# Patient Record
Sex: MALE | Race: WHITE | NOT HISPANIC OR LATINO | Employment: UNEMPLOYED | URBAN - METROPOLITAN AREA
[De-identification: names, ages, dates, MRNs, and addresses within clinical notes are randomized per-mention and may not be internally consistent; named-entity substitution may affect disease eponyms.]

---

## 2018-01-02 ENCOUNTER — OFFICE VISIT CONVERTED (OUTPATIENT)
Dept: FAMILY MEDICINE CLINIC | Facility: CLINIC | Age: 58
End: 2018-01-02
Attending: NURSE PRACTITIONER

## 2018-03-12 ENCOUNTER — CONVERSION ENCOUNTER (OUTPATIENT)
Dept: CARDIOLOGY | Facility: CLINIC | Age: 58
End: 2018-03-12

## 2018-03-12 ENCOUNTER — OFFICE VISIT CONVERTED (OUTPATIENT)
Dept: CARDIOLOGY | Facility: CLINIC | Age: 58
End: 2018-03-12
Attending: NURSE PRACTITIONER

## 2018-03-12 ENCOUNTER — OFFICE VISIT CONVERTED (OUTPATIENT)
Dept: FAMILY MEDICINE CLINIC | Facility: CLINIC | Age: 58
End: 2018-03-12
Attending: NURSE PRACTITIONER

## 2018-03-26 ENCOUNTER — PATIENT OUTREACH - CONVERTED (OUTPATIENT)
Dept: FAMILY MEDICINE CLINIC | Facility: CLINIC | Age: 58
End: 2018-03-26
Attending: NURSE PRACTITIONER

## 2018-06-05 ENCOUNTER — OFFICE VISIT CONVERTED (OUTPATIENT)
Dept: FAMILY MEDICINE CLINIC | Facility: CLINIC | Age: 58
End: 2018-06-05
Attending: NURSE PRACTITIONER

## 2018-10-23 ENCOUNTER — OFFICE VISIT CONVERTED (OUTPATIENT)
Dept: FAMILY MEDICINE CLINIC | Facility: CLINIC | Age: 58
End: 2018-10-23
Attending: NURSE PRACTITIONER

## 2019-02-18 ENCOUNTER — HOSPITAL ENCOUNTER (OUTPATIENT)
Dept: URGENT CARE | Facility: CLINIC | Age: 59
Discharge: HOME | End: 2019-02-18
Attending: NURSE PRACTITIONER

## 2019-03-18 ENCOUNTER — OFFICE VISIT CONVERTED (OUTPATIENT)
Dept: FAMILY MEDICINE CLINIC | Facility: CLINIC | Age: 59
End: 2019-03-18
Attending: NURSE PRACTITIONER

## 2019-03-21 ENCOUNTER — HOSPITAL ENCOUNTER (OUTPATIENT)
Dept: FAMILY MEDICINE CLINIC | Facility: CLINIC | Age: 59
Discharge: HOME | End: 2019-03-21
Attending: NURSE PRACTITIONER

## 2019-03-21 LAB
ALBUMIN SERPL-MCNC: 4.3 G/DL (ref 3.5–5)
ALBUMIN/GLOB SERPL: 1.5 {RATIO} (ref 1.4–2.6)
ALP SERPL-CCNC: 50 U/L (ref 56–119)
ALT SERPL-CCNC: 25 U/L (ref 10–40)
ANION GAP SERPL CALC-SCNC: 15 MMOL/L (ref 8–19)
AST SERPL-CCNC: 24 U/L (ref 15–50)
BASOPHILS # BLD AUTO: 0.07 10*3/UL (ref 0–0.2)
BASOPHILS NFR BLD AUTO: 0.8 % (ref 0–3)
BILIRUB SERPL-MCNC: 0.29 MG/DL (ref 0.2–1.3)
BUN SERPL-MCNC: 15 MG/DL (ref 5–25)
BUN/CREAT SERPL: 12 {RATIO} (ref 6–20)
CALCIUM SERPL-MCNC: 9.2 MG/DL (ref 8.7–10.4)
CHLORIDE SERPL-SCNC: 102 MMOL/L (ref 99–111)
CHOLEST SERPL-MCNC: 134 MG/DL (ref 107–200)
CHOLEST/HDLC SERPL: 4.6 {RATIO} (ref 3–6)
CONV ABS IMM GRAN: 0.02 10*3/UL (ref 0–0.2)
CONV CO2: 22 MMOL/L (ref 22–32)
CONV CREATININE URINE, RANDOM: 15.1 MG/DL (ref 10–300)
CONV IMMATURE GRAN: 0.2 % (ref 0–1.8)
CONV MICROALBUM.,U,RANDOM: <12 MG/L (ref 0–20)
CONV TOTAL PROTEIN: 7.2 G/DL (ref 6.3–8.2)
CREAT UR-MCNC: 1.27 MG/DL (ref 0.7–1.2)
DEPRECATED RDW RBC AUTO: 45 FL (ref 35.1–43.9)
EOSINOPHIL # BLD AUTO: 0.38 10*3/UL (ref 0–0.7)
EOSINOPHIL # BLD AUTO: 4.4 % (ref 0–7)
ERYTHROCYTE [DISTWIDTH] IN BLOOD BY AUTOMATED COUNT: 13.3 % (ref 11.6–14.4)
EST. AVERAGE GLUCOSE BLD GHB EST-MCNC: 177 MG/DL
GFR SERPLBLD BASED ON 1.73 SQ M-ARVRAT: >60 ML/MIN/{1.73_M2}
GLOBULIN UR ELPH-MCNC: 2.9 G/DL (ref 2–3.5)
GLUCOSE SERPL-MCNC: 130 MG/DL (ref 70–99)
HBA1C MFR BLD: 15.1 G/DL (ref 14–18)
HBA1C MFR BLD: 7.8 % (ref 3.5–5.7)
HCT VFR BLD AUTO: 46.8 % (ref 42–52)
HDLC SERPL-MCNC: 29 MG/DL (ref 40–60)
LDLC SERPL CALC-MCNC: 77 MG/DL (ref 70–100)
LYMPHOCYTES # BLD AUTO: 3.02 10*3/UL (ref 1–5)
MCH RBC QN AUTO: 29.3 PG (ref 27–31)
MCHC RBC AUTO-ENTMCNC: 32.3 G/DL (ref 33–37)
MCV RBC AUTO: 90.7 FL (ref 80–96)
MICROALBUMIN/CREAT UR: 79.5 MG/G{CRE} (ref 0–25)
MONOCYTES # BLD AUTO: 0.64 10*3/UL (ref 0.2–1.2)
MONOCYTES NFR BLD AUTO: 7.5 % (ref 3–10)
NEUTROPHILS # BLD AUTO: 4.42 10*3/UL (ref 2–8)
NEUTROPHILS NFR BLD AUTO: 51.8 % (ref 30–85)
NRBC CBCN: 0 % (ref 0–0.7)
OSMOLALITY SERPL CALC.SUM OF ELEC: 283 MOSM/KG (ref 273–304)
PLATELET # BLD AUTO: 233 10*3/UL (ref 130–400)
PMV BLD AUTO: 11.1 FL (ref 9.4–12.4)
POTASSIUM SERPL-SCNC: 4.3 MMOL/L (ref 3.5–5.3)
RBC # BLD AUTO: 5.16 10*6/UL (ref 4.7–6.1)
SODIUM SERPL-SCNC: 135 MMOL/L (ref 135–147)
T4 FREE SERPL-MCNC: 1.2 NG/DL (ref 0.9–1.8)
TRIGL SERPL-MCNC: 142 MG/DL (ref 40–150)
TSH SERPL-ACNC: 6.11 M[IU]/L (ref 0.27–4.2)
VARIANT LYMPHS NFR BLD MANUAL: 35.3 % (ref 20–45)
VLDLC SERPL-MCNC: 28 MG/DL (ref 5–37)
WBC # BLD AUTO: 8.55 10*3/UL (ref 4.8–10.8)

## 2019-05-30 ENCOUNTER — CONVERSION ENCOUNTER (OUTPATIENT)
Dept: CARDIOLOGY | Facility: CLINIC | Age: 59
End: 2019-05-30

## 2019-05-30 ENCOUNTER — OFFICE VISIT CONVERTED (OUTPATIENT)
Dept: CARDIOLOGY | Facility: CLINIC | Age: 59
End: 2019-05-30
Attending: INTERNAL MEDICINE

## 2019-06-11 ENCOUNTER — CONVERSION ENCOUNTER (OUTPATIENT)
Dept: FAMILY MEDICINE CLINIC | Facility: CLINIC | Age: 59
End: 2019-06-11

## 2019-06-11 ENCOUNTER — OFFICE VISIT CONVERTED (OUTPATIENT)
Dept: FAMILY MEDICINE CLINIC | Facility: CLINIC | Age: 59
End: 2019-06-11
Attending: NURSE PRACTITIONER

## 2019-06-11 ENCOUNTER — HOSPITAL ENCOUNTER (OUTPATIENT)
Dept: FAMILY MEDICINE CLINIC | Facility: CLINIC | Age: 59
Discharge: HOME | End: 2019-06-11
Attending: NURSE PRACTITIONER

## 2019-06-11 LAB
ALBUMIN SERPL-MCNC: 4.3 G/DL (ref 3.5–5)
ALBUMIN/GLOB SERPL: 1.2 {RATIO} (ref 1.4–2.6)
ALP SERPL-CCNC: 70 U/L (ref 56–119)
ALT SERPL-CCNC: 22 U/L (ref 10–40)
ANION GAP SERPL CALC-SCNC: 20 MMOL/L (ref 8–19)
AST SERPL-CCNC: 20 U/L (ref 15–50)
BASOPHILS # BLD AUTO: 0.08 10*3/UL (ref 0–0.2)
BASOPHILS NFR BLD AUTO: 0.7 % (ref 0–3)
BILIRUB SERPL-MCNC: 0.34 MG/DL (ref 0.2–1.3)
BUN SERPL-MCNC: 18 MG/DL (ref 5–25)
BUN/CREAT SERPL: 16 {RATIO} (ref 6–20)
CALCIUM SERPL-MCNC: 9 MG/DL (ref 8.7–10.4)
CHLORIDE SERPL-SCNC: 98 MMOL/L (ref 99–111)
CHOLEST SERPL-MCNC: 127 MG/DL (ref 107–200)
CHOLEST/HDLC SERPL: 5.5 {RATIO} (ref 3–6)
CONV ABS IMM GRAN: 0.03 10*3/UL (ref 0–0.2)
CONV CO2: 23 MMOL/L (ref 22–32)
CONV CREATININE URINE, RANDOM: 80.6 MG/DL (ref 10–300)
CONV IMMATURE GRAN: 0.3 % (ref 0–1.8)
CONV MICROALBUM.,U,RANDOM: <12 MG/L (ref 0–20)
CONV TOTAL PROTEIN: 7.8 G/DL (ref 6.3–8.2)
CREAT UR-MCNC: 1.11 MG/DL (ref 0.7–1.2)
DEPRECATED RDW RBC AUTO: 40.1 FL (ref 35.1–43.9)
EOSINOPHIL # BLD AUTO: 0.25 10*3/UL (ref 0–0.7)
EOSINOPHIL # BLD AUTO: 2.3 % (ref 0–7)
ERYTHROCYTE [DISTWIDTH] IN BLOOD BY AUTOMATED COUNT: 12.8 % (ref 11.6–14.4)
EST. AVERAGE GLUCOSE BLD GHB EST-MCNC: 200 MG/DL
GFR SERPLBLD BASED ON 1.73 SQ M-ARVRAT: >60 ML/MIN/{1.73_M2}
GLOBULIN UR ELPH-MCNC: 3.5 G/DL (ref 2–3.5)
GLUCOSE SERPL-MCNC: 217 MG/DL (ref 70–99)
HBA1C MFR BLD: 17.3 G/DL (ref 14–18)
HBA1C MFR BLD: 8.6 % (ref 3.5–5.7)
HCT VFR BLD AUTO: 50 % (ref 42–52)
HDLC SERPL-MCNC: 23 MG/DL (ref 40–60)
LDLC SERPL CALC-MCNC: 85 MG/DL (ref 70–100)
LYMPHOCYTES # BLD AUTO: 2.51 10*3/UL (ref 1–5)
MCH RBC QN AUTO: 30.1 PG (ref 27–31)
MCHC RBC AUTO-ENTMCNC: 34.6 G/DL (ref 33–37)
MCV RBC AUTO: 87.1 FL (ref 80–96)
MICROALBUMIN/CREAT UR: 14.9 MG/G{CRE} (ref 0–25)
MONOCYTES # BLD AUTO: 0.8 10*3/UL (ref 0.2–1.2)
MONOCYTES NFR BLD AUTO: 7.3 % (ref 3–10)
NEUTROPHILS # BLD AUTO: 7.36 10*3/UL (ref 2–8)
NEUTROPHILS NFR BLD AUTO: 66.6 % (ref 30–85)
NRBC CBCN: 0 % (ref 0–0.7)
OSMOLALITY SERPL CALC.SUM OF ELEC: 292 MOSM/KG (ref 273–304)
PLATELET # BLD AUTO: 225 10*3/UL (ref 130–400)
PMV BLD AUTO: 11.2 FL (ref 9.4–12.4)
POTASSIUM SERPL-SCNC: 4.4 MMOL/L (ref 3.5–5.3)
RBC # BLD AUTO: 5.74 10*6/UL (ref 4.7–6.1)
SODIUM SERPL-SCNC: 137 MMOL/L (ref 135–147)
T4 FREE SERPL-MCNC: 1.2 NG/DL (ref 0.9–1.8)
TRIGL SERPL-MCNC: 458 MG/DL (ref 40–150)
TSH SERPL-ACNC: 2.41 M[IU]/L (ref 0.27–4.2)
VARIANT LYMPHS NFR BLD MANUAL: 22.8 % (ref 20–45)
WBC # BLD AUTO: 11.03 10*3/UL (ref 4.8–10.8)

## 2019-07-29 ENCOUNTER — HOSPITAL ENCOUNTER (OUTPATIENT)
Dept: FAMILY MEDICINE CLINIC | Facility: CLINIC | Age: 59
Discharge: HOME | End: 2019-07-29
Attending: NURSE PRACTITIONER

## 2019-07-29 LAB
BASOPHILS # BLD AUTO: 0.06 10*3/UL (ref 0–0.2)
BASOPHILS NFR BLD AUTO: 0.6 % (ref 0–3)
CONV ABS IMM GRAN: 0.05 10*3/UL (ref 0–0.2)
CONV IMMATURE GRAN: 0.5 % (ref 0–1.8)
DEPRECATED RDW RBC AUTO: 40.5 FL (ref 35.1–43.9)
EOSINOPHIL # BLD AUTO: 0.15 10*3/UL (ref 0–0.7)
EOSINOPHIL # BLD AUTO: 1.5 % (ref 0–7)
ERYTHROCYTE [DISTWIDTH] IN BLOOD BY AUTOMATED COUNT: 13.1 % (ref 11.6–14.4)
HBA1C MFR BLD: 15.9 G/DL (ref 14–18)
HCT VFR BLD AUTO: 47.5 % (ref 42–52)
LYMPHOCYTES # BLD AUTO: 3.08 10*3/UL (ref 1–5)
MCH RBC QN AUTO: 29.1 PG (ref 27–31)
MCHC RBC AUTO-ENTMCNC: 33.5 G/DL (ref 33–37)
MCV RBC AUTO: 86.8 FL (ref 80–96)
MONOCYTES # BLD AUTO: 0.83 10*3/UL (ref 0.2–1.2)
MONOCYTES NFR BLD AUTO: 8.1 % (ref 3–10)
NEUTROPHILS # BLD AUTO: 6.02 10*3/UL (ref 2–8)
NEUTROPHILS NFR BLD AUTO: 59.1 % (ref 30–85)
NRBC CBCN: 0 % (ref 0–0.7)
PLATELET # BLD AUTO: 210 10*3/UL (ref 130–400)
PMV BLD AUTO: 11.7 FL (ref 9.4–12.4)
RBC # BLD AUTO: 5.47 10*6/UL (ref 4.7–6.1)
VARIANT LYMPHS NFR BLD MANUAL: 30.2 % (ref 20–45)
WBC # BLD AUTO: 10.19 10*3/UL (ref 4.8–10.8)

## 2019-09-04 ENCOUNTER — OFFICE VISIT CONVERTED (OUTPATIENT)
Dept: CARDIOLOGY | Facility: CLINIC | Age: 59
End: 2019-09-04
Attending: INTERNAL MEDICINE

## 2019-09-04 ENCOUNTER — HOSPITAL ENCOUNTER (OUTPATIENT)
Dept: FAMILY MEDICINE CLINIC | Facility: CLINIC | Age: 59
Discharge: HOME | End: 2019-09-04
Attending: NURSE PRACTITIONER

## 2019-09-04 ENCOUNTER — OFFICE VISIT CONVERTED (OUTPATIENT)
Dept: FAMILY MEDICINE CLINIC | Facility: CLINIC | Age: 59
End: 2019-09-04
Attending: NURSE PRACTITIONER

## 2019-09-04 ENCOUNTER — CONVERSION ENCOUNTER (OUTPATIENT)
Dept: CARDIOLOGY | Facility: CLINIC | Age: 59
End: 2019-09-04

## 2019-09-04 LAB
ALBUMIN SERPL-MCNC: 4.2 G/DL (ref 3.5–5)
ALBUMIN/GLOB SERPL: 1.4 {RATIO} (ref 1.4–2.6)
ALP SERPL-CCNC: 49 U/L (ref 56–155)
ALT SERPL-CCNC: 23 U/L (ref 10–40)
ANION GAP SERPL CALC-SCNC: 18 MMOL/L (ref 8–19)
AST SERPL-CCNC: 20 U/L (ref 15–50)
BASOPHILS # BLD AUTO: 0.04 10*3/UL (ref 0–0.2)
BASOPHILS NFR BLD AUTO: 0.4 % (ref 0–3)
BILIRUB SERPL-MCNC: 0.3 MG/DL (ref 0.2–1.3)
BUN SERPL-MCNC: 8 MG/DL (ref 5–25)
BUN/CREAT SERPL: 8 {RATIO} (ref 6–20)
CALCIUM SERPL-MCNC: 9.1 MG/DL (ref 8.7–10.4)
CHLORIDE SERPL-SCNC: 102 MMOL/L (ref 99–111)
CHOLEST SERPL-MCNC: 119 MG/DL (ref 107–200)
CHOLEST/HDLC SERPL: 4.8 {RATIO} (ref 3–6)
CONV ABS IMM GRAN: 0.03 10*3/UL (ref 0–0.2)
CONV CO2: 22 MMOL/L (ref 22–32)
CONV IMMATURE GRAN: 0.3 % (ref 0–1.8)
CONV TOTAL PROTEIN: 7.1 G/DL (ref 6.3–8.2)
CREAT UR-MCNC: 0.98 MG/DL (ref 0.7–1.2)
DEPRECATED RDW RBC AUTO: 45.7 FL (ref 35.1–43.9)
EOSINOPHIL # BLD AUTO: 0.35 10*3/UL (ref 0–0.7)
EOSINOPHIL # BLD AUTO: 3.9 % (ref 0–7)
ERYTHROCYTE [DISTWIDTH] IN BLOOD BY AUTOMATED COUNT: 13.6 % (ref 11.6–14.4)
EST. AVERAGE GLUCOSE BLD GHB EST-MCNC: 206 MG/DL
GFR SERPLBLD BASED ON 1.73 SQ M-ARVRAT: >60 ML/MIN/{1.73_M2}
GLOBULIN UR ELPH-MCNC: 2.9 G/DL (ref 2–3.5)
GLUCOSE SERPL-MCNC: 137 MG/DL (ref 70–99)
HBA1C MFR BLD: 8.8 % (ref 3.5–5.7)
HCT VFR BLD AUTO: 47.2 % (ref 42–52)
HDLC SERPL-MCNC: 25 MG/DL (ref 40–60)
HGB BLD-MCNC: 15.1 G/DL (ref 14–18)
LDLC SERPL CALC-MCNC: 64 MG/DL (ref 70–100)
LYMPHOCYTES # BLD AUTO: 3.1 10*3/UL (ref 1–5)
LYMPHOCYTES NFR BLD AUTO: 34.8 % (ref 20–45)
MCH RBC QN AUTO: 29 PG (ref 27–31)
MCHC RBC AUTO-ENTMCNC: 32 G/DL (ref 33–37)
MCV RBC AUTO: 90.8 FL (ref 80–96)
MONOCYTES # BLD AUTO: 0.84 10*3/UL (ref 0.2–1.2)
MONOCYTES NFR BLD AUTO: 9.4 % (ref 3–10)
NEUTROPHILS # BLD AUTO: 4.56 10*3/UL (ref 2–8)
NEUTROPHILS NFR BLD AUTO: 51.2 % (ref 30–85)
NRBC CBCN: 0 % (ref 0–0.7)
OSMOLALITY SERPL CALC.SUM OF ELEC: 286 MOSM/KG (ref 273–304)
PLATELET # BLD AUTO: 225 10*3/UL (ref 130–400)
PMV BLD AUTO: 11 FL (ref 9.4–12.4)
POTASSIUM SERPL-SCNC: 4.3 MMOL/L (ref 3.5–5.3)
RBC # BLD AUTO: 5.2 10*6/UL (ref 4.7–6.1)
SODIUM SERPL-SCNC: 138 MMOL/L (ref 135–147)
T4 FREE SERPL-MCNC: 1 NG/DL (ref 0.9–1.8)
TRIGL SERPL-MCNC: 149 MG/DL (ref 40–150)
TSH SERPL-ACNC: 3.7 M[IU]/L (ref 0.27–4.2)
VLDLC SERPL-MCNC: 30 MG/DL (ref 5–37)
WBC # BLD AUTO: 8.92 10*3/UL (ref 4.8–10.8)

## 2020-07-23 ENCOUNTER — OFFICE VISIT CONVERTED (OUTPATIENT)
Dept: FAMILY MEDICINE CLINIC | Facility: CLINIC | Age: 60
End: 2020-07-23
Attending: NURSE PRACTITIONER

## 2020-07-23 ENCOUNTER — HOSPITAL ENCOUNTER (OUTPATIENT)
Dept: FAMILY MEDICINE CLINIC | Facility: CLINIC | Age: 60
Discharge: HOME | End: 2020-07-23
Attending: NURSE PRACTITIONER

## 2020-07-23 ENCOUNTER — CONVERSION ENCOUNTER (OUTPATIENT)
Dept: FAMILY MEDICINE CLINIC | Facility: CLINIC | Age: 60
End: 2020-07-23

## 2020-07-23 LAB
ALBUMIN SERPL-MCNC: 4.3 G/DL (ref 3.5–5)
ALBUMIN/GLOB SERPL: 1.5 {RATIO} (ref 1.4–2.6)
ALP SERPL-CCNC: 58 U/L (ref 56–155)
ALT SERPL-CCNC: 26 U/L (ref 10–40)
ANION GAP SERPL CALC-SCNC: 20 MMOL/L (ref 8–19)
AST SERPL-CCNC: 15 U/L (ref 15–50)
BASOPHILS # BLD AUTO: 0.07 10*3/UL (ref 0–0.2)
BASOPHILS NFR BLD AUTO: 0.7 % (ref 0–3)
BILIRUB SERPL-MCNC: 0.27 MG/DL (ref 0.2–1.3)
BUN SERPL-MCNC: 13 MG/DL (ref 5–25)
BUN/CREAT SERPL: 12 {RATIO} (ref 6–20)
CALCIUM SERPL-MCNC: 9.3 MG/DL (ref 8.7–10.4)
CHLORIDE SERPL-SCNC: 98 MMOL/L (ref 99–111)
CHOLEST SERPL-MCNC: 153 MG/DL (ref 107–200)
CHOLEST/HDLC SERPL: 5.7 {RATIO} (ref 3–6)
CONV ABS IMM GRAN: 0.04 10*3/UL (ref 0–0.2)
CONV CO2: 22 MMOL/L (ref 22–32)
CONV CREATININE URINE, RANDOM: 42.5 MG/DL (ref 10–300)
CONV IMMATURE GRAN: 0.4 % (ref 0–1.8)
CONV MICROALBUM.,U,RANDOM: <12 MG/L (ref 0–20)
CONV TOTAL PROTEIN: 7.2 G/DL (ref 6.3–8.2)
CREAT UR-MCNC: 1.1 MG/DL (ref 0.7–1.2)
DEPRECATED RDW RBC AUTO: 38.9 FL (ref 35.1–43.9)
EOSINOPHIL # BLD AUTO: 0.28 10*3/UL (ref 0–0.7)
EOSINOPHIL # BLD AUTO: 2.9 % (ref 0–7)
ERYTHROCYTE [DISTWIDTH] IN BLOOD BY AUTOMATED COUNT: 12.1 % (ref 11.6–14.4)
EST. AVERAGE GLUCOSE BLD GHB EST-MCNC: 243 MG/DL
GFR SERPLBLD BASED ON 1.73 SQ M-ARVRAT: >60 ML/MIN/{1.73_M2}
GLOBULIN UR ELPH-MCNC: 2.9 G/DL (ref 2–3.5)
GLUCOSE SERPL-MCNC: 338 MG/DL (ref 70–99)
HBA1C MFR BLD: 10.1 % (ref 3.5–5.7)
HCT VFR BLD AUTO: 48.7 % (ref 42–52)
HDLC SERPL-MCNC: 27 MG/DL (ref 40–60)
HGB BLD-MCNC: 16.3 G/DL (ref 14–18)
LDLC SERPL CALC-MCNC: 50 MG/DL (ref 70–100)
LYMPHOCYTES # BLD AUTO: 2.65 10*3/UL (ref 1–5)
LYMPHOCYTES NFR BLD AUTO: 27.4 % (ref 20–45)
MCH RBC QN AUTO: 29.5 PG (ref 27–31)
MCHC RBC AUTO-ENTMCNC: 33.5 G/DL (ref 33–37)
MCV RBC AUTO: 88.2 FL (ref 80–96)
MICROALBUMIN/CREAT UR: 28.2 MG/G{CRE} (ref 0–25)
MONOCYTES # BLD AUTO: 0.64 10*3/UL (ref 0.2–1.2)
MONOCYTES NFR BLD AUTO: 6.6 % (ref 3–10)
NEUTROPHILS # BLD AUTO: 6 10*3/UL (ref 2–8)
NEUTROPHILS NFR BLD AUTO: 62 % (ref 30–85)
NRBC CBCN: 0 % (ref 0–0.7)
OSMOLALITY SERPL CALC.SUM OF ELEC: 295 MOSM/KG (ref 273–304)
PLATELET # BLD AUTO: 222 10*3/UL (ref 130–400)
PMV BLD AUTO: 11.7 FL (ref 9.4–12.4)
POTASSIUM SERPL-SCNC: 4 MMOL/L (ref 3.5–5.3)
RBC # BLD AUTO: 5.52 10*6/UL (ref 4.7–6.1)
SODIUM SERPL-SCNC: 136 MMOL/L (ref 135–147)
TRIGL SERPL-MCNC: 381 MG/DL (ref 40–150)
TSH SERPL-ACNC: 4.53 M[IU]/L (ref 0.27–4.2)
VLDLC SERPL-MCNC: 76 MG/DL (ref 5–37)
WBC # BLD AUTO: 9.68 10*3/UL (ref 4.8–10.8)

## 2020-07-25 LAB
PSA FREE MFR SERPL: 28.6 %
PSA FREE SERPL-MCNC: 0.6 NG/ML
PSA SERPL-MCNC: 2.1 NG/ML (ref 0–4)

## 2020-10-20 ENCOUNTER — OFFICE VISIT CONVERTED (OUTPATIENT)
Dept: FAMILY MEDICINE CLINIC | Facility: CLINIC | Age: 60
End: 2020-10-20
Attending: NURSE PRACTITIONER

## 2020-10-20 ENCOUNTER — HOSPITAL ENCOUNTER (OUTPATIENT)
Dept: FAMILY MEDICINE CLINIC | Facility: CLINIC | Age: 60
Discharge: HOME | End: 2020-10-20
Attending: NURSE PRACTITIONER

## 2020-10-20 LAB
BASOPHILS # BLD AUTO: 0.07 10*3/UL (ref 0–0.2)
BASOPHILS NFR BLD AUTO: 0.7 % (ref 0–3)
CONV ABS IMM GRAN: 0.02 10*3/UL (ref 0–0.2)
CONV IMMATURE GRAN: 0.2 % (ref 0–1.8)
DEPRECATED RDW RBC AUTO: 42.1 FL (ref 35.1–43.9)
EOSINOPHIL # BLD AUTO: 0.26 10*3/UL (ref 0–0.7)
EOSINOPHIL # BLD AUTO: 2.7 % (ref 0–7)
ERYTHROCYTE [DISTWIDTH] IN BLOOD BY AUTOMATED COUNT: 13.2 % (ref 11.6–14.4)
EST. AVERAGE GLUCOSE BLD GHB EST-MCNC: 180 MG/DL
HBA1C MFR BLD: 7.9 % (ref 3.5–5.7)
HCT VFR BLD AUTO: 53 % (ref 42–52)
HGB BLD-MCNC: 17.7 G/DL (ref 14–18)
LYMPHOCYTES # BLD AUTO: 3.34 10*3/UL (ref 1–5)
LYMPHOCYTES NFR BLD AUTO: 34.4 % (ref 20–45)
MCH RBC QN AUTO: 29.3 PG (ref 27–31)
MCHC RBC AUTO-ENTMCNC: 33.4 G/DL (ref 33–37)
MCV RBC AUTO: 87.6 FL (ref 80–96)
MONOCYTES # BLD AUTO: 0.65 10*3/UL (ref 0.2–1.2)
MONOCYTES NFR BLD AUTO: 6.7 % (ref 3–10)
NEUTROPHILS # BLD AUTO: 5.36 10*3/UL (ref 2–8)
NEUTROPHILS NFR BLD AUTO: 55.3 % (ref 30–85)
NRBC CBCN: 0 % (ref 0–0.7)
PLATELET # BLD AUTO: 239 10*3/UL (ref 130–400)
PMV BLD AUTO: 11.6 FL (ref 9.4–12.4)
RBC # BLD AUTO: 6.05 10*6/UL (ref 4.7–6.1)
WBC # BLD AUTO: 9.7 10*3/UL (ref 4.8–10.8)

## 2020-10-21 LAB
ALBUMIN SERPL-MCNC: 4.8 G/DL (ref 3.5–5)
ALBUMIN/GLOB SERPL: 1.5 {RATIO} (ref 1.4–2.6)
ALP SERPL-CCNC: 94 U/L (ref 56–155)
ALT SERPL-CCNC: 29 U/L (ref 10–40)
ANION GAP SERPL CALC-SCNC: 19 MMOL/L (ref 8–19)
AST SERPL-CCNC: 27 U/L (ref 15–50)
BILIRUB SERPL-MCNC: 0.76 MG/DL (ref 0.2–1.3)
BNP SERPL-MCNC: 163 PG/ML (ref 0–900)
BUN SERPL-MCNC: 9 MG/DL (ref 5–25)
BUN/CREAT SERPL: 8 {RATIO} (ref 6–20)
CALCIUM SERPL-MCNC: 10 MG/DL (ref 8.7–10.4)
CHLORIDE SERPL-SCNC: 102 MMOL/L (ref 99–111)
CONV CO2: 22 MMOL/L (ref 22–32)
CONV TOTAL PROTEIN: 8.1 G/DL (ref 6.3–8.2)
CREAT UR-MCNC: 1.14 MG/DL (ref 0.7–1.2)
GFR SERPLBLD BASED ON 1.73 SQ M-ARVRAT: >60 ML/MIN/{1.73_M2}
GLOBULIN UR ELPH-MCNC: 3.3 G/DL (ref 2–3.5)
GLUCOSE SERPL-MCNC: 82 MG/DL (ref 70–99)
OSMOLALITY SERPL CALC.SUM OF ELEC: 286 MOSM/KG (ref 273–304)
POTASSIUM SERPL-SCNC: 4.2 MMOL/L (ref 3.5–5.3)
SODIUM SERPL-SCNC: 139 MMOL/L (ref 135–147)
T4 FREE SERPL-MCNC: 1.4 NG/DL (ref 0.9–1.8)
TSH SERPL-ACNC: 2.49 M[IU]/L (ref 0.27–4.2)

## 2020-11-23 ENCOUNTER — CONVERSION ENCOUNTER (OUTPATIENT)
Dept: CARDIOLOGY | Facility: CLINIC | Age: 60
End: 2020-11-23

## 2020-11-23 ENCOUNTER — OFFICE VISIT CONVERTED (OUTPATIENT)
Dept: CARDIOLOGY | Facility: CLINIC | Age: 60
End: 2020-11-23
Attending: INTERNAL MEDICINE

## 2020-12-02 ENCOUNTER — CONVERSION ENCOUNTER (OUTPATIENT)
Dept: CARDIOLOGY | Facility: CLINIC | Age: 60
End: 2020-12-02
Attending: INTERNAL MEDICINE

## 2021-05-10 NOTE — PROCEDURES
"   Procedure Note      Patient Name: Alberto Rachel   Patient ID: [id number]   Sex: Male   YOB: 1960    Primary Care Provider: Parul GUERRIER    Visit Date: December 2, 2020    Provider: Darin Cruz MD   Location[facility]   Location Address: [address] [place] [address], [address]   VLADIMIR Yung  [id number]   Location Phone: [phone]          FINAL REPORT   TRANSTHORACIC ECHOCARDIOGRAM REPORT    Diagnosis: Shortness of breath   Height: 5'8\" Weight: 231 B/P: 190/86 BSA: 2.2   Tech: BNS   MEASUREMENTS:  RVID (Diastole) : RVID. (NORMAL: 0.7 to 2.4 cm max)   LVID (Systole): 4.3 cm (Diastole): 5.5 cm . (NORMAL: 3.7 - 5.4 cm)   Posterior Wall Thickness (Diastole): 0.9 cm. (NORMAL: 0.8 - 1.1 cm)   Septal Thickness (Diastole): 1.1 cm. (NORMAL: 0.7 - 1.2 cm)   LAID (Systole): 4.9 cm. (NORMAL: 1.9 - 3.8 cm)   Aortic Root Diameter (Diastole): 3.2 cm. (NORMAL: 2.0 - 3.7 cm)   COMMENTS:  The patient underwent 2-D, M-Mode, and Doppler examination, including pulse-wave, continuous-wave, and color-flow analysis; the study is technically adequate.   FINDINGS:  MITRAL VALVE: Normal. E to F slope was normal. No evidence of any prolapse.   AORTIC VALVE: Normal with three cusps. Normal central closure. No evidence of any obstruction.   TRICUSPID VALVE: Normal.   PULMONIC VALVE: Normal.   LEFT ATRIUM: Normal; no masses seen. LA volume is 30 mL/m2.   AORTIC ROOT: Normal in size and motion.   LEFT VENTRICLE: The left ventricular chamber size is normal. The left ventricular wall thickness is normal. The left ventricular systolic function is preserved with estimated EF of 55%. There is mild hypokinesis of the inferior wall and proximal interventricular septum.   RIGHT ATRIUM: Normal.   RIGHT VENTRICLE: Normal size and function.   PERICARDIUM: No effusion.   INFERIOR VENA CAVA: Diameter is 1.7 cm with greater than 50% reduction with inspiration.   DOPPLER: Pulse-wave, continuous wave, and color-flow Doppler " evaluation was performed. E/A ratio is 2.2. DT= 158 msec. IVRT is 81 msec. E/E' is 13. There is trace aortic valve regurgitation present. There is tricuspid regurgitation with normal estimated pulmonary artery systolic pressure.   Faxed: 12/08/2020      CONCLUSION:  1.  Left ventricular chamber size is normal. The left ventricular wall thickness is normal. The left ventricular        systolic function is preserved with estimated EF of 55%. There is mild hypokinesis of the inferior wall and        proximal interventricular septum.   2.  Grade 2-3 left ventricular diastolic dysfunction.   3.  Trace aortic regurgitation of no hemodynamic consequence.   4.  Tricuspid regurgitation with normal estimated pulmonary artery systolic pressure.      Darin Cruz MD, [facility]  PM/pap    This note was transcribed by Luanne Mccall.  pap/pm  The above service was transcribed by Luanne Mccall, and I attest to the accuracy of the note.  PM                 Electronically Signed by: Keri Mccall-, Other -Author on December 8, 2020 10:12:43 AM  Electronically Co-signed by: Darin Cruz MD -Reviewer on December 19, 2020 12:23:20 AM

## 2021-05-13 NOTE — PROGRESS NOTES
"   Progress Note      Patient Name: Alberto Rachel   Patient ID: [id number]   Sex: Male   YOB: 1960    Primary Care Provider: Parul GUERRIER   Referring Provider: Naheed GUERRIER    Visit Date: July 23, 2020    Provider: CHEY Ta   Location: [facility]   Location Address: [address] [place] [address] VLADIMIR Dawson  [id number]   Location Phone: [phone]          Chief Complaint     Follow up and med refills       History Of Present Illness  Alberto Rachel is a 60 year old /White male who presents for evaluation and treatment of:      Here for refills.  He is not fasting but doesnt have the gas money to come back. Not even in a couple of weeks. It's been almost a year since we checked his labs.   HTN/LIPIDs/CAD - doing ok with current meds  Depression/anxiety- he says it's doing better with the abilify. Denies SI/HI, needs refills   DM:  He is doing fine with meds, he says he checks his sugar and it's under 100. He is drinking 3 mountain dews a day and his diet is \"good\"  he chews a twist of tobacco a week       Past Medical History  Disease Name Date Onset Notes   Anxiety --  --    CAD (coronary artery disease) 10/23/2018 --    Carotid Artery Stenosis/Occlusion With Cerebrovascular Infarction 06/01/15 Dr St   Depression --  --    Depression --  --    Diabetes mellitus, type 2 10/23/2018 --    Duodenal Ulcer --  --    Essential hypertension 10/23/2018 --    Headache --  --    Heartburn --  --    Hyperlipidemia --  --    Hypertension --  --    Irregular Heart Beat --  --    Low back pain 03/18/2019 --    Mild episode of recurrent major depressive disorder 10/23/2018 --    Rash 03/18/2019 --    Shortness of Breath --  --    Type 2 diabetes mellitus with hyperglycemia, without long-term current use of insulin 06/11/2019 --    Uncontrolled diabetes mellitus 03/18/2019 --          Past Surgical History  Procedure Name Date Notes   Cardiac Catherization 06/2015 " Dr St   Circumcision --  age 14 yrs old   Heart Bypass 06/2015 5 bypasses         Medication List  Name Date Started Instructions   Abilify 20 mg oral tablet 07/23/2020 take 1 tablet (20 mg) by oral route once daily for 30 days   aspirin 81 mg oral tablet,delayed release (/EC) 07/23/2020 take 1 tablet (81 mg) by oral route once daily for 30 days   atorvastatin 80 mg oral tablet 07/23/2020 take 1 tablet (80 mg) by oral route once daily at bedtime for 30 days   BP machine 03/13/2018 dx htn   bumetanide 1 mg oral tablet  take 1 tablet (1 mg) by oral route every other day   Celexa 20 mg oral tablet 07/23/2020 take 1 tablet (20 mg) by oral route once daily for 30 days   fenofibrate 160 mg oral tablet 07/23/2020 take 1 tablet (160 mg) by oral route once daily for 30 days    mg oral tablet 03/18/2019 take 1 tablet (600 mg) by oral route every 6 hours as needed with food   Klor-Con M20 20 mEq oral tablet,ER particles/crystals  take 1 tablet by oral route every other day   metformin 1,000 mg oral tablet 07/23/2020 take 1 tablet (1,000 mg) by oral route 2 times per day with morning and evening meals for 30 days   metoprolol tartrate 50 mg oral tablet 07/23/2020 take 1 tablet (50 mg) by oral route 2 times per day with meals for 30 days   nitroglycerin 0.2 mg/hr transdermal patch 24 hour 03/29/2019 apply 1 PATCH transdermally EVERY DAY   Nitrostat 0.4 mg sublingual tablet, sublingual 10/23/2018 0.4 MG SL Q5MIN PRN For ANGINA         Allergy List  Allergen Name Date Reaction Notes   Flexeril --  --  --          Family Medical History  Disease Name Relative/Age Notes   Heart Disease  --          Social History  Finding Status Start/Stop Quantity Notes   Alcohol Former 13yrs old/-- --  07/28/2016 - 1 case per day states several years of drinking    Assessed for Abuse/Neglect --  --/-- --  Denies abuse   Developmental disorder of scholastic skill --  --/-- --  --    Other Substance Use Never --/-- --  --    Retired --   "--/-- --  --    Smokeless tobacco Current every day 4yrs/-- twist week 06/11/2019 - 03/12/2018 - 03/06/17 chew tobbaco 1 twist week used 54 yrs   Tobacco Current every day --/-- 2-3 twist pweek --    Tobacco chew use --  --/-- --  03/12/2018 -    Uses seatbelts --  --/-- --  --          Immunizations  NameDate Admin Mfg Trade Name Lot Number Route Inj VIS Given VIS Publication   Lpbwwdxut34/23/2018 SK[employer] Fluarix, quadrivalent, preservative free [id number] IM RD 10/23/2018 08/07/2015   Comments:    Arvvsofcv20/10/2017 SKB Fluarix, quadrivalent, preservative free [id number] NE NE 10/24/2017 07/02/2012   Comments:    Tblukapwj81/21/2015 SKB Fluarix, quadrivalent, preservative free [id number] NE NE 09/22/2015 07/02/2012   Comments:    Zpjviyoau75/15/2014 SKB Fluarix, quadrivalent, preservative free 2A2KX NE NE 12/02/2014 07/02/2012   Comments:          Review of Systems  · Constitutional  o Admits  o : weight gain  o Denies  o : fever, fatigue  · Cardiovascular  o Denies  o : lower extremity edema,chest pressure, palpitations  · Respiratory  o Denies  o : shortness of breath, wheezing, cough dyspnea on exertion  · Gastrointestinal  o Denies  o : nausea, vomiting, diarrhea, constipation, abdominal pain  · Genitourinary  o Denies  o : urgency, nocturia  · Psychiatric  o Denies  o : suicidal ideation, homicidal ideation      Vitals  Date Time BP Position Site L\R Cuff Size HR RR TEMP (F) WT  HT  BMI kg/m2 BSA m2 O2 Sat HC       09/04/2019 11:19 /78 Sitting    60 - R   226lbs 0oz 5'  8\" 34.36 2.22     09/04/2019 11:21 /70 Sitting    58 - R           07/23/2020 10:11 /76 Sitting    68 - R 20 97.7 232lbs 7oz 5'  8\" 35.34 2.25 96 %          Physical Examination  · Constitutional  o Appearance  o : well-nourished, well developed, alert, in no acute distress  · Respiratory  o Respiratory Effort  o : breathing unlabored  o Auscultation of Lungs  o : normal breath sounds throughout  · Cardiovascular  o Heart  o : "   § Auscultation of Heart  § : regular rate and rhythm, no murmurs, gallops or rubs  § Palpation of Heart  § : normal apical impulse, no cardiac thrill present  o Peripheral Vascular System  o :   § Carotid Arteries  § : normal pulses bilaterally, no bruits present  § Pedal Pulses  § : pulses 2 bilaterally  § Extremities  § : trace to 1 + lower leg edema noted dave; less than 2 second refill noted  · Neurologic  o Mental Status Examination  o :   § Orientation  § : grossly oriented to person, place and time  o Cranial Nerves  o : cranial nerves intact and symmetric throughout  · Psychiatric  o Judgement and Insight  o : judgement and insight intact, judgement for everyday activities and social situations within normal limits, insight intact  o Mood and Affect  o : mood normal, affect appropriate, denies any SI/HI  o Presence of Abnormal Thoughts  o : no hallucinations, no delusions present, no psychotic thoughts              Assessment  · Type 2 diabetes mellitus with hyperglycemia, without long-term current use of insulin       Type 2 diabetes mellitus with hyperglycemia     250.00/E11.65  · Essential hypertension     401.9/I10  · Hyperlipidemia     272.4/E78.5  · Nicotine dependence     305.1/F17.200  · Mild episode of recurrent major depressive disorder     296.31/F33.0  · Anxiety     300.02/F41.1  · Screening for depression     V79.0/Z13.89  · CAD (coronary artery disease)     414.00/I25.10  · Uncontrolled diabetes mellitus     250.02/E11.65      Plan  · Orders  o Annual depression screening, 15 minutes (, 89652) - V79.0/Z13.89 - 07/23/2020  o ACO-18: Positive screen for clinical depression using a standardized tool and a follow-up plan documented () - V79.0/Z13.89 - 07/23/2020  o CBC with Auto Diff [facility] (68674) - - 07/23/2020  o TSH [facility] (86634) - - 07/23/2020  o ACO-39: Current medications updated and reviewed () - - 07/23/2020  o ACO-18: Negative screen for clinical depression using a standardized  tool () - - 07/23/2020  o ACO-14: Influenza immunization administered or previously received () - - 07/23/2020  o ACO-13: Fall Risk Screening with no falls in past year or only one fall without injury in the past year (1101F) - - 07/23/2020  o PSA - total (57768) - - 07/23/2020  o Diabetes 2 Panel (Urine Microalbumin, CMP, Lipid, A1c, ) [facility] ([id number], 17068, 89089, 59769) - - 07/23/2020  · Medications  o Abilify 20 mg oral tablet   SIG: take 1 tablet (20 mg) by oral route once daily for 30 days   DISP: (30) tablets with 2 refills  Refilled on 07/23/2020     o aspirin 81 mg oral tablet,delayed release (DR/EC)   SIG: take 1 tablet (81 mg) by oral route once daily for 30 days   DISP: (30) tablet with 11 refills  Refilled on 07/23/2020     o atorvastatin 80 mg oral tablet   SIG: take 1 tablet (80 mg) by oral route once daily at bedtime for 30 days   DISP: (30) tablets with 2 refills  Refilled on 07/23/2020     o Celexa 20 mg oral tablet   SIG: take 1 tablet (20 mg) by oral route once daily for 30 days   DISP: (30) tablets with 2 refills  Refilled on 07/23/2020     o metformin 1,000 mg oral tablet   SIG: take 1 tablet (1,000 mg) by oral route 2 times per day with morning and evening meals for 30 days   DISP: (60) tablets with 2 refills  Refilled on 07/23/2020     o metoprolol tartrate 50 mg oral tablet   SIG: take 1 tablet (50 mg) by oral route 2 times per day with meals for 30 days   DISP: (60) tablets with 2 refills  Refilled on 07/23/2020     o Medications have been Reconciled  o Transition of Care or Provider Policy  · Instructions  o Advised that cheeses and other sources of dairy fats, animal fats, fast food, and the extras (candy, pasteries, pies, doughnuts and cookies) all contain LDL raising nutrients. Advised to increase fruits, vegetables, whole grains, and to monitor portion sizes.   o *Form of nicotine being used: dipping  o Patient was strongly encouraged to discontinue use of any nicotine containing  product or minimize the use of the product.  o Depression Screen completed and scanned into the EMR under the designated folder within the patient's documents.  o PHQ-9 results between 5-9 indicate Minimal Depression  o The provider screening met the required time of 15 minutes.  o Take all medications as prescribed/directed.  o Rest. Increase Fluids.  o Patient was educated/instructed on their diagnosis, treatment and medications prior to discharge from the clinic today.  o Call the office with any concerns or questions.  o F.U in 3 month for labs and appt. Call with any concerns ro questions. We will do labs today. He drove himself today. He said he is fine to drive to [place].   o Chronic conditions reviewed and taken into consideration for today's treatment plan.  · Disposition  o Call or Return if symptoms worsen or persist.            Electronically Signed by: CHEY Ta -Author on July 24, 2020 03:51:43 PM

## 2021-05-13 NOTE — PROGRESS NOTES
Progress Note      Patient Name: Alberto Rachel   Patient ID: [id number]   Sex: Male   YOB: 1960    Primary Care Provider: Parul GUERRIER    Visit Date: October 20, 2020    Provider: CHEY Walter   Location: [facility]   Location Address: [address]  [place]  [id number]   Location Phone: [phone]          Chief Complaint     3 month follow up  States he has been having SOA when lies down       History Of Present Illness  Alberto Rachel is a 60 year old /White male who presents for evaluation and treatment of:      Here for refills.  He is not fasting but is ok with labs.  He has been soa at times when he wipes himself or when he is laying down.  He is not having any swelling of the legs per pt.  He thinks he may have asthma. He is not coughing up anything.  No runny nose/vomiting noted.   HTN/LIPIDs/CAD - doing ok with current meds  Depression/anxiety- he says it's doing better with the abilify. Denies SI/HI, needs refills   DM:  He is not checking sugars.  He is drinking water.  He has lost 13 lbs.  he chews a twist of tobacco a week       Past Medical History  Disease Name Date Onset Notes   Anxiety --  --    CAD (coronary artery disease) 10/23/2018 --    Carotid Artery Stenosis/Occlusion With Cerebrovascular Infarction 06/01/15 Dr St   Depression --  --    Depression --  --    Diabetes mellitus, type 2 10/23/2018 --    Duodenal Ulcer --  --    Essential hypertension 10/23/2018 --    Headache --  --    Heartburn --  --    Hyperlipidemia --  --    Hypertension --  --    Irregular Heart Beat --  --    Low back pain 03/18/2019 --    Mild episode of recurrent major depressive disorder 10/23/2018 --    Rash 03/18/2019 --    Shortness of Breath --  --    Type 2 diabetes mellitus with hyperglycemia, without long-term current use of insulin 06/11/2019 --    Uncontrolled diabetes mellitus 03/18/2019 --          Past Surgical  History  Procedure Name Date Notes   Cardiac Catherization 06/2015 Dr St   Circumcision --  age 14 yrs old   Heart Bypass 06/2015 5 bypasses         Medication List  Name Date Started Instructions   Abilify 20 mg oral tablet 10/20/2020 take 1 tablet (20 mg) by oral route once daily for 30 days   aspirin 81 mg oral tablet,delayed release (/EC) 08/25/2020 take 1 tablet (81 mg) by oral route once daily for 30 days   atorvastatin 80 mg oral tablet 10/20/2020 take 1 tablet (80 mg) by oral route once daily at bedtime for 30 days   BP machine 03/13/2018 dx htn   bumetanide 1 mg oral tablet  take 1 tablet (1 mg) by oral route every other day   Celexa 20 mg oral tablet 10/20/2020 take 1 tablet (20 mg) by oral route once daily for 30 days   fenofibrate 160 mg oral tablet 10/20/2020 take 1 tablet (160 mg) by oral route once daily for 30 days   glipizide 5 mg oral tablet 10/20/2020 take 1 tablet (5 mg) by oral route 2 times per day before meals    mg oral tablet 03/18/2019 take 1 tablet (600 mg) by oral route every 6 hours as needed with food   Klor-Con M20 20 mEq oral tablet,ER particles/crystals  take 1 tablet by oral route every other day   levothyroxine 50 mcg oral tablet 10/20/2020 take 1 tablet (50 mcg) by oral route once daily on an empty stomach 30 minutes before breakfast   metformin 1,000 mg oral tablet 10/20/2020 take 1 tablet (1,000 mg) by oral route 2 times per day with morning and evening meals for 30 days   metoprolol tartrate 50 mg oral tablet 10/20/2020 take 1 tablet (50 mg) by oral route 2 times per day with meals for 30 days   nitroglycerin 0.2 mg/hr transdermal patch 24 hour 03/29/2019 apply 1 PATCH transdermally EVERY DAY   Nitrostat 0.4 mg sublingual tablet, sublingual 10/23/2018 0.4 MG SL Q5MIN PRN For ANGINA         Allergy List  Allergen Name Date Reaction Notes   Flexeril --  --  --        Allergies Reconciled  Family Medical History  Disease Name Relative/Age Notes   Heart Disease  --   "        Social History  Finding Status Start/Stop Quantity Notes   Alcohol Former 13yrs old/-- --  07/28/2016 - 1 case per day states several years of drinking    Assessed for Abuse/Neglect --  --/-- --  Denies abuse   Developmental disorder of scholastic skill --  --/-- --  --    Other Substance Use Never --/-- --  --    Retired --  --/-- --  --    Smokeless tobacco Current every day 4yrs/-- twist week 06/11/2019 - 03/12/2018 - 03/06/17 chew tobbaco 1 twist week used 54 yrs   Tobacco Current every day --/-- 1 twist pweek --    Tobacco chew use --  --/-- --  03/12/2018 -    Uses seatbelts --  --/-- --  --          Immunizations  NameDate Admin Mfg Trade Name Lot Number Route Inj VIS Given VIS Publication   Swuvoleld28/20/2020 [facility] Fluzone Quadrivalent [id number] IM LD 10/20/2020 08/15/2019   Comments: pt. tolerated well         Review of Systems  · Constitutional  o Admits  o : weight loss  o Denies  o : fever, fatigue, wt gain  · Cardiovascular  o Denies  o : lower extremity edema, claudication, chest pressure, palpitations  · Respiratory  o Denies  o : shortness of breath, wheezing, cough, hemoptysis, dyspnea on exertion  · Gastrointestinal  o Denies  o : nausea, vomiting, diarrhea, constipation, abdominal pain  · Genitourinary  o Denies  o : urgency, nocturia  · Psychiatric  o Denies  o : suicidal ideation, homicidal ideation      Vitals  Date Time BP Position Site L\R Cuff Size HR RR TEMP (F) WT  HT  BMI kg/m2 BSA m2 O2 Sat FR L/min FiO2        10/20/2020 01:34 /82 Sitting    65 - R 14 97.7 219lbs 0oz 5'  8\" 33.3 2.18 97 %            Physical Examination  · Constitutional  o Appearance  o : well-nourished, well developed, alert, in no acute distress  · Respiratory  o Respiratory Effort  o : breathing unlabored  o Auscultation of Lungs  o : normal breath sounds throughout  · Cardiovascular  o Heart  o :   § Auscultation of Heart  § : regular rate and rhythm, no murmurs, gallops or rubs  § Palpation of " Heart  § : normal apical impulse, no cardiac thrill present  o Peripheral Vascular System  o :   § Carotid Arteries  § : normal pulses bilaterally, no bruits present  § Pedal Pulses  § : pulses 2 bilaterally  § Extremities  § : trace to 1 + lower leg edema noted dave; less than 2 second refill noted  · Neurologic  o Mental Status Examination  o :   § Orientation  § : grossly oriented to person, place and time  o Cranial Nerves  o : cranial nerves intact and symmetric throughout  · Psychiatric  o Judgement and Insight  o : judgement and insight intact, judgement for everyday activities and social situations within normal limits, insight intact  o Mood and Affect  o : mood normal, affect appropriate, denies any SI/HI  o Presence of Abnormal Thoughts  o : no hallucinations, no delusions present, no psychotic thoughts          Assessment  · Type 2 diabetes mellitus with hyperglycemia, without long-term current use of insulin       Type 2 diabetes mellitus with hyperglycemia     250.00/E11.65  · Essential hypertension     401.9/I10  · Hyperlipidemia     272.4/E78.5  · Nicotine dependence     305.1/F17.200  · Mild episode of recurrent major depressive disorder     296.31/F33.0  · Anxiety     300.02/F41.1  · Need for influenza vaccination     V04.81/Z23  · CAD (coronary artery disease)     414.00/I25.10  · Uncontrolled diabetes mellitus     250.02/E11.65  · Dyspnea     786.09/R06.00      Plan  · Orders  o Immunization Admin Fee (Single) ([facility]) (67589) - V04.81/Z23 - 10/20/2020  o Fluzone Quadrivalent Vaccine, age 3+ (38524) - V04.81/Z23 - 10/20/2020   Vaccine - Influenza; Dose: 0.5; Site: Left Deltoid; Route: Intramuscular; Date: 10/20/2020 14:40:00; Exp: 06/30/2021; Lot: [id number]; Mfg: sanofi pasteur; TradeName: Fluzone Quadrivalent; Administered By: N/A; Comment: pt. tolerated well  o CBC with Auto Diff [facility] (61846) - - 10/20/2020  o CMP [facility] (80661) - - 10/20/2020  o Hgb A1c [facility] (45602) - - 10/20/2020  o Thyroid Profile  (THYII) - - 10/20/2020  o ACO-39: Current medications updated and reviewed () - - 10/20/2020  o ACO-14: Influenza immunization administered or previously received () - - 10/20/2020  o ACO-13: Fall Risk Screening with no falls in past year or only one fall without injury in the past year (1101F) - - 10/20/2020  o BNP level (61566) - - 10/20/2020  o Echocardiography for determining interventricular septal thickness (17000) - - 10/20/2020  o Doppler echocardiography, pulsed wave and/or continuous wave wit (94493) - - 10/20/2020  o Doppler echocardiography color flow velocity mapping (List separ (11366) - - 10/20/2020  o CXR PA/Lat [facility] (16246) - - 10/20/2020  · Medications  o Abilify 20 mg oral tablet   SIG: take 1 tablet (20 mg) by oral route once daily for 30 days   DISP: (30) Tablet with 2 refills  Refilled on 10/20/2020     o atorvastatin 80 mg oral tablet   SIG: take 1 tablet (80 mg) by oral route once daily at bedtime for 30 days   DISP: (30) Tablet with 2 refills  Refilled on 10/20/2020     o Celexa 20 mg oral tablet   SIG: take 1 tablet (20 mg) by oral route once daily for 30 days   DISP: (30) Tablet with 2 refills  Refilled on 10/20/2020     o fenofibrate 160 mg oral tablet   SIG: take 1 tablet (160 mg) by oral route once daily for 30 days   DISP: (30) Tablet with 2 refills  Refilled on 10/20/2020     o glipizide 5 mg oral tablet   SIG: take 1 tablet (5 mg) by oral route 2 times per day before meals   DISP: (60) Tablet with 2 refills  Refilled on 10/20/2020     o levothyroxine 50 mcg oral tablet   SIG: take 1 tablet (50 mcg) by oral route once daily on an empty stomach 30 minutes before breakfast   DISP: (30) Tablet with 2 refills  Refilled on 10/20/2020     o metformin 1,000 mg oral tablet   SIG: take 1 tablet (1,000 mg) by oral route 2 times per day with morning and evening meals for 30 days   DISP: (60) Tablet with 2 refills  Refilled on 10/20/2020     o metoprolol tartrate 50 mg  oral tablet   SIG: take 1 tablet (50 mg) by oral route 2 times per day with meals for 30 days   DISP: (60) Tablet with 2 refills  Refilled on 10/20/2020     o Medications have been Reconciled  o Transition of Care or Provider Policy  · Instructions  o Advised that cheeses and other sources of dairy fats, animal fats, fast food, and the extras (candy, pasteries, pies, doughnuts and cookies) all contain LDL raising nutrients. Advised to increase fruits, vegetables, whole grains, and to monitor portion sizes.   o *Form of nicotine being used: dipping  o Patient was strongly encouraged to discontinue use of any nicotine containing product or minimize the use of the product.  o Take all medications as prescribed/directed.  o Rest. Increase Fluids.  o Patient was educated/instructed on their diagnosis, treatment and medications prior to discharge from the clinic today.  o Call the office with any concerns or questions.  o F.U in 3 month for labs and appt. Call with any concerns ro questions. We will do labs today. He drove himself today. He said he is fine to drive to [facility]. We will do the u/s of the heart. We will do CXR.   o Chronic conditions reviewed and taken into consideration for today's treatment plan.  o Electronically Identified Patient Education Materials Provided Electronically            Electronically Signed by: CHEY Walter -Author on October 20, 2020 03:38:08 PM

## 2021-05-13 NOTE — PROGRESS NOTES
Progress Note      Patient Name: Alberto Rachel   Patient ID: [id number]   Sex: Male   YOB: 1960    Primary Care Provider: Parul GUERRIER    Visit Date: November 23, 2020    Provider: Darin Cruz MD   Location: [facility]   Location Address: [address] [place] [address], [facility]   VLADIMIR Yung  [id number]   Location Phone: [phone]          Chief Complaint     Shortness of breath.       History Of Present Illness  Alberto Rachel is a 60 year old /White male who has not been in the office in over 13 months now. He states he had an episode a month ago where he was short of breath off and on for 2 days. He admits he was without some of his medications. Now that he is back on his meds, he feels good and has no complaints at all. No chest pain. No palpitations, swelling, dizziness, syncope, PND, or orthopnea. He did not do the echocardiogram as directed by PCP.   PAST MEDICAL HISTORY: Coronary artery disease with previous bypass, LIMA to the LAD, SVG to ramus, and SVG to RCA in 2015; Hyperlipidemia; Hypertension.   PSYCHOSOCIAL HISTORY: Chews tobacco currently.   CURRENT MEDICATIONS: Patient cannot read or write and did not bring his bottles. Progress note from his PCP last office visit October 20th was obtained to try to verify some of the cardiac meds. He said he gets some of his med[facility] at [facility]. According to them, he does have aspirin 81 mg every other day and atorvastatin 80 mg that was picked up August 25th. He has never picked up bumetanide 1 mg. He does take fenofibrate 160 mg. He does not take potassium. He does take metoprolol 50 mg b.i.d. He says he has NitroPatch 0.2 mg/hour from a different pharmacy. According to a list from them, he is also supposed to be taking Abilify 20 mg, Celexa 20 mg, glipizide 5 mg, levothyroxine 50 mcg, metformin 1,000 mg b.i.d., nitro p.r.n. He also does have losartan 25 mg once a day that we prescribed last year.      ALLERGIES:  Flexeril.  "      Review of Systems  · Cardiovascular  o Denies  o : palpitations (fast, fluttering, or skipping beats), swelling (feet, ankles, hands), shortness of breath while walking or lying flat, chest pain or angina pectoris   · Respiratory  o Denies  o : chronic or frequent cough      Vitals  Date Time BP Position Site L\R Cuff Size HR RR TEMP (F) WT  HT  BMI kg/m2 BSA m2 O2 Sat FR L/min FiO2 HC       11/23/2020 01:07 /86 Sitting    66 - R   231lbs 0oz 5'  8\" 35.12 2.24       11/23/2020 01:07 /88 Sitting    64 - R                   Physical Examination  · Constitutional  o Appearance  o : Awake, alert, in no acute distress.  · Eyes  o Conjunctivae  o : Conjunctivae normal.  · Ears, Nose, Mouth and Throat  o Oral Cavity  o :   § Oral Mucosa  § : Normal.  · Neck  o Jugular Veins  o : No JVD. Good carotid upstroke. No bruits noted.  · Respiratory  o Respiratory  o : Good respiratory effort. Clear to percussion and auscultation.  · Cardiovascular  o Heart  o : PMI not well felt. S1, S2 normal. No S3. No S4.  o Peripheral Vascular System  o :   § Extremities  § : Good femoral and pedal pulses. No pedal edema.  · Gastrointestinal  o Abdominal Examination  o : Soft. No tenderness or masses felt. No hepatosplenomegaly. Abdominal aorta is not palpable.  · EKG  o EKG  o : Performed in the office today.  o Indications  o : Coronary artery disease.  o Results  o : Sinus rhythm, rate 68, nonspecific intraventricular conduction delay, abnormal T waves.  o Comparison  o : No change.   · Labs  o Labs  o : In October, BUN 9, creatinine 1.14, hemoglobin A1c 7.9%. In July, total cholesterol 153, triglycerides 381, LDL 50, HDL 27.          Assessment     1.  Shortness of breath, improving.  2.  Coronary artery disease with previous bypass without angina.  3.  Hyperlipidemia, mixed, with LDL at goal.  4.  Hypertension, needs tighter control.       Plan     1.  Increase losartan to 50 mg once a day.    2.  Instructed to do a " blood pressure log, and we will adjust hypertensive medications further if needed.    3.  Encouraged patient to bring someone with him at every office visit.  He said he has a son who can help him        with his medicines, so written instructions were given to the patient to increase his losartan to 50 mg so he        can have his son help him with it.    4.  Do an echocardiogram in 2-3 weeks.  5.  Rest of treatment is based on results of the echo.          CHEY Villanueva/Darin Cruz MD, [facility]  JF:PM:vm               Electronically Signed by: Britt Espinoza-, Other -Author on November 30, 2020 10:25:26 AM  Electronically Co-signed by: CHEY Lindo -Reviewer on December 1, 2020 12:12:48 PM  Electronically Co-signed by: Darin Cruz MD -Reviewer on December 6, 2020 07:08:22 PM

## 2021-05-14 VITALS
DIASTOLIC BLOOD PRESSURE: 86 MMHG | SYSTOLIC BLOOD PRESSURE: 190 MMHG | BODY MASS INDEX: 35.01 KG/M2 | HEIGHT: 68 IN | HEART RATE: 66 BPM | WEIGHT: 231 LBS

## 2021-05-14 VITALS
WEIGHT: 219 LBS | HEIGHT: 68 IN | OXYGEN SATURATION: 97 % | HEART RATE: 65 BPM | SYSTOLIC BLOOD PRESSURE: 120 MMHG | RESPIRATION RATE: 14 BRPM | BODY MASS INDEX: 33.19 KG/M2 | TEMPERATURE: 97.7 F | DIASTOLIC BLOOD PRESSURE: 82 MMHG

## 2021-05-15 VITALS
DIASTOLIC BLOOD PRESSURE: 83 MMHG | HEART RATE: 69 BPM | BODY MASS INDEX: 33.57 KG/M2 | TEMPERATURE: 98.6 F | WEIGHT: 221.5 LBS | OXYGEN SATURATION: 97 % | HEIGHT: 68 IN | RESPIRATION RATE: 14 BRPM | SYSTOLIC BLOOD PRESSURE: 166 MMHG

## 2021-05-15 VITALS
OXYGEN SATURATION: 96 % | RESPIRATION RATE: 20 BRPM | HEART RATE: 68 BPM | BODY MASS INDEX: 35.23 KG/M2 | TEMPERATURE: 97.7 F | HEIGHT: 68 IN | DIASTOLIC BLOOD PRESSURE: 76 MMHG | WEIGHT: 232.44 LBS | SYSTOLIC BLOOD PRESSURE: 136 MMHG

## 2021-05-15 VITALS
OXYGEN SATURATION: 97 % | WEIGHT: 222 LBS | BODY MASS INDEX: 33.65 KG/M2 | HEIGHT: 68 IN | RESPIRATION RATE: 12 BRPM | HEART RATE: 64 BPM | TEMPERATURE: 97.7 F | DIASTOLIC BLOOD PRESSURE: 70 MMHG | SYSTOLIC BLOOD PRESSURE: 143 MMHG

## 2021-05-15 VITALS
DIASTOLIC BLOOD PRESSURE: 68 MMHG | WEIGHT: 226.37 LBS | HEIGHT: 68 IN | BODY MASS INDEX: 34.31 KG/M2 | TEMPERATURE: 97.9 F | RESPIRATION RATE: 16 BRPM | SYSTOLIC BLOOD PRESSURE: 147 MMHG | OXYGEN SATURATION: 97 % | HEART RATE: 61 BPM

## 2021-05-15 VITALS
SYSTOLIC BLOOD PRESSURE: 148 MMHG | WEIGHT: 226 LBS | DIASTOLIC BLOOD PRESSURE: 78 MMHG | BODY MASS INDEX: 34.25 KG/M2 | SYSTOLIC BLOOD PRESSURE: 124 MMHG | HEIGHT: 68 IN | HEART RATE: 60 BPM | DIASTOLIC BLOOD PRESSURE: 80 MMHG

## 2021-05-15 VITALS
DIASTOLIC BLOOD PRESSURE: 67 MMHG | HEIGHT: 68 IN | SYSTOLIC BLOOD PRESSURE: 140 MMHG | BODY MASS INDEX: 34.25 KG/M2 | WEIGHT: 226 LBS | HEART RATE: 57 BPM

## 2021-05-16 VITALS
BODY MASS INDEX: 31.28 KG/M2 | TEMPERATURE: 97.8 F | RESPIRATION RATE: 14 BRPM | WEIGHT: 206.37 LBS | DIASTOLIC BLOOD PRESSURE: 65 MMHG | HEART RATE: 66 BPM | SYSTOLIC BLOOD PRESSURE: 139 MMHG | OXYGEN SATURATION: 98 % | HEIGHT: 68 IN

## 2021-05-16 VITALS
SYSTOLIC BLOOD PRESSURE: 138 MMHG | HEIGHT: 68 IN | OXYGEN SATURATION: 96 % | HEART RATE: 55 BPM | BODY MASS INDEX: 32.32 KG/M2 | DIASTOLIC BLOOD PRESSURE: 78 MMHG | TEMPERATURE: 98.6 F | RESPIRATION RATE: 12 BRPM | WEIGHT: 213.25 LBS

## 2021-05-16 VITALS
DIASTOLIC BLOOD PRESSURE: 88 MMHG | HEIGHT: 68 IN | HEART RATE: 56 BPM | WEIGHT: 215 LBS | BODY MASS INDEX: 32.58 KG/M2 | SYSTOLIC BLOOD PRESSURE: 136 MMHG

## 2021-05-16 VITALS
RESPIRATION RATE: 20 BRPM | OXYGEN SATURATION: 97 % | TEMPERATURE: 97.7 F | WEIGHT: 217.19 LBS | HEART RATE: 68 BPM | DIASTOLIC BLOOD PRESSURE: 73 MMHG | HEIGHT: 68 IN | SYSTOLIC BLOOD PRESSURE: 160 MMHG | BODY MASS INDEX: 32.92 KG/M2

## 2021-05-16 VITALS
SYSTOLIC BLOOD PRESSURE: 142 MMHG | OXYGEN SATURATION: 98 % | TEMPERATURE: 98.3 F | BODY MASS INDEX: 32.48 KG/M2 | RESPIRATION RATE: 12 BRPM | DIASTOLIC BLOOD PRESSURE: 79 MMHG | WEIGHT: 214.31 LBS | HEIGHT: 68 IN | HEART RATE: 62 BPM

## 2021-08-04 ENCOUNTER — TELEPHONE (OUTPATIENT)
Dept: CARDIOLOGY | Facility: CLINIC | Age: 61
End: 2021-08-04

## 2021-08-05 NOTE — TELEPHONE ENCOUNTER
He has an appointment in August 30.  Just hold on the fenofibrate till appointment we will discuss it then.  Make sure he has a lipid lab ordered before that appointment.

## 2021-08-13 ENCOUNTER — OFFICE VISIT (OUTPATIENT)
Dept: FAMILY MEDICINE CLINIC | Facility: CLINIC | Age: 61
End: 2021-08-13

## 2021-08-13 ENCOUNTER — HOSPITAL ENCOUNTER (OUTPATIENT)
Dept: GENERAL RADIOLOGY | Facility: HOSPITAL | Age: 61
Discharge: HOME | End: 2021-08-13
Admitting: FAMILY MEDICINE

## 2021-08-13 VITALS
BODY MASS INDEX: 33.45 KG/M2 | OXYGEN SATURATION: 98 % | HEIGHT: 68 IN | SYSTOLIC BLOOD PRESSURE: 170 MMHG | TEMPERATURE: 97.6 F | WEIGHT: 220.7 LBS | DIASTOLIC BLOOD PRESSURE: 90 MMHG | HEART RATE: 70 BPM

## 2021-08-13 DIAGNOSIS — F33.42 RECURRENT MAJOR DEPRESSIVE DISORDER, IN FULL REMISSION (HCC): Chronic | ICD-10-CM

## 2021-08-13 DIAGNOSIS — M17.10 ARTHRITIS OF KNEE: Primary | ICD-10-CM

## 2021-08-13 DIAGNOSIS — E11.65 TYPE 2 DIABETES MELLITUS WITH HYPERGLYCEMIA, WITHOUT LONG-TERM CURRENT USE OF INSULIN (HCC): Chronic | ICD-10-CM

## 2021-08-13 DIAGNOSIS — I10 ESSENTIAL HYPERTENSION: Chronic | ICD-10-CM

## 2021-08-13 DIAGNOSIS — E78.2 MIXED HYPERLIPIDEMIA: Chronic | ICD-10-CM

## 2021-08-13 DIAGNOSIS — S89.91XA INJURY OF RIGHT LOWER EXTREMITY, INITIAL ENCOUNTER: ICD-10-CM

## 2021-08-13 DIAGNOSIS — I25.10 ATHEROSCLEROSIS OF CORONARY ARTERY OF NATIVE HEART WITHOUT ANGINA PECTORIS, UNSPECIFIED VESSEL OR LESION TYPE: ICD-10-CM

## 2021-08-13 PROBLEM — F32.A DEPRESSION: Status: ACTIVE | Noted: 2021-08-13

## 2021-08-13 PROBLEM — F41.9 ANXIETY: Status: ACTIVE | Noted: 2021-08-13

## 2021-08-13 PROBLEM — E03.9 HYPOTHYROIDISM, UNSPECIFIED: Chronic | Status: ACTIVE | Noted: 2021-08-13

## 2021-08-13 PROBLEM — E78.5 HYPERLIPIDEMIA: Status: ACTIVE | Noted: 2021-08-13

## 2021-08-13 PROBLEM — E11.9 TYPE 2 DIABETES MELLITUS (HCC): Status: ACTIVE | Noted: 2018-10-23

## 2021-08-13 PROCEDURE — 73590 X-RAY EXAM OF LOWER LEG: CPT

## 2021-08-13 PROCEDURE — 99214 OFFICE O/P EST MOD 30 MIN: CPT | Performed by: FAMILY MEDICINE

## 2021-08-13 RX ORDER — ATORVASTATIN CALCIUM 80 MG/1
80 TABLET, FILM COATED ORAL DAILY
COMMUNITY
Start: 2021-07-31 | End: 2021-10-26

## 2021-08-13 RX ORDER — FENOFIBRATE 160 MG/1
160 TABLET ORAL DAILY
COMMUNITY
End: 2021-08-13

## 2021-08-13 RX ORDER — ARIPIPRAZOLE 20 MG/1
20 TABLET ORAL DAILY
COMMUNITY
End: 2022-02-14 | Stop reason: SDUPTHER

## 2021-08-13 RX ORDER — FENOFIBRATE 160 MG/1
160 TABLET ORAL DAILY
COMMUNITY
End: 2021-09-02 | Stop reason: CLARIF

## 2021-08-13 RX ORDER — POTASSIUM CHLORIDE 20 MEQ/1
TABLET, EXTENDED RELEASE ORAL
COMMUNITY
End: 2021-09-10 | Stop reason: SDUPTHER

## 2021-08-13 RX ORDER — ASPIRIN 81 MG/1
TABLET ORAL
COMMUNITY
Start: 2021-08-01 | End: 2021-09-10 | Stop reason: SDUPTHER

## 2021-08-13 RX ORDER — LEVOTHYROXINE SODIUM 0.05 MG/1
50 TABLET ORAL DAILY
COMMUNITY
End: 2022-02-14 | Stop reason: SDUPTHER

## 2021-08-13 RX ORDER — CITALOPRAM 20 MG/1
20 TABLET ORAL DAILY
COMMUNITY
End: 2022-02-14 | Stop reason: SDUPTHER

## 2021-08-13 RX ORDER — NITROGLYCERIN 0.4 MG/1
0.4 TABLET SUBLINGUAL
COMMUNITY
End: 2021-08-27 | Stop reason: SDUPTHER

## 2021-08-13 RX ORDER — BUMETANIDE 1 MG/1
TABLET ORAL
COMMUNITY
End: 2022-02-14 | Stop reason: SDUPTHER

## 2021-08-13 RX ORDER — IBUPROFEN 600 MG/1
600 TABLET ORAL EVERY 6 HOURS PRN
COMMUNITY
End: 2021-08-13

## 2021-08-13 RX ORDER — NITROGLYCERIN 40 MG/1
1 PATCH TRANSDERMAL DAILY
COMMUNITY
End: 2021-08-27

## 2021-08-13 RX ORDER — METOPROLOL TARTRATE 50 MG/1
50 TABLET, FILM COATED ORAL 2 TIMES DAILY
COMMUNITY
Start: 2021-08-01 | End: 2021-09-10

## 2021-08-13 RX ORDER — GLIPIZIDE 5 MG/1
5 TABLET ORAL
COMMUNITY
End: 2022-01-11 | Stop reason: SDUPTHER

## 2021-08-13 NOTE — PROGRESS NOTES
"Chief Complaint  Establish Care (new patient ) and patient wants xray done on right foot/leg    Subjective          Alberto Rachel presents to [facility]  History of Present Illness    Patient presents to establish care has a main complaint of   Knee pain, Leg pain significant past medical history of anxiety CAD depression HTN HLD    Patient is taking Lipitor Bumex Celexa fenofibrate glipizide ibuprofen Synthroid Metformin metoprolol potassium and Abilify for above chronic conditions and    Objective   Vital Signs:   /90 (BP Location: Left arm, Patient Position: Sitting, Cuff Size: Adult)   Pulse 70   Temp 97.6 °F (36.4 °C) (Temporal)   Ht 172.7 cm (68\")   Wt 100 kg (220 lb 11.2 oz)   SpO2 98%   BMI 33.56 kg/m²     Physical Exam  Vitals reviewed.   Constitutional:       Appearance: Normal appearance. He is well-developed.   HENT:      Head: Normocephalic and atraumatic.      Right Ear: External ear normal.      Left Ear: External ear normal.      Mouth/Throat:      Pharynx: No oropharyngeal exudate.   Eyes:      Conjunctiva/sclera: Conjunctivae normal.      Pupils: Pupils are equal, round, and reactive to light.   Cardiovascular:      Rate and Rhythm: Normal rate and regular rhythm.      Heart sounds: No murmur heard.   No friction rub. No gallop.    Pulmonary:      Effort: Pulmonary effort is normal.      Breath sounds: Normal breath sounds. No wheezing or rhonchi.   Abdominal:      General: Bowel sounds are normal. There is no distension.      Palpations: Abdomen is soft.      Tenderness: There is no abdominal tenderness.   Skin:     General: Skin is warm and dry.   Neurological:      Mental Status: He is alert and oriented to person, place, and time.      Cranial Nerves: No cranial nerve deficit.   Psychiatric:         Mood and Affect: Mood and affect normal.         Behavior: Behavior normal.         Thought Content: Thought content normal.         Judgment: " Judgment normal.        Result Review :   The following data was reviewed by: Magnus Euceda DO on 08/13/2021:  Common labs    Common Labsle 10/20/20 10/20/20 10/20/20    1857 1857 1857   Glucose   82   BUN   9   Creatinine   1.14   Sodium   139   Potassium   4.2   Chloride   102   Calcium   10.0   Albumin   4.8   Total Bilirubin   0.76   Alkaline Phosphatase   94   AST (SGOT)   27   ALT (SGPT)   29   WBC 9.70     Hemoglobin 17.7     Hematocrit 53.0 (A)     Platelets 239     Hemoglobin A1C  7.9 (A)    (A) Abnormal value       Comments are available for some flowsheets but are not being displayed.               TSH    TSH 10/20/20   TSH 2.490           Most Recent A1C    HGBA1C Most Recent 10/20/20   Hemoglobin A1C 7.9 (A)   (A) Abnormal value       Comments are available for some flowsheets but are not being displayed.             Review labs last A1c 10/2020 7.9 TSH same timeframe was within normal range CMP without remarkable kidney disease or dysfunction with a normal hemoglobin when last checked again on 10/2020            Assessment and Plan    Diagnoses and all orders for this visit:    1. Arthritis of knee (Primary)  -     XR Tibia Fibula 2 View Right (In Office)  -     diclofenac (VOLTAREN) 50 MG EC tablet; Take 1 tablet by mouth 2 (Two) Times a Day As Needed (pain with food).  Dispense: 30 tablet; Refill: 1    2. Injury of right lower extremity, initial encounter  -     XR Tibia Fibula 2 View Right (In Office)  -     diclofenac (VOLTAREN) 50 MG EC tablet; Take 1 tablet by mouth 2 (Two) Times a Day As Needed (pain with food).  Dispense: 30 tablet; Refill: 1    3. Essential hypertension    4. Mixed hyperlipidemia  -     Comprehensive Metabolic Panel; Future  -     Lipid Panel; Future  -     Hemoglobin A1c; Future  -     Microalbumin / Creatinine Urine Ratio - Urine, Clean Catch; Future    5. Type 2 diabetes mellitus with hyperglycemia, without long-term current use of insulin (CMS/[facility])  -     Comprehensive  Metabolic Panel; Future  -     Lipid Panel; Future  -     Hemoglobin A1c; Future  -     Microalbumin / Creatinine Urine Ratio - Urine, Clean Catch; Future    6. Recurrent major depressive disorder, in full remission (CMS/HCC)    7. Atherosclerosis of coronary artery of native heart without angina pectoris, unspecified vessel or lesion type      Counseled patient on importance of monitoring leg pain advise getting an ultrasound evaluation of his calf to evaluate for blood clot which would be concerning given his signs and symptoms versus just a muscle strain, patient declines to go to the hospital for any such procedure we will order an x-ray today to see if anything is strained or swollen to correlate with his symptoms and advised strongly if any worsening redness or pain would need to go to the hospital and patient understood and agreed.  Prescribe diclofenac as needed with food to take additionally for just muscle pain or strain he did admit an injury of having some twisting of his leg that may have caused this calf strain versus other as above, I will have him monitor his blood pressure today at home and follow-up in 2 to 4 weeks additionally given that it was 170/90 has a blood pressure monitor at home      Follow Up   Return in about 2 weeks (around 8/27/2021), or if symptoms worsen or fail to improve, for Labs before.  Patient was given instructions and counseling regarding his condition or for health maintenance advice. Please see specific information pulled into the AVS if appropriate.

## 2021-08-13 NOTE — PROGRESS NOTES
"Chief Complaint  Establish Care (new patient ) and patient wants xray done on right foot/leg    Subjective     {Problem List  Visit Diagnosis   Encounters  Notes  Medications  Labs  Result Review Imaging  Media :23}     Alberto Rachel presents to [facility]  History of Present Illness    Objective   Vital Signs:   Pulse 70   Temp 97.6 °F (36.4 °C) (Temporal)   Ht 172.7 cm (68\")   Wt 100 kg (220 lb 11.2 oz)   SpO2 98%   BMI 33.56 kg/m²     Physical Exam   Result Review :{Labs  Result Review  Imaging  Med Tab  Media  Procedures :23}   {The following data was reviewed by (Optional):02421}  {Ambulatory Labs (Optional):06260}  {Data reviewed (Optional):35417:::1}        adasd  Assessment and Plan {CC Problem List  Visit Diagnosis   ROS  Review (Popup)  [facility] Maintenance  Quality  BestPractice  Medications  SmartSets  SnapShot Encounters  Media :23}   Diagnoses and all orders for this visit:    1. Arthritis of knee (Primary)    2. Essential hypertension    3. Mixed hyperlipidemia  -     Comprehensive Metabolic Panel; Future  -     Lipid Panel; Future  -     Hemoglobin A1c; Future  -     Microalbumin / Creatinine Urine Ratio - Urine, Clean Catch; Future    4. Type 2 diabetes mellitus with hyperglycemia, without long-term current use of insulin (CMS/HCC)  -     Comprehensive Metabolic Panel; Future  -     Lipid Panel; Future  -     Hemoglobin A1c; Future  -     Microalbumin / Creatinine Urine Ratio - Urine, Clean Catch; Future    5. Recurrent major depressive disorder, in full remission (CMS/HCC)    6. Atherosclerosis of coronary artery of native heart without angina pectoris, unspecified vessel or lesion type      {Time Spent (Optional):54385}  Follow Up {Instructions Charge Capture  Follow-up Communications :23}  Return in about 4 weeks (around 9/10/2021), or if symptoms worsen or fail to improve, for Labs before.  Patient was given instructions and " counseling regarding his condition or for health maintenance advice. Please see specific information pulled into the AVS if appropriate.

## 2021-08-20 ENCOUNTER — TELEPHONE (OUTPATIENT)
Dept: CARDIOLOGY | Facility: CLINIC | Age: 61
End: 2021-08-20

## 2021-08-20 NOTE — TELEPHONE ENCOUNTER
It looks like the patient has not been here since 2019.  I cannot refill or do anything without an office visit.

## 2021-08-24 DIAGNOSIS — E78.2 MIXED HYPERLIPIDEMIA: Primary | ICD-10-CM

## 2021-08-24 NOTE — TELEPHONE ENCOUNTER
Letter says it will cover fenofibrate the generic for TriCor.  Start fenofibrate 145 mg once a day.  Check lipids in 3 months.

## 2021-08-26 PROBLEM — I25.810 ATHEROSCLEROSIS OF CORONARY ARTERY BYPASS GRAFT OF NATIVE HEART WITHOUT ANGINA PECTORIS: Status: ACTIVE | Noted: 2018-10-23

## 2021-08-27 ENCOUNTER — OFFICE VISIT (OUTPATIENT)
Dept: FAMILY MEDICINE CLINIC | Facility: CLINIC | Age: 61
End: 2021-08-27

## 2021-08-27 VITALS
DIASTOLIC BLOOD PRESSURE: 90 MMHG | OXYGEN SATURATION: 97 % | SYSTOLIC BLOOD PRESSURE: 170 MMHG | BODY MASS INDEX: 33.8 KG/M2 | WEIGHT: 223 LBS | HEIGHT: 68 IN | RESPIRATION RATE: 20 BRPM | HEART RATE: 74 BPM | TEMPERATURE: 97.7 F

## 2021-08-27 DIAGNOSIS — I10 ESSENTIAL HYPERTENSION: Primary | ICD-10-CM

## 2021-08-27 DIAGNOSIS — E03.9 HYPOTHYROIDISM, UNSPECIFIED TYPE: Chronic | ICD-10-CM

## 2021-08-27 DIAGNOSIS — E11.65 TYPE 2 DIABETES MELLITUS WITH HYPERGLYCEMIA, WITHOUT LONG-TERM CURRENT USE OF INSULIN (HCC): ICD-10-CM

## 2021-08-27 DIAGNOSIS — E78.2 MIXED HYPERLIPIDEMIA: ICD-10-CM

## 2021-08-27 PROCEDURE — 99214 OFFICE O/P EST MOD 30 MIN: CPT | Performed by: FAMILY MEDICINE

## 2021-08-27 RX ORDER — LISINOPRIL AND HYDROCHLOROTHIAZIDE 20; 12.5 MG/1; MG/1
1 TABLET ORAL DAILY
Qty: 30 TABLET | Refills: 5 | Status: SHIPPED | OUTPATIENT
Start: 2021-08-27 | End: 2021-08-27 | Stop reason: SDUPTHER

## 2021-08-27 RX ORDER — LISINOPRIL AND HYDROCHLOROTHIAZIDE 20; 12.5 MG/1; MG/1
1 TABLET ORAL DAILY
Qty: 30 TABLET | Refills: 5 | Status: SHIPPED | OUTPATIENT
Start: 2021-08-27 | End: 2021-09-10

## 2021-08-27 RX ORDER — NITROGLYCERIN 0.4 MG/1
0.4 TABLET SUBLINGUAL
Qty: 15 TABLET | Refills: 5 | Status: SHIPPED | OUTPATIENT
Start: 2021-08-27 | End: 2021-08-27 | Stop reason: SDUPTHER

## 2021-08-27 RX ORDER — NITROGLYCERIN 0.4 MG/1
0.4 TABLET SUBLINGUAL
Qty: 15 TABLET | Refills: 5 | Status: SHIPPED | OUTPATIENT
Start: 2021-08-27 | End: 2022-02-14 | Stop reason: SDUPTHER

## 2021-08-27 RX ORDER — NITROGLYCERIN 0.4 MG/1
0.4 TABLET SUBLINGUAL
Qty: 15 TABLET | Refills: 5 | Status: SHIPPED | OUTPATIENT
Start: 2021-08-27 | End: 2021-08-27

## 2021-08-27 RX ORDER — LISINOPRIL AND HYDROCHLOROTHIAZIDE 20; 12.5 MG/1; MG/1
1 TABLET ORAL DAILY
Qty: 30 TABLET | Refills: 5 | Status: SHIPPED | OUTPATIENT
Start: 2021-08-27 | End: 2021-08-27

## 2021-08-27 NOTE — PROGRESS NOTES
"Chief Complaint  Follow-up (hypertension )    Subjective          Alberto Rachel presents to [facility]  History of Present Illness    Presents to follow-up on blood pressure he is taking lisinopril HCTZ is elevated today 170/90 no chest pain palpitations headache fever shortness of breath or other he is not monitoring his blood pressure at home he has no monitor so we will provide him 1 and have him follow-up in a couple weeks.    Additionally has history of hyperlipidemia takes fenofibrate and has history of diabetes on Metformin glipizide additionally on Lipitor.  Last labs were reviewed and A1c on 10/2020 was 7.9 recommend repeating labs before follow-up    Objective   Vital Signs:   /90 Comment: manual bp  Pulse 74   Temp 97.7 °F (36.5 °C) (Temporal)   Resp 20   Ht 172.7 cm (68\")   Wt 101 kg (223 lb)   SpO2 97%   BMI 33.91 kg/m²     Physical Exam  Vitals reviewed.   Constitutional:       Appearance: Normal appearance. He is well-developed.   HENT:      Head: Normocephalic and atraumatic.      Right Ear: External ear normal.      Left Ear: External ear normal.      Mouth/Throat:      Pharynx: No oropharyngeal exudate.   Eyes:      Conjunctiva/sclera: Conjunctivae normal.      Pupils: Pupils are equal, round, and reactive to light.   Cardiovascular:      Rate and Rhythm: Normal rate and regular rhythm.      Heart sounds: No murmur heard.   No friction rub. No gallop.    Pulmonary:      Effort: Pulmonary effort is normal.      Breath sounds: Normal breath sounds. No wheezing or rhonchi.   Abdominal:      General: Bowel sounds are normal. There is no distension.      Palpations: Abdomen is soft.      Tenderness: There is no abdominal tenderness.   Skin:     General: Skin is warm and dry.   Neurological:      Mental Status: He is alert and oriented to person, place, and time.      Cranial Nerves: No cranial nerve deficit.   Psychiatric:         Mood and Affect: Mood " and affect normal.         Behavior: Behavior normal.         Thought Content: Thought content normal.         Judgment: Judgment normal.        Result Review :     Reviewed last labs from 10/2020 A1c was 7.9 and a normal BMP free T4 TSH normal needs lipid panel and repeat PSA            Assessment and Plan    Diagnoses and all orders for this visit:    1. Essential hypertension (Primary)  -     Discontinue: nitroglycerin (NITROSTAT) 0.4 MG SL tablet; Place 1 tablet under the tongue Every 5 (Five) Minutes As Needed for Chest Pain. Take no more than 3 doses in 15 minutes.  Dispense: 15 tablet; Refill: 5  -     Discontinue: lisinopril-hydrochlorothiazide (Zestoretic) 20-12.5 MG per tablet; Take 1 tablet by mouth Daily.  Dispense: 30 tablet; Refill: 5  -     Discontinue: lisinopril-hydrochlorothiazide (Zestoretic) 20-12.5 MG per tablet; Take 1 tablet by mouth Daily.  Dispense: 30 tablet; Refill: 5  -     Discontinue: nitroglycerin (NITROSTAT) 0.4 MG SL tablet; Place 1 tablet under the tongue Every 5 (Five) Minutes As Needed for Chest Pain. Take no more than 3 doses in 15 minutes.  Dispense: 15 tablet; Refill: 5  -     lisinopril-hydrochlorothiazide (Zestoretic) 20-12.5 MG per tablet; Take 1 tablet by mouth Daily.  Dispense: 30 tablet; Refill: 5  -     nitroglycerin (NITROSTAT) 0.4 MG SL tablet; Place 1 tablet under the tongue Every 5 (Five) Minutes As Needed for Chest Pain. Take no more than 3 doses in 15 minutes.  Dispense: 15 tablet; Refill: 5    2. Mixed hyperlipidemia    3. Hypothyroidism, unspecified type    4. Type 2 diabetes mellitus with hyperglycemia, without long-term current use of insulin (CMS/[facility])      Monitor blood pressure home follow-up in 2 weeks sooner if concerns, continue lisinopril HCTZ goal less than 140/90 continue Metformin glipizide for blood sugar monitor at home additionally and recheck labs in for follow-up.  Goal A1c will be less than 7.5 and LDL less than 100 when checked labs already  ordered to be done    Follow Up   Return in about 2 weeks (around 9/10/2021), or if symptoms worsen or fail to improve, for BP & Log.  Patient was given instructions and counseling regarding his condition or for health maintenance advice. Please see specific information pulled into the AVS if appropriate.

## 2021-09-02 DIAGNOSIS — E78.5 HYPERLIPIDEMIA, UNSPECIFIED HYPERLIPIDEMIA TYPE: Primary | ICD-10-CM

## 2021-09-02 RX ORDER — FENOFIBRATE 145 MG/1
145 TABLET, COATED ORAL DAILY
Qty: 90 TABLET | Refills: 3 | Status: SHIPPED | OUTPATIENT
Start: 2021-09-02 | End: 2022-02-14

## 2021-09-10 ENCOUNTER — TELEPHONE (OUTPATIENT)
Dept: FAMILY MEDICINE CLINIC | Facility: CLINIC | Age: 61
End: 2021-09-10

## 2021-09-10 ENCOUNTER — OFFICE VISIT (OUTPATIENT)
Dept: FAMILY MEDICINE CLINIC | Facility: CLINIC | Age: 61
End: 2021-09-10

## 2021-09-10 VITALS
BODY MASS INDEX: 32.92 KG/M2 | TEMPERATURE: 97.6 F | HEART RATE: 102 BPM | OXYGEN SATURATION: 96 % | WEIGHT: 217.2 LBS | DIASTOLIC BLOOD PRESSURE: 76 MMHG | HEIGHT: 68 IN | SYSTOLIC BLOOD PRESSURE: 181 MMHG

## 2021-09-10 DIAGNOSIS — R25.2 CRAMPS, EXTREMITY: Primary | ICD-10-CM

## 2021-09-10 DIAGNOSIS — R00.0 TACHYCARDIA: ICD-10-CM

## 2021-09-10 DIAGNOSIS — I10 ESSENTIAL HYPERTENSION: Chronic | ICD-10-CM

## 2021-09-10 DIAGNOSIS — E66.9 OBESITY (BMI 30-39.9): Chronic | ICD-10-CM

## 2021-09-10 DIAGNOSIS — Z55.0 ILLITERACY: Chronic | ICD-10-CM

## 2021-09-10 DIAGNOSIS — E03.9 HYPOTHYROIDISM, UNSPECIFIED TYPE: Chronic | ICD-10-CM

## 2021-09-10 PROCEDURE — 99214 OFFICE O/P EST MOD 30 MIN: CPT | Performed by: FAMILY MEDICINE

## 2021-09-10 RX ORDER — ASPIRIN 81 MG/1
81 TABLET ORAL DAILY
Qty: 30 TABLET | Refills: 5 | Status: SHIPPED | OUTPATIENT
Start: 2021-09-10 | End: 2022-02-14 | Stop reason: SDUPTHER

## 2021-09-10 RX ORDER — CARVEDILOL 25 MG/1
25 TABLET ORAL 2 TIMES DAILY WITH MEALS
Qty: 60 TABLET | Refills: 5 | Status: SHIPPED | OUTPATIENT
Start: 2021-09-10 | End: 2021-09-27

## 2021-09-10 RX ORDER — CARVEDILOL 12.5 MG/1
25 TABLET ORAL 2 TIMES DAILY WITH MEALS
Qty: 60 TABLET | Refills: 5 | Status: SHIPPED | OUTPATIENT
Start: 2021-09-10 | End: 2021-09-10

## 2021-09-10 RX ORDER — POTASSIUM CHLORIDE 20 MEQ/1
20 TABLET, EXTENDED RELEASE ORAL DAILY
Qty: 30 TABLET | Refills: 5 | Status: SHIPPED | OUTPATIENT
Start: 2021-09-10 | End: 2022-01-31

## 2021-09-10 RX ORDER — LISINOPRIL AND HYDROCHLOROTHIAZIDE 20; 12.5 MG/1; MG/1
1 TABLET ORAL 2 TIMES DAILY
Qty: 60 TABLET | Refills: 5 | Status: SHIPPED | OUTPATIENT
Start: 2021-09-10 | End: 2022-01-11

## 2021-09-10 RX ORDER — ASPIRIN 81 MG/1
81 TABLET ORAL DAILY
Qty: 30 TABLET | Refills: 4 | Status: SHIPPED | OUTPATIENT
Start: 2021-09-10 | End: 2021-09-10

## 2021-09-10 RX ORDER — LISINOPRIL AND HYDROCHLOROTHIAZIDE 20; 12.5 MG/1; MG/1
1 TABLET ORAL 2 TIMES DAILY
Qty: 60 TABLET | Refills: 5 | Status: SHIPPED | OUTPATIENT
Start: 2021-09-10 | End: 2021-09-10

## 2021-09-10 SDOH — EDUCATIONAL SECURITY - EDUCATION ATTAINMENT: ILITERACY AND LOW LEVEL LITERACY: Z55.0

## 2021-09-10 NOTE — TELEPHONE ENCOUNTER
Pharmacy Name: [id number] - [place], [place] - [address] [facility] [address]  [phone] [facility] [phone]      Pharmacy representative name: STU    Pharmacy representative phone number: [phone]    What medication are you calling in regards to: carvedilol (Coreg) 12.5 MG tablet    What question does the pharmacy have: THE PHARMACY STATED THE DIRECTIONS ARE TO TAKE THE MEDICATION TWICE A DAY WITH MEALS. CAN THIS BE CHANGED TO A 30-DAY SUPPLY  TABLETS. OR IF THE DIRECTIONS ARE CORRECT SHE NEEDS A CALL BACK TO VERIFY    Who is the provider that prescribed the medication: ZACK MINOR

## 2021-09-10 NOTE — PROGRESS NOTES
"Chief Complaint  high heart rate, cramps in legs, and Hypertension (running in 200's)    Subjective          Alberto Rachel presents to [facility]  History of Present Illness    Patient presents complaining of elevated blood pressure despite taking his lisinopril HCTZ twice a day he wants to stop taking metoprolol is not helping bring down his heart rate which is also elevated.  He is recently going through a break-up worsening his condition.  He wants to take aspirin and another medicine for blood pressure besides metoprolol.    Complicating his above conditions is his inability to read he does have a friend with him who is willing to help with managing his medicines having some cramps additionally potassium has helped in the past    Objective   Vital Signs:   BP (!) 181/76 (BP Location: Left arm, Patient Position: Sitting, Cuff Size: Adult)   Pulse 102   Temp 97.6 °F (36.4 °C) (Temporal)   Ht 172.7 cm (68\")   Wt 98.5 kg (217 lb 3.2 oz)   SpO2 96%   BMI 33.03 kg/m²     Physical Exam  Vitals reviewed.   Constitutional:       Appearance: Normal appearance. He is well-developed.   HENT:      Head: Normocephalic and atraumatic.      Right Ear: External ear normal.      Left Ear: External ear normal.      Mouth/Throat:      Pharynx: No oropharyngeal exudate.   Eyes:      Conjunctiva/sclera: Conjunctivae normal.      Pupils: Pupils are equal, round, and reactive to light.   Cardiovascular:      Rate and Rhythm: Normal rate and regular rhythm.      Heart sounds: No murmur heard.   No friction rub. No gallop.    Pulmonary:      Effort: Pulmonary effort is normal.      Breath sounds: Normal breath sounds. No wheezing or rhonchi.   Abdominal:      General: Bowel sounds are normal. There is no distension.      Palpations: Abdomen is soft.      Tenderness: There is no abdominal tenderness.   Skin:     General: Skin is warm and dry.   Neurological:      Mental Status: He is alert and " oriented to person, place, and time.      Cranial Nerves: No cranial nerve deficit.   Psychiatric:         Mood and Affect: Mood and affect normal.         Behavior: Behavior normal.         Thought Content: Thought content normal.         Judgment: Judgment normal.        Result Review :   The following data was reviewed by: Magnus Euceda DO on 09/10/2021:  Common labs    Common Labsle 10/20/20 10/20/20 10/20/20    1857 1857 1857   Glucose   82   BUN   9   Creatinine   1.14   Sodium   139   Potassium   4.2   Chloride   102   Calcium   10.0   Albumin   4.8   Total Bilirubin   0.76   Alkaline Phosphatase   94   AST (SGOT)   27   ALT (SGPT)   29   WBC 9.70     Hemoglobin 17.7     Hematocrit 53.0 (A)     Platelets 239     Hemoglobin A1C  7.9 (A)    (A) Abnormal value       Comments are available for some flowsheets but are not being displayed.                           Assessment and Plan    Diagnoses and all orders for this visit:    1. Cramps, extremity (Primary)    2. Essential hypertension  -     Discontinue: lisinopril-hydrochlorothiazide (Zestoretic) 20-12.5 MG per tablet; Take 1 tablet by mouth 2 (two) times a day.  Dispense: 60 tablet; Refill: 5  -     lisinopril-hydrochlorothiazide (Zestoretic) 20-12.5 MG per tablet; Take 1 tablet by mouth 2 (two) times a day.  Dispense: 60 tablet; Refill: 5  -     carvedilol (Coreg) 12.5 MG tablet; Take 2 tablets by mouth 2 (Two) Times a Day With Meals.  Dispense: 60 tablet; Refill: 5  -     potassium chloride (KLOR-CON) 20 MEQ CR tablet; Take 1 tablet by mouth Daily.  Dispense: 30 tablet; Refill: 5  -     aspirin 81 MG EC tablet; Take 1 tablet by mouth Daily.  Dispense: 30 tablet; Refill: 5    3. Tachycardia    4. Obesity (BMI 30-39.9)    5. Hypothyroidism, unspecified type    6. Illiteracy    Other orders  -     Discontinue: aspirin 81 MG EC tablet; Take 1 tablet by mouth Daily.  Dispense: 30 tablet; Refill: 4    Monitor blood pressure at home follow-up in 1 week potassium  for cramps renewed, Coreg added for blood pressure control less than 140/90 is the goal continue to monitor and manage weight salt intake increase exercise      Follow Up   Return in about 1 week (around 9/17/2021), or if symptoms worsen or fail to improve.  Patient was given instructions and counseling regarding his condition or for health maintenance advice. Please see specific information pulled into the AVS if appropriate.

## 2021-09-17 ENCOUNTER — OFFICE VISIT (OUTPATIENT)
Dept: FAMILY MEDICINE CLINIC | Facility: CLINIC | Age: 61
End: 2021-09-17

## 2021-09-17 VITALS
HEART RATE: 69 BPM | BODY MASS INDEX: 33.8 KG/M2 | TEMPERATURE: 97.6 F | DIASTOLIC BLOOD PRESSURE: 98 MMHG | WEIGHT: 223 LBS | SYSTOLIC BLOOD PRESSURE: 161 MMHG | HEIGHT: 68 IN | OXYGEN SATURATION: 97 %

## 2021-09-17 DIAGNOSIS — I10 ESSENTIAL HYPERTENSION: Primary | ICD-10-CM

## 2021-09-17 DIAGNOSIS — E66.9 OBESITY (BMI 30-39.9): ICD-10-CM

## 2021-09-17 DIAGNOSIS — J45.20 MILD INTERMITTENT ASTHMA, UNSPECIFIED WHETHER COMPLICATED: ICD-10-CM

## 2021-09-17 DIAGNOSIS — M17.10 ARTHRITIS OF KNEE: ICD-10-CM

## 2021-09-17 PROCEDURE — 99214 OFFICE O/P EST MOD 30 MIN: CPT | Performed by: FAMILY MEDICINE

## 2021-09-17 RX ORDER — ALBUTEROL SULFATE 90 UG/1
2 AEROSOL, METERED RESPIRATORY (INHALATION) EVERY 4 HOURS PRN
Qty: 18 G | Refills: 11 | Status: SHIPPED | OUTPATIENT
Start: 2021-09-17 | End: 2022-02-14 | Stop reason: SDUPTHER

## 2021-09-17 RX ORDER — DICLOFENAC SODIUM 75 MG/1
75 TABLET, DELAYED RELEASE ORAL 2 TIMES DAILY PRN
Qty: 60 TABLET | Refills: 2 | Status: SHIPPED | OUTPATIENT
Start: 2021-09-17 | End: 2021-11-12

## 2021-09-17 NOTE — PROGRESS NOTES
"Chief Complaint  Follow-up (hypertension)    Subjective          Alberto Rachel presents to [facility]  History of Present Illness    Patient presents for blood pressure elevations still not controlled he takes his medicine currently 2 times a day 2 tablets complaining of back pain and asthma history once inhaler and increased pain medicine or something a take as needed for his back no injury vision change loss feels better overall    Objective   Vital Signs:   /98   Pulse 69   Temp 97.6 °F (36.4 °C) (Temporal)   Ht 172.7 cm (68\")   Wt 101 kg (223 lb)   SpO2 97%   BMI 33.91 kg/m²     Physical Exam  Vitals reviewed.   Constitutional:       Appearance: Normal appearance. He is well-developed.   HENT:      Head: Normocephalic and atraumatic.      Right Ear: External ear normal.      Left Ear: External ear normal.      Mouth/Throat:      Pharynx: No oropharyngeal exudate.   Eyes:      Conjunctiva/sclera: Conjunctivae normal.      Pupils: Pupils are equal, round, and reactive to light.   Cardiovascular:      Rate and Rhythm: Normal rate and regular rhythm.      Heart sounds: No murmur heard.   No friction rub. No gallop.    Pulmonary:      Effort: Pulmonary effort is normal.      Breath sounds: Normal breath sounds. No wheezing or rhonchi.   Abdominal:      General: Bowel sounds are normal. There is no distension.      Palpations: Abdomen is soft.      Tenderness: There is no abdominal tenderness.   Skin:     General: Skin is warm and dry.   Neurological:      Mental Status: He is alert and oriented to person, place, and time.      Cranial Nerves: No cranial nerve deficit.   Psychiatric:         Mood and Affect: Mood and affect normal.         Behavior: Behavior normal.         Thought Content: Thought content normal.         Judgment: Judgment normal.        Result Review :   The following data was reviewed by: Magnus Euceda DO on 09/17/2021:  Common labs    Common Labsle " 10/20/20 10/20/20 10/20/20    1857 1857 1857   Glucose   82   BUN   9   Creatinine   1.14   Sodium   139   Potassium   4.2   Chloride   102   Calcium   10.0   Albumin   4.8   Total Bilirubin   0.76   Alkaline Phosphatase   94   AST (SGOT)   27   ALT (SGPT)   29   WBC 9.70     Hemoglobin 17.7     Hematocrit 53.0 (A)     Platelets 239     Hemoglobin A1C  7.9 (A)    (A) Abnormal value       Comments are available for some flowsheets but are not being displayed.                     Assessment and Plan    Diagnoses and all orders for this visit:    1. Essential hypertension (Primary)    2. Obesity (BMI 30-39.9)    3. Arthritis of knee  -     diclofenac (VOLTAREN) 75 MG EC tablet; Take 1 tablet by mouth 2 (Two) Times a Day As Needed (pain with food).  Dispense: 60 tablet; Refill: 2    4. Mild intermittent asthma, unspecified whether complicated  -     albuterol sulfate  (90 Base) MCG/ACT inhaler; Inhale 2 puffs Every 4 (Four) Hours As Needed for Wheezing.  Dispense: 18 g; Refill: 11    Patient is to continue monitor blood pressure at home we will work to get his blood pressure down to 140/90 he has a monitor at home to check    Asthma history ordered an albuterol inhaler  He will increase his diclofenac to 75 mg twice daily with food discontinue the 50 mg    Message to pharmacy to help with his illiteracy and keeping his medication straight we will add another medicine for blood pressure when were able to help better define for him which medicines to take likely to add hydralazine twice daily up to 3 times a day as needed        Follow Up   Return in about 1 week (around 9/24/2021).  Patient was given instructions and counseling regarding his condition or for health maintenance advice. Please see specific information pulled into the AVS if appropriate.

## 2021-09-27 ENCOUNTER — OFFICE VISIT (OUTPATIENT)
Dept: FAMILY MEDICINE CLINIC | Facility: CLINIC | Age: 61
End: 2021-09-27

## 2021-09-27 VITALS
WEIGHT: 223.8 LBS | SYSTOLIC BLOOD PRESSURE: 150 MMHG | BODY MASS INDEX: 33.92 KG/M2 | TEMPERATURE: 97.7 F | OXYGEN SATURATION: 97 % | HEIGHT: 68 IN | DIASTOLIC BLOOD PRESSURE: 90 MMHG | HEART RATE: 69 BPM

## 2021-09-27 DIAGNOSIS — F51.01 PRIMARY INSOMNIA: ICD-10-CM

## 2021-09-27 DIAGNOSIS — E78.2 MIXED HYPERLIPIDEMIA: Primary | Chronic | ICD-10-CM

## 2021-09-27 DIAGNOSIS — I10 ESSENTIAL HYPERTENSION: Chronic | ICD-10-CM

## 2021-09-27 DIAGNOSIS — E11.65 TYPE 2 DIABETES MELLITUS WITH HYPERGLYCEMIA, WITHOUT LONG-TERM CURRENT USE OF INSULIN (HCC): Chronic | ICD-10-CM

## 2021-09-27 DIAGNOSIS — E03.9 HYPOTHYROIDISM, UNSPECIFIED TYPE: Chronic | ICD-10-CM

## 2021-09-27 DIAGNOSIS — E66.9 OBESITY (BMI 30-39.9): Chronic | ICD-10-CM

## 2021-09-27 DIAGNOSIS — M51.36 DEGENERATIVE DISC DISEASE, LUMBAR: ICD-10-CM

## 2021-09-27 PROBLEM — E78.5 HYPERLIPIDEMIA: Chronic | Status: ACTIVE | Noted: 2021-08-13

## 2021-09-27 PROBLEM — E11.9 TYPE 2 DIABETES MELLITUS: Chronic | Status: ACTIVE | Noted: 2018-10-23

## 2021-09-27 PROCEDURE — 99214 OFFICE O/P EST MOD 30 MIN: CPT | Performed by: FAMILY MEDICINE

## 2021-09-27 RX ORDER — CARVEDILOL 25 MG/1
25 TABLET ORAL 2 TIMES DAILY WITH MEALS
Qty: 60 TABLET | Refills: 5 | Status: SHIPPED | OUTPATIENT
Start: 2021-09-27 | End: 2022-01-31

## 2021-09-27 RX ORDER — TRAZODONE HYDROCHLORIDE 100 MG/1
100 TABLET ORAL NIGHTLY
Qty: 7 TABLET | Refills: 1 | Status: SHIPPED | OUTPATIENT
Start: 2021-09-27 | End: 2021-11-22 | Stop reason: SDUPTHER

## 2021-09-27 RX ORDER — TRIAMTERENE CAPSULES 50 MG/1
50 CAPSULE ORAL DAILY
Qty: 120 CAPSULE | Refills: 2 | Status: SHIPPED | OUTPATIENT
Start: 2021-09-27 | End: 2021-10-07 | Stop reason: SDUPTHER

## 2021-09-27 NOTE — ASSESSMENT & PLAN NOTE
*controlled  Continue synthroid, no symptoms weight gain or lost, nodules or other  Last TSH T4 10/2020 within normal limits   recommend repeat with next routine labs

## 2021-09-27 NOTE — ASSESSMENT & PLAN NOTE
*borderline control, improved  150/90 today, better at home  Continue meds coreg bid, bumex, lisinopril/hctz  Dietary sodium restriction.  Weight loss.  Ambulatory blood pressure monitoring.  Blood pressure will be reassessed in another month with labs before

## 2021-09-27 NOTE — ASSESSMENT & PLAN NOTE
*managed  Last a1c >7, recheck per specialist or with next labs  Continue Metformin #monitor sugars at home   recommend eye exam and foot exam annually patient's for pneumonia hep B  Please follow-up recheck in 3 to 6 months

## 2021-09-27 NOTE — PROGRESS NOTES
"Chief Complaint  Follow-up (hypertension)    Subjective          Alberto Rachel presents to [facility]  History of Present Illness    Presents to follow up on hypertension, BP doing better but still above 150/90, better at home, wants to adjust medicine to get to goal <140/90.     Unable to read so hard to get to him read instructions on the bottle to even know which med he is taking so we I picked them by color. We increased coreg and will add triamterene which is green, one is white and 1 is blue.     He is checking blood pressure at home advised to monitor and follow up. He is having trouble sleeping most night, cant stay asleep, melatonin has not helped before, will trial trazodone.     Diclofenac help his back pain, knows to take with food as needed, no radiating symptoms or worsening.    Has PMH significant for hypothyroidism, diabetes, hyperlipidemia additionally on metformin, glipizide, synthroid. Last a1c was 7.9 on 10/2020, close to goal <7-7.5, no neuropathy, vision complaints or foot sores ulcers or other. Recommended yearly foot and eye exam, recheck labs before or at follow up appt.      Objective   Vital Signs:   /90 Comment: manual  Pulse 69   Temp 97.7 °F (36.5 °C) (Temporal)   Ht 172.7 cm (68\")   Wt 102 kg (223 lb 12.8 oz)   SpO2 97%   BMI 34.03 kg/m²     Physical Exam  Vitals reviewed.   Constitutional:       Appearance: Normal appearance. He is well-developed.   HENT:      Head: Normocephalic and atraumatic.      Right Ear: External ear normal.      Left Ear: External ear normal.      Mouth/Throat:      Pharynx: No oropharyngeal exudate.   Eyes:      Conjunctiva/sclera: Conjunctivae normal.      Pupils: Pupils are equal, round, and reactive to light.   Cardiovascular:      Rate and Rhythm: Normal rate and regular rhythm.      Heart sounds: No murmur heard.   No friction rub. No gallop.    Pulmonary:      Effort: Pulmonary effort is normal.      " Breath sounds: Normal breath sounds. No wheezing or rhonchi.   Abdominal:      General: Bowel sounds are normal. There is no distension.      Palpations: Abdomen is soft.      Tenderness: There is no abdominal tenderness.   Skin:     General: Skin is warm and dry.   Neurological:      Mental Status: He is alert and oriented to person, place, and time.      Cranial Nerves: No cranial nerve deficit.   Psychiatric:         Mood and Affect: Mood and affect normal.         Behavior: Behavior normal.         Thought Content: Thought content normal.         Judgment: Judgment normal.        Result Review :   The following data was reviewed by: Magnus Euceda DO on 09/27/2021:  Common labs    Common Labsle 10/20/20 10/20/20 10/20/20    1857 1857 1857   Glucose   82   BUN   9   Creatinine   1.14   Sodium   139   Potassium   4.2   Chloride   102   Calcium   10.0   Albumin   4.8   Total Bilirubin   0.76   Alkaline Phosphatase   94   AST (SGOT)   27   ALT (SGPT)   29   WBC 9.70     Hemoglobin 17.7     Hematocrit 53.0 (A)     Platelets 239     Hemoglobin A1C  7.9 (A)    (A) Abnormal value       Comments are available for some flowsheets but are not being displayed.               TSH    TSH 10/20/20   TSH 2.490           Most Recent A1C    HGBA1C Most Recent 10/20/20   Hemoglobin A1C 7.9 (A)   (A) Abnormal value       Comments are available for some flowsheets but are not being displayed.                  Patient's last A1c was 7.9 10/2020 had a TSH of 2.49 back on 10/2020 kidney function good hematocrit within normal limits 10/2021       Assessment and Plan    Diagnoses and all orders for this visit:    1. Mixed hyperlipidemia (Primary)  Assessment & Plan:  *controlled  Continue lipitor fenofibrate, recheck lipids labs given dual meds  Goal LDL <        2. Hypothyroidism, unspecified type  Assessment & Plan:  *controlled  Continue synthroid, no symptoms weight gain or lost, nodules or other  Last TSH T4 10/2020 within  normal limits   recommend repeat with next routine labs       Orders:  -     TSH+Free T4; Future    3. Essential hypertension  Assessment & Plan:  *borderline control, improved  150/90 today, better at home  Continue meds coreg bid, bumex, lisinopril/hctz  Dietary sodium restriction.  Weight loss.  Ambulatory blood pressure monitoring.  Blood pressure will be reassessed in another month with labs before         Orders:  -     carvedilol (Coreg) 25 MG tablet; Take 1 tablet by mouth 2 (Two) Times a Day With Meals.  Dispense: 60 tablet; Refill: 5  -     triamterene (DYRENIUM) 50 MG capsule; Take 1 capsule by mouth Daily. Indications: High Blood Pressure Disorder  Dispense: 120 capsule; Refill: 2    4. Obesity (BMI 30-39.9)  Assessment & Plan:  Patient's (Body mass index is 34.03 kg/m².) indicates that they are obese (BMI >30) with health conditions that include hypertension, coronary heart disease, diabetes mellitus and dyslipidemias . Weight is improving with lifestyle modifications. BMI is is above average; BMI management plan is completed. We discussed portion control and increasing exercise.       5. Type 2 diabetes mellitus with hyperglycemia, without long-term current use of insulin (CMS/[facility])  Assessment & Plan:  *managed  Last a1c >7, recheck per specialist or with next labs  Continue Metformin #monitor sugars at home   recommend eye exam and foot exam annually patient's for pneumonia hep B  Please follow-up recheck in 3 to 6 months      6. Primary insomnia  Assessment & Plan:  *new, managing  Advised sleep hygiene  Trial trazodone 50mg up to 2 tabs qhs   Will follow up in 1-2 weeks to see if improved      Orders:  -     traZODone (DESYREL) 100 MG tablet; Take 1 tablet by mouth Every Night for 14 days. Indications: Trouble Sleeping  Dispense: 7 tablet; Refill: 1    7. Degenerative disc disease, lumbar  Assessment & Plan:  *acute on chronic  Advised stretches, strengthening  Can take diclofenac prn with food as  needed  Consider PT and avoid exacerbating strenuous activities  Regular walking exercise also advised, weight loss        Follow Up   Return in about 1 week (around 10/4/2021), or if symptoms worsen or fail to improve, for Medicare Wellness, Labs before, BP & Log.  Patient was given instructions and counseling regarding his condition or for health maintenance advice. Please see specific information pulled into the AVS if appropriate.

## 2021-09-30 PROBLEM — M51.369 DEGENERATIVE DISC DISEASE, LUMBAR: Status: ACTIVE | Noted: 2021-09-30

## 2021-09-30 PROBLEM — M51.36 DEGENERATIVE DISC DISEASE, LUMBAR: Status: ACTIVE | Noted: 2021-09-30

## 2021-09-30 PROBLEM — F51.01 PRIMARY INSOMNIA: Status: ACTIVE | Noted: 2021-09-30

## 2021-09-30 NOTE — ASSESSMENT & PLAN NOTE
*acute on chronic  Advised stretches, strengthening  Can take diclofenac prn with food as needed  Consider PT and avoid exacerbating strenuous activities  Regular walking exercise also advised, weight loss

## 2021-09-30 NOTE — ASSESSMENT & PLAN NOTE
*new, managing  Advised sleep hygiene  Trial trazodone 50mg up to 2 tabs qhs   Will follow up in 1-2 weeks to see if improved

## 2021-09-30 NOTE — ASSESSMENT & PLAN NOTE
Patient's (Body mass index is 34.03 kg/m².) indicates that they are obese (BMI >30) with health conditions that include hypertension, coronary heart disease, diabetes mellitus and dyslipidemias . Weight is improving with lifestyle modifications. BMI is is above average; BMI management plan is completed. We discussed portion control and increasing exercise.

## 2021-10-04 DIAGNOSIS — M17.10 ARTHRITIS OF KNEE: ICD-10-CM

## 2021-10-04 DIAGNOSIS — S89.91XA INJURY OF RIGHT LOWER EXTREMITY, INITIAL ENCOUNTER: ICD-10-CM

## 2021-10-04 NOTE — TELEPHONE ENCOUNTER
Patient has 50mg and 75 mg in the chart, I would take the higher dose with food as needed doesn't need both

## 2021-10-06 ENCOUNTER — TELEPHONE (OUTPATIENT)
Dept: FAMILY MEDICINE CLINIC | Facility: CLINIC | Age: 61
End: 2021-10-06

## 2021-10-06 NOTE — TELEPHONE ENCOUNTER
Pt in office and pharmacy did not receive his BP med, he thinks it is his triamterene to treat his high BP. Wants to know if you will re-send it in to [facility].

## 2021-10-07 DIAGNOSIS — I10 ESSENTIAL HYPERTENSION: Chronic | ICD-10-CM

## 2021-10-07 RX ORDER — TRIAMTERENE CAPSULES 50 MG/1
50 CAPSULE ORAL DAILY
Qty: 30 CAPSULE | Refills: 2 | Status: SHIPPED | OUTPATIENT
Start: 2021-10-07 | End: 2021-11-22 | Stop reason: SDUPTHER

## 2021-10-15 ENCOUNTER — OFFICE VISIT (OUTPATIENT)
Dept: FAMILY MEDICINE CLINIC | Facility: CLINIC | Age: 61
End: 2021-10-15

## 2021-10-15 VITALS
WEIGHT: 212.2 LBS | DIASTOLIC BLOOD PRESSURE: 70 MMHG | BODY MASS INDEX: 32.16 KG/M2 | HEART RATE: 68 BPM | SYSTOLIC BLOOD PRESSURE: 138 MMHG | HEIGHT: 68 IN | TEMPERATURE: 97.4 F | OXYGEN SATURATION: 99 %

## 2021-10-15 DIAGNOSIS — I25.810 ATHEROSCLEROSIS OF CORONARY ARTERY BYPASS GRAFT OF NATIVE HEART WITHOUT ANGINA PECTORIS: ICD-10-CM

## 2021-10-15 DIAGNOSIS — F51.01 PRIMARY INSOMNIA: ICD-10-CM

## 2021-10-15 DIAGNOSIS — E78.2 MIXED HYPERLIPIDEMIA: Chronic | ICD-10-CM

## 2021-10-15 DIAGNOSIS — E03.9 HYPOTHYROIDISM, UNSPECIFIED TYPE: Chronic | ICD-10-CM

## 2021-10-15 DIAGNOSIS — I10 ESSENTIAL HYPERTENSION: Primary | Chronic | ICD-10-CM

## 2021-10-15 DIAGNOSIS — Z55.0 ILLITERACY: Chronic | ICD-10-CM

## 2021-10-15 DIAGNOSIS — E11.65 TYPE 2 DIABETES MELLITUS WITH HYPERGLYCEMIA, WITHOUT LONG-TERM CURRENT USE OF INSULIN (HCC): Chronic | ICD-10-CM

## 2021-10-15 DIAGNOSIS — K21.9 GASTROESOPHAGEAL REFLUX DISEASE WITHOUT ESOPHAGITIS: Chronic | ICD-10-CM

## 2021-10-15 DIAGNOSIS — M51.36 DEGENERATIVE DISC DISEASE, LUMBAR: ICD-10-CM

## 2021-10-15 DIAGNOSIS — E66.9 OBESITY (BMI 30-39.9): Chronic | ICD-10-CM

## 2021-10-15 PROCEDURE — 99214 OFFICE O/P EST MOD 30 MIN: CPT | Performed by: FAMILY MEDICINE

## 2021-10-15 RX ORDER — FAMOTIDINE 40 MG/1
40 TABLET, FILM COATED ORAL 2 TIMES DAILY
Qty: 60 TABLET | Refills: 5 | Status: SHIPPED | OUTPATIENT
Start: 2021-10-15 | End: 2022-05-06 | Stop reason: SDUPTHER

## 2021-10-15 SDOH — EDUCATIONAL SECURITY - EDUCATION ATTAINMENT: ILITERACY AND LOW LEVEL LITERACY: Z55.0

## 2021-10-15 NOTE — PROGRESS NOTES
"Chief Complaint  acid reflux and Follow-up (blood pressure)    Subjective          Alberto Rachel presents to [facility]  Patient presents to clinic for f/u after change in b/p medications. Home readings have been consistently  <140 systolic per pt. Denies fever, chills, myalgia, SOB, chest pain, or other.    Patient states that he is having intermittent burning stomach pain. The pain is located in his mid upper abdomen and is aggrivated by chewing tobacco, eating spicy foods, and lying down flat. Tums makes it better. He admits to increase in belching and denies change in bowel habits. He has 3-4 BMs per day that is soft and brown which he describes as normal looking.        Objective   Vital Signs:   /70 (BP Location: Left arm, Patient Position: Sitting, Cuff Size: Adult)   Pulse 68   Temp 97.4 °F (36.3 °C) (Temporal)   Ht 172.7 cm (68\")   Wt 96.3 kg (212 lb 3.2 oz)   SpO2 99%   BMI 32.26 kg/m²     Physical Exam  Vitals reviewed.   Constitutional:       Appearance: Normal appearance. He is well-developed.   HENT:      Head: Normocephalic and atraumatic.      Right Ear: External ear normal.      Left Ear: External ear normal.      Mouth/Throat:      Pharynx: No oropharyngeal exudate.   Eyes:      Conjunctiva/sclera: Conjunctivae normal.      Pupils: Pupils are equal, round, and reactive to light.   Cardiovascular:      Rate and Rhythm: Normal rate and regular rhythm.      Heart sounds: No murmur heard.  No friction rub. No gallop.    Pulmonary:      Effort: Pulmonary effort is normal.      Breath sounds: Normal breath sounds. No wheezing or rhonchi.   Abdominal:      General: Abdomen is flat. Bowel sounds are normal. There is no distension.      Palpations: Abdomen is soft.      Tenderness: There is no abdominal tenderness.      Hernia: No hernia is present.   Skin:     General: Skin is warm and dry.   Neurological:      Mental Status: He is alert and oriented to " person, place, and time.      Cranial Nerves: No cranial nerve deficit.   Psychiatric:         Mood and Affect: Mood and affect normal.         Behavior: Behavior normal.         Thought Content: Thought content normal.         Judgment: Judgment normal.        Result Review :   The following data was reviewed by: Magnus Euceda DO on 10/15/2021:           Assessment and Plan    Diagnoses and all orders for this visit:    1. Essential hypertension (Primary)  Assessment & Plan:  Controlled*  Continue medicines, treatment plan  At goal <140/90  Monitor at home, labs every 6-12 months as indicated  Fu for symptoms of headache or acute BP changes          2. Type 2 diabetes mellitus with hyperglycemia, without long-term current use of insulin (HCC)    3. Obesity (BMI 30-39.9)    4. Mixed hyperlipidemia  Assessment & Plan:  *controlled  Continue diet, medicines for now as prescribed  recheck lipids annually  Goal LDL <        5. Atherosclerosis of coronary artery bypass graft of native heart without angina pectoris    6. Illiteracy  Assessment & Plan:  Discussed with patient today about not being able to read   Advised to consider additional resources to help meed any needs he has  Referral to social work for patient       7. Primary insomnia  Assessment & Plan:  *chronic  We will change to trazodone or increase medicine for relief of insomnia  Can take Benadryl intentionally      8. Degenerative disc disease, lumbar  Assessment & Plan:  *stable  Continue activity,  try to avoid on activity or symptoms  Advised stretches, strengthening  Can take diclofenac prn with food as needed  Consider PT and avoid exacerbating strenuous activities          9. Hypothyroidism, unspecified type  Assessment & Plan:  *controlled  Continue synthroid, no symptoms weight gain or lost, nodules or other  Last TSH T4  within normal limits   recommend repeat with next routine labs   We will      10. Gastroesophageal reflux disease without  esophagitis  Assessment & Plan:  *acute, significant indigestion orders  Increase flatulence and gas  Advised using Gas-X, Pepcid can be taken twice a day  Avoid spicy foods ibuprofen, ]      Other orders  -     famotidine (Pepcid) 40 MG tablet; Take 1 tablet by mouth 2 (Two) Times a Day.  Dispense: 60 tablet; Refill: 5      Follow Up   Return in about 3 months (around 1/15/2022), or if symptoms worsen or fail to improve, for Next scheduled follow up.  Patient was given instructions and counseling regarding his condition or for health maintenance advice. Please see specific information pulled into the AVS if appropriate.

## 2021-10-26 NOTE — TELEPHONE ENCOUNTER
Patient last OV 11.23. 20     Medication was documented at that visit.       Next OV  None scheduled

## 2021-10-27 PROBLEM — F51.01 PRIMARY INSOMNIA: Chronic | Status: ACTIVE | Noted: 2021-09-30

## 2021-10-27 PROBLEM — M51.36 DEGENERATIVE DISC DISEASE, LUMBAR: Chronic | Status: ACTIVE | Noted: 2021-09-30

## 2021-10-27 PROBLEM — M51.369 DEGENERATIVE DISC DISEASE, LUMBAR: Chronic | Status: ACTIVE | Noted: 2021-09-30

## 2021-10-27 PROBLEM — S89.91XA INJURY OF RIGHT LEG: Status: RESOLVED | Noted: 2021-08-13 | Resolved: 2021-10-27

## 2021-10-28 RX ORDER — ATORVASTATIN CALCIUM 80 MG/1
TABLET, FILM COATED ORAL
Qty: 30 TABLET | Refills: 0 | Status: SHIPPED | OUTPATIENT
Start: 2021-10-28 | End: 2021-11-22 | Stop reason: SDUPTHER

## 2021-10-28 RX ORDER — METOPROLOL TARTRATE 50 MG/1
TABLET, FILM COATED ORAL
Qty: 60 TABLET | Refills: 0 | Status: SHIPPED | OUTPATIENT
Start: 2021-10-28 | End: 2022-02-14 | Stop reason: DRUGHIGH

## 2021-10-28 NOTE — ASSESSMENT & PLAN NOTE
*stable  Continue activity,  try to avoid on activity or symptoms  Advised stretches, strengthening  Can take diclofenac prn with food as needed  Consider PT and avoid exacerbating strenuous activities

## 2021-10-28 NOTE — ASSESSMENT & PLAN NOTE
*acute, significant indigestion orders  Increase flatulence and gas  Advised using Gas-X, Pepcid can be taken twice a day  Avoid spicy foods ibuprofen, ]

## 2021-10-28 NOTE — ASSESSMENT & PLAN NOTE
Controlled*  Continue medicines, treatment plan  At goal <140/90  Monitor at home, labs every 6-12 months as indicated  Fu for symptoms of headache or acute BP changes

## 2021-10-28 NOTE — ASSESSMENT & PLAN NOTE
*controlled  Continue diet, medicines for now as prescribed  recheck lipids annually  Goal LDL <

## 2021-10-28 NOTE — ASSESSMENT & PLAN NOTE
*chronic  We will change to trazodone or increase medicine for relief of insomnia  Can take Benadryl intentionally

## 2021-10-28 NOTE — ASSESSMENT & PLAN NOTE
Discussed with patient today about not being able to read   Advised to consider additional resources to help meed any needs he has  Referral to social work for patient

## 2021-10-28 NOTE — ASSESSMENT & PLAN NOTE
*controlled  Continue synthroid, no symptoms weight gain or lost, nodules or other  Last TSH T4  within normal limits   recommend repeat with next routine labs   We will

## 2021-11-12 ENCOUNTER — OFFICE VISIT (OUTPATIENT)
Dept: FAMILY MEDICINE CLINIC | Facility: CLINIC | Age: 61
End: 2021-11-12

## 2021-11-12 VITALS
HEART RATE: 64 BPM | TEMPERATURE: 97.5 F | BODY MASS INDEX: 32.92 KG/M2 | DIASTOLIC BLOOD PRESSURE: 70 MMHG | HEIGHT: 68 IN | WEIGHT: 217.2 LBS | OXYGEN SATURATION: 98 % | SYSTOLIC BLOOD PRESSURE: 153 MMHG

## 2021-11-12 DIAGNOSIS — M51.36 DEGENERATIVE DISC DISEASE, LUMBAR: Chronic | ICD-10-CM

## 2021-11-12 DIAGNOSIS — I10 ESSENTIAL HYPERTENSION: Primary | Chronic | ICD-10-CM

## 2021-11-12 PROCEDURE — 99214 OFFICE O/P EST MOD 30 MIN: CPT | Performed by: FAMILY MEDICINE

## 2021-11-12 RX ORDER — CELECOXIB 100 MG/1
100 CAPSULE ORAL 2 TIMES DAILY PRN
Qty: 60 CAPSULE | Refills: 2 | Status: SHIPPED | OUTPATIENT
Start: 2021-11-12 | End: 2022-01-11 | Stop reason: SDUPTHER

## 2021-11-12 NOTE — PROGRESS NOTES
"Chief Complaint  Follow-up (blood pressure)    Subjective          Alberto Rachel presents to [facility]  History of Present Illness     Presents to follow-up on blood pressure doing well takes medicine as prescribed at goal less than 140/90 at home little bit elevated today 153/70 no chest pain palpitations headache fever shortness breath or other 1 some repeat log to monitor at home    Additionally has some chronic low back pain he takes diclofenac for wants to know we can increase but at max 75 mg twice a day with food could consider changing to Celebrex if needed  Objective   Vital Signs:   /70   Pulse 64   Temp 97.5 °F (36.4 °C) (Temporal)   Ht 172.7 cm (68\")   Wt 98.5 kg (217 lb 3.2 oz)   SpO2 98%   BMI 33.03 kg/m²     Physical Exam  Vitals reviewed.   Constitutional:       Appearance: Normal appearance. He is well-developed.   HENT:      Head: Normocephalic and atraumatic.      Right Ear: External ear normal.      Left Ear: External ear normal.      Mouth/Throat:      Pharynx: No oropharyngeal exudate.   Eyes:      Conjunctiva/sclera: Conjunctivae normal.      Pupils: Pupils are equal, round, and reactive to light.   Cardiovascular:      Rate and Rhythm: Normal rate.   Pulmonary:      Effort: Pulmonary effort is normal.   Abdominal:      General: Abdomen is flat. There is no distension.      Palpations: Abdomen is soft.   Skin:     General: Skin is warm and dry.   Neurological:      General: No focal deficit present.      Mental Status: He is alert and oriented to person, place, and time.   Psychiatric:         Mood and Affect: Mood and affect normal.         Behavior: Behavior normal.         Thought Content: Thought content normal.         Judgment: Judgment normal.        Result Review :   The following data was reviewed by: Magnus Euceda DO on 11/12/2021:                Assessment and Plan    Diagnoses and all orders for this visit:    1. Essential " hypertension (Primary)    2. Degenerative disc disease, lumbar      Continue medicine for blood pressure at goal less than 140/90 at home continue to monitor follow-up if worse signs or symptoms and continue with diclofenac for pain of his low back    Follow-up on other chronic conditions HLD T2DM hypothyroidism 3 months labs before        Follow Up   Return in about 3 months (around 2/12/2022), or if symptoms worsen or fail to improve, for Labs before.  Patient was given instructions and counseling regarding his condition or for health maintenance advice. Please see specific information pulled into the AVS if appropriate.

## 2021-11-22 DIAGNOSIS — I10 ESSENTIAL HYPERTENSION: Chronic | ICD-10-CM

## 2021-11-22 DIAGNOSIS — F51.01 PRIMARY INSOMNIA: ICD-10-CM

## 2021-11-22 DIAGNOSIS — E78.2 MIXED HYPERLIPIDEMIA: Primary | ICD-10-CM

## 2021-11-22 RX ORDER — TRAZODONE HYDROCHLORIDE 100 MG/1
100 TABLET ORAL NIGHTLY
Qty: 30 TABLET | Refills: 11 | Status: SHIPPED | OUTPATIENT
Start: 2021-11-22 | End: 2022-02-14 | Stop reason: SDUPTHER

## 2021-11-22 RX ORDER — METOPROLOL TARTRATE 50 MG/1
50 TABLET, FILM COATED ORAL 2 TIMES DAILY
Qty: 60 TABLET | Refills: 0 | Status: CANCELLED | OUTPATIENT
Start: 2021-11-22

## 2021-11-22 RX ORDER — ATORVASTATIN CALCIUM 80 MG/1
80 TABLET, FILM COATED ORAL DAILY
Qty: 30 TABLET | Refills: 11 | Status: SHIPPED | OUTPATIENT
Start: 2021-11-22 | End: 2022-01-18 | Stop reason: ALTCHOICE

## 2021-11-22 RX ORDER — TRIAMTERENE CAPSULES 50 MG/1
50 CAPSULE ORAL DAILY
Qty: 30 CAPSULE | Refills: 11 | Status: SHIPPED | OUTPATIENT
Start: 2021-11-22 | End: 2021-12-15

## 2021-11-22 NOTE — TELEPHONE ENCOUNTER
PT in office and is needing refill on his trazodone 100mg, metoprolol 50mg, atorvastatin 80mg, and triamterene 50mg, would like sent to [facility].

## 2021-11-29 DIAGNOSIS — I10 ESSENTIAL HYPERTENSION: Chronic | ICD-10-CM

## 2021-12-13 RX ORDER — METOPROLOL TARTRATE 50 MG/1
TABLET, FILM COATED ORAL
Qty: 60 TABLET | Refills: 0 | OUTPATIENT
Start: 2021-12-13

## 2021-12-14 ENCOUNTER — TELEPHONE (OUTPATIENT)
Dept: FAMILY MEDICINE CLINIC | Facility: CLINIC | Age: 61
End: 2021-12-14

## 2021-12-14 NOTE — TELEPHONE ENCOUNTER
Pt in office and wants to know if you will refill his Metoprolol Tartrate 50mg, and would like sent to [facility] in [place]. Previously filled by Dr Emery.

## 2021-12-15 ENCOUNTER — TELEPHONE (OUTPATIENT)
Dept: FAMILY MEDICINE CLINIC | Facility: CLINIC | Age: 61
End: 2021-12-15

## 2021-12-15 RX ORDER — SPIRONOLACTONE 50 MG/1
50 TABLET, FILM COATED ORAL DAILY
Qty: 90 TABLET | Refills: 3
Start: 2021-12-15 | End: 2022-02-14 | Stop reason: SDUPTHER

## 2021-12-15 NOTE — TELEPHONE ENCOUNTER
Patient came into the office he was confused about what medications he was needed refilled he was identifying them by color. I called and spoke to Mary Lou at the pharmacy and she stated that she belives that he needs the metoprolol tartrate. She stated that the other medication that she thinks he is trying to he is theTriamterene but it was denied for insurance. Please advise.

## 2021-12-17 ENCOUNTER — TELEPHONE (OUTPATIENT)
Dept: FAMILY MEDICINE CLINIC | Facility: CLINIC | Age: 61
End: 2021-12-17

## 2022-01-11 ENCOUNTER — OFFICE VISIT (OUTPATIENT)
Dept: FAMILY MEDICINE CLINIC | Facility: CLINIC | Age: 62
End: 2022-01-11

## 2022-01-11 VITALS
HEIGHT: 68 IN | HEART RATE: 58 BPM | SYSTOLIC BLOOD PRESSURE: 163 MMHG | BODY MASS INDEX: 32.92 KG/M2 | WEIGHT: 217.2 LBS | TEMPERATURE: 97.4 F | DIASTOLIC BLOOD PRESSURE: 69 MMHG | OXYGEN SATURATION: 98 %

## 2022-01-11 DIAGNOSIS — I10 ESSENTIAL HYPERTENSION: Chronic | ICD-10-CM

## 2022-01-11 PROCEDURE — 99213 OFFICE O/P EST LOW 20 MIN: CPT | Performed by: FAMILY MEDICINE

## 2022-01-11 RX ORDER — GLIPIZIDE 5 MG/1
5 TABLET ORAL
Qty: 60 TABLET | Refills: 5 | Status: SHIPPED | OUTPATIENT
Start: 2022-01-11 | End: 2022-02-14 | Stop reason: SDUPTHER

## 2022-01-11 RX ORDER — LISINOPRIL AND HYDROCHLOROTHIAZIDE 25; 20 MG/1; MG/1
1 TABLET ORAL 2 TIMES DAILY
Qty: 60 TABLET | Refills: 11 | Status: SHIPPED | OUTPATIENT
Start: 2022-01-11 | End: 2022-02-14 | Stop reason: DRUGHIGH

## 2022-01-11 RX ORDER — CELECOXIB 100 MG/1
100 CAPSULE ORAL 2 TIMES DAILY PRN
Qty: 60 CAPSULE | Refills: 5 | Status: SHIPPED | OUTPATIENT
Start: 2022-01-11 | End: 2022-02-14 | Stop reason: SDUPTHER

## 2022-01-11 NOTE — PROGRESS NOTES
"Chief Complaint  go over medications and Hypertension    Subjective          Alberto Rachel presents to [facility]  History of Present Illness    Patient presents to follow-up on chronic conditions    He has high blood pressure difficulty with controlling due to his issues with literacy, had some confusion with what medicines he has been taking so we want to make sure he came in and we reviewed with him his medicines today his insurance will longer cover triamterene and will need to change to a different medicine no issues with vision loss change headache palpitations or other    Objective   Vital Signs:   /69   Pulse 58   Temp 97.4 °F (36.3 °C) (Temporal)   Ht 172.7 cm (68\")   Wt 98.5 kg (217 lb 3.2 oz)   SpO2 98%   BMI 33.03 kg/m²     Physical Exam  Vitals reviewed.   Constitutional:       Appearance: Normal appearance. He is well-developed.   HENT:      Head: Normocephalic and atraumatic.      Right Ear: External ear normal.      Left Ear: External ear normal.      Mouth/Throat:      Pharynx: No oropharyngeal exudate.   Eyes:      Conjunctiva/sclera: Conjunctivae normal.      Pupils: Pupils are equal, round, and reactive to light.   Cardiovascular:      Rate and Rhythm: Normal rate.   Pulmonary:      Effort: Pulmonary effort is normal.   Abdominal:      General: Abdomen is flat. There is no distension.      Palpations: Abdomen is soft.   Skin:     General: Skin is warm and dry.   Neurological:      General: No focal deficit present.      Mental Status: He is alert and oriented to person, place, and time.   Psychiatric:         Mood and Affect: Mood and affect normal.         Behavior: Behavior normal.         Thought Content: Thought content normal.         Judgment: Judgment normal.        Result Review :   The following data was reviewed by: Magnus Euceda DO on 01/11/2022:           Advise patient to get labs today or before the end of the week if able     monitor " blood pressure at home goal less than 140/90       Assessment and Plan    Diagnoses and all orders for this visit:    1. Essential hypertension    Other orders  -     celecoxib (CeleBREX) 100 MG capsule; Take 1 capsule by mouth 2 (Two) Times a Day As Needed for Mild Pain .  Dispense: 60 capsule; Refill: 5  -     lisinopril-hydrochlorothiazide (PRINZIDE,ZESTORETIC) 20-25 MG per tablet; Take 1 tablet by mouth 2 (Two) Times a Day.  Dispense: 60 tablet; Refill: 11  -     glipizide (GLUCOTROL) 5 MG tablet; Take 1 tablet by mouth 2 (Two) Times a Day Before Meals.  Dispense: 60 tablet; Refill: 5        Follow Up   Return in about 4 weeks (around 2/8/2022), or if symptoms worsen or fail to improve, for Labs before.  Patient was given instructions and counseling regarding his condition or for health maintenance advice. Please see specific information pulled into the AVS if appropriate.

## 2022-01-12 ENCOUNTER — LAB (OUTPATIENT)
Dept: LAB | Facility: HOSPITAL | Age: 62
End: 2022-01-12

## 2022-01-12 DIAGNOSIS — E78.2 MIXED HYPERLIPIDEMIA: ICD-10-CM

## 2022-01-12 DIAGNOSIS — E03.9 HYPOTHYROIDISM, UNSPECIFIED TYPE: Chronic | ICD-10-CM

## 2022-01-12 DIAGNOSIS — E11.65 TYPE 2 DIABETES MELLITUS WITH HYPERGLYCEMIA, WITHOUT LONG-TERM CURRENT USE OF INSULIN: Chronic | ICD-10-CM

## 2022-01-12 LAB
ALBUMIN SERPL-MCNC: 4.8 G/DL (ref 3.5–5.2)
ALBUMIN UR-MCNC: <1.2 MG/DL
ALBUMIN/GLOB SERPL: 1.8 G/DL
ALP SERPL-CCNC: 48 U/L (ref 39–117)
ALT SERPL W P-5'-P-CCNC: 25 U/L (ref 1–41)
ANION GAP SERPL CALCULATED.3IONS-SCNC: 9.1 MMOL/L (ref 5–15)
AST SERPL-CCNC: 22 U/L (ref 1–40)
BILIRUB SERPL-MCNC: 0.4 MG/DL (ref 0–1.2)
BUN SERPL-MCNC: 17 MG/DL (ref 8–23)
BUN/CREAT SERPL: 12.9 (ref 7–25)
CALCIUM SPEC-SCNC: 9.5 MG/DL (ref 8.6–10.5)
CHLORIDE SERPL-SCNC: 104 MMOL/L (ref 98–107)
CHOLEST SERPL-MCNC: 173 MG/DL (ref 0–200)
CO2 SERPL-SCNC: 21.9 MMOL/L (ref 22–29)
CREAT SERPL-MCNC: 1.32 MG/DL (ref 0.76–1.27)
CREAT UR-MCNC: 29.8 MG/DL
GFR SERPL CREATININE-BSD FRML MDRD: 55 ML/MIN/1.73
GLOBULIN UR ELPH-MCNC: 2.7 GM/DL
GLUCOSE SERPL-MCNC: 207 MG/DL (ref 65–99)
HBA1C MFR BLD: 11.92 % (ref 4.8–5.6)
HDLC SERPL-MCNC: 36 MG/DL (ref 40–60)
LDLC SERPL CALC-MCNC: 107 MG/DL (ref 0–100)
LDLC/HDLC SERPL: 2.86 {RATIO}
MICROALBUMIN/CREAT UR: NORMAL MG/G{CREAT}
POTASSIUM SERPL-SCNC: 4.5 MMOL/L (ref 3.5–5.2)
PROT SERPL-MCNC: 7.5 G/DL (ref 6–8.5)
SODIUM SERPL-SCNC: 135 MMOL/L (ref 136–145)
T4 FREE SERPL-MCNC: 1.05 NG/DL (ref 0.93–1.7)
TRIGL SERPL-MCNC: 171 MG/DL (ref 0–150)
TSH SERPL DL<=0.05 MIU/L-ACNC: 3.8 UIU/ML (ref 0.27–4.2)
VLDLC SERPL-MCNC: 30 MG/DL (ref 5–40)

## 2022-01-12 PROCEDURE — 83036 HEMOGLOBIN GLYCOSYLATED A1C: CPT

## 2022-01-12 PROCEDURE — 84443 ASSAY THYROID STIM HORMONE: CPT

## 2022-01-12 PROCEDURE — 82570 ASSAY OF URINE CREATININE: CPT

## 2022-01-12 PROCEDURE — 84439 ASSAY OF FREE THYROXINE: CPT

## 2022-01-12 PROCEDURE — 82043 UR ALBUMIN QUANTITATIVE: CPT

## 2022-01-12 PROCEDURE — 80061 LIPID PANEL: CPT

## 2022-01-12 PROCEDURE — 36415 COLL VENOUS BLD VENIPUNCTURE: CPT

## 2022-01-12 PROCEDURE — 80053 COMPREHEN METABOLIC PANEL: CPT

## 2022-01-17 ENCOUNTER — TELEPHONE (OUTPATIENT)
Dept: CARDIOLOGY | Facility: CLINIC | Age: 62
End: 2022-01-17

## 2022-01-17 ENCOUNTER — TELEPHONE (OUTPATIENT)
Dept: FAMILY MEDICINE CLINIC | Facility: CLINIC | Age: 62
End: 2022-01-17

## 2022-01-17 DIAGNOSIS — E11.65 TYPE 2 DIABETES MELLITUS WITH HYPERGLYCEMIA, WITHOUT LONG-TERM CURRENT USE OF INSULIN: Primary | ICD-10-CM

## 2022-01-17 NOTE — TELEPHONE ENCOUNTER
----- Message from Magnus Euceda, DO sent at 1/17/2022 11:12 AM EST -----  I want to thank you for coming in and getting these labs draw, you potassium I was worried about was fine which is good but your sugar is running very high.     Would you be ok if I sent you to the sugar specialist to help us with your insulin and medicines? Just let me know we'll schedule you with Dr. Costa in [facility] [place] in [place].

## 2022-01-17 NOTE — TELEPHONE ENCOUNTER
----- Message from CHEY Hernández sent at 1/13/2022  7:42 AM EST -----  Cholesterols are worse.  Is he taking his cholesterol meds daily?

## 2022-01-17 NOTE — PROGRESS NOTES
I want to thank you for coming in and getting these labs draw, you potassium I was worried about was fine which is good but your sugar is running very high.     Would you be ok if I sent you to the sugar specialist to help us with your insulin and medicines? Just let me know we'll schedule you with Dr. Costa in [facility] down [place] the [place] in [place].

## 2022-01-18 DIAGNOSIS — E78.2 MIXED HYPERLIPIDEMIA: Primary | ICD-10-CM

## 2022-01-18 RX ORDER — ROSUVASTATIN CALCIUM 40 MG/1
40 TABLET, COATED ORAL DAILY
Qty: 90 TABLET | Refills: 3 | Status: SHIPPED | OUTPATIENT
Start: 2022-01-18 | End: 2022-02-14 | Stop reason: ALTCHOICE

## 2022-01-19 NOTE — TELEPHONE ENCOUNTER
Patient aware of referral being placed.     PROVIDER PLEASE BE ADVISED:    Patient also states he wanted to see if provider would increase his Glipizide, he currently takes 5mg twice a day and wonders if it is increased would that help his sugar levels from being high? Same question concerning cholesterol medication as well.

## 2022-01-24 RX ORDER — FENOFIBRATE 160 MG/1
TABLET ORAL
Qty: 90 TABLET | OUTPATIENT
Start: 2022-01-24

## 2022-01-31 DIAGNOSIS — I10 ESSENTIAL HYPERTENSION: Chronic | ICD-10-CM

## 2022-01-31 RX ORDER — CARVEDILOL 25 MG/1
TABLET ORAL
Qty: 180 TABLET | Refills: 2 | Status: SHIPPED | OUTPATIENT
Start: 2022-01-31 | End: 2022-02-14 | Stop reason: SDUPTHER

## 2022-01-31 RX ORDER — POTASSIUM CHLORIDE 20 MEQ/1
20 TABLET, EXTENDED RELEASE ORAL DAILY
Qty: 90 TABLET | Refills: 0 | Status: SHIPPED | OUTPATIENT
Start: 2022-01-31 | End: 2022-02-14 | Stop reason: SDUPTHER

## 2022-02-14 ENCOUNTER — OFFICE VISIT (OUTPATIENT)
Dept: FAMILY MEDICINE CLINIC | Facility: CLINIC | Age: 62
End: 2022-02-14

## 2022-02-14 VITALS
TEMPERATURE: 97.5 F | DIASTOLIC BLOOD PRESSURE: 62 MMHG | BODY MASS INDEX: 33.4 KG/M2 | HEIGHT: 68 IN | SYSTOLIC BLOOD PRESSURE: 146 MMHG | OXYGEN SATURATION: 97 % | WEIGHT: 220.4 LBS | HEART RATE: 75 BPM

## 2022-02-14 DIAGNOSIS — Z20.822 CLOSE EXPOSURE TO COVID-19 VIRUS: Primary | ICD-10-CM

## 2022-02-14 DIAGNOSIS — E11.65 TYPE 2 DIABETES MELLITUS WITH HYPERGLYCEMIA, WITHOUT LONG-TERM CURRENT USE OF INSULIN: Chronic | ICD-10-CM

## 2022-02-14 DIAGNOSIS — B37.2 CANDIDAL DERMATITIS: ICD-10-CM

## 2022-02-14 DIAGNOSIS — J45.20 MILD INTERMITTENT ASTHMA, UNSPECIFIED WHETHER COMPLICATED: ICD-10-CM

## 2022-02-14 DIAGNOSIS — E78.2 MIXED HYPERLIPIDEMIA: Chronic | ICD-10-CM

## 2022-02-14 DIAGNOSIS — F51.01 PRIMARY INSOMNIA: Chronic | ICD-10-CM

## 2022-02-14 DIAGNOSIS — I10 ESSENTIAL HYPERTENSION: Chronic | ICD-10-CM

## 2022-02-14 DIAGNOSIS — E03.9 HYPOTHYROIDISM, UNSPECIFIED TYPE: Chronic | ICD-10-CM

## 2022-02-14 PROCEDURE — 99214 OFFICE O/P EST MOD 30 MIN: CPT | Performed by: FAMILY MEDICINE

## 2022-02-14 PROCEDURE — 87426 SARSCOV CORONAVIRUS AG IA: CPT | Performed by: FAMILY MEDICINE

## 2022-02-14 RX ORDER — TRAZODONE HYDROCHLORIDE 100 MG/1
100 TABLET ORAL NIGHTLY
Qty: 30 TABLET | Refills: 11 | Status: SHIPPED | OUTPATIENT
Start: 2022-02-14 | End: 2023-02-20

## 2022-02-14 RX ORDER — NITROGLYCERIN 0.4 MG/1
0.4 TABLET SUBLINGUAL
Qty: 15 TABLET | Refills: 5 | Status: SHIPPED | OUTPATIENT
Start: 2022-02-14

## 2022-02-14 RX ORDER — CITALOPRAM 20 MG/1
20 TABLET ORAL DAILY
Qty: 90 TABLET | Refills: 3 | Status: SHIPPED | OUTPATIENT
Start: 2022-02-14 | End: 2023-02-02

## 2022-02-14 RX ORDER — ATORVASTATIN CALCIUM 80 MG/1
80 TABLET, FILM COATED ORAL DAILY
Qty: 90 TABLET | Refills: 3 | Status: SHIPPED | OUTPATIENT
Start: 2022-02-14 | End: 2022-11-03

## 2022-02-14 RX ORDER — CARVEDILOL 25 MG/1
25 TABLET ORAL 2 TIMES DAILY WITH MEALS
Qty: 180 TABLET | Refills: 2 | Status: SHIPPED | OUTPATIENT
Start: 2022-02-14 | End: 2022-05-06 | Stop reason: SDUPTHER

## 2022-02-14 RX ORDER — ARIPIPRAZOLE 20 MG/1
20 TABLET ORAL DAILY
Qty: 90 TABLET | Refills: 3 | Status: SHIPPED | OUTPATIENT
Start: 2022-02-14 | End: 2023-02-20

## 2022-02-14 RX ORDER — CELECOXIB 100 MG/1
100 CAPSULE ORAL 2 TIMES DAILY PRN
Qty: 60 CAPSULE | Refills: 5 | Status: SHIPPED | OUTPATIENT
Start: 2022-02-14 | End: 2022-11-16

## 2022-02-14 RX ORDER — NYSTATIN AND TRIAMCINOLONE ACETONIDE 100000; 1 [USP'U]/G; MG/G
1 OINTMENT TOPICAL 2 TIMES DAILY
Qty: 30 G | Refills: 0 | Status: SHIPPED | OUTPATIENT
Start: 2022-02-14 | End: 2022-02-24

## 2022-02-14 RX ORDER — LEVOTHYROXINE SODIUM 0.05 MG/1
50 TABLET ORAL DAILY
Qty: 90 TABLET | Refills: 3 | Status: SHIPPED | OUTPATIENT
Start: 2022-02-14 | End: 2023-01-23

## 2022-02-14 RX ORDER — BUMETANIDE 1 MG/1
1 TABLET ORAL DAILY
Qty: 90 TABLET | Refills: 3 | Status: SHIPPED | OUTPATIENT
Start: 2022-02-14 | End: 2023-01-23

## 2022-02-14 RX ORDER — ASPIRIN 81 MG/1
81 TABLET ORAL DAILY
Qty: 30 TABLET | Refills: 5 | Status: SHIPPED | OUTPATIENT
Start: 2022-02-14 | End: 2022-11-16

## 2022-02-14 RX ORDER — ATORVASTATIN CALCIUM 80 MG/1
80 TABLET, FILM COATED ORAL DAILY
COMMUNITY
Start: 2022-01-25 | End: 2022-02-14 | Stop reason: SDUPTHER

## 2022-02-14 RX ORDER — GLIPIZIDE 5 MG/1
5 TABLET ORAL
Qty: 60 TABLET | Refills: 5 | Status: SHIPPED | OUTPATIENT
Start: 2022-02-14 | End: 2022-05-06

## 2022-02-14 RX ORDER — LATANOPROST 50 UG/ML
SOLUTION/ DROPS OPHTHALMIC
COMMUNITY
Start: 2022-01-14

## 2022-02-14 RX ORDER — POTASSIUM CHLORIDE 20 MEQ/1
20 TABLET, EXTENDED RELEASE ORAL DAILY
Qty: 90 TABLET | Refills: 0 | Status: SHIPPED | OUTPATIENT
Start: 2022-02-14 | End: 2022-07-29 | Stop reason: SDUPTHER

## 2022-02-14 RX ORDER — ALBUTEROL SULFATE 90 UG/1
2 AEROSOL, METERED RESPIRATORY (INHALATION) EVERY 4 HOURS PRN
Qty: 18 G | Refills: 11 | Status: SHIPPED | OUTPATIENT
Start: 2022-02-14

## 2022-02-14 RX ORDER — SPIRONOLACTONE 50 MG/1
50 TABLET, FILM COATED ORAL DAILY
Qty: 90 TABLET | Refills: 3
Start: 2022-02-14 | End: 2022-05-06 | Stop reason: SDUPTHER

## 2022-02-14 NOTE — PROGRESS NOTES
"Chief Complaint  Follow-up (hypertension,diabetes), diabetes (sugar ran in 500's a month ago ), dry cough (exposed to covid 2 weeks ago, negative covid test at home), and red spots on chest and stomach (noticed years ago)    Subjective          Alberto aRchel presents to [facility]  History of Present Illness    Patient presents to follow-up on blood pressure and blood sugars with his diabetes running in the 500s a month ago he states also having a dry cough was exposed to Covid and wants to be tested has had exposure over 2 weeks ago.    He monitors his blood pressure at home and has been close to goal, better on repeat today and difficult to manage given his complaints of not being able to read which we have try to color coordinate his medicines as to best help him know which he is taking and when.     He takes glipizide for blood sugar and metformin and have discussed there being other medicines that might better help him with his blood sugar and not cause to go low, consider adding farxiga or smilar to take daily and refer to specialist.     Objective   Vital Signs:   /62   Pulse 75   Temp 97.5 °F (36.4 °C) (Temporal)   Ht 172.7 cm (68\")   Wt 100 kg (220 lb 6.4 oz)   SpO2 97%   BMI 33.51 kg/m²     Physical Exam  Vitals reviewed.   Constitutional:       Appearance: Normal appearance. He is well-developed.   HENT:      Head: Normocephalic and atraumatic.      Right Ear: External ear normal.      Left Ear: External ear normal.      Mouth/Throat:      Pharynx: No oropharyngeal exudate.   Eyes:      Conjunctiva/sclera: Conjunctivae normal.      Pupils: Pupils are equal, round, and reactive to light.   Cardiovascular:      Rate and Rhythm: Normal rate.   Pulmonary:      Effort: Pulmonary effort is normal.   Abdominal:      General: Abdomen is flat. There is no distension.      Palpations: Abdomen is soft.   Skin:     General: Skin is warm and dry.   Neurological:      " General: No focal deficit present.      Mental Status: He is alert and oriented to person, place, and time.   Psychiatric:         Mood and Affect: Mood and affect normal.         Behavior: Behavior normal.         Thought Content: Thought content normal.         Judgment: Judgment normal.        Result Review :   The following data was reviewed by: Magnus Euceda DO on 02/14/2022:  Common labs    Common Labsle 1/12/22 1/12/22 1/12/22 1/12/22    1131 1131 1131 1133   Glucose  207 (A)     BUN  17     Creatinine  1.32 (A)     eGFR Non African Am  55 (A)     Sodium  135 (A)     Potassium  4.5     Chloride  104     Calcium  9.5     Albumin  4.80     Total Bilirubin  0.4     Alkaline Phosphatase  48     AST (SGOT)  22     ALT (SGPT)  25     Total Cholesterol 173      Triglycerides 171 (A)      HDL Cholesterol 36 (A)      LDL Cholesterol  107 (A)      Hemoglobin A1C   11.92 (A)    Microalbumin, Urine    <1.2   (A) Abnormal value                      Assessment and Plan    Diagnoses and all orders for this visit:    1. Close exposure to COVID-19 virus (Primary)  -     POCT SARS-CoV-2 Antigen MIKE    2. Essential hypertension  -     aspirin 81 MG EC tablet; Take 1 tablet by mouth Daily.  Dispense: 30 tablet; Refill: 5  -     nitroglycerin (NITROSTAT) 0.4 MG SL tablet; Place 1 tablet under the tongue Every 5 (Five) Minutes As Needed for Chest Pain. Take no more than 3 doses in 15 minutes.  Dispense: 15 tablet; Refill: 5  -     potassium chloride (K-DUR,KLOR-CON) 20 MEQ CR tablet; Take 1 tablet by mouth Daily.  Dispense: 90 tablet; Refill: 0  -     carvedilol (COREG) 25 MG tablet; Take 1 tablet by mouth 2 (Two) Times a Day With Meals.  Dispense: 180 tablet; Refill: 2    3. Mild intermittent asthma, unspecified whether complicated  -     albuterol sulfate  (90 Base) MCG/ACT inhaler; Inhale 2 puffs Every 4 (Four) Hours As Needed for Wheezing.  Dispense: 18 g; Refill: 11    4. Primary insomnia  -     traZODone (DESYREL) 100  MG tablet; Take 1 tablet by mouth Every Night. Indications: Trouble Sleeping  Dispense: 30 tablet; Refill: 11    5. Type 2 diabetes mellitus with hyperglycemia, without long-term current use of insulin (HCC)  -     glipizide (GLUCOTROL) 5 MG tablet; Take 1 tablet by mouth 2 (Two) Times a Day Before Meals.  Dispense: 60 tablet; Refill: 5  -     metFORMIN (GLUCOPHAGE) 1000 MG tablet; Take 1 tablet by mouth 2 (Two) Times a Day With Meals.  Dispense: 180 tablet; Refill: 3  -     Ambulatory Referral to Internal Medicine    6. Mixed hyperlipidemia    7. Hypothyroidism, unspecified type  -     levothyroxine (SYNTHROID, LEVOTHROID) 50 MCG tablet; Take 1 tablet by mouth Daily.  Dispense: 90 tablet; Refill: 3    8. Candidal dermatitis    Other orders  -     celecoxib (CeleBREX) 100 MG capsule; Take 1 capsule by mouth 2 (Two) Times a Day As Needed for Mild Pain .  Dispense: 60 capsule; Refill: 5  -     citalopram (CeleXA) 20 MG tablet; Take 1 tablet by mouth Daily.  Dispense: 90 tablet; Refill: 3  -     atorvastatin (LIPITOR) 80 MG tablet; Take 1 tablet by mouth Daily.  Dispense: 90 tablet; Refill: 3  -     ARIPiprazole (ABILIFY) 20 MG tablet; Take 1 tablet by mouth Daily.  Dispense: 90 tablet; Refill: 3  -     bumetanide (BUMEX) 1 MG tablet; Take 1 tablet by mouth Daily.  Dispense: 90 tablet; Refill: 3  -     spironolactone (Aldactone) 50 MG tablet; Take 1 tablet by mouth Daily.  Dispense: 90 tablet; Refill: 3  -     nystatin-triamcinolone (MYCOLOG) 665887-3.1 UNIT/GM-% ointment; Apply 1 application topically to the appropriate area as directed 2 (Two) Times a Day for 10 days.  Dispense: 30 g; Refill: 0  -     Dextromethorphan-guaiFENesin  MG/15ML liquid; Take 10 mL by mouth Every 6 (Six) Hours As Needed (cough).  Dispense: 354 mL; Refill: 0      Reviewed with patient medications and instructions to refer him to specialist given directions on how to get there and where the location of the new provider would be to help  us with his diabetes, continue to monitor his blood sugar and blood pressure at home and bring the log with him to next appointment, will send any recent labs to specialist additionally to review    Covid swab today for patient negative           Follow Up   Return in about 4 weeks (around 3/14/2022), or if symptoms worsen or fail to improve, for Recheck, BP & Log, Dr. Costa for glucose, keep montioring .  Patient was given instructions and counseling regarding his condition or for health maintenance advice. Please see specific information pulled into the AVS if appropriate.

## 2022-02-20 PROBLEM — B37.2 CANDIDAL DERMATITIS: Status: ACTIVE | Noted: 2022-02-20

## 2022-02-20 LAB
EXPIRATION DATE: ABNORMAL
INTERNAL CONTROL: ABNORMAL
Lab: 1111
SARS-COV-2 AG UPPER RESP QL IA.RAPID: NOT DETECTED

## 2022-02-22 ENCOUNTER — TELEPHONE (OUTPATIENT)
Dept: FAMILY MEDICINE CLINIC | Facility: CLINIC | Age: 62
End: 2022-02-22

## 2022-02-22 NOTE — TELEPHONE ENCOUNTER
Talked to patient and dr baker he stated the patient can take 2 pills twice a day of celebrex 100mg, pt is aware and understood

## 2022-03-02 ENCOUNTER — OFFICE VISIT (OUTPATIENT)
Dept: FAMILY MEDICINE CLINIC | Facility: CLINIC | Age: 62
End: 2022-03-02

## 2022-03-02 VITALS
SYSTOLIC BLOOD PRESSURE: 157 MMHG | HEART RATE: 69 BPM | WEIGHT: 222.1 LBS | DIASTOLIC BLOOD PRESSURE: 81 MMHG | BODY MASS INDEX: 33.66 KG/M2 | TEMPERATURE: 97.8 F | OXYGEN SATURATION: 95 % | HEIGHT: 68 IN

## 2022-03-02 DIAGNOSIS — J40 BRONCHITIS: Primary | ICD-10-CM

## 2022-03-02 PROCEDURE — 99213 OFFICE O/P EST LOW 20 MIN: CPT | Performed by: FAMILY MEDICINE

## 2022-03-02 RX ORDER — CEFDINIR 300 MG/1
300 CAPSULE ORAL 2 TIMES DAILY
Qty: 20 CAPSULE | Refills: 0 | Status: SHIPPED | OUTPATIENT
Start: 2022-03-02 | End: 2022-03-15 | Stop reason: SDUPTHER

## 2022-03-02 NOTE — PROGRESS NOTES
"Chief Complaint  Cough (For 3 weeks), Sore Throat, and Nasal Congestion    Subjective          Alberto Rachel presents to [facility]  History of Present Illness    Patient presents with cough congestion ongoing for 3 weeks nasal congestion sore throat    Objective   Vital Signs:   /81   Pulse 69   Temp 97.8 °F (36.6 °C)   Ht 172.7 cm (68\")   Wt 101 kg (222 lb 1.6 oz)   SpO2 95%   BMI 33.77 kg/m²     Physical Exam  Vitals reviewed.   Constitutional:       Appearance: Normal appearance. He is well-developed.   HENT:      Head: Normocephalic and atraumatic.      Right Ear: External ear normal.      Left Ear: External ear normal.      Mouth/Throat:      Pharynx: No oropharyngeal exudate.   Eyes:      Conjunctiva/sclera: Conjunctivae normal.      Pupils: Pupils are equal, round, and reactive to light.   Cardiovascular:      Rate and Rhythm: Normal rate.   Pulmonary:      Effort: Pulmonary effort is normal.   Abdominal:      General: Abdomen is flat. There is no distension.      Palpations: Abdomen is soft.   Skin:     General: Skin is warm and dry.   Neurological:      General: No focal deficit present.      Mental Status: He is alert and oriented to person, place, and time.   Psychiatric:         Mood and Affect: Mood and affect normal.         Behavior: Behavior normal.         Thought Content: Thought content normal.         Judgment: Judgment normal.        Result Review :                 Assessment and Plan    Diagnoses and all orders for this visit:    1. Bronchitis (Primary)    Other orders  -     cefdinir (OMNICEF) 300 MG capsule; Take 1 capsule by mouth 2 (Two) Times a Day.  Dispense: 20 capsule; Refill: 0      Will prescribe antibiotics and follow-up if no improvement precautions given can take over-the-counter medicine for symptom relief      Follow Up   No follow-ups on file.  Patient was given instructions and counseling regarding his condition or for health " maintenance advice. Please see specific information pulled into the AVS if appropriate.

## 2022-03-15 ENCOUNTER — OFFICE VISIT (OUTPATIENT)
Dept: FAMILY MEDICINE CLINIC | Facility: CLINIC | Age: 62
End: 2022-03-15

## 2022-03-15 VITALS
TEMPERATURE: 97.4 F | SYSTOLIC BLOOD PRESSURE: 129 MMHG | RESPIRATION RATE: 18 BRPM | DIASTOLIC BLOOD PRESSURE: 71 MMHG | WEIGHT: 218.6 LBS | OXYGEN SATURATION: 97 % | HEIGHT: 68 IN | BODY MASS INDEX: 33.13 KG/M2 | HEART RATE: 92 BPM

## 2022-03-15 DIAGNOSIS — E11.65 TYPE 2 DIABETES MELLITUS WITH HYPERGLYCEMIA, WITHOUT LONG-TERM CURRENT USE OF INSULIN: ICD-10-CM

## 2022-03-15 DIAGNOSIS — I10 ESSENTIAL HYPERTENSION: Primary | ICD-10-CM

## 2022-03-15 DIAGNOSIS — J40 BRONCHITIS: ICD-10-CM

## 2022-03-15 PROCEDURE — 99213 OFFICE O/P EST LOW 20 MIN: CPT | Performed by: FAMILY MEDICINE

## 2022-03-15 RX ORDER — BLOOD-GLUCOSE METER
KIT MISCELLANEOUS
COMMUNITY
Start: 2022-03-14

## 2022-03-15 RX ORDER — ORAL SEMAGLUTIDE 3 MG/1
TABLET ORAL
COMMUNITY
Start: 2022-03-11 | End: 2022-08-23 | Stop reason: SDUPTHER

## 2022-03-15 RX ORDER — BLOOD SUGAR DIAGNOSTIC
STRIP MISCELLANEOUS
COMMUNITY
Start: 2022-03-11

## 2022-03-15 RX ORDER — EMPAGLIFLOZIN 10 MG/1
TABLET, FILM COATED ORAL
COMMUNITY
Start: 2022-03-11 | End: 2022-10-26 | Stop reason: SDUPTHER

## 2022-03-15 RX ORDER — METFORMIN HYDROCHLORIDE 500 MG/1
TABLET, EXTENDED RELEASE ORAL
COMMUNITY
Start: 2022-03-11 | End: 2022-03-15

## 2022-03-15 RX ORDER — CEFDINIR 300 MG/1
300 CAPSULE ORAL 2 TIMES DAILY
Qty: 20 CAPSULE | Refills: 0 | Status: SHIPPED | OUTPATIENT
Start: 2022-03-15 | End: 2022-05-16

## 2022-03-15 RX ORDER — LANCETS 30 GAUGE
EACH MISCELLANEOUS
COMMUNITY
Start: 2022-03-11

## 2022-03-15 NOTE — PROGRESS NOTES
"Chief Complaint  Follow-up (1 Month Follow Up)    Subjective          Alberto Rachel presents to [facility]  History of Present Illness    Patient presents to follow-up on blood pressure and other chronic conditions his pressure today is 129/71 he did see specialist about his diabetes he is taking medicines as prescribed and overall doing well no complaints today      Objective   Vital Signs:   /71   Pulse 92   Temp 97.4 °F (36.3 °C)   Resp 18   Ht 172.7 cm (68\")   Wt 99.2 kg (218 lb 9.6 oz)   SpO2 97%   BMI 33.24 kg/m²     Physical Exam  Vitals reviewed.   Constitutional:       Appearance: Normal appearance. He is well-developed.   HENT:      Head: Normocephalic and atraumatic.      Right Ear: External ear normal.      Left Ear: External ear normal.      Nose: Nose normal.   Eyes:      Conjunctiva/sclera: Conjunctivae normal.      Pupils: Pupils are equal, round, and reactive to light.   Cardiovascular:      Rate and Rhythm: Normal rate.   Pulmonary:      Effort: Pulmonary effort is normal.      Breath sounds: Normal breath sounds.   Abdominal:      General: There is no distension.   Skin:     General: Skin is warm and dry.   Neurological:      General: No focal deficit present.      Mental Status: He is alert and oriented to person, place, and time.   Psychiatric:         Mood and Affect: Mood and affect normal.         Behavior: Behavior normal.         Thought Content: Thought content normal.         Judgment: Judgment normal.        Result Review :   The following data was reviewed by: Magnus Euceda DO on 03/15/2022:  Common labs    Common Labsle 1/12/22 1/12/22 1/12/22 1/12/22    1131 1131 1131 1133   Glucose  207 (A)     BUN  17     Creatinine  1.32 (A)     eGFR Non African Am  55 (A)     Sodium  135 (A)     Potassium  4.5     Chloride  104     Calcium  9.5     Albumin  4.80     Total Bilirubin  0.4     Alkaline Phosphatase  48     AST (SGOT)  22     ALT (SGPT) "  25     Total Cholesterol 173      Triglycerides 171 (A)      HDL Cholesterol 36 (A)      LDL Cholesterol  107 (A)      Hemoglobin A1C   11.92 (A)    Microalbumin, Urine    <1.2   (A) Abnormal value                      Assessment and Plan    Diagnoses and all orders for this visit:    1. Essential hypertension (Primary)    2. Type 2 diabetes mellitus with hyperglycemia, without long-term current use of insulin (HCC)    3. Bronchitis    Other orders  -     cefdinir (OMNICEF) 300 MG capsule; Take 1 capsule by mouth 2 (Two) Times a Day.  Dispense: 20 capsule; Refill: 0      Continue medicines as prescribed for blood pressure follow-up with specialist regarding diabetes monitor blood sugars at home goal blood pressure less than 140/90 and A1c less than 7    We will prescribe antibiotic for bronchitis symptoms if no improvement or further development later in the week of symptoms      Follow Up   Return in about 2 months (around 5/15/2022) for Labs before.  Patient was given instructions and counseling regarding his condition or for health maintenance advice. Please see specific information pulled into the AVS if appropriate.

## 2022-04-06 ENCOUNTER — TELEPHONE (OUTPATIENT)
Dept: FAMILY MEDICINE CLINIC | Facility: CLINIC | Age: 62
End: 2022-04-06

## 2022-04-06 NOTE — TELEPHONE ENCOUNTER
We received a letter from [facility] because pt thought he was taking atorvastatin and rosuvastatin, according to his med list he is just taking atorvastatin so I called pt to let him know

## 2022-05-06 DIAGNOSIS — E78.2 MIXED HYPERLIPIDEMIA: ICD-10-CM

## 2022-05-06 DIAGNOSIS — I10 ESSENTIAL HYPERTENSION: Primary | ICD-10-CM

## 2022-05-06 DIAGNOSIS — K21.9 GASTROESOPHAGEAL REFLUX DISEASE WITHOUT ESOPHAGITIS: ICD-10-CM

## 2022-05-06 RX ORDER — CARVEDILOL 25 MG/1
25 TABLET ORAL 2 TIMES DAILY WITH MEALS
Qty: 180 TABLET | Refills: 2 | Status: SHIPPED | OUTPATIENT
Start: 2022-05-06 | End: 2022-11-16

## 2022-05-06 RX ORDER — FAMOTIDINE 40 MG/1
40 TABLET, FILM COATED ORAL 2 TIMES DAILY
Qty: 60 TABLET | Refills: 5 | Status: SHIPPED | OUTPATIENT
Start: 2022-05-06 | End: 2022-11-03

## 2022-05-06 RX ORDER — SPIRONOLACTONE 50 MG/1
50 TABLET, FILM COATED ORAL DAILY
Qty: 90 TABLET | Refills: 3 | Status: SHIPPED | OUTPATIENT
Start: 2022-05-06 | End: 2022-11-10 | Stop reason: DRUGHIGH

## 2022-05-06 NOTE — PROGRESS NOTES
Ilia from [facility] called to double check medication list for patient. Spoke with Dr. Euceda via telephone to also check medications. Refilled patients meds that were due.

## 2022-05-16 ENCOUNTER — OFFICE VISIT (OUTPATIENT)
Dept: FAMILY MEDICINE CLINIC | Facility: CLINIC | Age: 62
End: 2022-05-16

## 2022-05-16 VITALS
RESPIRATION RATE: 18 BRPM | SYSTOLIC BLOOD PRESSURE: 149 MMHG | WEIGHT: 214 LBS | TEMPERATURE: 97.3 F | HEIGHT: 68 IN | OXYGEN SATURATION: 97 % | DIASTOLIC BLOOD PRESSURE: 73 MMHG | HEART RATE: 76 BPM | BODY MASS INDEX: 32.43 KG/M2

## 2022-05-16 DIAGNOSIS — I10 ESSENTIAL HYPERTENSION: Chronic | ICD-10-CM

## 2022-05-16 DIAGNOSIS — E78.2 MIXED HYPERLIPIDEMIA: Chronic | ICD-10-CM

## 2022-05-16 DIAGNOSIS — K21.9 GASTROESOPHAGEAL REFLUX DISEASE WITHOUT ESOPHAGITIS: Chronic | ICD-10-CM

## 2022-05-16 DIAGNOSIS — E11.65 TYPE 2 DIABETES MELLITUS WITH HYPERGLYCEMIA, WITHOUT LONG-TERM CURRENT USE OF INSULIN: Primary | Chronic | ICD-10-CM

## 2022-05-16 PROCEDURE — 99214 OFFICE O/P EST MOD 30 MIN: CPT | Performed by: FAMILY MEDICINE

## 2022-05-16 RX ORDER — LISINOPRIL AND HYDROCHLOROTHIAZIDE 25; 20 MG/1; MG/1
1 TABLET ORAL 2 TIMES DAILY
COMMUNITY
Start: 2022-04-04 | End: 2022-09-15 | Stop reason: SDUPTHER

## 2022-05-16 RX ORDER — BIMATOPROST 0.01 %
DROPS OPHTHALMIC (EYE)
COMMUNITY
Start: 2022-03-15

## 2022-05-16 RX ORDER — PIOGLITAZONEHYDROCHLORIDE 15 MG/1
15 TABLET ORAL
COMMUNITY
Start: 2022-03-25

## 2022-05-16 RX ORDER — ORAL SEMAGLUTIDE 7 MG/1
TABLET ORAL
COMMUNITY
Start: 2022-05-06 | End: 2022-08-23 | Stop reason: SDUPTHER

## 2022-07-29 DIAGNOSIS — I10 ESSENTIAL HYPERTENSION: Chronic | ICD-10-CM

## 2022-07-29 RX ORDER — POTASSIUM CHLORIDE 20 MEQ/1
20 TABLET, EXTENDED RELEASE ORAL DAILY
Qty: 90 TABLET | Refills: 0 | Status: SHIPPED | OUTPATIENT
Start: 2022-07-29 | End: 2022-10-21

## 2022-07-29 NOTE — TELEPHONE ENCOUNTER
Caller: [facility] [place] [address] [facility] - [phone] [facility] [phone] FX    Relationship: Pharmacy    Best call back number: [phone]    Requested Prescriptions:   Requested Prescriptions     Pending Prescriptions Disp Refills   • potassium chloride (K-DUR,KLOR-CON) 20 MEQ CR tablet 90 tablet 0     Sig: Take 1 tablet by mouth Daily.        Pharmacy where request should be sent: [address] [phone] [facility] [phone] FX     Additional details provided by patient: SENT TO THE WRONG PHARMACY    Does the patient have less than a 3 day supply:  [x] Yes  [] No    Talon Lou Rep   07/29/22 13:44 EDT

## 2022-08-23 ENCOUNTER — OFFICE VISIT (OUTPATIENT)
Dept: FAMILY MEDICINE CLINIC | Facility: CLINIC | Age: 62
End: 2022-08-23

## 2022-08-23 VITALS
DIASTOLIC BLOOD PRESSURE: 81 MMHG | HEART RATE: 70 BPM | WEIGHT: 211.3 LBS | BODY MASS INDEX: 32.02 KG/M2 | TEMPERATURE: 98.2 F | OXYGEN SATURATION: 96 % | SYSTOLIC BLOOD PRESSURE: 174 MMHG | RESPIRATION RATE: 18 BRPM | HEIGHT: 68 IN

## 2022-08-23 DIAGNOSIS — F51.01 PRIMARY INSOMNIA: Chronic | ICD-10-CM

## 2022-08-23 DIAGNOSIS — Z00.00 ENCOUNTER FOR SUBSEQUENT ANNUAL WELLNESS VISIT (AWV) IN MEDICARE PATIENT: Primary | ICD-10-CM

## 2022-08-23 DIAGNOSIS — E66.9 OBESITY (BMI 30-39.9): ICD-10-CM

## 2022-08-23 DIAGNOSIS — E11.65 TYPE 2 DIABETES MELLITUS WITH HYPERGLYCEMIA, WITHOUT LONG-TERM CURRENT USE OF INSULIN: Chronic | ICD-10-CM

## 2022-08-23 DIAGNOSIS — E03.9 HYPOTHYROIDISM, UNSPECIFIED TYPE: Chronic | ICD-10-CM

## 2022-08-23 DIAGNOSIS — I10 ESSENTIAL HYPERTENSION: Chronic | ICD-10-CM

## 2022-08-23 DIAGNOSIS — E78.2 MIXED HYPERLIPIDEMIA: Chronic | ICD-10-CM

## 2022-08-23 DIAGNOSIS — K21.9 GASTROESOPHAGEAL REFLUX DISEASE WITHOUT ESOPHAGITIS: ICD-10-CM

## 2022-08-23 PROCEDURE — 1170F FXNL STATUS ASSESSED: CPT | Performed by: FAMILY MEDICINE

## 2022-08-23 PROCEDURE — 1160F RVW MEDS BY RX/DR IN RCRD: CPT | Performed by: FAMILY MEDICINE

## 2022-08-23 PROCEDURE — G0439 PPPS, SUBSEQ VISIT: HCPCS | Performed by: FAMILY MEDICINE

## 2022-08-23 PROCEDURE — 1126F AMNT PAIN NOTED NONE PRSNT: CPT | Performed by: FAMILY MEDICINE

## 2022-08-23 RX ORDER — ORAL SEMAGLUTIDE 14 MG/1
TABLET ORAL
COMMUNITY
Start: 2022-08-12

## 2022-08-24 ENCOUNTER — LAB (OUTPATIENT)
Dept: LAB | Facility: HOSPITAL | Age: 62
End: 2022-08-24

## 2022-08-24 DIAGNOSIS — E11.65 TYPE 2 DIABETES MELLITUS WITH HYPERGLYCEMIA, WITHOUT LONG-TERM CURRENT USE OF INSULIN: Chronic | ICD-10-CM

## 2022-08-24 DIAGNOSIS — K21.9 GASTROESOPHAGEAL REFLUX DISEASE WITHOUT ESOPHAGITIS: ICD-10-CM

## 2022-08-24 DIAGNOSIS — E78.2 MIXED HYPERLIPIDEMIA: Chronic | ICD-10-CM

## 2022-08-24 DIAGNOSIS — F51.01 PRIMARY INSOMNIA: Chronic | ICD-10-CM

## 2022-08-24 DIAGNOSIS — I10 ESSENTIAL HYPERTENSION: Chronic | ICD-10-CM

## 2022-08-24 DIAGNOSIS — E66.9 OBESITY (BMI 30-39.9): ICD-10-CM

## 2022-08-24 DIAGNOSIS — E03.9 HYPOTHYROIDISM, UNSPECIFIED TYPE: Chronic | ICD-10-CM

## 2022-08-24 LAB
ALBUMIN SERPL-MCNC: 4.6 G/DL (ref 3.5–5.2)
ALBUMIN UR-MCNC: <1.2 MG/DL
ALBUMIN/GLOB SERPL: 1.6 G/DL
ALP SERPL-CCNC: 71 U/L (ref 39–117)
ALT SERPL W P-5'-P-CCNC: 23 U/L (ref 1–41)
ANION GAP SERPL CALCULATED.3IONS-SCNC: 14 MMOL/L (ref 5–15)
AST SERPL-CCNC: 16 U/L (ref 1–40)
BILIRUB SERPL-MCNC: 0.2 MG/DL (ref 0–1.2)
BUN SERPL-MCNC: 24 MG/DL (ref 8–23)
BUN/CREAT SERPL: 22.2 (ref 7–25)
CALCIUM SPEC-SCNC: 9.7 MG/DL (ref 8.6–10.5)
CHLORIDE SERPL-SCNC: 97 MMOL/L (ref 98–107)
CHOLEST SERPL-MCNC: 177 MG/DL (ref 0–200)
CO2 SERPL-SCNC: 24 MMOL/L (ref 22–29)
CREAT SERPL-MCNC: 1.08 MG/DL (ref 0.76–1.27)
CREAT UR-MCNC: 19.6 MG/DL
EGFRCR SERPLBLD CKD-EPI 2021: 76.6 ML/MIN/1.73
FOLATE SERPL-MCNC: 13.6 NG/ML (ref 4.78–24.2)
GLOBULIN UR ELPH-MCNC: 2.8 GM/DL
GLUCOSE SERPL-MCNC: 194 MG/DL (ref 65–99)
HBA1C MFR BLD: 8.7 % (ref 4.8–5.6)
HDLC SERPL-MCNC: 36 MG/DL (ref 40–60)
LDLC SERPL CALC-MCNC: 69 MG/DL (ref 0–100)
LDLC/HDLC SERPL: 1.33 {RATIO}
MICROALBUMIN/CREAT UR: NORMAL MG/G{CREAT}
POTASSIUM SERPL-SCNC: 4.2 MMOL/L (ref 3.5–5.2)
PROT SERPL-MCNC: 7.4 G/DL (ref 6–8.5)
SODIUM SERPL-SCNC: 135 MMOL/L (ref 136–145)
T4 FREE SERPL-MCNC: 1.27 NG/DL (ref 0.93–1.7)
TRIGL SERPL-MCNC: 466 MG/DL (ref 0–150)
TSH SERPL DL<=0.05 MIU/L-ACNC: 2.23 UIU/ML (ref 0.27–4.2)
VIT B12 BLD-MCNC: 397 PG/ML (ref 211–946)
VLDLC SERPL-MCNC: 72 MG/DL (ref 5–40)

## 2022-08-24 PROCEDURE — 84439 ASSAY OF FREE THYROXINE: CPT

## 2022-08-24 PROCEDURE — 82043 UR ALBUMIN QUANTITATIVE: CPT

## 2022-08-24 PROCEDURE — 84443 ASSAY THYROID STIM HORMONE: CPT

## 2022-08-24 PROCEDURE — 82607 VITAMIN B-12: CPT

## 2022-08-24 PROCEDURE — 83036 HEMOGLOBIN GLYCOSYLATED A1C: CPT

## 2022-08-24 PROCEDURE — 36415 COLL VENOUS BLD VENIPUNCTURE: CPT

## 2022-08-24 PROCEDURE — 82570 ASSAY OF URINE CREATININE: CPT

## 2022-08-24 PROCEDURE — 80061 LIPID PANEL: CPT

## 2022-08-24 PROCEDURE — 80053 COMPREHEN METABOLIC PANEL: CPT

## 2022-08-24 PROCEDURE — 82746 ASSAY OF FOLIC ACID SERUM: CPT

## 2022-08-25 ENCOUNTER — TELEPHONE (OUTPATIENT)
Dept: CARDIOLOGY | Facility: CLINIC | Age: 62
End: 2022-08-25

## 2022-08-25 NOTE — TELEPHONE ENCOUNTER
----- Message from CHEY Deng sent at 8/25/2022  8:13 AM EDT -----  Notify pt triglycerides are elevated again. LDL is in normal range. They need follow up appointment with one of the MDs

## 2022-09-05 DIAGNOSIS — E78.5 HYPERLIPIDEMIA, UNSPECIFIED HYPERLIPIDEMIA TYPE: ICD-10-CM

## 2022-09-05 RX ORDER — FENOFIBRATE 145 MG/1
145 TABLET, COATED ORAL DAILY
Qty: 90 TABLET | Refills: 3 | Status: SHIPPED | OUTPATIENT
Start: 2022-09-05

## 2022-09-07 ENCOUNTER — TELEPHONE (OUTPATIENT)
Dept: FAMILY MEDICINE CLINIC | Facility: CLINIC | Age: 62
End: 2022-09-07

## 2022-09-07 DIAGNOSIS — I10 ESSENTIAL HYPERTENSION: Primary | ICD-10-CM

## 2022-09-07 DIAGNOSIS — E78.5 HYPERLIPIDEMIA, UNSPECIFIED HYPERLIPIDEMIA TYPE: Primary | ICD-10-CM

## 2022-09-07 RX ORDER — EZETIMIBE 10 MG/1
10 TABLET ORAL DAILY
Qty: 90 TABLET | Refills: 3 | Status: SHIPPED | OUTPATIENT
Start: 2022-09-07 | End: 2022-11-16

## 2022-09-07 NOTE — TELEPHONE ENCOUNTER
Caller: [facility]    Relationship to patient: Other    Best call back number: [phone]    Patient is needing: THE [facility] NOTICED WHEN REVIEWING PATIENT'S HISTORY OF MEDICATIONS THAT  WHEN PATIENT SWITCHED TO [facility] lisinopril-hydrochlorothiazide (PRINZIDE,ZESTORETIC) 20-25 MG per tablet     WAS NOT INCLUDED AND PATIENT HAS NOT HAD THIS MEDICATION SINCE MAY PLEASE MAKE SURE IT IS INCLUDED WITH THE NEXT REFILL.  PATIENT PICKS UP HIS MEDICATIONS EVERY Friday PHARMACY DOES NOT HAVE THIS PRESCRIPTION ON FILE

## 2022-09-15 NOTE — TELEPHONE ENCOUNTER
Caller: [facility]    Best call back number: [phone]    Requested Prescriptions:   Requested Prescriptions     Pending Prescriptions Disp Refills   • lisinopril-hydrochlorothiazide (PRINZIDE,ZESTORETIC) 20-25 MG per tablet       Sig: Take 1 tablet by mouth 2 (Two) Times a Day.        Pharmacy where request should be sent: [address] [phone] [facility] [phone] FX     Additional details provided by patient: [facility] REPORTS PATIENT NEEDS MEDICATION HAS BEEN OUT MEDICATION SINCE STARTING THE PREPACKED MEDS IN MAY.  NEW PHARMACY COULD NOT REQUEST REFILL, MEDICATION DID NOT TRANSFER FROM OLD PHARMACY.    PLEASE SEND  Does the patient have less than a 3 day supply:  [x] Yes  [] No    Talon Salvador Rep   09/15/22 16:03 EDT

## 2022-09-16 RX ORDER — LISINOPRIL AND HYDROCHLOROTHIAZIDE 25; 20 MG/1; MG/1
1 TABLET ORAL 2 TIMES DAILY
Qty: 60 TABLET | Refills: 2 | Status: SHIPPED | OUTPATIENT
Start: 2022-09-16 | End: 2022-11-16 | Stop reason: DRUGHIGH

## 2022-09-16 NOTE — TELEPHONE ENCOUNTER
Spoke with Michael from Pharmacy, he stated prescription was never sent over to them when patient switched pharmacy. Went ahead and placed order based off last prescription refilled

## 2022-09-21 NOTE — TELEPHONE ENCOUNTER
Spoke with patient. He has been monitoring BP says its been running a bit high. Let him know to continue checking it through the week and if it was not decreasing to give us a call back to see if we needed to follow up or do something different with medication. Patient verbalized understanding    Patient received medications this week with BP medication added

## 2022-10-21 DIAGNOSIS — I10 ESSENTIAL HYPERTENSION: Chronic | ICD-10-CM

## 2022-10-21 RX ORDER — POTASSIUM CHLORIDE 20 MEQ/1
TABLET, EXTENDED RELEASE ORAL
Qty: 90 TABLET | Refills: 0 | Status: SHIPPED | OUTPATIENT
Start: 2022-10-21 | End: 2022-11-10

## 2022-10-26 ENCOUNTER — OFFICE VISIT (OUTPATIENT)
Dept: FAMILY MEDICINE CLINIC | Facility: CLINIC | Age: 62
End: 2022-10-26

## 2022-10-26 VITALS
WEIGHT: 207 LBS | DIASTOLIC BLOOD PRESSURE: 55 MMHG | HEIGHT: 68 IN | OXYGEN SATURATION: 96 % | TEMPERATURE: 98 F | SYSTOLIC BLOOD PRESSURE: 110 MMHG | BODY MASS INDEX: 31.37 KG/M2 | HEART RATE: 69 BPM

## 2022-10-26 DIAGNOSIS — Z23 NEED FOR INFLUENZA VACCINATION: ICD-10-CM

## 2022-10-26 DIAGNOSIS — E03.9 HYPOTHYROIDISM, UNSPECIFIED TYPE: Chronic | ICD-10-CM

## 2022-10-26 DIAGNOSIS — E78.2 MIXED HYPERLIPIDEMIA: Chronic | ICD-10-CM

## 2022-10-26 DIAGNOSIS — J40 BRONCHITIS: ICD-10-CM

## 2022-10-26 DIAGNOSIS — M25.511 BILATERAL SHOULDER PAIN, UNSPECIFIED CHRONICITY: Primary | ICD-10-CM

## 2022-10-26 DIAGNOSIS — M51.36 DEGENERATIVE DISC DISEASE, LUMBAR: Chronic | ICD-10-CM

## 2022-10-26 DIAGNOSIS — I10 ESSENTIAL HYPERTENSION: Chronic | ICD-10-CM

## 2022-10-26 DIAGNOSIS — M25.512 BILATERAL SHOULDER PAIN, UNSPECIFIED CHRONICITY: Primary | ICD-10-CM

## 2022-10-26 PROCEDURE — G0008 ADMIN INFLUENZA VIRUS VAC: HCPCS | Performed by: FAMILY MEDICINE

## 2022-10-26 PROCEDURE — 99214 OFFICE O/P EST MOD 30 MIN: CPT | Performed by: FAMILY MEDICINE

## 2022-10-26 PROCEDURE — 90686 IIV4 VACC NO PRSV 0.5 ML IM: CPT | Performed by: FAMILY MEDICINE

## 2022-10-26 RX ORDER — EMPAGLIFLOZIN 25 MG/1
25 TABLET, FILM COATED ORAL DAILY
COMMUNITY
Start: 2022-10-07

## 2022-10-26 RX ORDER — PREDNISONE 20 MG/1
20 TABLET ORAL 2 TIMES DAILY
Qty: 10 TABLET | Refills: 0 | Status: SHIPPED | OUTPATIENT
Start: 2022-10-26 | End: 2022-11-04

## 2022-11-01 PROBLEM — M25.511 BILATERAL SHOULDER PAIN: Chronic | Status: ACTIVE | Noted: 2022-11-01

## 2022-11-01 PROBLEM — M25.512 BILATERAL SHOULDER PAIN: Status: ACTIVE | Noted: 2022-11-01

## 2022-11-01 PROBLEM — M25.512 BILATERAL SHOULDER PAIN: Chronic | Status: ACTIVE | Noted: 2022-11-01

## 2022-11-01 PROBLEM — M25.511 BILATERAL SHOULDER PAIN: Status: ACTIVE | Noted: 2022-11-01

## 2022-11-02 DIAGNOSIS — K21.9 GASTROESOPHAGEAL REFLUX DISEASE WITHOUT ESOPHAGITIS: ICD-10-CM

## 2022-11-02 DIAGNOSIS — E78.2 MIXED HYPERLIPIDEMIA: ICD-10-CM

## 2022-11-03 RX ORDER — ATORVASTATIN CALCIUM 80 MG/1
TABLET, FILM COATED ORAL
Qty: 30 TABLET | Refills: 1 | Status: SHIPPED | OUTPATIENT
Start: 2022-11-03 | End: 2022-12-27

## 2022-11-03 RX ORDER — FAMOTIDINE 40 MG/1
TABLET, FILM COATED ORAL
Qty: 60 TABLET | Refills: 4 | Status: SHIPPED | OUTPATIENT
Start: 2022-11-03 | End: 2023-03-27

## 2022-11-04 ENCOUNTER — OFFICE VISIT (OUTPATIENT)
Dept: FAMILY MEDICINE CLINIC | Facility: CLINIC | Age: 62
End: 2022-11-04

## 2022-11-04 ENCOUNTER — LAB (OUTPATIENT)
Dept: LAB | Facility: HOSPITAL | Age: 62
End: 2022-11-04

## 2022-11-04 ENCOUNTER — HOSPITAL ENCOUNTER (OUTPATIENT)
Dept: GENERAL RADIOLOGY | Facility: HOSPITAL | Age: 62
Discharge: HOME | End: 2022-11-04

## 2022-11-04 VITALS
HEIGHT: 67 IN | RESPIRATION RATE: 18 BRPM | SYSTOLIC BLOOD PRESSURE: 110 MMHG | OXYGEN SATURATION: 99 % | BODY MASS INDEX: 33.27 KG/M2 | TEMPERATURE: 97.3 F | DIASTOLIC BLOOD PRESSURE: 54 MMHG | WEIGHT: 212 LBS | HEART RATE: 64 BPM

## 2022-11-04 DIAGNOSIS — R05.2 SUBACUTE COUGH: ICD-10-CM

## 2022-11-04 DIAGNOSIS — R06.02 SHORTNESS OF BREATH: ICD-10-CM

## 2022-11-04 DIAGNOSIS — M79.601 BILATERAL ARM PAIN: ICD-10-CM

## 2022-11-04 DIAGNOSIS — M79.602 BILATERAL ARM PAIN: ICD-10-CM

## 2022-11-04 DIAGNOSIS — R06.02 SHORTNESS OF BREATH: Primary | ICD-10-CM

## 2022-11-04 LAB
BASOPHILS # BLD AUTO: 0.02 10*3/MM3 (ref 0–0.2)
BASOPHILS NFR BLD AUTO: 0.1 % (ref 0–1.5)
DEPRECATED RDW RBC AUTO: 40.2 FL (ref 37–54)
EOSINOPHIL # BLD AUTO: 0.05 10*3/MM3 (ref 0–0.4)
EOSINOPHIL NFR BLD AUTO: 0.3 % (ref 0.3–6.2)
ERYTHROCYTE [DISTWIDTH] IN BLOOD BY AUTOMATED COUNT: 12.9 % (ref 12.3–15.4)
HCT VFR BLD AUTO: 46.2 % (ref 37.5–51)
HGB BLD-MCNC: 16 G/DL (ref 13–17.7)
IMM GRANULOCYTES # BLD AUTO: 0.13 10*3/MM3 (ref 0–0.05)
IMM GRANULOCYTES NFR BLD AUTO: 0.9 % (ref 0–0.5)
LYMPHOCYTES # BLD AUTO: 2.86 10*3/MM3 (ref 0.7–3.1)
LYMPHOCYTES NFR BLD AUTO: 19.1 % (ref 19.6–45.3)
MCH RBC QN AUTO: 29.7 PG (ref 26.6–33)
MCHC RBC AUTO-ENTMCNC: 34.6 G/DL (ref 31.5–35.7)
MCV RBC AUTO: 85.9 FL (ref 79–97)
MONOCYTES # BLD AUTO: 0.7 10*3/MM3 (ref 0.1–0.9)
MONOCYTES NFR BLD AUTO: 4.7 % (ref 5–12)
NEUTROPHILS NFR BLD AUTO: 11.24 10*3/MM3 (ref 1.7–7)
NEUTROPHILS NFR BLD AUTO: 74.9 % (ref 42.7–76)
NRBC BLD AUTO-RTO: 0 /100 WBC (ref 0–0.2)
PLATELET # BLD AUTO: 257 10*3/MM3 (ref 140–450)
PMV BLD AUTO: 10.4 FL (ref 6–12)
RBC # BLD AUTO: 5.38 10*6/MM3 (ref 4.14–5.8)
WBC NRBC COR # BLD: 15 10*3/MM3 (ref 3.4–10.8)

## 2022-11-04 PROCEDURE — 85025 COMPLETE CBC W/AUTO DIFF WBC: CPT

## 2022-11-04 PROCEDURE — 71046 X-RAY EXAM CHEST 2 VIEWS: CPT

## 2022-11-04 PROCEDURE — 36415 COLL VENOUS BLD VENIPUNCTURE: CPT

## 2022-11-04 PROCEDURE — 80053 COMPREHEN METABOLIC PANEL: CPT

## 2022-11-04 PROCEDURE — 99214 OFFICE O/P EST MOD 30 MIN: CPT

## 2022-11-04 NOTE — PROGRESS NOTES
Chief Complaint   Patient presents with   • Shoulder Pain     Bilateral shoulder pain x2 weeks   • Laryngitis     X3 weeks       Subjective          Alberto Rachel presents to [facility]    History of Present Illness  He is here to be seen for bilateral shoulder pain for 2 weeks. He has also been complaining of sore throat that's been ongoing for 3 weeks now. He states he overall feels weak and fatigued. He has not had any lab work taken yet. He is complaining of a cough that started last week and shortness of breath. He states he also has some chest pain that comes and goes but is on the right side not the left side. He is also complaining of MARYA arm pain.      Past History:  Medical History: has a past medical history of Anxiety, Arthritis, Atherosclerosis of coronary artery bypass graft of native heart without angina pectoris (10/23/2018), CAD (coronary artery disease) (10/23/2018), Carotid artery stenosis with cerebral infarction ([facility]) (2015), Cramps, extremity (9/10/2021), Depression, DM2 (diabetes mellitus, type 2) ([facility]) (10/23/2018), Duodenal ulcer, Essential hypertension (10/23/2018), Headache, Heart attack ([facility]) (), Heartburn, HLD (hyperlipidemia), HTN (hypertension), Injury of right leg (2021), Irregular heart beat, Low back pain (2019), Mild episode of recurrent depressive disorder ([facility]) (10/23/2018), Rash (2019), Shortness of breath, SOB (shortness of breath), Type 2 diabetes mellitus with hyperglycemia, without long-term current use of insulin ([facility]) (2019), and Uncontrolled diabetes mellitus (2019).   Surgical History: has a past surgical history that includes Cardiac catheterization (2015); Circumcision, non-; and Coronary artery bypass graft (2015).   Family History: family history includes Heart disease in an other family member.   Social History: reports that he has never smoked. He has been exposed to tobacco  "smoke. His smokeless tobacco use includes chew. He reports that he does not currently use alcohol. He reports that he does not use drugs.  Allergies: Cyclobenzaprine  (Not in a hospital admission)       Social History     Socioeconomic History   • Marital status:    Tobacco Use   • Smoking status: Never     Passive exposure: Past   • Smokeless tobacco: Current     Types: Chew   Vaping Use   • Vaping Use: Never used   Substance and Sexual Activity   • Alcohol use: Not Currently   • Drug use: Never   • Sexual activity: Defer       Health Maintenance Due   Topic Date Due   • ZOSTER VACCINE (1 of 2) Never done   • COVID-19 Vaccine (4 - Booster for Moderna series) 06/20/2022       Objective     Vital Signs:   /54 (BP Location: Left arm, Patient Position: Sitting, Cuff Size: Adult)   Pulse 64   Temp 97.3 °F (36.3 °C) (Temporal)   Resp 18   Ht 170.2 cm (67\")   Wt 96.2 kg (212 lb)   SpO2 99%   BMI 33.20 kg/m²       Physical Exam  Constitutional:       Appearance: Normal appearance.   HENT:      Nose: Nose normal.      Mouth/Throat:      Mouth: Mucous membranes are moist.      Pharynx: Oropharynx is clear.   Cardiovascular:      Rate and Rhythm: Normal rate and regular rhythm.      Pulses: Normal pulses.   Pulmonary:      Effort: Pulmonary effort is normal.      Breath sounds: Normal breath sounds.   Skin:     General: Skin is warm and dry.   Neurological:      General: No focal deficit present.      Mental Status: He is alert and oriented to person, place, and time.   Psychiatric:         Mood and Affect: Mood normal.         Behavior: Behavior normal.          Review of Systems   Constitutional: Positive for fatigue.   Respiratory: Positive for cough and shortness of breath.    Cardiovascular: Positive for chest pain (right sided).   Musculoskeletal: Positive for myalgias (MARYA arm).        Result Review :                 Assessment and Plan    Diagnoses and all orders for this visit:    1. Shortness of " breath (Primary)  -     XR Chest 2 View; Future  -     CBC w AUTO Differential; Future    2. Subacute cough  -     XR Chest 2 View; Future  -     CBC w AUTO Differential; Future    3. Bilateral arm pain  -     Comprehensive metabolic panel; Future  -     diclofenac (VOLTAREN) 50 MG EC tablet; Take 1 tablet by mouth 2 (Two) Times a Day As Needed (shoudler pain MARYA) for up to 14 days.  Dispense: 28 tablet; Refill: 0    Kidney function on 8/24 lab draw was WNL. He was give diclofenac for the arm pain and advised to wait to take any until we get new kidney function back. He is taking potassium and advised to hold that until we know what his potassium level is. CXR is negative for any acute process.    I spent 37 minutes caring for Alberto on this date of service. This time includes time spent by me in the following activities:preparing for the visit, reviewing tests, obtaining and/or reviewing a separately obtained history, performing a medically appropriate examination and/or evaluation , counseling and educating the patient/family/caregiver, ordering medications, tests, or procedures and documenting information in the medical record    Pt thought to be clinically stable at this time.    Follow Up   No follow-ups on file.  Patient was given instructions and counseling regarding his condition or for health maintenance advice. Please see specific information pulled into the AVS if appropriate.

## 2022-11-05 ENCOUNTER — DOCUMENTATION (OUTPATIENT)
Dept: FAMILY MEDICINE CLINIC | Facility: CLINIC | Age: 62
End: 2022-11-05

## 2022-11-05 LAB
ALBUMIN SERPL-MCNC: 5 G/DL (ref 3.5–5.2)
ALBUMIN/GLOB SERPL: 2.1 G/DL
ALP SERPL-CCNC: 40 U/L (ref 39–117)
ALT SERPL W P-5'-P-CCNC: 14 U/L (ref 1–41)
ANION GAP SERPL CALCULATED.3IONS-SCNC: 16 MMOL/L (ref 5–15)
AST SERPL-CCNC: 14 U/L (ref 1–40)
BILIRUB SERPL-MCNC: 0.3 MG/DL (ref 0–1.2)
BUN SERPL-MCNC: 95 MG/DL (ref 8–23)
BUN/CREAT SERPL: 22.5 (ref 7–25)
CALCIUM SPEC-SCNC: 10.2 MG/DL (ref 8.6–10.5)
CHLORIDE SERPL-SCNC: 94 MMOL/L (ref 98–107)
CO2 SERPL-SCNC: 23 MMOL/L (ref 22–29)
CREAT SERPL-MCNC: 4.23 MG/DL (ref 0.76–1.27)
EGFRCR SERPLBLD CKD-EPI 2021: 14.9 ML/MIN/1.73
GLOBULIN UR ELPH-MCNC: 2.4 GM/DL
GLUCOSE SERPL-MCNC: 144 MG/DL (ref 65–99)
POTASSIUM SERPL-SCNC: 6.7 MMOL/L (ref 3.5–5.2)
PROT SERPL-MCNC: 7.4 G/DL (ref 6–8.5)
SODIUM SERPL-SCNC: 133 MMOL/L (ref 136–145)

## 2022-11-05 NOTE — PROGRESS NOTES
"I received a panic high on K+ of 6.7 on [place].  I attempted [phone] and a message come to me \"[employer] there are restrictions on this line\". I attempted this # 4 times.  I then had the on call service attempt the call and they received the same message.  I then called Celine Gupta--[phone] and a man answered the line stating it was the wrong # and there is no such Celine at that #.  Then I attempted his second emergency contact on the chart (Juventino Peralta [phone]) and the message I got from his cell is \"this caller is not accepting calls at this time\".  There is no other contact to trial.    I was unable to give pt the critical lab level.    He is currently on K+ supplement.   I will attempt again in the AM.  If pt does call back he needs to go straight to the ER with the K+ of 6.7.    Parul Rodas, APRN   11/05/2022     "

## 2022-11-07 ENCOUNTER — LAB (OUTPATIENT)
Dept: LAB | Facility: HOSPITAL | Age: 62
End: 2022-11-07

## 2022-11-07 DIAGNOSIS — D72.829 LEUKOCYTOSIS, UNSPECIFIED TYPE: ICD-10-CM

## 2022-11-07 DIAGNOSIS — E87.5 HYPERKALEMIA: Primary | ICD-10-CM

## 2022-11-07 DIAGNOSIS — E87.5 HYPERKALEMIA: ICD-10-CM

## 2022-11-07 LAB
ALBUMIN SERPL-MCNC: 4.7 G/DL (ref 3.5–5.2)
ALBUMIN/GLOB SERPL: 1.9 G/DL
ALP SERPL-CCNC: 43 U/L (ref 39–117)
ALT SERPL W P-5'-P-CCNC: 11 U/L (ref 1–41)
ANION GAP SERPL CALCULATED.3IONS-SCNC: 11.7 MMOL/L (ref 5–15)
AST SERPL-CCNC: 10 U/L (ref 1–40)
BASOPHILS # BLD AUTO: 0.05 10*3/MM3 (ref 0–0.2)
BASOPHILS NFR BLD AUTO: 0.3 % (ref 0–1.5)
BILIRUB SERPL-MCNC: 0.5 MG/DL (ref 0–1.2)
BUN SERPL-MCNC: 81 MG/DL (ref 8–23)
BUN/CREAT SERPL: 31.8 (ref 7–25)
CALCIUM SPEC-SCNC: 9.8 MG/DL (ref 8.6–10.5)
CHLORIDE SERPL-SCNC: 99 MMOL/L (ref 98–107)
CO2 SERPL-SCNC: 18.3 MMOL/L (ref 22–29)
CREAT SERPL-MCNC: 2.55 MG/DL (ref 0.76–1.27)
DEPRECATED RDW RBC AUTO: 41 FL (ref 37–54)
EGFRCR SERPLBLD CKD-EPI 2021: 27.3 ML/MIN/1.73
EOSINOPHIL # BLD AUTO: 0.18 10*3/MM3 (ref 0–0.4)
EOSINOPHIL NFR BLD AUTO: 1.2 % (ref 0.3–6.2)
ERYTHROCYTE [DISTWIDTH] IN BLOOD BY AUTOMATED COUNT: 12.8 % (ref 12.3–15.4)
GLOBULIN UR ELPH-MCNC: 2.5 GM/DL
GLUCOSE SERPL-MCNC: 218 MG/DL (ref 65–99)
HCT VFR BLD AUTO: 49.4 % (ref 37.5–51)
HGB BLD-MCNC: 16.8 G/DL (ref 13–17.7)
IMM GRANULOCYTES # BLD AUTO: 0.07 10*3/MM3 (ref 0–0.05)
IMM GRANULOCYTES NFR BLD AUTO: 0.4 % (ref 0–0.5)
LYMPHOCYTES # BLD AUTO: 4.8 10*3/MM3 (ref 0.7–3.1)
LYMPHOCYTES NFR BLD AUTO: 30.7 % (ref 19.6–45.3)
MCH RBC QN AUTO: 29.8 PG (ref 26.6–33)
MCHC RBC AUTO-ENTMCNC: 34 G/DL (ref 31.5–35.7)
MCV RBC AUTO: 87.7 FL (ref 79–97)
MONOCYTES # BLD AUTO: 1.09 10*3/MM3 (ref 0.1–0.9)
MONOCYTES NFR BLD AUTO: 7 % (ref 5–12)
NEUTROPHILS NFR BLD AUTO: 60.4 % (ref 42.7–76)
NEUTROPHILS NFR BLD AUTO: 9.46 10*3/MM3 (ref 1.7–7)
NRBC BLD AUTO-RTO: 0 /100 WBC (ref 0–0.2)
PLATELET # BLD AUTO: 272 10*3/MM3 (ref 140–450)
PMV BLD AUTO: 10.5 FL (ref 6–12)
POTASSIUM SERPL-SCNC: 6 MMOL/L (ref 3.5–5.2)
PROT SERPL-MCNC: 7.2 G/DL (ref 6–8.5)
RBC # BLD AUTO: 5.63 10*6/MM3 (ref 4.14–5.8)
SODIUM SERPL-SCNC: 129 MMOL/L (ref 136–145)
WBC NRBC COR # BLD: 15.65 10*3/MM3 (ref 3.4–10.8)

## 2022-11-07 PROCEDURE — 85025 COMPLETE CBC W/AUTO DIFF WBC: CPT

## 2022-11-07 PROCEDURE — 36415 COLL VENOUS BLD VENIPUNCTURE: CPT

## 2022-11-07 PROCEDURE — 80053 COMPREHEN METABOLIC PANEL: CPT

## 2022-11-07 NOTE — PROGRESS NOTES
Bethany Roblero tried to contact patient multiple different ways to alert him of this critical high potassium level and to seek treatment in the ER. His telephone line seems to be unable to accept calls and the contact listed is not the correct number. He needs to go to the ER immediately if we can get ahold of him. Hes in acute kidney failure with a potassium that could cause an arhythmia.

## 2022-11-08 ENCOUNTER — HOSPITAL ENCOUNTER (EMERGENCY)
Facility: HOSPITAL | Age: 62
Discharge: HOME | End: 2022-11-08
Attending: EMERGENCY MEDICINE | Admitting: EMERGENCY MEDICINE

## 2022-11-08 ENCOUNTER — TELEPHONE (OUTPATIENT)
Dept: FAMILY MEDICINE CLINIC | Facility: CLINIC | Age: 62
End: 2022-11-08

## 2022-11-08 VITALS
BODY MASS INDEX: 30.54 KG/M2 | HEART RATE: 84 BPM | SYSTOLIC BLOOD PRESSURE: 147 MMHG | DIASTOLIC BLOOD PRESSURE: 77 MMHG | RESPIRATION RATE: 18 BRPM | WEIGHT: 201.5 LBS | HEIGHT: 68 IN | TEMPERATURE: 97.4 F | OXYGEN SATURATION: 97 %

## 2022-11-08 DIAGNOSIS — N28.9 RENAL INSUFFICIENCY: Primary | ICD-10-CM

## 2022-11-08 LAB
ALBUMIN SERPL-MCNC: 4.8 G/DL (ref 3.5–5.2)
ALBUMIN/GLOB SERPL: 1.3 G/DL
ALP SERPL-CCNC: 50 U/L (ref 39–117)
ALT SERPL W P-5'-P-CCNC: 13 U/L (ref 1–41)
ANION GAP SERPL CALCULATED.3IONS-SCNC: 11.1 MMOL/L (ref 5–15)
AST SERPL-CCNC: 12 U/L (ref 1–40)
BASOPHILS # BLD AUTO: 0.03 10*3/MM3 (ref 0–0.2)
BASOPHILS NFR BLD AUTO: 0.2 % (ref 0–1.5)
BILIRUB SERPL-MCNC: 0.4 MG/DL (ref 0–1.2)
BILIRUB UR QL STRIP: NEGATIVE
BUN SERPL-MCNC: 80 MG/DL (ref 8–23)
BUN/CREAT SERPL: 37.6 (ref 7–25)
CALCIUM SPEC-SCNC: 9.5 MG/DL (ref 8.6–10.5)
CHLORIDE SERPL-SCNC: 100 MMOL/L (ref 98–107)
CLARITY UR: CLEAR
CO2 SERPL-SCNC: 22.9 MMOL/L (ref 22–29)
COLOR UR: YELLOW
CREAT SERPL-MCNC: 2.13 MG/DL (ref 0.76–1.27)
DEPRECATED RDW RBC AUTO: 41.3 FL (ref 37–54)
EGFRCR SERPLBLD CKD-EPI 2021: 33.9 ML/MIN/1.73
EOSINOPHIL # BLD AUTO: 0.16 10*3/MM3 (ref 0–0.4)
EOSINOPHIL NFR BLD AUTO: 1.3 % (ref 0.3–6.2)
ERYTHROCYTE [DISTWIDTH] IN BLOOD BY AUTOMATED COUNT: 12.5 % (ref 12.3–15.4)
GLOBULIN UR ELPH-MCNC: 3.6 GM/DL
GLUCOSE SERPL-MCNC: 174 MG/DL (ref 65–99)
GLUCOSE UR STRIP-MCNC: ABNORMAL MG/DL
HCT VFR BLD AUTO: 49 % (ref 37.5–51)
HGB BLD-MCNC: 16.4 G/DL (ref 13–17.7)
HGB UR QL STRIP.AUTO: NEGATIVE
HOLD SPECIMEN: NORMAL
HOLD SPECIMEN: NORMAL
IMM GRANULOCYTES # BLD AUTO: 0.06 10*3/MM3 (ref 0–0.05)
IMM GRANULOCYTES NFR BLD AUTO: 0.5 % (ref 0–0.5)
KETONES UR QL STRIP: NEGATIVE
LEUKOCYTE ESTERASE UR QL STRIP.AUTO: NEGATIVE
LIPASE SERPL-CCNC: 204 U/L (ref 13–60)
LYMPHOCYTES # BLD AUTO: 3.19 10*3/MM3 (ref 0.7–3.1)
LYMPHOCYTES NFR BLD AUTO: 25.2 % (ref 19.6–45.3)
MAGNESIUM SERPL-MCNC: 3 MG/DL (ref 1.6–2.4)
MCH RBC QN AUTO: 29.8 PG (ref 26.6–33)
MCHC RBC AUTO-ENTMCNC: 33.5 G/DL (ref 31.5–35.7)
MCV RBC AUTO: 89.1 FL (ref 79–97)
MONOCYTES # BLD AUTO: 1.24 10*3/MM3 (ref 0.1–0.9)
MONOCYTES NFR BLD AUTO: 9.8 % (ref 5–12)
NEUTROPHILS NFR BLD AUTO: 63 % (ref 42.7–76)
NEUTROPHILS NFR BLD AUTO: 7.98 10*3/MM3 (ref 1.7–7)
NITRITE UR QL STRIP: NEGATIVE
NRBC BLD AUTO-RTO: 0 /100 WBC (ref 0–0.2)
PH UR STRIP.AUTO: 6 [PH] (ref 5–8)
PHOSPHATE SERPL-MCNC: 4.2 MG/DL (ref 2.5–4.5)
PLATELET # BLD AUTO: 214 10*3/MM3 (ref 140–450)
PMV BLD AUTO: 9.9 FL (ref 6–12)
POTASSIUM SERPL-SCNC: 5.5 MMOL/L (ref 3.5–5.2)
PROT SERPL-MCNC: 8.4 G/DL (ref 6–8.5)
PROT UR QL STRIP: NEGATIVE
QT INTERVAL: 363 MS
RBC # BLD AUTO: 5.5 10*6/MM3 (ref 4.14–5.8)
SODIUM SERPL-SCNC: 134 MMOL/L (ref 136–145)
SP GR UR STRIP: <=1.005 (ref 1–1.03)
UROBILINOGEN UR QL STRIP: ABNORMAL
WBC NRBC COR # BLD: 12.66 10*3/MM3 (ref 3.4–10.8)
WHOLE BLOOD HOLD COAG: NORMAL
WHOLE BLOOD HOLD SPECIMEN: NORMAL

## 2022-11-08 PROCEDURE — 81003 URINALYSIS AUTO W/O SCOPE: CPT | Performed by: PHYSICIAN ASSISTANT

## 2022-11-08 PROCEDURE — 99283 EMERGENCY DEPT VISIT LOW MDM: CPT

## 2022-11-08 PROCEDURE — 83735 ASSAY OF MAGNESIUM: CPT | Performed by: EMERGENCY MEDICINE

## 2022-11-08 PROCEDURE — 80053 COMPREHEN METABOLIC PANEL: CPT | Performed by: EMERGENCY MEDICINE

## 2022-11-08 PROCEDURE — 85025 COMPLETE CBC W/AUTO DIFF WBC: CPT | Performed by: EMERGENCY MEDICINE

## 2022-11-08 PROCEDURE — 93005 ELECTROCARDIOGRAM TRACING: CPT | Performed by: PHYSICIAN ASSISTANT

## 2022-11-08 PROCEDURE — 84100 ASSAY OF PHOSPHORUS: CPT | Performed by: EMERGENCY MEDICINE

## 2022-11-08 PROCEDURE — 83690 ASSAY OF LIPASE: CPT | Performed by: PHYSICIAN ASSISTANT

## 2022-11-08 RX ORDER — SODIUM CHLORIDE 0.9 % (FLUSH) 0.9 %
10 SYRINGE (ML) INJECTION AS NEEDED
Status: DISCONTINUED | OUTPATIENT
Start: 2022-11-08 | End: 2022-11-08 | Stop reason: HOSPADM

## 2022-11-08 RX ADMIN — SODIUM CHLORIDE 1000 ML: 9 INJECTION, SOLUTION INTRAVENOUS at 12:03

## 2022-11-08 NOTE — ED PROVIDER NOTES
Time: 11:47 AM EST  Arrived by: private car  Chief Complaint: abnormal labs  History provided by: patient  History is limited by: N/A    History of Present Illness:  Patient is a 62 y.o. year old male who presents to the emergency department with abnormal labs yesterday from PCP who advised patient to come to ED for evaluation and repeat labs.       History provided by:  Patient   used: No        Patient Care Team  Primary Care Provider: Magnus Euceda DO    Past Medical History:     Allergies   Allergen Reactions   • Cyclobenzaprine Unknown - Low Severity     Past Medical History:   Diagnosis Date   • Anxiety    • Arthritis    • Atherosclerosis of coronary artery bypass graft of native heart without angina pectoris 10/23/2018    Coronary artery disease with previous bypass, LIMA to the LAD, SVG to ramus, and SVG to RCA in 2015   • CAD (coronary artery disease) 10/23/2018   • Carotid artery stenosis with cerebral infarction ([facility]) 06/01/2015    Dr. St    • Cramps, extremity 9/10/2021   • Depression    • DM2 (diabetes mellitus, type 2) ([facility]) 10/23/2018   • Duodenal ulcer    • Essential hypertension 10/23/2018   • Headache    • Heart attack ([facility]) 2015   • Heartburn    • HLD (hyperlipidemia)    • HTN (hypertension)    • Injury of right leg 8/13/2021   • Irregular heart beat    • Low back pain 03/18/2019   • Mild episode of recurrent depressive disorder ([facility]) 10/23/2018   • Rash 03/18/2019   • Shortness of breath    • SOB (shortness of breath)    • Type 2 diabetes mellitus with hyperglycemia, without long-term current use of insulin ([facility]) 06/11/2019   • Uncontrolled diabetes mellitus 03/18/2019     Past Surgical History:   Procedure Laterality Date   • CARDIAC CATHETERIZATION  06/2015    Dr. St    • CIRCUMCISION      age 14 yrs old   • CORONARY ARTERY BYPASS GRAFT  06/2015    5 bypasses     Family History   Problem Relation Age of Onset   • Heart disease Other        Home Medications:  Prior to  Admission medications    Medication Sig Start Date End Date Taking? Authorizing Provider   albuterol sulfate  (90 Base) MCG/ACT inhaler Inhale 2 puffs Every 4 (Four) Hours As Needed for Wheezing. 2/14/22   Magnus Euceda DO   ARIPiprazole (ABILIFY) 20 MG tablet Take 1 tablet by mouth Daily. 2/14/22   Magnus Euceda DO   aspirin 81 MG EC tablet Take 1 tablet by mouth Daily. 2/14/22   Magnus Euceda DO   atorvastatin (LIPITOR) 80 MG tablet TAKE 1 TABLET AT BEDTIME 11/3/22   Magnus Euceda DO   Blood Glucose Monitoring Suppl (OneTouch Verio Reflect) w/Device kit  3/14/22   Provider, MD Kenney   brimonidine-timolol (COMBIGAN) 0.2-0.5 % ophthalmic solution  7/5/22   Provider, MD Kenney   bumetanide (BUMEX) 1 MG tablet Take 1 tablet by mouth Daily. 2/14/22   Magnus Euceda DO   carvedilol (COREG) 25 MG tablet Take 1 tablet by mouth 2 (Two) Times a Day With Meals. 5/6/22   Magnus Euceda DO   celecoxib (CeleBREX) 100 MG capsule Take 1 capsule by mouth 2 (Two) Times a Day As Needed for Mild Pain . 2/14/22   Magnus Euceda DO   citalopram (CeleXA) 20 MG tablet Take 1 tablet by mouth Daily. 2/14/22   Magnus Euceda DO   cyanocobalamin (V-R VITAMIN B-12) 500 MCG tablet Take 1 tablet by mouth Daily. 9/5/22   Magnus Euceda DO   diclofenac (VOLTAREN) 50 MG EC tablet Take 1 tablet by mouth 2 (Two) Times a Day As Needed (shoudler pain MARYA) for up to 14 days. 11/4/22 11/18/22  Latricia Dwyer APRN   ezetimibe (Zetia) 10 MG tablet Take 1 tablet by mouth Daily. 9/7/22   Magnus Euceda DO   famotidine (PEPCID) 40 MG tablet TAKE 1 TABLET BY MOUTH TWICE DAILY 11/3/22   Magnus Euceda DO   fenofibrate (TRICOR) 145 MG tablet Take 1 tablet by mouth Daily. 9/5/22   Euceda, Magnus, DO   Jardiance 25 MG tablet tablet Take 1 tablet by mouth Daily. 10/7/22   Provider, Kenney, MD   latanoprost (XALATAN) 0.005 % ophthalmic solution INSTILL 1 DROP IN BOTH EYES AT BEDTIME 1/14/22   Provider, MD Kenney   levothyroxine  (SYNTHROID, LEVOTHROID) 50 MCG tablet Take 1 tablet by mouth Daily. 2/14/22   Magnus Euceda DO   lisinopril-hydrochlorothiazide (PRINZIDE,ZESTORETIC) 20-25 MG per tablet Take 1 tablet by mouth 2 (Two) Times a Day. 9/16/22   Magnus Euceda DO   Lumigan 0.01 % ophthalmic drops INSTILL 1 DROP IN BOTH EYES AT BEDTIME 3/15/22   Kenney Conn MD   metFORMIN (GLUCOPHAGE) 1000 MG tablet Take 1 tablet by mouth 2 (Two) Times a Day With Meals. 2/14/22   Magnus Euceda DO   metFORMIN ER (GLUCOPHAGE-XR) 500 MG 24 hr tablet  7/15/22   Kenney Conn MD   nitroglycerin (NITROSTAT) 0.4 MG SL tablet Place 1 tablet under the tongue Every 5 (Five) Minutes As Needed for Chest Pain. Take no more than 3 doses in 15 minutes. 2/14/22   Magnus Euceda DO   OneTouch Delica Lancets 30G misc  3/11/22   Kenney Conn MD   OneTouch Verio test strip  3/11/22   Kenney Conn MD   pioglitazone (ACTOS) 15 MG tablet Take 15 mg by mouth every night at bedtime. 3/25/22   Kenney Conn MD   potassium chloride (K-DUR,KLOR-CON) 20 MEQ CR tablet TAKE 1 TABLET BY MOUTH ONCE DAILY 10/21/22   Magnus Euceda DO   Rybelsus 14 MG tablet  8/12/22   Kenney Conn MD   spironolactone (Aldactone) 50 MG tablet Take 1 tablet by mouth Daily. 5/6/22 5/6/23  Magnus Euceda DO   traZODone (DESYREL) 100 MG tablet Take 1 tablet by mouth Every Night. Indications: Trouble Sleeping 2/14/22 2/14/23  Magnus Euceda DO        Social History:   Social History     Tobacco Use   • Smoking status: Never     Passive exposure: Past   • Smokeless tobacco: Current     Types: Chew   Vaping Use   • Vaping Use: Never used   Substance Use Topics   • Alcohol use: Not Currently   • Drug use: Never     Recent travel: not applicable    Review of Systems:  Review of Systems   Constitutional: Negative for chills and fever.   HENT: Negative for congestion, ear pain and sore throat.    Eyes: Negative for pain.   Respiratory: Negative for cough, chest  "tightness and shortness of breath.    Cardiovascular: Negative for chest pain.   Gastrointestinal: Negative for abdominal pain, diarrhea, nausea and vomiting.   Genitourinary: Negative for flank pain and hematuria.   Musculoskeletal: Negative for joint swelling.   Skin: Negative for pallor.   Neurological: Negative for seizures and headaches.   All other systems reviewed and are negative.       Physical Exam:  /76   Pulse 74   Temp 97.4 °F (36.3 °C)   Resp 18   Ht 172.7 cm (68\")   Wt 91.4 kg (201 lb 8 oz)   SpO2 97%   BMI 30.64 kg/m²     Physical Exam  Vitals and nursing note reviewed.   Constitutional:       General: He is not in acute distress.     Appearance: Normal appearance. He is not toxic-appearing.   HENT:      Head: Normocephalic and atraumatic.      Jaw: There is normal jaw occlusion.   Eyes:      General: Lids are normal.      Extraocular Movements: Extraocular movements intact.      Conjunctiva/sclera: Conjunctivae normal.      Pupils: Pupils are equal, round, and reactive to light.   Cardiovascular:      Rate and Rhythm: Normal rate and regular rhythm.      Pulses: Normal pulses.      Heart sounds: Normal heart sounds.   Pulmonary:      Effort: Pulmonary effort is normal. No respiratory distress.      Breath sounds: Normal breath sounds. No wheezing or rhonchi.   Abdominal:      General: Abdomen is flat. There is no distension.      Palpations: Abdomen is soft.      Tenderness: There is no abdominal tenderness. There is no guarding or rebound.   Musculoskeletal:         General: Normal range of motion.      Cervical back: Normal range of motion and neck supple.      Right lower leg: No edema.      Left lower leg: No edema.   Skin:     General: Skin is warm and dry.   Neurological:      Mental Status: He is alert and oriented to person, place, and time. Mental status is at baseline.   Psychiatric:         Mood and Affect: Mood normal.                Medications in the Emergency " Department:  Medications   sodium chloride 0.9 % flush 10 mL (has no administration in time range)   sodium chloride 0.9 % bolus 1,000 mL (1,000 mL Intravenous New Bag 11/8/22 1203)        Labs  Lab Results (last 24 hours)     Procedure Component Value Units Date/Time    CBC & Differential [[id number]]  (Abnormal) Collected: 11/08/22 0926    Specimen: Blood Updated: 11/08/22 0940    Narrative:      The following orders were created for panel order CBC & Differential.  Procedure                               Abnormality         Status                     ---------                               -----------         ------                     CBC Auto Differential[[id number]]        Abnormal            Final result                 Please view results for these tests on the individual orders.    Comprehensive Metabolic Panel [[id number]]  (Abnormal) Collected: 11/08/22 0926    Specimen: Blood Updated: 11/08/22 1001     Glucose 174 mg/dL      BUN 80 mg/dL      Creatinine 2.13 mg/dL      Sodium 134 mmol/L      Potassium 5.5 mmol/L      Chloride 100 mmol/L      CO2 22.9 mmol/L      Calcium 9.5 mg/dL      Total Protein 8.4 g/dL      Albumin 4.80 g/dL      ALT (SGPT) 13 U/L      AST (SGOT) 12 U/L      Alkaline Phosphatase 50 U/L      Total Bilirubin 0.4 mg/dL      Globulin 3.6 gm/dL      A/G Ratio 1.3 g/dL      BUN/Creatinine Ratio 37.6     Anion Gap 11.1 mmol/L      eGFR 33.9 mL/min/1.73      Comment: National Kidney Foundation and American Society of Nephrology (ASN) Task Force recommended calculation based on the Chronic Kidney Disease Epidemiology Collaboration (CKD-EPI) equation refit without adjustment for race.       Narrative:      GFR Normal >60  Chronic Kidney Disease <60  Kidney Failure <15      Magnesium [[id number]]  (Abnormal) Collected: 11/08/22 0926    Specimen: Blood Updated: 11/08/22 1001     Magnesium 3.0 mg/dL     Phosphorus [[id number]]  (Normal) Collected: 11/08/22 0926    Specimen: Blood Updated: 11/08/22  1001     Phosphorus 4.2 mg/dL     CBC Auto Differential [[id number]]  (Abnormal) Collected: 11/08/22 0926    Specimen: Blood Updated: 11/08/22 0940     WBC 12.66 10*3/mm3      RBC 5.50 10*6/mm3      Hemoglobin 16.4 g/dL      Hematocrit 49.0 %      MCV 89.1 fL      MCH 29.8 pg      MCHC 33.5 g/dL      RDW 12.5 %      RDW-SD 41.3 fl      MPV 9.9 fL      Platelets 214 10*3/mm3      Neutrophil % 63.0 %      Lymphocyte % 25.2 %      Monocyte % 9.8 %      Eosinophil % 1.3 %      Basophil % 0.2 %      Immature Grans % 0.5 %      Neutrophils, Absolute 7.98 10*3/mm3      Lymphocytes, Absolute 3.19 10*3/mm3      Monocytes, Absolute 1.24 10*3/mm3      Eosinophils, Absolute 0.16 10*3/mm3      Basophils, Absolute 0.03 10*3/mm3      Immature Grans, Absolute 0.06 10*3/mm3      nRBC 0.0 /100 WBC     Lipase [[id number]]  (Abnormal) Collected: 11/08/22 0926    Specimen: Blood Updated: 11/08/22 1036     Lipase 204 U/L     Urinalysis With Culture If Indicated - Urine, Clean Catch [[id number]]  (Abnormal) Collected: 11/08/22 1051    Specimen: Urine, Clean Catch Updated: 11/08/22 1102     Color, UA Yellow     Appearance, UA Clear     pH, UA 6.0     Specific Gravity, UA <=1.005     Glucose, UA >=1000 mg/dL (3+)     Ketones, UA Negative     Bilirubin, UA Negative     Blood, UA Negative     Protein, UA Negative     Leuk Esterase, UA Negative     Nitrite, UA Negative     Urobilinogen, UA 0.2 E.U./dL    Narrative:      In absence of clinical symptoms, the presence of pyuria, bacteria, and/or nitrites on the urinalysis result does not correlate with infection.  Urine microscopic not indicated.           Imaging:  No Radiology Exams Resulted Within Past 24 Hours    Procedures:  Procedures    Progress                            Medical Decision Making:  MDM  Number of Diagnoses or Management Options  Renal insufficiency  Diagnosis management comments: In summary this is a 62-year-old male who presents to the emergency department for evaluation of  abnormal laboratory findings yesterday.  He reports he was told he was in acute renal failure along with other abnormalities.  Laboratory evaluation today shows improving renal function.  CBC independently reviewed by me and shows no critical abnormalities.  CMP independently reviewed by me and shows no critical abnormalities except for improving renal function.  Patient is otherwise well-appearing and is adamantly requesting discharge home.  He is eating and drink without issue in the emergency department and he is making plenty of urine.  Very strict return to ER and follow-up instructions have been provided to the patient.         Final diagnoses:   Renal insufficiency        Disposition:  ED Disposition     ED Disposition   Discharge    Condition   Stable    Comment   --             This medical record created using voice recognition software and a virtual scribe.    Documentation assistance provided by Tiarra Hill acting as scribe for Marco A Slade MD, MD. Information recorded by the scribe was done at my direction and has been verified and validated by me.         Tiarra Hill  11/08/22 1152       Marco A Slade MD  11/08/22 2123

## 2022-11-08 NOTE — ED NOTES
The patient's daughter got a call yesterday from patient's pcp to have him come into er to be evaluation and repeat labs for kidney function and calcium levels

## 2022-11-08 NOTE — TELEPHONE ENCOUNTER
Spoke with patient and friend of patient Zina about lab results. Per Dr. Pino, patient is still in acute kidney failure and should go to ER to be checked out and treated. Let them know the importance of being checked out due to lab values. Zina stated patient would go to hospital and had a ride coming to pick him up

## 2022-11-10 ENCOUNTER — LAB (OUTPATIENT)
Dept: LAB | Facility: HOSPITAL | Age: 62
End: 2022-11-10

## 2022-11-10 DIAGNOSIS — E87.5 HYPERKALEMIA: Primary | ICD-10-CM

## 2022-11-10 DIAGNOSIS — E87.5 HYPERKALEMIA: ICD-10-CM

## 2022-11-10 LAB
ALBUMIN SERPL-MCNC: 4.4 G/DL (ref 3.5–5.2)
ANION GAP SERPL CALCULATED.3IONS-SCNC: 15 MMOL/L (ref 5–15)
BUN SERPL-MCNC: 60 MG/DL (ref 8–23)
BUN/CREAT SERPL: 21.4 (ref 7–25)
CALCIUM SPEC-SCNC: 9.3 MG/DL (ref 8.6–10.5)
CHLORIDE SERPL-SCNC: 104 MMOL/L (ref 98–107)
CO2 SERPL-SCNC: 19 MMOL/L (ref 22–29)
CREAT SERPL-MCNC: 2.81 MG/DL (ref 0.76–1.27)
EGFRCR SERPLBLD CKD-EPI 2021: 24.3 ML/MIN/1.73
GLUCOSE SERPL-MCNC: 139 MG/DL (ref 65–99)
PHOSPHATE SERPL-MCNC: 4.8 MG/DL (ref 2.5–4.5)
POTASSIUM SERPL-SCNC: 5.3 MMOL/L (ref 3.5–5.2)
SODIUM SERPL-SCNC: 138 MMOL/L (ref 136–145)

## 2022-11-10 PROCEDURE — 80069 RENAL FUNCTION PANEL: CPT

## 2022-11-10 PROCEDURE — 36415 COLL VENOUS BLD VENIPUNCTURE: CPT

## 2022-11-14 NOTE — PROGRESS NOTES
Please follow up in office and recheck Potassium still a little high,    Kidneys worse, need to increase fluids and hold some other medicines too I think  If your feeling worse I would go back to hospital but if not come in for labs today and bring all medicines

## 2022-11-15 ENCOUNTER — LAB (OUTPATIENT)
Dept: LAB | Facility: HOSPITAL | Age: 62
End: 2022-11-15

## 2022-11-15 ENCOUNTER — OFFICE VISIT (OUTPATIENT)
Dept: FAMILY MEDICINE CLINIC | Facility: CLINIC | Age: 62
End: 2022-11-15

## 2022-11-15 ENCOUNTER — PATIENT OUTREACH (OUTPATIENT)
Dept: CASE MANAGEMENT | Facility: OTHER | Age: 62
End: 2022-11-15

## 2022-11-15 VITALS
SYSTOLIC BLOOD PRESSURE: 105 MMHG | RESPIRATION RATE: 18 BRPM | OXYGEN SATURATION: 100 % | TEMPERATURE: 98.1 F | HEIGHT: 68 IN | WEIGHT: 206.4 LBS | DIASTOLIC BLOOD PRESSURE: 38 MMHG | BODY MASS INDEX: 31.28 KG/M2 | HEART RATE: 76 BPM

## 2022-11-15 DIAGNOSIS — E78.2 MIXED HYPERLIPIDEMIA: ICD-10-CM

## 2022-11-15 DIAGNOSIS — E87.5 HYPERKALEMIA: ICD-10-CM

## 2022-11-15 DIAGNOSIS — M25.512 BILATERAL SHOULDER PAIN, UNSPECIFIED CHRONICITY: ICD-10-CM

## 2022-11-15 DIAGNOSIS — K21.9 GASTROESOPHAGEAL REFLUX DISEASE WITHOUT ESOPHAGITIS: ICD-10-CM

## 2022-11-15 DIAGNOSIS — M25.511 BILATERAL SHOULDER PAIN, UNSPECIFIED CHRONICITY: ICD-10-CM

## 2022-11-15 DIAGNOSIS — M25.512 BILATERAL SHOULDER PAIN, UNSPECIFIED CHRONICITY: Chronic | ICD-10-CM

## 2022-11-15 DIAGNOSIS — E87.5 HYPERKALEMIA: Primary | ICD-10-CM

## 2022-11-15 DIAGNOSIS — I10 ESSENTIAL HYPERTENSION: ICD-10-CM

## 2022-11-15 DIAGNOSIS — E78.2 MIXED HYPERLIPIDEMIA: Chronic | ICD-10-CM

## 2022-11-15 DIAGNOSIS — Z55.0 ILLITERACY: Chronic | ICD-10-CM

## 2022-11-15 DIAGNOSIS — M25.511 BILATERAL SHOULDER PAIN, UNSPECIFIED CHRONICITY: Chronic | ICD-10-CM

## 2022-11-15 LAB — MAGNESIUM SERPL-MCNC: 2 MG/DL (ref 1.6–2.4)

## 2022-11-15 PROCEDURE — 99213 OFFICE O/P EST LOW 20 MIN: CPT | Performed by: FAMILY MEDICINE

## 2022-11-15 PROCEDURE — 80069 RENAL FUNCTION PANEL: CPT

## 2022-11-15 PROCEDURE — 83735 ASSAY OF MAGNESIUM: CPT

## 2022-11-15 PROCEDURE — 36415 COLL VENOUS BLD VENIPUNCTURE: CPT

## 2022-11-15 SDOH — EDUCATIONAL SECURITY - EDUCATION ATTAINMENT: ILITERACY AND LOW LEVEL LITERACY: Z55.0

## 2022-11-15 NOTE — OUTREACH NOTE
AMBULATORY CASE MANAGEMENT NOTE    Name and Relationship of Patient/Support Person: Alberto Rachel Ray - Self    CCM Interim Update    Saw in office for CCM services and discussed program, read consent to patient and he verbally agreed to consent to CCM services for chronic conditions of HTN, HLD, DMT2, depression and anxiety. Reports he cannot read or write. Reviewed program with him, purpose, goals, and expectations. He is aware he can stop services at any time and discussed possible cost of program.     Adult Patient Profile  Questions/Answers    Flowsheet Row Most Recent Value   Symptoms/Conditions Managed at Home cardiovascular, diabetes, type 2   Barriers to Managing Health financial resources, literacy, medication, inability to prepare, medication side effects, social support, understanding health advice   Cardiovascular Symptoms/Conditions high blood cholesterol, hypertension   Cardiovascular Self-Management Outcome 4 (good)   Cardiovascular Comment keepng blood pressure log at home and readings are WNL, however at office today he is hypotension (122/38 and recheck at 110/55 manual)   Diabetes Management Strategies blood glucose testing, medication therapy   Frequency of Blood Glucose Testing other (see comments)  [only checking blood sugars daily]   Usual Blood Glucose lower 100's   Last A1C Result down from 11.9% on 1/12/22   Diabetes Self-Management Outcome 4 (good)   Diabetes Comment michelle in blood pressure log today, readins 2oo to 106 most in low 100's PCP givn log to review   Taking Medications Not Prescribed no   Missed Doses of Prescribed Medications During Past Week no  [has pill pack from pharmacy and nursing called for his medication list to be faxed over for the pill pack]   Taken Prescribed Medications at Different Time or Schedule During Past Week no   Taken More or Less Medication Than Prescribed no  [removing potassium from pill pack this week and not taking it]   Barriers to Taking  Medication as Prescribed don't know what it is for, trouble reading the label  [has pill planner from pharmacy but not the medication list and is unsure what all his medicines are for but is compliant taking them]   Primary Source of Support/Comfort friend   Name of Support/Comfort Primary Source his neighbor Celine assists as needed, she lives next door to him no other support system   People in Home alone   Family Caregiver if Needed none        Adult Wellness Assessment  Questions/Answers    Flowsheet Row Most Recent Value   Help with Reading Health-Related Information always   Problems Learning about Medical Condition always   Confidence in Filling Out Forms by Self never        Social Work Assessment  Questions/Answers    Flowsheet Row Most Recent Value   People in Home alone   Family Caregiver if Needed none   Speech WDL WDL                Education Documentation  Blood Pressure Monitoring, taught by Kimberly Mayers RN at 11/15/2022 10:55 AM.  Learner: Patient  Readiness: Acceptance  Method: Explanation, Teach Back  Response: Demonstrated Understanding, Verbalizes Understanding  Comment: patient brings hisblood sugar an blood pressure logs to office and his pill pack from pharmacy, discussed with him and PCP.    Blood Glucose Monitoring, taught by Kimberly Mayers RN at 11/15/2022 10:55 AM.  Learner: Patient  Readiness: Acceptance  Method: Explanation, Teach Back  Response: Demonstrated Understanding, Verbalizes Understanding  Comment: patient brings hisblood sugar an blood pressure logs to office and his pill pack from pharmacy, discussed with him and PCP.    Diabetes, Type 2, taught by Kimberly Mayers RN at 11/15/2022 10:55 AM.  Learner: Patient  Readiness: Acceptance  Method: Explanation, Teach Back  Response: Demonstrated Understanding, Verbalizes Understanding  Comment: patient brings hisblood sugar an blood pressure logs to office and his pill pack from pharmacy, discussed with him and PCP.    unresolved  or worsening symptoms, taught by Kimberly Mayers, RN at 11/15/2022 10:55 AM.  Learner: Patient  Readiness: Acceptance  Method: Explanation, Teach Back  Response: Demonstrated Understanding, Verbalizes Understanding  Comment: patient brings hisblood sugar an blood pressure logs to office and his pill pack from pharmacy, discussed with him and PCP.    medication management, taught by Kimberly Mayers RN at 11/15/2022 10:55 AM.  Learner: Patient  Readiness: Acceptance  Method: Explanation, Teach Back  Response: Demonstrated Understanding, Verbalizes Understanding  Comment: patient brings hisblood sugar an blood pressure logs to office and his pill pack from pharmacy, discussed with him and PCP.          KIMBERLY MURO  Ambulatory Case Management    11/15/2022, 11:01 EST

## 2022-11-16 ENCOUNTER — TELEPHONE (OUTPATIENT)
Dept: CASE MANAGEMENT | Facility: OTHER | Age: 62
End: 2022-11-16

## 2022-11-16 ENCOUNTER — TELEPHONE (OUTPATIENT)
Dept: FAMILY MEDICINE CLINIC | Facility: CLINIC | Age: 62
End: 2022-11-16

## 2022-11-16 ENCOUNTER — PATIENT OUTREACH (OUTPATIENT)
Dept: CASE MANAGEMENT | Facility: OTHER | Age: 62
End: 2022-11-16

## 2022-11-16 DIAGNOSIS — E87.5 HYPERKALEMIA: Primary | ICD-10-CM

## 2022-11-16 DIAGNOSIS — M25.512 BILATERAL SHOULDER PAIN, UNSPECIFIED CHRONICITY: ICD-10-CM

## 2022-11-16 DIAGNOSIS — E78.2 MIXED HYPERLIPIDEMIA: ICD-10-CM

## 2022-11-16 DIAGNOSIS — E11.65 TYPE 2 DIABETES MELLITUS WITH HYPERGLYCEMIA, WITHOUT LONG-TERM CURRENT USE OF INSULIN: Chronic | ICD-10-CM

## 2022-11-16 DIAGNOSIS — N17.9 ACUTE RENAL FAILURE, UNSPECIFIED ACUTE RENAL FAILURE TYPE: ICD-10-CM

## 2022-11-16 DIAGNOSIS — Z55.0 ILLITERACY: ICD-10-CM

## 2022-11-16 DIAGNOSIS — E03.9 HYPOTHYROIDISM, UNSPECIFIED TYPE: Chronic | ICD-10-CM

## 2022-11-16 DIAGNOSIS — M25.511 BILATERAL SHOULDER PAIN, UNSPECIFIED CHRONICITY: ICD-10-CM

## 2022-11-16 LAB
ALBUMIN SERPL-MCNC: 4.7 G/DL (ref 3.5–5.2)
ANION GAP SERPL CALCULATED.3IONS-SCNC: 12 MMOL/L (ref 5–15)
BUN SERPL-MCNC: 46 MG/DL (ref 8–23)
BUN/CREAT SERPL: 11.4 (ref 7–25)
CALCIUM SPEC-SCNC: 9.8 MG/DL (ref 8.6–10.5)
CHLORIDE SERPL-SCNC: 97 MMOL/L (ref 98–107)
CO2 SERPL-SCNC: 24 MMOL/L (ref 22–29)
CREAT SERPL-MCNC: 4.05 MG/DL (ref 0.76–1.27)
EGFRCR SERPLBLD CKD-EPI 2021: 15.7 ML/MIN/1.73
GLUCOSE SERPL-MCNC: 109 MG/DL (ref 65–99)
PHOSPHATE SERPL-MCNC: 4.6 MG/DL (ref 2.5–4.5)
POTASSIUM SERPL-SCNC: 6.6 MMOL/L (ref 3.5–5.2)
SODIUM SERPL-SCNC: 133 MMOL/L (ref 136–145)

## 2022-11-16 RX ORDER — SODIUM ZIRCONIUM CYCLOSILICATE 10 G/10G
10 POWDER, FOR SUSPENSION ORAL 3 TIMES DAILY
Qty: 11 EACH | Refills: 0 | Status: SHIPPED | OUTPATIENT
Start: 2022-11-16

## 2022-11-16 SDOH — EDUCATIONAL SECURITY - EDUCATION ATTAINMENT: ILITERACY AND LOW LEVEL LITERACY: Z55.0

## 2022-11-16 NOTE — TELEPHONE ENCOUNTER
Attempted to reach for CCM afternoon follow up after visit here this morning for nurse visit to support with medi-planner and review labs. No answer and no ability to leave voicemail. PCP updated.

## 2022-11-16 NOTE — PROGRESS NOTES
Stop diclofenac, increase fluids, sent a medicine powder to take up to 3x today to lower potassium.     Was 6.6, the highest its been was 6.7 when he went to the ED I think the first time which may be best as they can monitor and give fluids.

## 2022-11-16 NOTE — TELEPHONE ENCOUNTER
I received a critical value phone call at 0251 about a potassium level being 6.6 for Mr. Rachel. I tried calling him at 0255 and 0257. He did not answer and does not have voicemail box set up. I informed Dr. Euceda this morning, as it is his patient, he is going to take over and get ahold of him for recheck.

## 2022-11-16 NOTE — OUTREACH NOTE
AMBULATORY CASE MANAGEMENT NOTE    Name and Relationship of Patient/Support Person: Alberto Rachel - Self        Education Documentation  medication management, taught by Kimberly Mayers RN at 11/16/2022 11:53 AM.  Learner: Patient  Readiness: Acceptance  Method: Explanation, Teach Back, Handout  Response: Needs Reinforcement  Comment: discussed lab results, medication changes and iqra picture of how to ntake new medication as he is unable to read or write, he verbalizes understanding and will contact again later today.    food/fluid intake, taught by Kimberly Mayers RN at 11/16/2022 11:53 AM.  Learner: Patient  Readiness: Acceptance  Method: Explanation, Teach Back, Handout  Response: Needs Reinforcement  Comment: discussed lab results, medication changes and iqra picture of how to ntake new medication as he is unable to read or write, he verbalizes understanding and will contact again later today.        CCM Interim Update      Patient to office this morning, discussed labs with him, GARY Hernández office nurse and myself reviewed his current medication list from PCP and he brought his pill planner in from pharmacy and we removed all medications he is no longer taking and placed pills back in planner for AM and PM, we also gave him drawing of new medication Lokelma powder for 2 days and drinking with glass of water TID. He verbalizes ability to understand picture drawings of instructions. He has my work cell for needs and I will call him later today to check on him. He is not wanting to go the ER or in hospital today. PCP aware. He was given the new medication and Betty went to pharmacy to give new medication list and medications we removed from pill planner to them and  they will deliver his new pill pack Friday, his current pill pack is correct up to Saturday with all medication PCP has discontinued today on medication list removed. Both myself and GARY Hernández reviewed each pill in pill pack and removed all  discontinued medicines and replaced pills he is to remain on in the pill pack and taped back up.     He denies any nausea of vomiting today, I discussed all labs with him and explained high potassium and low GFR and side effects. Educated on Lokelma and side effects.           VI MURO  Ambulatory Case Management    11/16/2022, 11:52 EST

## 2022-11-17 ENCOUNTER — TELEPHONE (OUTPATIENT)
Dept: CASE MANAGEMENT | Facility: OTHER | Age: 62
End: 2022-11-17

## 2022-11-17 ENCOUNTER — LAB (OUTPATIENT)
Dept: LAB | Facility: HOSPITAL | Age: 62
End: 2022-11-17

## 2022-11-17 DIAGNOSIS — N17.9 ACUTE RENAL FAILURE, UNSPECIFIED ACUTE RENAL FAILURE TYPE: ICD-10-CM

## 2022-11-17 DIAGNOSIS — E87.5 HYPERKALEMIA: ICD-10-CM

## 2022-11-17 LAB
ALBUMIN SERPL-MCNC: 4.6 G/DL (ref 3.5–5.2)
ANION GAP SERPL CALCULATED.3IONS-SCNC: 12.6 MMOL/L (ref 5–15)
BUN SERPL-MCNC: 27 MG/DL (ref 8–23)
BUN/CREAT SERPL: 14.2 (ref 7–25)
CALCIUM SPEC-SCNC: 9.9 MG/DL (ref 8.6–10.5)
CHLORIDE SERPL-SCNC: 100 MMOL/L (ref 98–107)
CO2 SERPL-SCNC: 24.4 MMOL/L (ref 22–29)
CREAT SERPL-MCNC: 1.9 MG/DL (ref 0.76–1.27)
EGFRCR SERPLBLD CKD-EPI 2021: 38.9 ML/MIN/1.73
GLUCOSE SERPL-MCNC: 111 MG/DL (ref 65–99)
PHOSPHATE SERPL-MCNC: 3.1 MG/DL (ref 2.5–4.5)
POTASSIUM SERPL-SCNC: 4.9 MMOL/L (ref 3.5–5.2)
SODIUM SERPL-SCNC: 137 MMOL/L (ref 136–145)

## 2022-11-17 PROCEDURE — 80069 RENAL FUNCTION PANEL: CPT

## 2022-11-17 PROCEDURE — 36415 COLL VENOUS BLD VENIPUNCTURE: CPT

## 2022-11-17 NOTE — TELEPHONE ENCOUNTER
He got repeat labs today and per office nurse Betty he was asking her if he is supposed to take the powder and his medicines or what today. He did report to her he did take his powder yesterday. Just checked lab reports and nothing back in system yet. Will f/u tomorrow.

## 2022-11-18 ENCOUNTER — TELEPHONE (OUTPATIENT)
Dept: FAMILY MEDICINE CLINIC | Facility: CLINIC | Age: 62
End: 2022-11-18

## 2022-11-18 ENCOUNTER — PATIENT OUTREACH (OUTPATIENT)
Dept: CASE MANAGEMENT | Facility: OTHER | Age: 62
End: 2022-11-18

## 2022-11-18 ENCOUNTER — LAB (OUTPATIENT)
Dept: LAB | Facility: HOSPITAL | Age: 62
End: 2022-11-18

## 2022-11-18 DIAGNOSIS — Z55.0 ILLITERACY: ICD-10-CM

## 2022-11-18 DIAGNOSIS — E11.65 TYPE 2 DIABETES MELLITUS WITH HYPERGLYCEMIA, WITHOUT LONG-TERM CURRENT USE OF INSULIN: ICD-10-CM

## 2022-11-18 DIAGNOSIS — N17.9 ACUTE RENAL FAILURE, UNSPECIFIED ACUTE RENAL FAILURE TYPE: ICD-10-CM

## 2022-11-18 DIAGNOSIS — E78.2 MIXED HYPERLIPIDEMIA: ICD-10-CM

## 2022-11-18 DIAGNOSIS — E87.5 HYPERKALEMIA: ICD-10-CM

## 2022-11-18 DIAGNOSIS — E87.5 HYPERKALEMIA: Primary | ICD-10-CM

## 2022-11-18 LAB
ALBUMIN SERPL-MCNC: 4.6 G/DL (ref 3.5–5.2)
ANION GAP SERPL CALCULATED.3IONS-SCNC: 10.7 MMOL/L (ref 5–15)
BUN SERPL-MCNC: 18 MG/DL (ref 8–23)
BUN/CREAT SERPL: 10.8 (ref 7–25)
CALCIUM SPEC-SCNC: 9.5 MG/DL (ref 8.6–10.5)
CHLORIDE SERPL-SCNC: 103 MMOL/L (ref 98–107)
CO2 SERPL-SCNC: 23.3 MMOL/L (ref 22–29)
CREAT SERPL-MCNC: 1.67 MG/DL (ref 0.76–1.27)
EGFRCR SERPLBLD CKD-EPI 2021: 45.4 ML/MIN/1.73
GLUCOSE SERPL-MCNC: 218 MG/DL (ref 65–99)
PHOSPHATE SERPL-MCNC: 2.4 MG/DL (ref 2.5–4.5)
POTASSIUM SERPL-SCNC: 4.1 MMOL/L (ref 3.5–5.2)
SODIUM SERPL-SCNC: 137 MMOL/L (ref 136–145)

## 2022-11-18 PROCEDURE — 83735 ASSAY OF MAGNESIUM: CPT

## 2022-11-18 PROCEDURE — 80069 RENAL FUNCTION PANEL: CPT

## 2022-11-18 SDOH — EDUCATIONAL SECURITY - EDUCATION ATTAINMENT: ILITERACY AND LOW LEVEL LITERACY: Z55.0

## 2022-11-18 NOTE — TELEPHONE ENCOUNTER
Patient came in to office to have labs redrawn, asked if it was ok to start taking medication.    Spoke with Dr. Euceda about patients recent lab results, stated to have patient stop taking lokelma powder and start taking medication in pill pack again. Also go ahead and redraw labs today      Patient aware to stop powder mix and to start medication back

## 2022-11-18 NOTE — OUTREACH NOTE
AMBULATORY CASE MANAGEMENT NOTE    Name and Relationship of Patient/Support Person: Alberto Rachel Ray - Self     CCM Interim Update    Called patient, completed assessment and education and he is doing much better in labs, discussed life and health goals. He is eager to have referral to social work with support for food and anything else available to him. He is on SSI and SSA. He gets $80 a month for food stamps and does run out of money after bills are paid.         Adult Patient Profile  Questions/Answers    Flowsheet Row Most Recent Value   Identifying Life Goals want to go to [facility]   Identifying Health Goals managing weight, having enough money to get care, making sure to get medicine   Q1: How often do you have a drink containing alcohol? Never   Q2: How many drinks containing alcohol do you have on a typical day when you are drinking? None   Q3: How often do you have six or more drinks on one occasion? Never   Audit-C Score 0        Send Education  Questions/Answers    Flowsheet Row Most Recent Value   Annual Wellness Visit:  Patient Has Completed   Other Patient Education/Resources  Advanced Care Planning, MyChart   ACP Education Method Verbal   MyChart Education Method Verbal   Advanced Directives: Not Interested At This Time      SDOH updated and reviewed with the patient during this program:  Financial Resource Strain: High Risk   • Difficulty of Paying Living Expenses: Hard      Physical Activity: Sufficiently Active   • Days of Exercise per Week: 7 days   • Minutes of Exercise per Session: 30 min      Food Insecurity: Food Insecurity Present   • Worried About Running Out of Food in the Last Year: Sometimes true   • Ran Out of Food in the Last Year: Sometimes true      Social Connections: Socially Isolated   • Frequency of Communication with Friends and Family: Once a week   • Frequency of Social Gatherings with Friends and Family: Once a week   • Attends [employer]ist Services: Never   • Active Member of  Clubs or Organizations: No   • Attends Club or Organization Meetings: Never   • Marital Status:       Transportation Needs: No Transportation Needs   • Lack of Transportation (Medical): No   • Lack of Transportation (Non-Medical): No      Housing Stability: High Risk   • Unable to Pay for Housing in the Last Year: Yes   • Number of Places Lived in the Last Year: 1   • Unstable Housing in the Last Year: No      Stress: No Stress Concern Present   • Feeling of Stress : Not at all         Education Documentation  unresolved or worsening symptoms, taught by Kimberly Mayers RN at 11/18/2022 10:38 AM.  Learner: Patient  Readiness: Acceptance  Method: Explanation, Teach Back  Response: Verbalizes Understanding    medication management, taught by Kimberly Mayers RN at 11/18/2022 10:38 AM.  Learner: Patient  Readiness: Acceptance  Method: Explanation, Teach Back  Response: Verbalizes Understanding          KIMBERLY MURO  Ambulatory Case Management    11/18/2022, 10:42 EST

## 2022-11-21 ENCOUNTER — PATIENT OUTREACH (OUTPATIENT)
Dept: CASE MANAGEMENT | Facility: OTHER | Age: 62
End: 2022-11-21

## 2022-11-21 ENCOUNTER — TELEPHONE (OUTPATIENT)
Dept: CASE MANAGEMENT | Facility: OTHER | Age: 62
End: 2022-11-21

## 2022-11-21 DIAGNOSIS — E11.65 TYPE 2 DIABETES MELLITUS WITH HYPERGLYCEMIA, WITHOUT LONG-TERM CURRENT USE OF INSULIN: ICD-10-CM

## 2022-11-21 DIAGNOSIS — Z55.0 ILLITERACY: ICD-10-CM

## 2022-11-21 DIAGNOSIS — E87.5 HYPERKALEMIA: Primary | ICD-10-CM

## 2022-11-21 DIAGNOSIS — E78.2 MIXED HYPERLIPIDEMIA: ICD-10-CM

## 2022-11-21 LAB — MAGNESIUM SERPL-MCNC: 1.9 MG/DL (ref 1.6–2.4)

## 2022-11-21 RX ORDER — SOD PHOS DI, MONO/K PHOS MONO 250 MG
1 TABLET ORAL DAILY
Qty: 5 TABLET | Refills: 0 | Status: SHIPPED | OUTPATIENT
Start: 2022-11-21 | End: 2022-11-25 | Stop reason: SDUPTHER

## 2022-11-21 SDOH — EDUCATIONAL SECURITY - EDUCATION ATTAINMENT: ILITERACY AND LOW LEVEL LITERACY: Z55.0

## 2022-11-21 NOTE — PROGRESS NOTES
Kidney function better and potassium    His phosphorous went down a smidge, I would get him some of that for the next week to take for just a few days and recheck next week again    Follow up and review medicines or he can just come to office I dont want anyone confusing things more  *I reordered labs, sent phosph to [facility]

## 2022-11-21 NOTE — OUTREACH NOTE
Social Work Assessment  Questions/Answers    Flowsheet Row Most Recent Value   Referral Source nursing   Reason for Consult financial concerns   Preferred Language English   People in Home alone   Provides Primary Care For no one, unable/limited ability to care for self   Family Caregiver if Needed none   Quality of Family Relationships helpful   Source of Income social security   Financial/Environmental Concerns unable to afford food, unable to afford rent/mortgage   Application for Public Assistance pending public assistance pending number        SDOH updated and reviewed with the patient during this program:  Financial Resource Strain: High Risk   • Difficulty of Paying Living Expenses: Hard      Food Insecurity: Food Insecurity Present   • Worried About Running Out of Food in the Last Year: Sometimes true   • Ran Out of Food in the Last Year: Sometimes true      Transportation Needs: No Transportation Needs   • Lack of Transportation (Medical): No   • Lack of Transportation (Non-Medical): No      Housing Stability: High Risk   • Unable to Pay for Housing in the Last Year: Yes   • Number of Places Lived in the Last Year: 1   • Unstable Housing in the Last Year: No      Continuing Care   [facility]    [address] [address]    Phone: [phone]    Resource for: Transportation Needs   [facility]    [address] [facility] [address]    Phone: [phone]    Resource for: Food Insecurity   [facility]    [address] [place] [address]    Phone: [phone]    Resource for: Financial Resource Strain, Food Insecurity     Patient Outreach    MSW spoke with patient to discuss resources needed. Patient given resources for additional food beltran in the area. MSW also sent Talia at [employer] referral. MSW provided patient and friend Keagan  with phone number to [employer] to call and complete screening via phone. Patient thankful for assistance and states 'this will really help'. Patient also given resources for [facility] program. MSW available if needed in the future.    EMA GOODMAN -   Ambulatory Case Management    11/21/2022, 15:17 EST

## 2022-11-21 NOTE — TELEPHONE ENCOUNTER
Call to Ilia at [facility] and he reports he will not get the phosphorus supplement in until tomorrow but what is ordered DOES have some amount of potassium in it, he is not sure how much on the breakdown but it does have potassium.         This is what I found on search:  K-PHOS® NEUTRAL Phosphorus Supplement Rx ONLY DESCRIPTION Each tablet contains 852 mg dibasic sodium phosphate anhydrous, 155 mg monobasic potassium phosphate, and the equivalent of 130 mg monobasic sodium phosphate monohydrate. Each tablet yields approximately 250 mg of phosphorus, 298 mg of sodium (13.0 mEq) and 45 mg of potassium (1.1 mEq).

## 2022-11-21 NOTE — OUTREACH NOTE
AMBULATORY CASE MANAGEMENT NOTE    Name and Relationship of Patient/Support Person:  -     [facility] Interim Update    Noted per chart review patient repeat labs are improving even more, potassium is normal, phosphorus is a bit low and PCP added 5 day supplement and new lab orders. His GFR is improving as well as Creatinine and BUN. Labs placed for 11/23/22. Will discuss specific plan for follow with PCP and call patient. Will need his 5 day phosphorus pills from pharmacy added to his current pill pack.     Called and gave patient his ab results and informed he needs new labs on Wednesday 11/23/22 and her verbalized understanding and agreement and informed PCP wants new Phosphorus supplement for 5 days but not in pharmacy yet.     Care Coordination    Outreach to [place] pharmacy and noted his phosphorus is not ready for , he is not sure they have it or what the hold up is but her will research and call me back on work cell.         Education Documentation  No documentation found.        VI MURO  Ambulatory Case Management    11/21/2022, 09:43 EST

## 2022-11-22 ENCOUNTER — PATIENT OUTREACH (OUTPATIENT)
Dept: CASE MANAGEMENT | Facility: OTHER | Age: 62
End: 2022-11-22

## 2022-11-22 DIAGNOSIS — E87.5 HYPERKALEMIA: Primary | ICD-10-CM

## 2022-11-22 DIAGNOSIS — E78.2 MIXED HYPERLIPIDEMIA: ICD-10-CM

## 2022-11-22 DIAGNOSIS — Z55.0 ILLITERACY: ICD-10-CM

## 2022-11-22 PROCEDURE — 99487 CPLX CHRNC CARE 1ST 60 MIN: CPT | Performed by: FAMILY MEDICINE

## 2022-11-22 PROCEDURE — 99489 CPLX CHRNC CARE EA ADDL 30: CPT | Performed by: FAMILY MEDICINE

## 2022-11-22 SDOH — EDUCATIONAL SECURITY - EDUCATION ATTAINMENT: ILITERACY AND LOW LEVEL LITERACY: Z55.0

## 2022-11-22 NOTE — TELEPHONE ENCOUNTER
Spoke with Iila at [facility], we are picking it up today and will let patient know to come in to get his planner adjusted once we get the pills here.

## 2022-11-22 NOTE — OUTREACH NOTE
AMBULATORY CASE MANAGEMENT NOTE    Name and Relationship of Patient/Support Person: Alberto Rachel Nikos - Self    Care Coordination    Outreach to Ilia at [facility], he does have the phosphorus supplement ready for  and Ashley from the office went to get medication.     CCM Interim Update    Call to patient and he will come to office and  his medication and get his pill planner taken care of.     Patient comes to office and he took his first phosphorus tablet and I added for Wednesday through Saturday on his morning pill planner and updated his list on the cover. Noted his PC medications were only gone from Sunday. I questioned this and he reports he finished them from his old planner last week and started this week last night. Discussed starting tonight's on the regular day to keep it less confusing and less likely to miss a night time dose. I had him show me how he does his pill planner and he demonstrates appropriate am and pm medications. He is aware to come in later tomorrow morning for repeat labs. PCP updated.     Added for complex CCM this month.           Education Documentation  medication management, taught by Kimberly Mayers RN at 11/22/2022  9:55 AM.  Learner: Patient  Readiness: Acceptance  Method: Explanation, Demonstration, Teach Back  Response: Verbalizes Understanding, Demonstrated Understanding  Comment: will come to office to get pill planner up dated with the phosphorus supplament for 5 days and discussed over phone how he needs to return for labs tomorrow          KIMBERLY MURO  Ambulatory Case Management    11/22/2022, 09:56 EST

## 2022-11-23 ENCOUNTER — LAB (OUTPATIENT)
Dept: LAB | Facility: HOSPITAL | Age: 62
End: 2022-11-23

## 2022-11-23 DIAGNOSIS — E87.5 HYPERKALEMIA: ICD-10-CM

## 2022-11-23 LAB
ALBUMIN SERPL-MCNC: 4.9 G/DL (ref 3.5–5.2)
ANION GAP SERPL CALCULATED.3IONS-SCNC: 11.4 MMOL/L (ref 5–15)
BUN SERPL-MCNC: 16 MG/DL (ref 8–23)
BUN/CREAT SERPL: 10.4 (ref 7–25)
CALCIUM SPEC-SCNC: 9.6 MG/DL (ref 8.6–10.5)
CHLORIDE SERPL-SCNC: 105 MMOL/L (ref 98–107)
CO2 SERPL-SCNC: 22.6 MMOL/L (ref 22–29)
CREAT SERPL-MCNC: 1.54 MG/DL (ref 0.76–1.27)
EGFRCR SERPLBLD CKD-EPI 2021: 50.1 ML/MIN/1.73
GLUCOSE SERPL-MCNC: 127 MG/DL (ref 65–99)
MAGNESIUM SERPL-MCNC: 1.8 MG/DL (ref 1.6–2.4)
PHOSPHATE SERPL-MCNC: 2.4 MG/DL (ref 2.5–4.5)
POTASSIUM SERPL-SCNC: 4.3 MMOL/L (ref 3.5–5.2)
SODIUM SERPL-SCNC: 139 MMOL/L (ref 136–145)

## 2022-11-23 PROCEDURE — 80069 RENAL FUNCTION PANEL: CPT

## 2022-11-23 PROCEDURE — 36415 COLL VENOUS BLD VENIPUNCTURE: CPT

## 2022-11-23 PROCEDURE — 83735 ASSAY OF MAGNESIUM: CPT

## 2022-11-25 DIAGNOSIS — E83.39 HYPOPHOSPHATEMIA: Primary | ICD-10-CM

## 2022-11-25 RX ORDER — SOD PHOS DI, MONO/K PHOS MONO 250 MG
1 TABLET ORAL DAILY
Qty: 5 TABLET | Refills: 0 | Status: SHIPPED | OUTPATIENT
Start: 2022-11-25 | End: 2022-11-30

## 2022-11-26 NOTE — PROGRESS NOTES
Phosphorous still low, make sure he was able to get and start phosphorous supplement and recheck this coming week, Potassium and other labs still looking good

## 2022-11-29 ENCOUNTER — PATIENT OUTREACH (OUTPATIENT)
Dept: CASE MANAGEMENT | Facility: OTHER | Age: 62
End: 2022-11-29

## 2022-11-29 DIAGNOSIS — E78.2 MIXED HYPERLIPIDEMIA: ICD-10-CM

## 2022-11-29 DIAGNOSIS — E87.5 HYPERKALEMIA: Primary | ICD-10-CM

## 2022-11-29 DIAGNOSIS — Z55.0 ILLITERACY: ICD-10-CM

## 2022-11-29 DIAGNOSIS — E11.65 TYPE 2 DIABETES MELLITUS WITH HYPERGLYCEMIA, WITHOUT LONG-TERM CURRENT USE OF INSULIN: ICD-10-CM

## 2022-11-29 SDOH — EDUCATIONAL SECURITY - EDUCATION ATTAINMENT: ILITERACY AND LOW LEVEL LITERACY: Z55.0

## 2022-11-29 NOTE — PROGRESS NOTES
I personally put his phosphorus supplement in his pill planner last week. He started it on 11/22 for 5 days. He is aware need for repeat labs and will come in tomorrow to do those.

## 2022-11-29 NOTE — OUTREACH NOTE
[facility] End of Month Documentation    This Chronic Medical Management Care Plan for Alberto Rachel, 62 y.o. male, has been a new plan of care implemented; established; monitored and managed and a new plan of care implemented for the month of November.  A cumulative time of 219  minutes was spent on this patient record this month, including face to face visit with patient; face to face with provider; phone call with pharmacist; chart review; electronic communication with primary care provider.    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  A detailed listing of time spent for chronic care management is tracked within each outreach encounter.  Current medications include:  has a current medication list which includes the following prescription(s): albuterol sulfate hfa, aripiprazole, atorvastatin, onetouch verio reflect, brimonidine-timolol, bumetanide, citalopram, cyanocobalamin, famotidine, fenofibrate, jardiance, phosphorous, latanoprost, levothyroxine, lumigan, metformin, nitroglycerin, onetouch delica lancets 30g, onetouch verio, pioglitazone, rybelsus, lokelma, and trazodone. and the patient is reported to be patient is compliant with medication protocol,  Medications are reported to be non-effective in controlling symptoms and changes  have been made to the medication protocol.      The patient was monitored remotely for blood pressure; blood glucose; mood & behavior; medications.    The patient's physical needs include:  medication education; resources for disability needs.     The patient's mental support needs include:  increased support; coordination of community providers    The patient's cognitive support needs include:  medication; coordination of community providers; increased support    The patient's psychosocial support needs include:  need for increased support; coordination of community providers; medication management or adherence    The patient's functional needs include: needs met    The patient's environmental needs include:  not applicable, none    Care Plan overall comments:  care plan initiated    Refer to previous outreach notes for more information on the areas listed above.    Monthly Billing Diagnoses  (E87.5) Hyperkalemia    (Z55.0) Illiteracy    (E78.2) Mixed hyperlipidemia    (E11.65) Type 2 diabetes mellitus with hyperglycemia, without long-term current use of insulin (HCC)    Medications   · Medications have been reconciled    Care Plan progress this month:      Recently Modified Care Plans Updates made since 10/29/2022 12:00 AM     Wellness (Adult)         Problem Priority Last Modified     ELS HEALTH LITERACY (WELLNESS) (ADULT) --  11/15/2022  3:40 PM by Kimberly Mayers RN              Goal Recent Progress Last Modified     Health Literacy Improved --  11/15/2022  3:40 PM by Kimberly Mayers RN     Evidence-based guidance:   Assess health literacy using [place] of [employer] recommended questions or standardized tools.   Use a universal method with health literacy to esure a nonjudgmental approach to varying levels of literacy.   Identify barriers to seeking or understanding information.   Note: Patient barriers may include literacy, language or culture, mental health, stigma, embarrassment; clinician barriers may  include avoidance, time constraint, stereotype or bias.   Collaborate with interprofessional team and child and parent/caregiver to develop a structured yet individualized education plan that supports shared decision-making.   Consider a combination of strategies such as print, audiotape, video and computer-based learning in a group or individual setting.   Encourage patient to seek health information and education in the community such as library, continuing education, English as a second language class, support group or peer health .   Provide educational materials that are written in plain language (3rd to 5th grade reading level) and include icons, pictures and tables; provide essential information first.    Notes:               General Plan of Care (Adult)         Problem Priority Last Modified     ELS HEALTH PROMOTION OR DISEASE SELF-MANAGEMENT (GENERAL PLAN OF CARE) (ADULT) --  11/15/2022  3:40 PM by Kimberly Mayers RN              Goal Recent Progress Last Modified     Self-Management Plan Developed --  11/15/2022  3:40 PM by Kimberly Mayers RN     Evidence-based guidance:   Review biopsychosocial determinants of health screens.   Determine level of modifiable health risk.   Assess level of patient activation, level of readiness, importance and confidence to make changes.   Evoke change talk using open-ended questions, pros and cons, as well as looking forward.   Identify areas where behavior change may lead to improved health.   Partner with patient to develop a robust self-management plan that includes lifestyle factors, such as weight loss, exercise and healthy nutrition, as well as goals specific to disease risks.   Support patient and family/caregiver active participation in decision-making and self-management plan.   Implement additional goals and interventions based on identified risk factors to reduce health risk.   Facilitate advance care planning.   Review need for preventive screening based  on age, sex, family history and health history.    Notes:               Wellness (Adult)         Problem Priority Last Modified     ELS HEALTH LITERACY (WELLNESS) (ADULT) --  11/18/2022 10:33 AM by Kimberly Mayers RN              Goal Recent Progress Last Modified     Health Literacy Improved --  11/18/2022 10:34 AM by Kimberly Mayers RN     Evidence-based guidance:   Assess health literacy using [place] of Medicine recommended questions or standardized tools.   Use a universal method with health literacy to esure a nonjudgmental approach to varying levels of literacy.   Identify barriers to seeking or understanding information.   Note: Patient barriers may include literacy, language or culture, mental health, stigma, embarrassment; clinician barriers may include avoidance, time constraint, stereotype or bias.   Collaborate with interprofessional team and child and parent/caregiver to develop a structured yet individualized education plan that supports shared decision-making.   Consider a combination of strategies such as print, audiotape, video and computer-based learning in a group or individual setting.   Encourage patient to seek health information and education in the community such as library, continuing education, English as a second language class, support group or peer health .   Provide educational materials that are written in plain language (3rd to 5th grade reading level) and include icons, pictures and tables; provide essential information first.        Notes: 11/18/22 patient unable to read or write, medication education on picture boards drawn out for his this month, he reports it is working for him             Task Due Date Last Modified     Identify Deficit and Optimize Health Literacy --  11/18/2022 10:35 AM by Kimberly Mayers RN     Care Management Activities:      - education plan reviewed and/or amended  - empathy and reassurance conveyed  - patient's preferred learning methods  utilized  - questions encouraged      Notes:              Problem Priority Last Modified     ELS MEDICATION ADHERENCE (WELLNESS) (ADULT) --  11/18/2022 10:35 AM by Kimberly Mayers RN              Goal Recent Progress Last Modified     Medication Adherence Maintained --  11/18/2022 10:36 AM by Kimberly Mayers, RN     Evidence-based guidance:   Develop a complete and accurate medication list including those prescribed and over-the-counter, those taken only occasionally and those not taken by mouth such as injections, inhalers, ointments or creams and drops.   Review all medications to determine if patient or caregiver knows why the medications are given and if taken as prescribed.   Complete or review a medication adherence assessment including barriers to medication adherence.   Arrange and encourage counseling and medication review by pharmacist.   Assess barriers to medication adherence.   Manage poor understanding or health literacy by using easy to understand language, teach-back, visual aids and teaching only 2 or 3 points at a time.   Assess presence of side effects; provide suggestions to manage or reduce side effects.   Consult with provider and/or pharmacist regarding substitute medication, changing dose, simplification of regimen or safe discontinuation of some medications.   Encourage the use of medication reminders such as clock or cell phone alarm, color coding, pillboxes for am/pm and days of the week, pharmacy refill reminder, auto-refill system or mail-order option.   Assist with resources when cost is a barrier; refer to prescription assistance programs; confirm that generics are prescribed whenever possible; consider 90-day prescriptions to reduce copay cost; synchronize refills.   Provide help to complete medication assistance applications or health insurance forms as needed.   Complete a follow-up call 2 to 3 weeks after medication self-management plan developed; assess adherence and understanding,  as well as listen to patient or caregiver concerns; amend plan as needed.   Provide frequent follow-up providing motivation, encouragement and support when medication nonadherence is identified.      Notes: 11/18/22 working with patient, PCP, office nurse and pharmacy to get his pill packs correct and picture boards to educate on new medicines             Task Due Date Last Modified     Optimize Medication Use --  11/18/2022 10:37 AM by Kimberly Mayers RN     Care Management Activities:      - administration or use of medication demonstrated  - barriers to medication adherence identified  - completion of financial assistance requests facilitated  - medication list compiled  - medication list reviewed  - medication-adherence assessment completed  - medication reminder use encouraged  - medication side effects managed  - understanding of current medications assessed      Notes:                      · Current Specialty Plan of Care Status signed by both patient and provider    Instructions   · Patient was provided an electronic copy of care plan  · CCM services were explained and offered and patient has accepted these services.  · Patient has given their written consent to receive CCM services and understands that this includes the authorization of electronic communication of medical information with the other treating providers.  · Patient understands that they may stop CCM services at any time and these changes will be effective at the end of the calendar month and will effectively revocate the agreement of CCM services.  · Patient understands that only one practitioner can furnish and be paid for CCM services during one calendar month.  Patient also understands that there may be co-payment or deductible fees in association with CCM services.  · Patient will continue with at least monthly follow-up calls with the Ambulatory .    KIMBERLY MURO  Ambulatory Case Management    11/29/2022, 18:13 EST

## 2022-11-29 NOTE — OUTREACH NOTE
AMBULATORY CASE MANAGEMENT NOTE    Name and Relationship of Patient/Support Person: Alberto Rachel Ray - Self    CCM Interim Update    Called to give lab results and informed phosphorus was low but still was taking supplement, has completed and PCP wants repeat lab tomorrow and patient is aware and will come in for this.           VI MURO  Ambulatory Case Management    11/29/2022, 18:12 EST

## 2022-11-30 ENCOUNTER — LAB (OUTPATIENT)
Dept: LAB | Facility: HOSPITAL | Age: 62
End: 2022-11-30

## 2022-11-30 DIAGNOSIS — K21.9 GASTROESOPHAGEAL REFLUX DISEASE WITHOUT ESOPHAGITIS: Chronic | ICD-10-CM

## 2022-11-30 DIAGNOSIS — E11.65 TYPE 2 DIABETES MELLITUS WITH HYPERGLYCEMIA, WITHOUT LONG-TERM CURRENT USE OF INSULIN: Chronic | ICD-10-CM

## 2022-11-30 DIAGNOSIS — I10 ESSENTIAL HYPERTENSION: Chronic | ICD-10-CM

## 2022-11-30 DIAGNOSIS — E83.39 HYPOPHOSPHATEMIA: ICD-10-CM

## 2022-11-30 DIAGNOSIS — E78.2 MIXED HYPERLIPIDEMIA: Chronic | ICD-10-CM

## 2022-11-30 LAB
ALBUMIN SERPL-MCNC: 4.5 G/DL (ref 3.5–5.2)
ANION GAP SERPL CALCULATED.3IONS-SCNC: 12 MMOL/L (ref 5–15)
BUN SERPL-MCNC: 15 MG/DL (ref 8–23)
BUN/CREAT SERPL: 11.8 (ref 7–25)
CALCIUM SPEC-SCNC: 9.6 MG/DL (ref 8.6–10.5)
CHLORIDE SERPL-SCNC: 105 MMOL/L (ref 98–107)
CO2 SERPL-SCNC: 24 MMOL/L (ref 22–29)
CREAT SERPL-MCNC: 1.27 MG/DL (ref 0.76–1.27)
EGFRCR SERPLBLD CKD-EPI 2021: 63.1 ML/MIN/1.73
GLUCOSE SERPL-MCNC: 102 MG/DL (ref 65–99)
HBA1C MFR BLD: 8 % (ref 4.8–5.6)
PHOSPHATE SERPL-MCNC: 2.4 MG/DL (ref 2.5–4.5)
POTASSIUM SERPL-SCNC: 3.9 MMOL/L (ref 3.5–5.2)
SODIUM SERPL-SCNC: 141 MMOL/L (ref 136–145)

## 2022-11-30 PROCEDURE — 80069 RENAL FUNCTION PANEL: CPT

## 2022-11-30 PROCEDURE — 83036 HEMOGLOBIN GLYCOSYLATED A1C: CPT

## 2022-11-30 PROCEDURE — 36415 COLL VENOUS BLD VENIPUNCTURE: CPT

## 2022-12-01 ENCOUNTER — PATIENT OUTREACH (OUTPATIENT)
Dept: CASE MANAGEMENT | Facility: OTHER | Age: 62
End: 2022-12-01

## 2022-12-01 DIAGNOSIS — Z55.0 ILLITERACY: ICD-10-CM

## 2022-12-01 DIAGNOSIS — E87.5 HYPERKALEMIA: Primary | ICD-10-CM

## 2022-12-01 DIAGNOSIS — E78.2 MIXED HYPERLIPIDEMIA: ICD-10-CM

## 2022-12-01 DIAGNOSIS — E11.65 TYPE 2 DIABETES MELLITUS WITH HYPERGLYCEMIA, WITHOUT LONG-TERM CURRENT USE OF INSULIN: ICD-10-CM

## 2022-12-01 SDOH — EDUCATIONAL SECURITY - EDUCATION ATTAINMENT: ILITERACY AND LOW LEVEL LITERACY: Z55.0

## 2022-12-01 NOTE — OUTREACH NOTE
AMBULATORY CASE MANAGEMENT NOTE    Name and Relationship of Patient/Support Person: Alberto Rachel Ray - Self    CCM Interim Update    Noted per chart review patient's labs are back and per PCP report Phosphorus is a bit low but just off a it so continue same and repeat labs in chart for standing labs. His A1c is lower still this lab at 8.0% down from 8.7% 3 months ago. Reviewed orders and PCP is wanting standing weekly labs x 2.    Patient informed of results and need to come back to office the next 2 Wednesdays for labs and he is requesting a reminder call for those appointments. Placed in outreach call for 12/7 and 12/14 form case management.           VI MURO  Ambulatory Case Management    12/1/2022, 11:28 EST

## 2022-12-08 ENCOUNTER — PATIENT OUTREACH (OUTPATIENT)
Dept: CASE MANAGEMENT | Facility: OTHER | Age: 62
End: 2022-12-08

## 2022-12-08 DIAGNOSIS — Z55.0 ILLITERACY: ICD-10-CM

## 2022-12-08 DIAGNOSIS — E87.5 HYPERKALEMIA: Primary | ICD-10-CM

## 2022-12-08 SDOH — EDUCATIONAL SECURITY - EDUCATION ATTAINMENT: ILITERACY AND LOW LEVEL LITERACY: Z55.0

## 2022-12-08 NOTE — OUTREACH NOTE
AMBULATORY CASE MANAGEMENT NOTE    Name and Relationship of Patient/Support Person: Wilson Alberto Ray - Self    CCM Interim Update    Attempted to reach to come in for lab work today as he missed his appointment for this yesterday. He was due to have repeat labs 12/7/22 and 12/14/22. No voicemail.         VI MURO  Ambulatory Case Management    12/8/2022, 09:52 EST

## 2022-12-08 NOTE — OUTREACH NOTE
AMBULATORY CASE MANAGEMENT NOTE    Name and Relationship of Patient/Support Person: Alberto Rachel Nikos - Self        Education Documentation  medication management, taught by Kimberly Mayers, RN at 12/8/2022 12:36 PM.  Learner: Patient  Readiness: Acceptance  Method: Teach Back, Explanation  Response: Verbalizes Understanding  Comment: reports he will be in for labs          [facility] Interim Update    Called patient and he is aware of labs. He plans to go tomorrow to do labs due to transportation. He knows to go this week and next week as well. He denies any issues or needs at this time. Feeling good.            KIMBERLY MURO  Ambulatory Case Management    12/8/2022, 12:28 EST

## 2022-12-09 ENCOUNTER — LAB (OUTPATIENT)
Dept: LAB | Facility: HOSPITAL | Age: 62
End: 2022-12-09

## 2022-12-09 ENCOUNTER — TELEPHONE (OUTPATIENT)
Dept: CASE MANAGEMENT | Facility: OTHER | Age: 62
End: 2022-12-09

## 2022-12-09 DIAGNOSIS — E83.39 HYPOPHOSPHATEMIA: ICD-10-CM

## 2022-12-09 LAB
ALBUMIN SERPL-MCNC: 4.5 G/DL (ref 3.5–5.2)
ANION GAP SERPL CALCULATED.3IONS-SCNC: 10.1 MMOL/L (ref 5–15)
BUN SERPL-MCNC: 12 MG/DL (ref 8–23)
BUN/CREAT SERPL: 8.8 (ref 7–25)
CALCIUM SPEC-SCNC: 9.6 MG/DL (ref 8.6–10.5)
CHLORIDE SERPL-SCNC: 105 MMOL/L (ref 98–107)
CO2 SERPL-SCNC: 27.9 MMOL/L (ref 22–29)
CREAT SERPL-MCNC: 1.37 MG/DL (ref 0.76–1.27)
EGFRCR SERPLBLD CKD-EPI 2021: 57.6 ML/MIN/1.73
GLUCOSE SERPL-MCNC: 101 MG/DL (ref 65–99)
PHOSPHATE SERPL-MCNC: 2.9 MG/DL (ref 2.5–4.5)
POTASSIUM SERPL-SCNC: 3.8 MMOL/L (ref 3.5–5.2)
SODIUM SERPL-SCNC: 143 MMOL/L (ref 136–145)

## 2022-12-09 PROCEDURE — 36415 COLL VENOUS BLD VENIPUNCTURE: CPT

## 2022-12-09 PROCEDURE — 80069 RENAL FUNCTION PANEL: CPT

## 2022-12-09 NOTE — TELEPHONE ENCOUNTER
Patient came in today for his repeat labs, will be due again on Friday next week, will f/u results on Monday.

## 2022-12-12 ENCOUNTER — PATIENT OUTREACH (OUTPATIENT)
Dept: CASE MANAGEMENT | Facility: OTHER | Age: 62
End: 2022-12-12

## 2022-12-12 DIAGNOSIS — E87.5 HYPERKALEMIA: Primary | ICD-10-CM

## 2022-12-12 DIAGNOSIS — Z55.0 ILLITERACY: ICD-10-CM

## 2022-12-12 SDOH — EDUCATIONAL SECURITY - EDUCATION ATTAINMENT: ILITERACY AND LOW LEVEL LITERACY: Z55.0

## 2022-12-12 NOTE — OUTREACH NOTE
AMBULATORY CASE MANAGEMENT NOTE    Name and Relationship of Patient/Support Person: Alberto Rachel Ray - Self    CCM Interim Update    Reviewed labs, phosphorus is WNL, discussed with PCP and plan is to re test again this Friday and if all stable still then return to regular labs with future follow ups. I outreached to patient and he verbalizes understanding.             VI MURO  Ambulatory Case Management    12/12/2022, 15:28 EST

## 2022-12-14 RX ORDER — ASPIRIN 81 MG/1
TABLET, COATED ORAL
Qty: 30 TABLET | Refills: 4 | Status: SHIPPED | OUTPATIENT
Start: 2022-12-14

## 2022-12-16 ENCOUNTER — OFFICE VISIT (OUTPATIENT)
Dept: FAMILY MEDICINE CLINIC | Facility: CLINIC | Age: 62
End: 2022-12-16

## 2022-12-16 VITALS
SYSTOLIC BLOOD PRESSURE: 154 MMHG | HEART RATE: 86 BPM | WEIGHT: 194.9 LBS | HEIGHT: 68 IN | OXYGEN SATURATION: 97 % | DIASTOLIC BLOOD PRESSURE: 78 MMHG | BODY MASS INDEX: 29.54 KG/M2

## 2022-12-16 DIAGNOSIS — R09.81 NASAL CONGESTION: ICD-10-CM

## 2022-12-16 DIAGNOSIS — I10 ESSENTIAL HYPERTENSION: Chronic | ICD-10-CM

## 2022-12-16 DIAGNOSIS — E11.65 TYPE 2 DIABETES MELLITUS WITH HYPERGLYCEMIA, WITHOUT LONG-TERM CURRENT USE OF INSULIN: Chronic | ICD-10-CM

## 2022-12-16 DIAGNOSIS — J02.9 SORE THROAT: ICD-10-CM

## 2022-12-16 DIAGNOSIS — J10.1 INFLUENZA A: ICD-10-CM

## 2022-12-16 DIAGNOSIS — R11.2 NAUSEA AND VOMITING, UNSPECIFIED VOMITING TYPE: ICD-10-CM

## 2022-12-16 DIAGNOSIS — R05.9 COUGH, UNSPECIFIED TYPE: Primary | ICD-10-CM

## 2022-12-16 LAB
EXPIRATION DATE: ABNORMAL
EXPIRATION DATE: NORMAL
EXPIRATION DATE: NORMAL
FLUAV AG NPH QL: POSITIVE
FLUBV AG NPH QL: NEGATIVE
INTERNAL CONTROL: ABNORMAL
INTERNAL CONTROL: NORMAL
INTERNAL CONTROL: NORMAL
Lab: ABNORMAL
Lab: NORMAL
Lab: NORMAL
S PYO AG THROAT QL: NEGATIVE
SARS-COV-2 AG UPPER RESP QL IA.RAPID: NOT DETECTED

## 2022-12-16 PROCEDURE — 99214 OFFICE O/P EST MOD 30 MIN: CPT | Performed by: NURSE PRACTITIONER

## 2022-12-16 PROCEDURE — 87804 INFLUENZA ASSAY W/OPTIC: CPT | Performed by: NURSE PRACTITIONER

## 2022-12-16 PROCEDURE — 87426 SARSCOV CORONAVIRUS AG IA: CPT | Performed by: NURSE PRACTITIONER

## 2022-12-16 PROCEDURE — 87880 STREP A ASSAY W/OPTIC: CPT | Performed by: NURSE PRACTITIONER

## 2022-12-16 RX ORDER — BROMPHENIRAMINE MALEATE, PSEUDOEPHEDRINE HYDROCHLORIDE, AND DEXTROMETHORPHAN HYDROBROMIDE 2; 30; 10 MG/5ML; MG/5ML; MG/5ML
5 SYRUP ORAL 4 TIMES DAILY PRN
Qty: 150 ML | Refills: 0 | Status: SHIPPED | OUTPATIENT
Start: 2022-12-16 | End: 2023-02-15

## 2022-12-16 RX ORDER — PREDNISONE 20 MG/1
40 TABLET ORAL DAILY
Qty: 10 TABLET | Refills: 0 | Status: SHIPPED | OUTPATIENT
Start: 2022-12-16 | End: 2022-12-21

## 2022-12-16 RX ORDER — ONDANSETRON HYDROCHLORIDE 8 MG/1
8 TABLET, FILM COATED ORAL EVERY 8 HOURS PRN
Qty: 20 TABLET | Refills: 0 | Status: SHIPPED | OUTPATIENT
Start: 2022-12-16

## 2022-12-16 NOTE — PROGRESS NOTES
"Chief Complaint  Cough, Sore Throat, Vomiting, and Nasal Congestion    Subjective        Alberto Rachel presents to [facility]  History of Present Illness  Pt presents with cough, sore throat, vomiting, and nasal congestion x 1 week. Pt also c/o fatigue and myalgia. Has not taken anything to treat.     Reviewed all recent labs and medications.      Objective   Vital Signs:  /78 (BP Location: Left arm, Patient Position: Sitting)   Pulse 86   Ht 172.7 cm (67.99\")   Wt 88.4 kg (194 lb 14.4 oz)   SpO2 97%   BMI 29.64 kg/m²   Estimated body mass index is 29.64 kg/m² as calculated from the following:    Height as of this encounter: 172.7 cm (67.99\").    Weight as of this encounter: 88.4 kg (194 lb 14.4 oz).    BMI is >= 25 and <30. (Overweight) The following options were offered after discussion;: exercise counseling/recommendations and nutrition counseling/recommendations      Physical Exam  Vitals reviewed.   Constitutional:       General: He is not in acute distress.     Appearance: Normal appearance.   HENT:      Head: Normocephalic.      Right Ear: Tympanic membrane normal.      Left Ear: Tympanic membrane normal.      Nose: Congestion present.      Mouth/Throat:      Pharynx: Oropharynx is clear. No posterior oropharyngeal erythema.   Eyes:      General: No scleral icterus.     Extraocular Movements: Extraocular movements intact.      Conjunctiva/sclera: Conjunctivae normal.      Pupils: Pupils are equal, round, and reactive to light.   Cardiovascular:      Rate and Rhythm: Normal rate and regular rhythm.      Pulses: Normal pulses.      Heart sounds: Normal heart sounds.   Pulmonary:      Effort: Pulmonary effort is normal.      Breath sounds: Normal breath sounds.   Abdominal:      General: Bowel sounds are normal.      Palpations: Abdomen is soft.   Musculoskeletal:         General: Normal range of motion.      Cervical back: Neck supple.   Skin:     General: Skin is " warm and dry.   Neurological:      Mental Status: He is alert and oriented to person, place, and time.   Psychiatric:         Mood and Affect: Mood normal.         Behavior: Behavior normal.         Thought Content: Thought content normal.         Judgment: Judgment normal.        Result Review :    Common labs    Common Labs 11/30/22 11/30/22 12/9/22 12/22/22    [id number]     Glucose  102 (A) 101 (A) 99   BUN  15 12 17   Creatinine  1.27 1.37 (A) 0.93   Sodium  141 143 138   Potassium  3.9 3.8 3.8   Chloride  105 105 98   Calcium  9.6 9.6 9.7   Albumin  4.50 4.50 4.50   Hemoglobin A1C 8.00 (A)      (A) Abnormal value                      Assessment and Plan   Diagnoses and all orders for this visit:    1. Cough, unspecified type (Primary)  Comments:  Rapid covid negative , rapid strep negative   Rapid flu positive flu A  Bromfed UAD prn   Orders:  -     POCT Influenza A/B  -     POCT SARS-CoV-2 Antigen MIKE    2. Sore throat  -     POCT rapid strep A    3. Nausea and vomiting, unspecified vomiting type  Comments:  zofran 8mg PO tid PRN   [place] diet   increase clear fluids     4. Nasal congestion    5. Influenza A  Comments:  Outside window for tamiflu   Prednisone 40mg PO qd x 5days   Increase rest/clear fluids     6. Type 2 diabetes mellitus with hyperglycemia, without long-term current use of insulin (HCC)  Assessment & Plan:  Diabetes is stable .   Continue current treatment regimen.  Reminded to bring in blood sugar diary at next visit.  Diabetes will be reassessed in 3 months. Discussed transient increase in blood glucose level while taking steroid.       7. Essential hypertension  Assessment & Plan:  Hypertension is stable.  Continue current treatment regimen.  Dietary sodium restriction.  Regular aerobic exercise.  Blood pressure will be reassessed at the next regular appointment.      Other orders  -     predniSONE (DELTASONE) 20 MG tablet; Take 2 tablets by mouth Daily for 5 days.  Dispense: 10 tablet;  Refill: 0  -     brompheniramine-pseudoephedrine-DM 30-2-10 MG/5ML syrup; Take 5 mL by mouth 4 (Four) Times a Day As Needed for Cough.  Dispense: 150 mL; Refill: 0  -     ondansetron (Zofran) 8 MG tablet; Take 1 tablet by mouth Every 8 (Eight) Hours As Needed for Nausea or Vomiting.  Dispense: 20 tablet; Refill: 0           Follow Up   Return if symptoms worsen or fail to improve.  Patient was given instructions and counseling regarding his condition or for health maintenance advice. Please see specific information pulled into the AVS if appropriate.

## 2022-12-16 NOTE — ASSESSMENT & PLAN NOTE
Hypertension is stable.  Continue current treatment regimen.  Dietary sodium restriction.  Regular aerobic exercise.  Blood pressure will be reassessed at the next regular appointment.

## 2022-12-16 NOTE — ASSESSMENT & PLAN NOTE
Diabetes is stable .   Continue current treatment regimen.  Reminded to bring in blood sugar diary at next visit.  Diabetes will be reassessed in 3 months. Discussed transient increase in blood glucose level while taking steroid.

## 2022-12-21 ENCOUNTER — PATIENT OUTREACH (OUTPATIENT)
Dept: CASE MANAGEMENT | Facility: OTHER | Age: 62
End: 2022-12-21

## 2022-12-21 DIAGNOSIS — E78.2 MIXED HYPERLIPIDEMIA: ICD-10-CM

## 2022-12-21 DIAGNOSIS — E87.5 HYPERKALEMIA: Primary | ICD-10-CM

## 2022-12-21 DIAGNOSIS — E11.65 TYPE 2 DIABETES MELLITUS WITH HYPERGLYCEMIA, WITHOUT LONG-TERM CURRENT USE OF INSULIN: ICD-10-CM

## 2022-12-21 DIAGNOSIS — Z55.0 ILLITERACY: ICD-10-CM

## 2022-12-21 SDOH — EDUCATIONAL SECURITY - EDUCATION ATTAINMENT: ILITERACY AND LOW LEVEL LITERACY: Z55.0

## 2022-12-21 NOTE — OUTREACH NOTE
AMBULATORY CASE MANAGEMENT NOTE    Name and Relationship of Patient/Support Person: Alberto Rachel Ray - Self    CCM Interim Update    Reminder call for repeat labs due last week,he had flu and did not get done. He will come tomorrow. Care plan updated.         Education Documentation  medication management, taught by Kimberly Mayers, RN at 12/21/2022 11:57 AM.  Learner: Patient  Readiness: Acceptance  Method: Explanation, Teach Back  Response: Verbalizes Understanding  Comment: reinforced need for standing labs, will come in tomorrow and reports medication compliance    preventive care, taught by Kimberly Mayers, RN at 12/21/2022 11:57 AM.  Learner: Patient  Readiness: Acceptance  Method: Explanation, Teach Back  Response: Verbalizes Understanding  Comment: reinforced need for standing labs, will come in tomorrow and reports medication compliance          KIMBERLY MURO  Ambulatory Case Management    12/21/2022, 12:02 EST

## 2022-12-22 ENCOUNTER — LAB (OUTPATIENT)
Dept: LAB | Facility: HOSPITAL | Age: 62
End: 2022-12-22

## 2022-12-22 DIAGNOSIS — E83.39 HYPOPHOSPHATEMIA: ICD-10-CM

## 2022-12-22 LAB
ALBUMIN SERPL-MCNC: 4.5 G/DL (ref 3.5–5.2)
ANION GAP SERPL CALCULATED.3IONS-SCNC: 12.8 MMOL/L (ref 5–15)
BUN SERPL-MCNC: 17 MG/DL (ref 8–23)
BUN/CREAT SERPL: 18.3 (ref 7–25)
CALCIUM SPEC-SCNC: 9.7 MG/DL (ref 8.6–10.5)
CHLORIDE SERPL-SCNC: 98 MMOL/L (ref 98–107)
CO2 SERPL-SCNC: 27.2 MMOL/L (ref 22–29)
CREAT SERPL-MCNC: 0.93 MG/DL (ref 0.76–1.27)
EGFRCR SERPLBLD CKD-EPI 2021: 91.7 ML/MIN/1.73
GLUCOSE SERPL-MCNC: 99 MG/DL (ref 65–99)
PHOSPHATE SERPL-MCNC: 3.7 MG/DL (ref 2.5–4.5)
POTASSIUM SERPL-SCNC: 3.8 MMOL/L (ref 3.5–5.2)
SODIUM SERPL-SCNC: 138 MMOL/L (ref 136–145)

## 2022-12-22 PROCEDURE — 36415 COLL VENOUS BLD VENIPUNCTURE: CPT

## 2022-12-22 PROCEDURE — 80069 RENAL FUNCTION PANEL: CPT

## 2022-12-27 RX ORDER — ATORVASTATIN CALCIUM 80 MG/1
TABLET, FILM COATED ORAL
Qty: 30 TABLET | Refills: 0 | Status: SHIPPED | OUTPATIENT
Start: 2022-12-27 | End: 2023-01-30

## 2022-12-29 ENCOUNTER — PATIENT OUTREACH (OUTPATIENT)
Dept: CASE MANAGEMENT | Facility: OTHER | Age: 62
End: 2022-12-29

## 2022-12-29 DIAGNOSIS — E11.65 TYPE 2 DIABETES MELLITUS WITH HYPERGLYCEMIA, WITHOUT LONG-TERM CURRENT USE OF INSULIN: ICD-10-CM

## 2022-12-29 DIAGNOSIS — Z55.0 ILLITERACY: ICD-10-CM

## 2022-12-29 DIAGNOSIS — E78.2 MIXED HYPERLIPIDEMIA: ICD-10-CM

## 2022-12-29 DIAGNOSIS — E87.5 HYPERKALEMIA: Primary | ICD-10-CM

## 2022-12-29 PROCEDURE — 99439 CHRNC CARE MGMT STAF EA ADDL: CPT | Performed by: FAMILY MEDICINE

## 2022-12-29 PROCEDURE — 99490 CHRNC CARE MGMT STAFF 1ST 20: CPT | Performed by: FAMILY MEDICINE

## 2022-12-29 SDOH — EDUCATIONAL SECURITY - EDUCATION ATTAINMENT: ILITERACY AND LOW LEVEL LITERACY: Z55.0

## 2022-12-29 NOTE — OUTREACH NOTE
[facility] End of Month Documentation    This Chronic Medical Management Care Plan for Alberto Rachel, 62 y.o. male, has been monitored and managed; reviewed; revised and a new plan of care implemented for the month of December.  A cumulative time of 46  minutes was spent on this patient record this month, including phone call with patient; chart review.    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  A detailed listing of time spent for chronic care management is tracked within each outreach encounter.  Current medications include:  has a current medication list which includes the following prescription(s): albuterol sulfate hfa, aripiprazole, aspirin low dose, atorvastatin, onetouch verio reflect, brimonidine-timolol, brompheniramine-pseudoephedrine-dm, bumetanide, citalopram, cyanocobalamin, famotidine, fenofibrate, jardiance, latanoprost, levothyroxine, lumigan, metformin, nitroglycerin, ondansetron, onetouch delica lancets 30g, onetouch verio, pioglitazone, rybelsus, lokelma, and trazodone. and the patient is reported to be patient is compliant with medication protocol,  Medications are reported to be effective.    The patient was monitored remotely for medications; blood pressure; mood & behavior.    The patient's physical needs include:   help taking medications as prescribed; medication education.     The patient's mental support needs include:  increased support; coordination of community providers    The patient's cognitive support needs include:  continued support; medication    The patient's psychosocial support needs include:  need for increased support; medication management or adherence    The patient's functional needs include: needs met    The patient's environmental needs include:  not applicable, none    Care Plan overall comments:  reviewed care plan and updated    Refer to previous outreach notes for more information on the areas listed above.    Monthly Billing Diagnoses  (E87.5) Hyperkalemia    (Z55.0) Illiteracy    (E78.2) Mixed hyperlipidemia    (E11.65) Type 2 diabetes mellitus with hyperglycemia, without long-term current use of insulin (HCC)    Medications   · Medications have been reconciled    Care Plan progress this month:      Recently Modified Care Plans Updates made since 11/28/2022 12:00 AM     Wellness (Adult)         Problem Priority Last Modified     ELS MEDICATION ADHERENCE (WELLNESS) (ADULT) --  11/18/2022 10:35 AM by Kimberly Mayers RN              Goal Recent Progress Last Modified     Medication Adherence Maintained On track  12/21/2022 11:59 AM by Kimberly Mayers, RN     Evidence-based guidance:   Develop a complete and accurate medication list including those prescribed and over-the-counter, those taken only occasionally and those not taken by mouth such as injections, inhalers, ointments or creams and drops.   Review all medications to determine if patient or caregiver knows why the medications are given and if taken as prescribed.   Complete or review a medication adherence assessment including barriers to medication adherence.   Arrange and encourage counseling and medication review by pharmacist.   Assess barriers to medication adherence.   Manage poor understanding or health literacy by using easy to  understand language, teach-back, visual aids and teaching only 2 or 3 points at a time.   Assess presence of side effects; provide suggestions to manage or reduce side effects.   Consult with provider and/or pharmacist regarding substitute medication, changing dose, simplification of regimen or safe discontinuation of some medications.   Encourage the use of medication reminders such as clock or cell phone alarm, color coding, pillboxes for am/pm and days of the week, pharmacy refill reminder, auto-refill system or mail-order option.   Assist with resources when cost is a barrier; refer to prescription assistance programs; confirm that generics are prescribed whenever possible; consider 90-day prescriptions to reduce copay cost; synchronize refills.   Provide help to complete medication assistance applications or health insurance forms as needed.   Complete a follow-up call 2 to 3 weeks after medication self-management plan developed; assess adherence and understanding, as well as listen to patient or caregiver concerns; amend plan as needed.   Provide frequent follow-up providing motivation, encouragement and support when medication nonadherence is identified.      Notes: 11/18/22 working with patient, PCP, office nurse and pharmacy to get his pill packs correct and picture boards to educate on new medicines    12/21/22 reports medication compliance with his pill pack from pharmacy                     · Current Specialty Plan of Care Status signed by both patient and provider    Instructions   · Patient was provided an electronic copy of care plan  · CCM services were explained and offered and patient has accepted these services.  · Patient has given their written consent to receive CCM services and understands that this includes the authorization of electronic communication of medical information with the other treating providers.  · Patient understands that they may stop CCM services at any time and these changes  will be effective at the end of the calendar month and will effectively revocate the agreement of CCM services.  · Patient understands that only one practitioner can furnish and be paid for CCM services during one calendar month.  Patient also understands that there may be co-payment or deductible fees in association with CCM services.  · Patient will continue with at least monthly follow-up calls with the Ambulatory .    VI MURO  Ambulatory Case Management    12/29/2022, 13:30 EST

## 2023-01-18 ENCOUNTER — PATIENT OUTREACH (OUTPATIENT)
Dept: CASE MANAGEMENT | Facility: OTHER | Age: 63
End: 2023-01-18
Payer: MEDICARE

## 2023-01-18 DIAGNOSIS — E78.2 MIXED HYPERLIPIDEMIA: ICD-10-CM

## 2023-01-18 DIAGNOSIS — E11.65 TYPE 2 DIABETES MELLITUS WITH HYPERGLYCEMIA, WITHOUT LONG-TERM CURRENT USE OF INSULIN: ICD-10-CM

## 2023-01-18 DIAGNOSIS — E87.5 HYPERKALEMIA: Primary | ICD-10-CM

## 2023-01-18 DIAGNOSIS — Z55.0 ILLITERACY: ICD-10-CM

## 2023-01-18 PROCEDURE — 99490 CHRNC CARE MGMT STAFF 1ST 20: CPT | Performed by: FAMILY MEDICINE

## 2023-01-18 SDOH — EDUCATIONAL SECURITY - EDUCATION ATTAINMENT: ILITERACY AND LOW LEVEL LITERACY: Z55.0

## 2023-01-18 NOTE — OUTREACH NOTE
AMBULATORY CASE MANAGEMENT NOTE    Name and Relationship of Patient/Support Person: Alberto Rachel Ray - Self      CCM Interim Update    Doing well, aware he is in need of 3 month follow up next month and needs repeat lab work. He will go to eye doctor. Agrees to let PCP do DM foot exam at next follow up to close gaps.     Care Coordination    Call to [facility] in [place] Last DM eye exam they report was 12/23/21 and he did get glaucoma test 5/17/22. She will send report.         Education Documentation  A1C Goal, taught by Kimberly Mayers, RN at 1/18/2023  2:40 PM.  Learner: Patient  Readiness: Eager  Method: Explanation, Teach Back  Response: Verbalizes Understanding  Comment: reviewed need for DM foot and eye exam and discussed most recent labs and A1c results    Frequency of Testing, taught by Kimberly Mayers, RN at 1/18/2023  2:40 PM.  Learner: Patient  Readiness: Eager  Method: Explanation, Teach Back  Response: Verbalizes Understanding  Comment: reviewed need for DM foot and eye exam and discussed most recent labs and A1c results    preventive care, taught by Kimberly Mayers, RN at 1/18/2023  2:40 PM.  Learner: Patient  Readiness: Eager  Method: Explanation, Teach Back  Response: Verbalizes Understanding  Comment: reviewed need for DM foot and eye exam and discussed most recent labs and A1c results          KIMBERLY MURO  Ambulatory Case Management    1/18/2023, 14:42 EST

## 2023-01-21 DIAGNOSIS — E03.9 HYPOTHYROIDISM, UNSPECIFIED TYPE: Chronic | ICD-10-CM

## 2023-01-23 RX ORDER — BUMETANIDE 1 MG/1
TABLET ORAL
Qty: 90 TABLET | Refills: 1 | Status: SHIPPED | OUTPATIENT
Start: 2023-01-23

## 2023-01-23 RX ORDER — LEVOTHYROXINE SODIUM 0.05 MG/1
TABLET ORAL
Qty: 90 TABLET | Refills: 1 | Status: SHIPPED | OUTPATIENT
Start: 2023-01-23

## 2023-01-27 ENCOUNTER — PATIENT OUTREACH (OUTPATIENT)
Dept: CASE MANAGEMENT | Facility: OTHER | Age: 63
End: 2023-01-27
Payer: MEDICARE

## 2023-01-27 DIAGNOSIS — E78.2 MIXED HYPERLIPIDEMIA: ICD-10-CM

## 2023-01-27 DIAGNOSIS — Z55.0 ILLITERACY: ICD-10-CM

## 2023-01-27 DIAGNOSIS — E87.5 HYPERKALEMIA: Primary | ICD-10-CM

## 2023-01-27 DIAGNOSIS — E11.65 TYPE 2 DIABETES MELLITUS WITH HYPERGLYCEMIA, WITHOUT LONG-TERM CURRENT USE OF INSULIN: ICD-10-CM

## 2023-01-27 SDOH — EDUCATIONAL SECURITY - EDUCATION ATTAINMENT: ILITERACY AND LOW LEVEL LITERACY: Z55.0

## 2023-01-27 NOTE — OUTREACH NOTE
[facility] End of Month Documentation    This Chronic Medical Management Care Plan for Alberto Rachel, 62 y.o. male, has been monitored and managed; reviewed; revised and a new plan of care implemented for the month of January.  A cumulative time of 25  minutes was spent on this patient record this month, including phone call with patient; chart review; phone call with other providers.    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 pectoris; Essential hypertension; Depression; Hyperlipidemia; Type 2 diabetes mellitus (HCC); Hypothyroidism, unspecified; Arthritis of knee; Illiteracy; Cramps, extremity; Tachycardia; Obesity (BMI 30-39.9); Degenerative disc disease, lumbar; Primary insomnia; Gastroesophageal reflux disease without esophagitis; Close exposure to COVID-19 virus; Mild intermittent asthma; Candidal dermatitis; Bronchitis; Bilateral shoulder pain; and Hyperkalemia on their problem list., the following items were addressed: medical records; medications, unable to review medications since he cannot read but will bring med card to next follow up and any changes can be found within the plan section of the note.  A detailed listing of time spent for chronic care management is tracked within each outreach encounter.  Current medications include:  has a current medication list which includes the following prescription(s): albuterol sulfate hfa, aripiprazole, aspirin low dose, atorvastatin, onetouch verio reflect, brimonidine-timolol, brompheniramine-pseudoephedrine-dm, bumetanide, citalopram, cyanocobalamin, famotidine, fenofibrate, jardiance, latanoprost, levothyroxine, lumigan, metformin, nitroglycerin, ondansetron, onetouch delica lancets 30g, onetouch verio, pioglitazone, rybelsus, lokelma, and trazodone. and the patient is reported to be patient is compliant with medication protocol,  Medications are reported to be effective.    The  patient was monitored remotely for mood & behavior.    The patient's physical needs include:  help taking medications as prescribed.     The patient's mental support needs include:  coordination of community providers    The patient's cognitive support needs include:  needs met    The patient's psychosocial support needs include:  medication management or adherence    The patient's functional needs include: eye care, needs DM eye exam    The patient's environmental needs include:  not applicable, none    Care Plan overall comments:  reviewed care plan and updated    Refer to previous outreach notes for more information on the areas listed above.    Monthly Billing Diagnoses  (E87.5) Hyperkalemia    (Z55.0) Illiteracy    (E78.2) Mixed hyperlipidemia    (E11.65) Type 2 diabetes mellitus with hyperglycemia, without long-term current use of insulin (HCC)    Medications   · Medications have been reconciled    Care Plan progress this month:      Recently Modified Care Plans Updates made since 12/27/2022 12:00 AM     Wellness (Adult)         Problem Priority Last Modified     ELS HEALTH LITERACY (WELLNESS) (ADULT) --  11/18/2022 10:33 AM by Kimberly Mayers RN              Goal Recent Progress Last Modified     Health Literacy Improved --  1/18/2023  2:29 PM by Kimberly Mayers RN     Evidence-based guidance:   Assess health literacy using [employer] recommended questions or standardized tools.   Use a universal method with health literacy to esure a nonjudgmental approach to varying levels of literacy.   Identify barriers to seeking or understanding information.   Note: Patient barriers may include literacy, language or culture, mental health, stigma, embarrassment; clinician barriers may include avoidance, time constraint, stereotype or bias.   Collaborate with interprofessional team and child and parent/caregiver to develop a structured yet individualized education plan that supports shared decision-making.    Consider a combination of strategies such as print, audiotape, video and computer-based learning in a group or individual setting.   Encourage patient to seek health information and education in the community such as library, continuing education, English as a second language class, support group or peer health .   Provide educational materials that are written in plain language (3rd to 5th grade reading level) and include icons, pictures and tables; provide essential information first.        Notes: 11/18/22 patient unable to read or write, medication education on picture boards drawn out for his this month, he reports it is working for him    1/18/23 discussed medications but he states he cannot tell me names he just takes what is in his pill pack,is done with cough medictaion, aware of need for f/u, made appt for Feb and needs me to call to remind day prior                     · Current Specialty Plan of Care Status signed by both patient and provider    Instructions   · Patient was provided an electronic copy of care plan  · CCM services were explained and offered and patient has accepted these services.  · Patient has given their written consent to receive CCM services and understands that this includes the authorization of electronic communication of medical information with the other treating providers.  · Patient understands that they may stop CCM services at any time and these changes will be effective at the end of the calendar month and will effectively revocate the agreement of CCM services.  · Patient understands that only one practitioner can furnish and be paid for CCM services during one calendar month.  Patient also understands that there may be co-payment or deductible fees in association with CCM services.  · Patient will continue with at least monthly follow-up calls with the Ambulatory .    VI MURO  Ambulatory Case Management    1/27/2023, 12:02 EST

## 2023-01-30 RX ORDER — ATORVASTATIN CALCIUM 80 MG/1
TABLET, FILM COATED ORAL
Qty: 30 TABLET | Refills: 0 | Status: SHIPPED | OUTPATIENT
Start: 2023-01-30 | End: 2023-02-27

## 2023-02-02 RX ORDER — CITALOPRAM 20 MG/1
TABLET ORAL
Qty: 90 TABLET | Refills: 0 | Status: SHIPPED | OUTPATIENT
Start: 2023-02-02

## 2023-02-14 ENCOUNTER — PATIENT OUTREACH (OUTPATIENT)
Dept: CASE MANAGEMENT | Facility: OTHER | Age: 63
End: 2023-02-14
Payer: MEDICARE

## 2023-02-14 DIAGNOSIS — Z55.0 ILLITERACY: ICD-10-CM

## 2023-02-14 DIAGNOSIS — E78.2 MIXED HYPERLIPIDEMIA: ICD-10-CM

## 2023-02-14 DIAGNOSIS — E87.5 HYPERKALEMIA: Primary | ICD-10-CM

## 2023-02-14 SDOH — EDUCATIONAL SECURITY - EDUCATION ATTAINMENT: ILITERACY AND LOW LEVEL LITERACY: Z55.0

## 2023-02-15 ENCOUNTER — OFFICE VISIT (OUTPATIENT)
Dept: FAMILY MEDICINE CLINIC | Facility: CLINIC | Age: 63
End: 2023-02-15
Payer: MEDICARE

## 2023-02-15 ENCOUNTER — LAB (OUTPATIENT)
Dept: LAB | Facility: HOSPITAL | Age: 63
End: 2023-02-15
Payer: MEDICARE

## 2023-02-15 VITALS
HEIGHT: 68 IN | HEART RATE: 88 BPM | OXYGEN SATURATION: 96 % | WEIGHT: 197.3 LBS | TEMPERATURE: 98.2 F | DIASTOLIC BLOOD PRESSURE: 54 MMHG | RESPIRATION RATE: 14 BRPM | BODY MASS INDEX: 29.9 KG/M2 | SYSTOLIC BLOOD PRESSURE: 127 MMHG

## 2023-02-15 DIAGNOSIS — E78.2 MIXED HYPERLIPIDEMIA: ICD-10-CM

## 2023-02-15 DIAGNOSIS — E03.9 HYPOTHYROIDISM, UNSPECIFIED TYPE: ICD-10-CM

## 2023-02-15 DIAGNOSIS — E78.2 MIXED HYPERLIPIDEMIA: Chronic | ICD-10-CM

## 2023-02-15 DIAGNOSIS — I10 ESSENTIAL HYPERTENSION: Chronic | ICD-10-CM

## 2023-02-15 DIAGNOSIS — E11.65 TYPE 2 DIABETES MELLITUS WITH HYPERGLYCEMIA, WITHOUT LONG-TERM CURRENT USE OF INSULIN: Chronic | ICD-10-CM

## 2023-02-15 DIAGNOSIS — Z00.00 ENCOUNTER FOR SUBSEQUENT ANNUAL WELLNESS VISIT (AWV) IN MEDICARE PATIENT: Primary | ICD-10-CM

## 2023-02-15 DIAGNOSIS — E11.65 TYPE 2 DIABETES MELLITUS WITH HYPERGLYCEMIA, WITHOUT LONG-TERM CURRENT USE OF INSULIN: ICD-10-CM

## 2023-02-15 DIAGNOSIS — K21.9 GASTROESOPHAGEAL REFLUX DISEASE WITHOUT ESOPHAGITIS: Chronic | ICD-10-CM

## 2023-02-15 LAB
DEPRECATED RDW RBC AUTO: 45.5 FL (ref 37–54)
ERYTHROCYTE [DISTWIDTH] IN BLOOD BY AUTOMATED COUNT: 13.6 % (ref 12.3–15.4)
FOLATE SERPL-MCNC: 8.59 NG/ML (ref 4.78–24.2)
HCT VFR BLD AUTO: 48 % (ref 37.5–51)
HGB BLD-MCNC: 15.6 G/DL (ref 13–17.7)
MCH RBC QN AUTO: 29.3 PG (ref 26.6–33)
MCHC RBC AUTO-ENTMCNC: 32.5 G/DL (ref 31.5–35.7)
MCV RBC AUTO: 90.2 FL (ref 79–97)
PLATELET # BLD AUTO: 264 10*3/MM3 (ref 140–450)
PMV BLD AUTO: 11.3 FL (ref 6–12)
RBC # BLD AUTO: 5.32 10*6/MM3 (ref 4.14–5.8)
VIT B12 BLD-MCNC: 764 PG/ML (ref 211–946)
WBC NRBC COR # BLD: 9.16 10*3/MM3 (ref 3.4–10.8)

## 2023-02-15 PROCEDURE — 80069 RENAL FUNCTION PANEL: CPT

## 2023-02-15 PROCEDURE — 1170F FXNL STATUS ASSESSED: CPT | Performed by: FAMILY MEDICINE

## 2023-02-15 PROCEDURE — 82746 ASSAY OF FOLIC ACID SERUM: CPT

## 2023-02-15 PROCEDURE — 36415 COLL VENOUS BLD VENIPUNCTURE: CPT

## 2023-02-15 PROCEDURE — 1126F AMNT PAIN NOTED NONE PRSNT: CPT | Performed by: FAMILY MEDICINE

## 2023-02-15 PROCEDURE — 80061 LIPID PANEL: CPT

## 2023-02-15 PROCEDURE — 82607 VITAMIN B-12: CPT

## 2023-02-15 PROCEDURE — 84439 ASSAY OF FREE THYROXINE: CPT

## 2023-02-15 PROCEDURE — 1160F RVW MEDS BY RX/DR IN RCRD: CPT | Performed by: FAMILY MEDICINE

## 2023-02-15 PROCEDURE — 84443 ASSAY THYROID STIM HORMONE: CPT

## 2023-02-15 PROCEDURE — G0439 PPPS, SUBSEQ VISIT: HCPCS | Performed by: FAMILY MEDICINE

## 2023-02-15 PROCEDURE — 83036 HEMOGLOBIN GLYCOSYLATED A1C: CPT

## 2023-02-15 PROCEDURE — 85027 COMPLETE CBC AUTOMATED: CPT

## 2023-02-15 PROCEDURE — 3044F HG A1C LEVEL LT 7.0%: CPT | Performed by: FAMILY MEDICINE

## 2023-02-15 NOTE — PROGRESS NOTES
The ABCs of the Annual Wellness Visit  Subsequent Medicare Wellness Visit    Subjective    Edi Montanez is a 62 y.o. male who presents for a Subsequent Medicare Wellness Visit.      EDI MONTANEZ is a 62-year-old male who presents for a 3-month follow-up. His blood pressure is at goal today. He is taking medication for his diabetes. He needs his A1c checked today. We did labs before I saw him today. We will review those when able and follow up. We will see him in 3 months for chronic conditions. His foot exam today was benign.    The patient reports feeling well today. He denies any sores or numbness and tingling in his feet. He is unsure if he needs any refills. He denies there being anything else needed for today.    The following portions of the patient's history were reviewed and   updated as appropriate: allergies, current medications, past family history, past medical history, past social history, past surgical history and problem list.    Compared to one year ago, the patient feels his physical   health is the same.    Compared to one year ago, the patient feels his mental   health is the same.    Recent Hospitalizations:  He was not admitted to the hospital during the last year.       Current Medical Providers:  Patient Care Team:  Magnus Euceda DO as PCP - General (Family Medicine)  Kimberly Mayers, GARY as Ambulatory  ([facility] [place])  Vee Barajas MSW as  (Amb Case Mgmt) ([facility] [place])    Outpatient Medications Prior to Visit   Medication Sig Dispense Refill   • albuterol sulfate  (90 Base) MCG/ACT inhaler Inhale 2 puffs Every 4 (Four) Hours As Needed for Wheezing. 18 g 11   • ARIPiprazole (ABILIFY) 20 MG tablet Take 1 tablet by mouth Daily. 90 tablet 3   • Aspirin Low Dose 81 MG EC tablet TAKE 1 TABLET ONCE DAILY 30 tablet 4   • atorvastatin (LIPITOR) 80 MG tablet TAKE 1 TABLET BY MOUTH AT BEDTIME FOR CHOLESTEROL 30 tablet 0   • Blood Glucose Monitoring Suppl  (OneTouch Verio Reflect) w/Device kit      • brimonidine-timolol (COMBIGAN) 0.2-0.5 % ophthalmic solution      • bumetanide (BUMEX) 1 MG tablet TAKE 1 TABLET ONCE DAILY 90 tablet 1   • citalopram (CeleXA) 20 MG tablet TAKE 1 TABLET ONCE DAILY 90 tablet 0   • cyanocobalamin (V-R VITAMIN B-12) 500 MCG tablet Take 1 tablet by mouth Daily. 90 tablet 3   • famotidine (PEPCID) 40 MG tablet TAKE 1 TABLET BY MOUTH TWICE DAILY 60 tablet 4   • fenofibrate (TRICOR) 145 MG tablet Take 1 tablet by mouth Daily. 90 tablet 3   • Jardiance 25 MG tablet tablet Take 1 tablet by mouth Daily.     • latanoprost (XALATAN) 0.005 % ophthalmic solution INSTILL 1 DROP IN BOTH EYES AT BEDTIME     • levothyroxine (SYNTHROID, LEVOTHROID) 50 MCG tablet TAKE 1 TABLET ONCE DAILY 90 tablet 1   • Lumigan 0.01 % ophthalmic drops INSTILL 1 DROP IN BOTH EYES AT BEDTIME     • metFORMIN (GLUCOPHAGE) 1000 MG tablet Take 1 tablet by mouth 2 (Two) Times a Day With Meals. 180 tablet 3   • nitroglycerin (NITROSTAT) 0.4 MG SL tablet Place 1 tablet under the tongue Every 5 (Five) Minutes As Needed for Chest Pain. Take no more than 3 doses in 15 minutes. 15 tablet 5   • ondansetron (Zofran) 8 MG tablet Take 1 tablet by mouth Every 8 (Eight) Hours As Needed for Nausea or Vomiting. 20 tablet 0   • OneTouch Delica Lancets 30G misc      • OneTouch Verio test strip      • pioglitazone (ACTOS) 15 MG tablet Take 15 mg by mouth every night at bedtime.     • Rybelsus 14 MG tablet      • sodium zirconium cyclosilicate (Lokelma) 10 g pack Take 10 g by mouth 3 (Three) Times a Day. Indications: High Amount of Potassium in the Blood 11 each 0   • traZODone (DESYREL) 100 MG tablet Take 1 tablet by mouth Every Night. Indications: Trouble Sleeping 30 tablet 11   • brompheniramine-pseudoephedrine-DM 30-2-10 MG/5ML syrup Take 5 mL by mouth 4 (Four) Times a Day As Needed for Cough. 150 mL 0     No facility-administered medications prior to visit.       No opioid medication  "identified on active medication list. I have reviewed chart for other potential  high risk medication/s and harmful drug interactions in the elderly.          Aspirin is on active medication list. Aspirin use is indicated based on review of current medical condition/s. Pros and cons of this therapy have been discussed today. Benefits of this medication outweigh potential harm.  Patient has been encouraged to continue taking this medication.  .      Patient Active Problem List   Diagnosis   • Anxiety   • Atherosclerosis of coronary artery bypass graft of native heart without angina pectoris   • Essential hypertension   • Depression   • Hyperlipidemia   • Type 2 diabetes mellitus (HCC)   • Hypothyroidism, unspecified   • Arthritis of knee   • Illiteracy   • Cramps, extremity   • Tachycardia   • Obesity (BMI 30-39.9)   • Degenerative disc disease, lumbar   • Primary insomnia   • Gastroesophageal reflux disease without esophagitis   • Close exposure to COVID-19 virus   • Mild intermittent asthma   • Candidal dermatitis   • Bronchitis   • Bilateral shoulder pain   • Hyperkalemia     Advance Care Planning  Advance Directive is not on file.  ACP discussion was declined by the patient. Patient has an advance directive (not in EMR), copy requested.     Objective    Vitals:    02/15/23 1205   BP: 127/54   Pulse: 88   Resp: 14   Temp: 98.2 °F (36.8 °C)   SpO2: 96%   Weight: 89.5 kg (197 lb 4.8 oz)   Height: 172.7 cm (68\")   PainSc: 0-No pain     Estimated body mass index is 30 kg/m² as calculated from the following:    Height as of this encounter: 172.7 cm (68\").    Weight as of this encounter: 89.5 kg (197 lb 4.8 oz).    BMI is >= 30 and <35. (Class 1 Obesity). The following options were offered after discussion;: exercise counseling/recommendations    Physical Exam  Vitals reviewed.   Constitutional:       Appearance: Normal appearance. He is well-developed.   HENT:      Head: Normocephalic and atraumatic.      Right Ear: " External ear normal.      Left Ear: External ear normal.      Nose: Nose normal.   Eyes:      Conjunctiva/sclera: Conjunctivae normal.      Pupils: Pupils are equal, round, and reactive to light.   Cardiovascular:      Rate and Rhythm: Normal rate.   Pulmonary:      Effort: Pulmonary effort is normal.      Breath sounds: Normal breath sounds.   Abdominal:      General: There is no distension.   Skin:     General: Skin is warm and dry.   Neurological:      General: No focal deficit present.      Mental Status: He is alert and oriented to person, place, and time.   Psychiatric:         Mood and Affect: Mood and affect normal.         Behavior: Behavior normal.         Thought Content: Thought content normal.         Judgment: Judgment normal.           Does the patient have evidence of cognitive impairment? No    Lab Results   Component Value Date    TRIG 107 02/15/2023    HDL 37 (L) 02/15/2023     (H) 02/15/2023    VLDL 20 02/15/2023    HGBA1C 5.80 (H) 02/15/2023        HEALTH RISK ASSESSMENT    Smoking Status:  Social History     Tobacco Use   Smoking Status Never   • Passive exposure: Past   Smokeless Tobacco Current   • Types: Chew     Alcohol Consumption:  Social History     Substance and Sexual Activity   Alcohol Use Not Currently     Fall Risk Screen:    [facility], and patient is at LOW risk for falls.Assessment completed on:2/15/2023    Depression Screening:  PHQ-2/PHQ-9 Depression Screening 2/15/2023   Little Interest or Pleasure in Doing Things 0-->not at all   Feeling Down, Depressed or Hopeless 0-->not at all   Trouble Falling or Staying Asleep, or Sleeping Too Much -   Feeling Tired or Having Little Energy -   Poor Appetite or Overeating -   Feeling Bad about Yourself - or that You are a Failure or Have Let Yourself or Your Family Down -   Trouble Concentrating on Things, Such as Reading the Newspaper or Watching Television -   Moving or Speaking So Slowly that Other  People Could Have Noticed? Or the Opposite - Being So Fidgety -   Thoughts that You Would be Better Off Dead or of Hurting Yourself in Some Way -   PHQ-9: Brief Depression Severity Measure Score 0   If You Checked Off Any Problems, How Difficult Have These Problems Made It For You to Do Your Work, Take Care of Things at Home, or Get Along with Other People? -       Health Habits and Functional and Cognitive Screening:  Functional & Cognitive Status 2/15/2023   Do you have difficulty preparing food and eating? No   Do you have difficulty bathing yourself, getting dressed or grooming yourself? No   Do you have difficulty using the toilet? No   Do you have difficulty moving around from place to place? No   Do you have trouble with steps or getting out of a bed or a chair? No   Current Diet Well Balanced Diet   Dental Exam Other   Eye Exam Up to date   Exercise (times per week) 5 times per week   Current Exercises Include Walking   Do you need help using the phone?  No   Are you deaf or do you have serious difficulty hearing?  No   Do you need help with transportation? No   Do you need help shopping? No   Do you need help preparing meals?  No   Do you need help with housework?  No   Do you need help with laundry? No   Do you need help taking your medications? No   Do you need help managing money? No   Do you ever drive or ride in a car without wearing a seat belt? No   Have you felt unusual stress, anger or loneliness in the last month? No   Who do you live with? Alone   If you need help, do you have trouble finding someone available to you? No   Have you been bothered in the last four weeks by sexual problems? No   Do you have difficulty concentrating, remembering or making decisions? Yes       Age-appropriate Screening Schedule:  Refer to the list below for future screening recommendations based on patient's age, sex and/or medical conditions. Orders for these recommended tests are listed in the plan section. The  patient has been provided with a written plan.    Health Maintenance   Topic Date Due   • ZOSTER VACCINE (1 of 2) Never done   • DIABETIC EYE EXAM  12/23/2022   • TDAP/TD VACCINES (1 - Tdap) 10/26/2023 (Originally 7/12/1979)   • HEMOGLOBIN A1C  08/15/2023   • URINE MICROALBUMIN  08/24/2023   • DIABETIC FOOT EXAM  02/15/2024   • LIPID PANEL  02/15/2024   • INFLUENZA VACCINE  Completed                CMS Preventative Services Quick Reference  Risk Factors Identified During Encounter  None Identified  The above risks/problems have been discussed with the patient.  Pertinent information has been shared with the patient in the After Visit Summary.  An After Visit Summary and PPPS were made available to the patient.    Follow Up:   Next Medicare Wellness visit to be scheduled in 1 year.

## 2023-02-16 LAB
ALBUMIN SERPL-MCNC: 5.1 G/DL (ref 3.5–5.2)
ANION GAP SERPL CALCULATED.3IONS-SCNC: 10.6 MMOL/L (ref 5–15)
BUN SERPL-MCNC: 25 MG/DL (ref 8–23)
BUN/CREAT SERPL: 17.9 (ref 7–25)
CALCIUM SPEC-SCNC: 9.9 MG/DL (ref 8.6–10.5)
CHLORIDE SERPL-SCNC: 98 MMOL/L (ref 98–107)
CHOLEST SERPL-MCNC: 188 MG/DL (ref 0–200)
CO2 SERPL-SCNC: 24.4 MMOL/L (ref 22–29)
CREAT SERPL-MCNC: 1.4 MG/DL (ref 0.76–1.27)
EGFRCR SERPLBLD CKD-EPI 2021: 56.1 ML/MIN/1.73
GLUCOSE SERPL-MCNC: 102 MG/DL (ref 65–99)
HBA1C MFR BLD: 5.8 % (ref 4.8–5.6)
HDLC SERPL-MCNC: 37 MG/DL (ref 40–60)
LDLC SERPL CALC-MCNC: 131 MG/DL (ref 0–100)
LDLC/HDLC SERPL: 3.5 {RATIO}
PHOSPHATE SERPL-MCNC: 3.1 MG/DL (ref 2.5–4.5)
POTASSIUM SERPL-SCNC: 4.2 MMOL/L (ref 3.5–5.2)
SODIUM SERPL-SCNC: 133 MMOL/L (ref 136–145)
T4 FREE SERPL-MCNC: 1.36 NG/DL (ref 0.93–1.7)
TRIGL SERPL-MCNC: 107 MG/DL (ref 0–150)
TSH SERPL DL<=0.05 MIU/L-ACNC: 1.62 UIU/ML (ref 0.27–4.2)
VLDLC SERPL-MCNC: 20 MG/DL (ref 5–40)

## 2023-02-20 DIAGNOSIS — F51.01 PRIMARY INSOMNIA: Chronic | ICD-10-CM

## 2023-02-20 RX ORDER — TRAZODONE HYDROCHLORIDE 100 MG/1
TABLET ORAL
Qty: 330 TABLET | Refills: 0 | Status: SHIPPED | OUTPATIENT
Start: 2023-02-20 | End: 2023-03-27

## 2023-02-20 RX ORDER — ARIPIPRAZOLE 20 MG/1
TABLET ORAL
Qty: 30 TABLET | Refills: 2 | Status: SHIPPED | OUTPATIENT
Start: 2023-02-20

## 2023-02-27 RX ORDER — ATORVASTATIN CALCIUM 80 MG/1
TABLET, FILM COATED ORAL
Qty: 30 TABLET | Refills: 0 | Status: SHIPPED | OUTPATIENT
Start: 2023-02-27

## 2023-02-27 RX ORDER — ATORVASTATIN CALCIUM 80 MG/1
TABLET, FILM COATED ORAL
Qty: 30 TABLET | Refills: 0 | Status: SHIPPED | OUTPATIENT
Start: 2023-02-27 | End: 2023-02-27

## 2023-03-20 ENCOUNTER — TELEPHONE (OUTPATIENT)
Dept: CASE MANAGEMENT | Facility: OTHER | Age: 63
End: 2023-03-20
Payer: MEDICARE

## 2023-03-20 NOTE — TELEPHONE ENCOUNTER
CCM chart review done, no visits since last f/u with PCP. Called to see how he is doing and will likely move to CCM monitoring if all is well. Unable to reach and left a voicemail.

## 2023-03-25 DIAGNOSIS — F51.01 PRIMARY INSOMNIA: Chronic | ICD-10-CM

## 2023-03-25 DIAGNOSIS — E78.2 MIXED HYPERLIPIDEMIA: ICD-10-CM

## 2023-03-25 DIAGNOSIS — K21.9 GASTROESOPHAGEAL REFLUX DISEASE WITHOUT ESOPHAGITIS: ICD-10-CM

## 2023-03-27 ENCOUNTER — PATIENT OUTREACH (OUTPATIENT)
Dept: CASE MANAGEMENT | Facility: OTHER | Age: 63
End: 2023-03-27
Payer: MEDICARE

## 2023-03-27 DIAGNOSIS — Z55.0 ILLITERACY: ICD-10-CM

## 2023-03-27 DIAGNOSIS — E78.2 MIXED HYPERLIPIDEMIA: ICD-10-CM

## 2023-03-27 DIAGNOSIS — E11.65 TYPE 2 DIABETES MELLITUS WITH HYPERGLYCEMIA, WITHOUT LONG-TERM CURRENT USE OF INSULIN: ICD-10-CM

## 2023-03-27 DIAGNOSIS — E87.5 HYPERKALEMIA: Primary | ICD-10-CM

## 2023-03-27 RX ORDER — TRAZODONE HYDROCHLORIDE 100 MG/1
TABLET ORAL
Qty: 30 TABLET | Refills: 0 | Status: SHIPPED | OUTPATIENT
Start: 2023-03-27

## 2023-03-27 RX ORDER — FAMOTIDINE 40 MG/1
TABLET, FILM COATED ORAL
Qty: 60 TABLET | Refills: 3 | Status: SHIPPED | OUTPATIENT
Start: 2023-03-27

## 2023-03-27 SDOH — EDUCATIONAL SECURITY - EDUCATION ATTAINMENT: ILITERACY AND LOW LEVEL LITERACY: Z55.0

## 2023-03-27 NOTE — OUTREACH NOTE
AMBULATORY CASE MANAGEMENT NOTE    Name and Relationship of Patient/Support Person: Alberto Rachel Ray - Self     CCM Interim Update    Patient reports doing well. Discussed care Gaps and need for diabetic eye exam. He has a paper at home and will call to schedule that and let me know. He reports blood sugars doing well. Last A1c last month well controlled per chart review. Requesting call for PCP follow up for May for reminder. Care plans reviewed and updated and partial closed.    Care Coordination    Call to [facility] and continues to get his medication pill planner delivered weekly with no issues at all.         Adult Patient Profile  Questions/Answers    Flowsheet Row Most Recent Value   Diabetes Management Strategies activity, blood glucose testing, medication therapy   A1C Target below 7%   Last A1C Result 2/15/23 down to 5.8%!!!!   Diabetes Self-Management Outcome 5 (very good)   Diabetes Comment continues in 100's for readings          Education Documentation  Blood Glucose Monitoring, taught by Kimberly Mayers, RN at 3/27/2023  3:31 PM.  Learner: Patient  Readiness: Acceptance  Method: Explanation, Teach Back  Response: Verbalizes Understanding  Comment: aware of need for DM eye exm and encouraged to get prior to his May PCP f/u.    Diabetes, Type 2, taught by Kimberly Mayers, RN at 3/27/2023  3:31 PM.  Learner: Patient  Readiness: Acceptance  Method: Explanation, Teach Back  Response: Verbalizes Understanding  Comment: aware of need for DM eye exm and encouraged to get prior to his May PCP f/u.    preventive care, taught by Kimberly Mayers, RN at 3/27/2023  3:31 PM.  Learner: Patient  Readiness: Acceptance  Method: Explanation, Teach Back  Response: Verbalizes Understanding  Comment: aware of need for DM eye exm and encouraged to get prior to his May PCP f/u.          Kimberly MURO  Ambulatory Case Management    3/27/2023, 15:35 EDT

## 2023-03-31 ENCOUNTER — PATIENT OUTREACH (OUTPATIENT)
Dept: CASE MANAGEMENT | Facility: OTHER | Age: 63
End: 2023-03-31
Payer: MEDICARE

## 2023-03-31 DIAGNOSIS — E78.2 MIXED HYPERLIPIDEMIA: ICD-10-CM

## 2023-03-31 DIAGNOSIS — Z55.0 ILLITERACY: Primary | ICD-10-CM

## 2023-03-31 DIAGNOSIS — E11.65 TYPE 2 DIABETES MELLITUS WITH HYPERGLYCEMIA, WITHOUT LONG-TERM CURRENT USE OF INSULIN: ICD-10-CM

## 2023-03-31 SDOH — EDUCATIONAL SECURITY - EDUCATION ATTAINMENT: ILITERACY AND LOW LEVEL LITERACY: Z55.0

## 2023-03-31 NOTE — OUTREACH NOTE
[facility] End of Month Documentation    This Chronic Medical Management Care Plan for Alberto Rachel, 62 y.o. male, has been monitored and managed; reviewed; revised; complete and a new plan of care implemented for the month of March.  A cumulative time of 28  minutes was spent on this patient record this month, including face to face with provider; phone call with patient; phone call with pharmacist; chart review.    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 pectoris; Essential hypertension; Depression; Hyperlipidemia; Type 2 diabetes mellitus (HCC); Hypothyroidism, unspecified; Arthritis of knee; Illiteracy; Cramps, extremity; Tachycardia; Obesity (BMI 30-39.9); Degenerative disc disease, lumbar; Primary insomnia; Gastroesophageal reflux disease without esophagitis; Close exposure to COVID-19 virus; Mild intermittent asthma; Candidal dermatitis; Bronchitis; Bilateral shoulder pain; and Hyperkalemia on their problem list., the following items were addressed: medical records; medications; referrals to community service providers and any changes can be found within the plan section of the note.  A detailed listing of time spent for chronic care management is tracked within each outreach encounter.  Current medications include:  has a current medication list which includes the following prescription(s): albuterol sulfate hfa, aripiprazole, aspirin low dose, atorvastatin, onetouch verio reflect, brimonidine-timolol, bumetanide, citalopram, cyanocobalamin, famotidine, fenofibrate, jardiance, latanoprost, levothyroxine, lumigan, metformin, nitroglycerin, ondansetron, onetouch delica lancets 30g, onetouch verio, pioglitazone, rybelsus, lokelma, trazodone, and triamcinolone. and the patient is reported to be patient is compliant with medication protocol,  Medications are reported to be effective.    The patient was monitored remotely for  activity level; blood glucose; medications; mood & behavior.    The patient's physical needs include:  needs met.     The patient's mental support needs include:  needs met    The patient's cognitive support needs include:  medication; coordination of community providers    The patient's psychosocial support needs include:  medication management or adherence    The patient's functional needs include: eye care, needs DM eye exam he will schedule prior to May follow up    The patient's environmental needs include:  not applicable, none    Care Plan overall comments:  reviewed care plan and updated and closed portion    Refer to previous outreach notes for more information on the areas listed above.    Monthly Billing Diagnoses  (Z55.0) Illiteracy    (E78.2) Mixed hyperlipidemia    (E11.65) Type 2 diabetes mellitus with hyperglycemia, without long-term current use of insulin (HCC)    Medications   · Medications have been reconciled    Care Plan progress this month:      Recently Modified Care Plans Updates made since 2/28/2023 12:00 AM     Wellness (Adult)         Problem Priority Last Modified     [facility]ALTH LITERACY (WELLNESS) (ADULT) --  11/18/2022 10:33 AM by Kimberly Mayers RN              Goal Recent Progress Last Modified     Health Literacy Improved --  3/27/2023  3:21 PM by Kimberly Mayers, RN     Evidence-based guidance:   Assess health literacy using [employer] recommended questions or standardized tools.   Use a universal method with health literacy to esure a nonjudgmental approach to varying levels of literacy.   Identify barriers to seeking or understanding information.   Note: Patient barriers may include literacy, language or culture, mental health, stigma, embarrassment; clinician barriers may include avoidance, time constraint, stereotype or bias.   Collaborate with interprofessional team and child and parent/caregiver to develop a structured yet individualized education plan that supports  shared decision-making.   Consider a combination of strategies such as print, audiotape, video and computer-based learning in a group or individual setting.   Encourage patient to seek health information and education in the community such as library, continuing education, English as a second language class, support group or peer health .   Provide educational materials that are written in plain language (3rd to 5th grade reading level) and include icons, pictures and tables; provide essential information first.        Notes: 11/18/22 patient unable to read or write, medication education on picture boards drawn out for his this month, he reports it is working for him    1/18/23 discussed medications but he states he cannot tell me names he just takes what is in his pill pack,is done with cough medictaion, aware of need for f/u, made appt for Feb and needs me to call to remind day prior    3/27/23 will likely not have literacy improvement but doing well keeping medications and getting his pill packs on time, no needs.             Problem Priority Last Modified     [facility] MEDICATION ADHERENCE (WELLNESS) (ADULT) --  3/27/2023  3:30 PM by Kimberly Mayers, GARY              Goal Recent Progress Last Modified     Medication Adherence Maintained On track  3/27/2023  3:30 PM by Kimberly Mayers, RN     Evidence-based guidance:   Develop a complete and accurate medication list including those prescribed and over-the-counter, those taken only occasionally and those not taken by mouth such as injections, inhalers, ointments or creams and drops.   Review all medications to determine if patient or caregiver knows why the medications are given and if taken as prescribed.   Complete or review a medication adherence assessment including barriers to medication adherence.   Arrange and encourage counseling and medication review by pharmacist.   Assess barriers to medication adherence.   Manage poor understanding or health literacy by  using easy to understand language, teach-back, visual aids and teaching only 2 or 3 points at a time.   Assess presence of side effects; provide suggestions to manage or reduce side effects.   Consult with provider and/or pharmacist regarding substitute medication, changing dose, simplification of regimen or safe discontinuation of some medications.   Encourage the use of medication reminders such as clock or cell phone alarm, color coding, pillboxes for am/pm and days of the week, pharmacy refill reminder, auto-refill system or mail-order option.   Assist with resources when cost is a barrier; refer to prescription assistance programs; confirm that generics are prescribed whenever possible; consider 90-day prescriptions to reduce copay cost; synchronize refills.   Provide help to complete medication assistance applications or health insurance forms as needed.   Complete a follow-up call 2 to 3 weeks after medication self-management plan developed; assess adherence and understanding, as well as listen to patient or caregiver concerns; amend plan as needed.   Provide frequent follow-up providing motivation, encouragement and support when medication nonadherence is identified.      Notes: 11/18/22 working with patient, PCP, office nurse and pharmacy to get his pill packs correct and picture boards to educate on new medicines    12/21/22 reports medication compliance with his pill pack from pharmacy    3/27/23 call to pharmacy his pill packs are delivered weekly and he is doing well, no issues with refills at all             Task Due Date Last Modified     Optimize Medication Use --  3/27/2023  3:30 PM by Kimberly Mayers RN     Care Management Activities:      - administration or use of medication demonstrated  - barriers to medication adherence identified  - completion of financial assistance requests facilitated  - medication list compiled  - medication list reviewed  - medication-adherence assessment completed  -  medication reminder use encouraged  - medication side effects managed  - understanding of current medications assessed      Notes:                      · Current Specialty Plan of Care Status signed by both patient and provider    Instructions   · Patient was provided an electronic copy of care plan  · CCM services were explained and offered and patient has accepted these services.  · Patient has given their written consent to receive CCM services and understands that this includes the authorization of electronic communication of medical information with the other treating providers.  · Patient understands that they may stop CCM services at any time and these changes will be effective at the end of the calendar month and will effectively revocate the agreement of CCM services.  · Patient understands that only one practitioner can furnish and be paid for CCM services during one calendar month.  Patient also understands that there may be co-payment or deductible fees in association with CCM services.  · Patient will continue with at least monthly follow-up calls with the Ambulatory .    Kimberly MURO  Ambulatory Case Management    3/31/2023, 09:44 EDT

## 2023-04-17 ENCOUNTER — TELEPHONE (OUTPATIENT)
Dept: CASE MANAGEMENT | Facility: OTHER | Age: 63
End: 2023-04-17
Payer: MEDICARE

## 2023-04-17 NOTE — TELEPHONE ENCOUNTER
Chart reviewed and put in monitoring as CCM goals met. He is in monitoring for 90 days. Reviewed chart and his DM eye exam is in chart from 5/17/22 and patient is to be scheduling a follow up and gaps updated.

## 2023-04-18 DIAGNOSIS — F51.01 PRIMARY INSOMNIA: Chronic | ICD-10-CM

## 2023-04-18 RX ORDER — ATORVASTATIN CALCIUM 80 MG/1
TABLET, FILM COATED ORAL
Qty: 30 TABLET | Refills: 0 | Status: SHIPPED | OUTPATIENT
Start: 2023-04-18

## 2023-04-18 RX ORDER — CITALOPRAM 20 MG/1
TABLET ORAL
Qty: 90 TABLET | Refills: 0 | Status: SHIPPED | OUTPATIENT
Start: 2023-04-18

## 2023-04-18 RX ORDER — TRAZODONE HYDROCHLORIDE 100 MG/1
TABLET ORAL
Qty: 30 TABLET | Refills: 0 | Status: SHIPPED | OUTPATIENT
Start: 2023-04-18

## 2023-05-09 ENCOUNTER — PATIENT OUTREACH (OUTPATIENT)
Dept: CASE MANAGEMENT | Facility: OTHER | Age: 63
End: 2023-05-09
Payer: MEDICARE

## 2023-05-09 DIAGNOSIS — Z55.0 ILLITERACY: Primary | ICD-10-CM

## 2023-05-09 DIAGNOSIS — E11.65 TYPE 2 DIABETES MELLITUS WITH HYPERGLYCEMIA, WITHOUT LONG-TERM CURRENT USE OF INSULIN: ICD-10-CM

## 2023-05-09 DIAGNOSIS — E78.2 MIXED HYPERLIPIDEMIA: ICD-10-CM

## 2023-05-09 SDOH — EDUCATIONAL SECURITY - EDUCATION ATTAINMENT: ILITERACY AND LOW LEVEL LITERACY: Z55.0

## 2023-05-09 NOTE — OUTREACH NOTE
AMBULATORY CASE MANAGEMENT NOTE    Name and Relationship of Patient/Support Person: Wilson Alberto Ray - Self    CCM Interim Update    Reports he is doing well and he is aware of his follow up with PCP for Monday 5/15/23 at 1 pm. He denies any needs and continues to take his medicines as scheduled. Monitoring for 60 day jerman.             Kimberly H  Ambulatory Case Management    5/9/2023, 08:44 EDT

## 2023-05-15 ENCOUNTER — OFFICE VISIT (OUTPATIENT)
Dept: FAMILY MEDICINE CLINIC | Facility: CLINIC | Age: 63
End: 2023-05-15
Payer: MEDICARE

## 2023-05-15 VITALS
HEIGHT: 68 IN | BODY MASS INDEX: 29.31 KG/M2 | TEMPERATURE: 98.9 F | SYSTOLIC BLOOD PRESSURE: 142 MMHG | OXYGEN SATURATION: 99 % | RESPIRATION RATE: 14 BRPM | HEART RATE: 85 BPM | DIASTOLIC BLOOD PRESSURE: 77 MMHG | WEIGHT: 193.4 LBS

## 2023-05-15 DIAGNOSIS — E78.2 MIXED HYPERLIPIDEMIA: Chronic | ICD-10-CM

## 2023-05-15 DIAGNOSIS — Z12.11 SCREEN FOR COLON CANCER: ICD-10-CM

## 2023-05-15 DIAGNOSIS — E03.9 HYPOTHYROIDISM, UNSPECIFIED TYPE: Chronic | ICD-10-CM

## 2023-05-15 DIAGNOSIS — F51.01 PRIMARY INSOMNIA: Chronic | ICD-10-CM

## 2023-05-15 DIAGNOSIS — I10 ESSENTIAL HYPERTENSION: Chronic | ICD-10-CM

## 2023-05-15 DIAGNOSIS — E11.65 TYPE 2 DIABETES MELLITUS WITH HYPERGLYCEMIA, WITHOUT LONG-TERM CURRENT USE OF INSULIN: Primary | Chronic | ICD-10-CM

## 2023-05-15 DIAGNOSIS — Z55.0 ILLITERACY: ICD-10-CM

## 2023-05-15 DIAGNOSIS — N18.31 STAGE 3A CHRONIC KIDNEY DISEASE (CKD): ICD-10-CM

## 2023-05-15 SDOH — EDUCATIONAL SECURITY - EDUCATION ATTAINMENT: ILITERACY AND LOW LEVEL LITERACY: Z55.0

## 2023-05-15 NOTE — ASSESSMENT & PLAN NOTE
*managed  Last lipid LDL above 100, goal is <  Recheck with next labs increase medicines or add repatha alternatively

## 2023-05-15 NOTE — PROGRESS NOTES
"Chief Complaint  Diabetes    Subjective        Alberto Rachel presents to [facility]  History of Present Illness    Presents to follow-up on chronic conditions diabetes he follows with Dr. Costa and does have complicated educational background with his illiteracy making it more difficult to prescribe medicines and follow-up instructions.     He has been doing well since under our care for last 6 months or so.  Blood pressure elevated in clinic will need to monitor more closely and adjust medicine but will hold for now and see if improves with home readings, may need to come in to have checked bc of reading issue.     Thyroid controlled cholesterol is elevated and will need to recheck with next labs and adjust medicine Cologuard ordered we will have him come in to help instruct what to do.    Objective   Vital Signs:  /77   Pulse 85   Temp 98.9 °F (37.2 °C)   Resp 14   Ht 172.7 cm (68\")   Wt 87.7 kg (193 lb 6.4 oz)   SpO2 99%   BMI 29.41 kg/m²   Estimated body mass index is 29.41 kg/m² as calculated from the following:    Height as of this encounter: 172.7 cm (68\").    Weight as of this encounter: 87.7 kg (193 lb 6.4 oz).       BMI is >= 25 and <30. (Overweight) The following options were offered after discussion;: exercise counseling/recommendations      Physical Exam  Vitals reviewed.   Constitutional:       Appearance: Normal appearance. He is well-developed.   HENT:      Head: Normocephalic and atraumatic.      Right Ear: External ear normal.      Left Ear: External ear normal.      Nose: Nose normal.   Eyes:      Conjunctiva/sclera: Conjunctivae normal.      Pupils: Pupils are equal, round, and reactive to light.   Cardiovascular:      Rate and Rhythm: Normal rate.   Pulmonary:      Effort: Pulmonary effort is normal.      Breath sounds: Normal breath sounds.   Abdominal:      General: There is no distension.   Skin:     General: Skin is warm and dry. "   Neurological:      Mental Status: He is alert and oriented to person, place, and time. Mental status is at baseline.   Psychiatric:         Mood and Affect: Mood and affect normal.         Behavior: Behavior normal.         Thought Content: Thought content normal.         Judgment: Judgment normal.        Result Review :  The following data was reviewed by: Magnus Euceda DO on 05/15/2023:  Common labs        12/9/2022    09:07 12/22/2022    08:44 2/15/2023    11:43   Common Labs   Glucose 101   99   102     BUN 12   17   25     Creatinine 1.37   0.93   1.40     Sodium 143   138   133     Potassium 3.8   3.8   4.2     Chloride 105   98   98     Calcium 9.6   9.7   9.9     Albumin 4.50   4.50   5.1     WBC   9.16     Hemoglobin   15.6     Hematocrit   48.0     Platelets   264     Total Cholesterol   188     Triglycerides   107     HDL Cholesterol   37     LDL Cholesterol    131     Hemoglobin A1C   5.80                    Assessment and Plan   Diagnoses and all orders for this visit:    1. Type 2 diabetes mellitus with hyperglycemia, without long-term current use of insulin (Primary)  Assessment & Plan:  *controlled  a1c at goal <7-7.5   Continue medicines as prescribed  Recheck a1c 3mo after last  Goal fasting sugar <120-150  Up to date on vax  Foot and eye exam recommended annually   Fu with Julissa      2. Hypothyroidism, unspecified type    3. Mixed hyperlipidemia  Assessment & Plan:  *managed  Last lipid LDL above 100, goal is <  Recheck with next labs increase medicines or add repatha alternatively         4. Essential hypertension  Assessment & Plan:  *borderline control  Hypertension is unchanged.  Continue current treatment regimen.  Dietary sodium restriction.  Weight loss.  Ambulatory blood pressure monitoring.  Blood pressure will be reassessed at the next regular appointment.        5. Illiteracy  Assessment & Plan:  Complicates care, barrier      6. Primary insomnia  Assessment &  Plan:  *controlled  Continue trazodone   No complaints      7. Stage 3a chronic kidney disease (CKD)    8. Screen for colon cancer  -     Cologuard - Stool, Per Rectum; Future           Follow Up   Return in about 3 months (around 8/15/2023), or if symptoms worsen or fail to improve, for Next scheduled follow up, BP & Log, Labs before.  Patient was given instructions and counseling regarding his condition or for health maintenance advice. Please see specific information pulled into the AVS if appropriate.

## 2023-05-15 NOTE — ASSESSMENT & PLAN NOTE
*borderline control  Hypertension is unchanged.  Continue current treatment regimen.  Dietary sodium restriction.  Weight loss.  Ambulatory blood pressure monitoring.  Blood pressure will be reassessed at the next regular appointment.

## 2023-05-15 NOTE — ASSESSMENT & PLAN NOTE
*controlled  a1c at goal <7-7.5   Continue medicines as prescribed  Recheck a1c 3mo after last  Goal fasting sugar <120-150  Up to date on vax  Foot and eye exam recommended annually   Fu with Julissa

## 2023-05-16 DIAGNOSIS — F51.01 PRIMARY INSOMNIA: Chronic | ICD-10-CM

## 2023-05-16 RX ORDER — TRAZODONE HYDROCHLORIDE 100 MG/1
TABLET ORAL
Qty: 30 TABLET | Refills: 0 | Status: SHIPPED | OUTPATIENT
Start: 2023-05-16

## 2023-05-16 RX ORDER — ASPIRIN 81 MG/1
TABLET, COATED ORAL
Qty: 30 TABLET | Refills: 3 | Status: SHIPPED | OUTPATIENT
Start: 2023-05-16

## 2023-05-16 RX ORDER — ATORVASTATIN CALCIUM 80 MG/1
TABLET, FILM COATED ORAL
Qty: 30 TABLET | Refills: 0 | Status: SHIPPED | OUTPATIENT
Start: 2023-05-16

## 2023-05-16 RX ORDER — ARIPIPRAZOLE 20 MG/1
TABLET ORAL
Qty: 30 TABLET | Refills: 1 | Status: SHIPPED | OUTPATIENT
Start: 2023-05-16

## 2023-05-23 ENCOUNTER — TELEPHONE (OUTPATIENT)
Dept: FAMILY MEDICINE CLINIC | Facility: CLINIC | Age: 63
End: 2023-05-23
Payer: MEDICARE

## 2023-05-23 NOTE — TELEPHONE ENCOUNTER
Patient came by office this morning with cologuard sample kit. Processed the kit for patient and scheduled same day  to ship to lab

## 2023-06-10 DIAGNOSIS — F51.01 PRIMARY INSOMNIA: Chronic | ICD-10-CM

## 2023-06-12 RX ORDER — ATORVASTATIN CALCIUM 80 MG/1
TABLET, FILM COATED ORAL
Qty: 30 TABLET | Refills: 0 | Status: SHIPPED | OUTPATIENT
Start: 2023-06-12

## 2023-06-12 RX ORDER — TRAZODONE HYDROCHLORIDE 100 MG/1
TABLET ORAL
Qty: 30 TABLET | Refills: 0 | Status: SHIPPED | OUTPATIENT
Start: 2023-06-12

## 2023-06-16 ENCOUNTER — PATIENT OUTREACH (OUTPATIENT)
Dept: CASE MANAGEMENT | Facility: OTHER | Age: 63
End: 2023-06-16
Payer: MEDICARE

## 2023-06-16 DIAGNOSIS — E11.65 TYPE 2 DIABETES MELLITUS WITH HYPERGLYCEMIA, WITHOUT LONG-TERM CURRENT USE OF INSULIN: ICD-10-CM

## 2023-06-16 DIAGNOSIS — E78.2 MIXED HYPERLIPIDEMIA: ICD-10-CM

## 2023-06-16 DIAGNOSIS — Z55.0 ILLITERACY: Primary | ICD-10-CM

## 2023-06-16 SDOH — EDUCATIONAL SECURITY - EDUCATION ATTAINMENT: ILITERACY AND LOW LEVEL LITERACY: Z55.0

## 2023-06-16 NOTE — OUTREACH NOTE
AMBULATORY CASE MANAGEMENT NOTE    Name and Relationship of Patient/Support Person: Alberto Rachel - Self    Care Coordination    Call to [facility] and he does not have any upcoming DM eye exam appointments and we have his last exam on file.    CCM Interim Update    Attempted to reach to get gaps closed and closing for Ccm as goals all met. Will outreach again next week.             Kimberly H  Ambulatory Case Management    6/16/2023, 12:25 EDT

## 2023-07-24 ENCOUNTER — TELEPHONE (OUTPATIENT)
Dept: CASE MANAGEMENT | Facility: OTHER | Age: 63
End: 2023-07-24
Payer: MEDICARE

## 2023-07-24 NOTE — TELEPHONE ENCOUNTER
DM eye exam on chart and normal for DM but needs 2 month f/u for glaucoma, he forgets to make appointments. Will outreach to eye doc to see if they can do reminder calls for him for f/u needs.

## 2023-07-31 DIAGNOSIS — F51.01 PRIMARY INSOMNIA: Chronic | ICD-10-CM

## 2023-07-31 DIAGNOSIS — E78.5 HYPERLIPIDEMIA, UNSPECIFIED HYPERLIPIDEMIA TYPE: ICD-10-CM

## 2023-07-31 RX ORDER — FENOFIBRATE 145 MG/1
TABLET, COATED ORAL
Qty: 90 TABLET | Refills: 2 | Status: SHIPPED | OUTPATIENT
Start: 2023-07-31

## 2023-07-31 RX ORDER — ATORVASTATIN CALCIUM 80 MG/1
TABLET, FILM COATED ORAL
Qty: 30 TABLET | Refills: 0 | Status: SHIPPED | OUTPATIENT
Start: 2023-07-31 | End: 2023-08-07

## 2023-07-31 RX ORDER — DM/PE/ACETAMINOPHEN/CHLORPHENR 10-5-325-2
TABLET, SEQUENTIAL ORAL
Qty: 90 TABLET | Refills: 2 | Status: SHIPPED | OUTPATIENT
Start: 2023-07-31

## 2023-07-31 RX ORDER — TRAZODONE HYDROCHLORIDE 100 MG/1
TABLET ORAL
Qty: 30 TABLET | Refills: 0 | Status: SHIPPED | OUTPATIENT
Start: 2023-07-31

## 2023-07-31 RX ORDER — ARIPIPRAZOLE 20 MG/1
TABLET ORAL
Qty: 30 TABLET | Refills: 0 | Status: SHIPPED | OUTPATIENT
Start: 2023-07-31

## 2023-08-07 RX ORDER — ATORVASTATIN CALCIUM 80 MG/1
TABLET, FILM COATED ORAL
Qty: 30 TABLET | Refills: 0 | Status: SHIPPED | OUTPATIENT
Start: 2023-08-07

## 2023-08-09 ENCOUNTER — PATIENT OUTREACH (OUTPATIENT)
Dept: CASE MANAGEMENT | Facility: OTHER | Age: 63
End: 2023-08-09
Payer: MEDICARE

## 2023-08-09 DIAGNOSIS — E11.65 TYPE 2 DIABETES MELLITUS WITH HYPERGLYCEMIA, WITHOUT LONG-TERM CURRENT USE OF INSULIN: Primary | ICD-10-CM

## 2023-08-09 DIAGNOSIS — Z55.0 ILLITERACY: ICD-10-CM

## 2023-08-09 SDOH — EDUCATIONAL SECURITY - EDUCATION ATTAINMENT: ILITERACY AND LOW LEVEL LITERACY: Z55.0

## 2023-08-09 NOTE — OUTREACH NOTE
AMBULATORY CASE MANAGEMENT NOTE    Name and Relationship of Patient/Support Person: [facility] -     Care Coordination    Call to eye doctor and he is down for a September follow up appointment and they do a week prior to and day before reminder calls for patients to know about their appointments.     CCM goals met and closing program. Monitoring 90 days completed.     Kimberly MURO  Ambulatory Case Management    8/9/2023, 16:43 EDT   patient was critically ill... Patient was critically ill with a high probability of imminent or life threatening deterioration.

## 2023-08-28 DIAGNOSIS — F51.01 PRIMARY INSOMNIA: Chronic | ICD-10-CM

## 2023-08-28 RX ORDER — ARIPIPRAZOLE 20 MG/1
TABLET ORAL
Qty: 30 TABLET | Refills: 0 | Status: SHIPPED | OUTPATIENT
Start: 2023-08-28

## 2023-08-28 RX ORDER — TRAZODONE HYDROCHLORIDE 100 MG/1
TABLET ORAL
Qty: 30 TABLET | Refills: 0 | Status: SHIPPED | OUTPATIENT
Start: 2023-08-28

## 2023-09-06 ENCOUNTER — LAB (OUTPATIENT)
Dept: LAB | Facility: HOSPITAL | Age: 63
End: 2023-09-06
Payer: MEDICARE

## 2023-09-06 ENCOUNTER — OFFICE VISIT (OUTPATIENT)
Dept: FAMILY MEDICINE CLINIC | Facility: CLINIC | Age: 63
End: 2023-09-06
Payer: MEDICARE

## 2023-09-06 VITALS
BODY MASS INDEX: 31.19 KG/M2 | HEART RATE: 91 BPM | OXYGEN SATURATION: 96 % | WEIGHT: 205.8 LBS | HEIGHT: 68 IN | DIASTOLIC BLOOD PRESSURE: 78 MMHG | TEMPERATURE: 98.7 F | SYSTOLIC BLOOD PRESSURE: 138 MMHG | RESPIRATION RATE: 16 BRPM

## 2023-09-06 DIAGNOSIS — Z12.5 SCREENING FOR MALIGNANT NEOPLASM OF PROSTATE: ICD-10-CM

## 2023-09-06 DIAGNOSIS — I10 ESSENTIAL HYPERTENSION: Chronic | ICD-10-CM

## 2023-09-06 DIAGNOSIS — E03.9 HYPOTHYROIDISM, UNSPECIFIED TYPE: Chronic | ICD-10-CM

## 2023-09-06 DIAGNOSIS — N18.31 STAGE 3A CHRONIC KIDNEY DISEASE (CKD): ICD-10-CM

## 2023-09-06 DIAGNOSIS — E11.65 TYPE 2 DIABETES MELLITUS WITH HYPERGLYCEMIA, WITHOUT LONG-TERM CURRENT USE OF INSULIN: ICD-10-CM

## 2023-09-06 DIAGNOSIS — E78.2 MIXED HYPERLIPIDEMIA: ICD-10-CM

## 2023-09-06 DIAGNOSIS — E11.65 TYPE 2 DIABETES MELLITUS WITH HYPERGLYCEMIA, WITHOUT LONG-TERM CURRENT USE OF INSULIN: Primary | Chronic | ICD-10-CM

## 2023-09-06 DIAGNOSIS — E78.2 MIXED HYPERLIPIDEMIA: Chronic | ICD-10-CM

## 2023-09-06 DIAGNOSIS — I10 ESSENTIAL HYPERTENSION: ICD-10-CM

## 2023-09-06 LAB
ALBUMIN SERPL-MCNC: 4.8 G/DL (ref 3.5–5.2)
ALBUMIN UR-MCNC: <1.2 MG/DL
ALBUMIN/GLOB SERPL: 1.7 G/DL
ALP SERPL-CCNC: 48 U/L (ref 39–117)
ALT SERPL W P-5'-P-CCNC: 20 U/L (ref 1–41)
ANION GAP SERPL CALCULATED.3IONS-SCNC: 13 MMOL/L (ref 5–15)
AST SERPL-CCNC: 20 U/L (ref 1–40)
BASOPHILS # BLD AUTO: 0.05 10*3/MM3 (ref 0–0.2)
BASOPHILS NFR BLD AUTO: 0.6 % (ref 0–1.5)
BILIRUB SERPL-MCNC: 0.2 MG/DL (ref 0–1.2)
BUN SERPL-MCNC: 22 MG/DL (ref 8–23)
BUN/CREAT SERPL: 13.7 (ref 7–25)
CALCIUM SPEC-SCNC: 9.3 MG/DL (ref 8.6–10.5)
CHLORIDE SERPL-SCNC: 100 MMOL/L (ref 98–107)
CHOLEST SERPL-MCNC: 154 MG/DL (ref 0–200)
CO2 SERPL-SCNC: 23 MMOL/L (ref 22–29)
CREAT SERPL-MCNC: 1.61 MG/DL (ref 0.76–1.27)
CREAT UR-MCNC: 15.8 MG/DL
DEPRECATED RDW RBC AUTO: 44.8 FL (ref 37–54)
EGFRCR SERPLBLD CKD-EPI 2021: 47.2 ML/MIN/1.73
EOSINOPHIL # BLD AUTO: 0.57 10*3/MM3 (ref 0–0.4)
EOSINOPHIL NFR BLD AUTO: 6.4 % (ref 0.3–6.2)
ERYTHROCYTE [DISTWIDTH] IN BLOOD BY AUTOMATED COUNT: 13.5 % (ref 12.3–15.4)
GLOBULIN UR ELPH-MCNC: 2.8 GM/DL
GLUCOSE SERPL-MCNC: 244 MG/DL (ref 65–99)
HBA1C MFR BLD: 6.1 % (ref 4.8–5.6)
HCT VFR BLD AUTO: 47.5 % (ref 37.5–51)
HDLC SERPL-MCNC: 38 MG/DL (ref 40–60)
HGB BLD-MCNC: 15.7 G/DL (ref 13–17.7)
IMM GRANULOCYTES # BLD AUTO: 0.06 10*3/MM3 (ref 0–0.05)
IMM GRANULOCYTES NFR BLD AUTO: 0.7 % (ref 0–0.5)
LDLC SERPL CALC-MCNC: 93 MG/DL (ref 0–100)
LDLC/HDLC SERPL: 2.37 {RATIO}
LYMPHOCYTES # BLD AUTO: 2.11 10*3/MM3 (ref 0.7–3.1)
LYMPHOCYTES NFR BLD AUTO: 23.8 % (ref 19.6–45.3)
MCH RBC QN AUTO: 30.1 PG (ref 26.6–33)
MCHC RBC AUTO-ENTMCNC: 33.1 G/DL (ref 31.5–35.7)
MCV RBC AUTO: 91.2 FL (ref 79–97)
MICROALBUMIN/CREAT UR: NORMAL MG/G{CREAT}
MONOCYTES # BLD AUTO: 0.56 10*3/MM3 (ref 0.1–0.9)
MONOCYTES NFR BLD AUTO: 6.3 % (ref 5–12)
NEUTROPHILS NFR BLD AUTO: 5.53 10*3/MM3 (ref 1.7–7)
NEUTROPHILS NFR BLD AUTO: 62.2 % (ref 42.7–76)
NRBC BLD AUTO-RTO: 0 /100 WBC (ref 0–0.2)
PLATELET # BLD AUTO: 237 10*3/MM3 (ref 140–450)
PMV BLD AUTO: 11 FL (ref 6–12)
POTASSIUM SERPL-SCNC: 4 MMOL/L (ref 3.5–5.2)
PROT SERPL-MCNC: 7.6 G/DL (ref 6–8.5)
PSA SERPL-MCNC: 2.1 NG/ML (ref 0–4)
RBC # BLD AUTO: 5.21 10*6/MM3 (ref 4.14–5.8)
SODIUM SERPL-SCNC: 136 MMOL/L (ref 136–145)
TRIGL SERPL-MCNC: 129 MG/DL (ref 0–150)
VLDLC SERPL-MCNC: 23 MG/DL (ref 5–40)
WBC NRBC COR # BLD: 8.88 10*3/MM3 (ref 3.4–10.8)

## 2023-09-06 PROCEDURE — 82570 ASSAY OF URINE CREATININE: CPT

## 2023-09-06 PROCEDURE — 36415 COLL VENOUS BLD VENIPUNCTURE: CPT

## 2023-09-06 PROCEDURE — 82043 UR ALBUMIN QUANTITATIVE: CPT

## 2023-09-06 PROCEDURE — 80061 LIPID PANEL: CPT

## 2023-09-06 PROCEDURE — 85025 COMPLETE CBC W/AUTO DIFF WBC: CPT

## 2023-09-06 PROCEDURE — G0103 PSA SCREENING: HCPCS

## 2023-09-06 PROCEDURE — 80053 COMPREHEN METABOLIC PANEL: CPT

## 2023-09-06 PROCEDURE — 83036 HEMOGLOBIN GLYCOSYLATED A1C: CPT

## 2023-09-06 RX ORDER — ASPIRIN 81 MG/1
TABLET, COATED ORAL
Qty: 30 TABLET | Refills: 2 | Status: SHIPPED | OUTPATIENT
Start: 2023-09-06

## 2023-09-06 NOTE — PROGRESS NOTES
"Chief Complaint  Diabetes, Hypertension, Hypothyroidism, Hyperlipidemia, and Chronic Kidney Disease    Subjective        Alberto Rachel presents to [facility]  History of Present Illness    Presents to follow-up on chronic conditions monitoring blood sugar at home and blood pressure at goal less than 140/90 blood sugars fasting are at goal    Objective   Vital Signs:  /78   Pulse 91   Temp 98.7 °F (37.1 °C)   Resp 16   Ht 172.7 cm (68\")   Wt 93.4 kg (205 lb 12.8 oz)   SpO2 96%   BMI 31.29 kg/m²   Estimated body mass index is 31.29 kg/m² as calculated from the following:    Height as of this encounter: 172.7 cm (68\").    Weight as of this encounter: 93.4 kg (205 lb 12.8 oz).               Physical Exam  Vitals reviewed.   Constitutional:       Appearance: Normal appearance. He is well-developed.   HENT:      Head: Normocephalic and atraumatic.      Right Ear: External ear normal.      Left Ear: External ear normal.      Nose: Nose normal.   Eyes:      Conjunctiva/sclera: Conjunctivae normal.      Pupils: Pupils are equal, round, and reactive to light.   Cardiovascular:      Rate and Rhythm: Normal rate.   Pulmonary:      Effort: Pulmonary effort is normal.      Breath sounds: Normal breath sounds.   Abdominal:      General: There is no distension.   Skin:     General: Skin is warm and dry.   Neurological:      Mental Status: He is alert and oriented to person, place, and time. Mental status is at baseline.   Psychiatric:         Mood and Affect: Mood and affect normal.         Behavior: Behavior normal.         Thought Content: Thought content normal.         Judgment: Judgment normal.      Result Review :  The following data was reviewed by: Magnus Euceda DO on 09/06/2023:  Common labs          12/9/2022    09:07 12/22/2022    08:44 2/15/2023    11:43   Common Labs   Glucose 101  99  102    BUN 12  17  25    Creatinine 1.37  0.93  1.40    Sodium 143  138  133  "   Potassium 3.8  3.8  4.2    Chloride 105  98  98    Calcium 9.6  9.7  9.9    Albumin 4.50  4.50  5.1    WBC   9.16    Hemoglobin   15.6    Hematocrit   48.0    Platelets   264    Total Cholesterol   188    Triglycerides   107    HDL Cholesterol   37    LDL Cholesterol    131    Hemoglobin A1C   5.80                   Assessment and Plan   Diagnoses and all orders for this visit:    1. Type 2 diabetes mellitus with hyperglycemia, without long-term current use of insulin (Primary)  -     CBC & Differential; Future  -     Comprehensive metabolic panel; Future  -     Lipid panel; Future  -     Hemoglobin A1c; Future  -     Microalbumin / Creatinine Urine Ratio - Urine, Clean Catch; Future    2. Mixed hyperlipidemia  -     CBC & Differential; Future  -     Comprehensive metabolic panel; Future  -     Lipid panel; Future  -     Hemoglobin A1c; Future  -     Microalbumin / Creatinine Urine Ratio - Urine, Clean Catch; Future    3. Essential hypertension  -     CBC & Differential; Future  -     Comprehensive metabolic panel; Future  -     Lipid panel; Future  -     Hemoglobin A1c; Future  -     Microalbumin / Creatinine Urine Ratio - Urine, Clean Catch; Future    4. Hypothyroidism, unspecified type    5. Stage 3a chronic kidney disease (CKD)    6. Screening for malignant neoplasm of prostate  -     PSA SCREENING; Future      Continue medicines as prescribed continue monitoring blood sugar and blood pressure at home recheck labs every 3 to 6 months at goal less than 7 A1c and blood pressure less than 140/90       Follow Up   Return in about 6 months (around 3/6/2024) for Recheck.  Patient was given instructions and counseling regarding his condition or for health maintenance advice. Please see specific information pulled into the AVS if appropriate.

## 2023-09-19 RX ORDER — ATORVASTATIN CALCIUM 80 MG/1
TABLET, FILM COATED ORAL
Qty: 30 TABLET | Refills: 0 | Status: SHIPPED | OUTPATIENT
Start: 2023-09-19

## 2023-09-25 DIAGNOSIS — F51.01 PRIMARY INSOMNIA: Chronic | ICD-10-CM

## 2023-09-25 RX ORDER — ARIPIPRAZOLE 20 MG/1
TABLET ORAL
Qty: 30 TABLET | Refills: 0 | Status: SHIPPED | OUTPATIENT
Start: 2023-09-25

## 2023-09-25 RX ORDER — TRAZODONE HYDROCHLORIDE 100 MG/1
TABLET ORAL
Qty: 30 TABLET | Refills: 0 | Status: SHIPPED | OUTPATIENT
Start: 2023-09-25

## 2023-10-03 DIAGNOSIS — K21.9 GASTROESOPHAGEAL REFLUX DISEASE WITHOUT ESOPHAGITIS: ICD-10-CM

## 2023-10-03 DIAGNOSIS — E78.2 MIXED HYPERLIPIDEMIA: ICD-10-CM

## 2023-10-03 DIAGNOSIS — E03.9 HYPOTHYROIDISM, UNSPECIFIED TYPE: Chronic | ICD-10-CM

## 2023-10-03 RX ORDER — BUMETANIDE 1 MG/1
TABLET ORAL
Qty: 90 TABLET | Refills: 0 | Status: SHIPPED | OUTPATIENT
Start: 2023-10-03

## 2023-10-03 RX ORDER — CITALOPRAM 20 MG/1
TABLET ORAL
Qty: 90 TABLET | Refills: 0 | Status: SHIPPED | OUTPATIENT
Start: 2023-10-03

## 2023-10-03 RX ORDER — LEVOTHYROXINE SODIUM 0.05 MG/1
TABLET ORAL
Qty: 90 TABLET | Refills: 0 | Status: SHIPPED | OUTPATIENT
Start: 2023-10-03

## 2023-10-03 RX ORDER — FAMOTIDINE 40 MG/1
TABLET, FILM COATED ORAL
Qty: 60 TABLET | Refills: 1 | Status: SHIPPED | OUTPATIENT
Start: 2023-10-03

## 2023-10-23 DIAGNOSIS — F51.01 PRIMARY INSOMNIA: Chronic | ICD-10-CM

## 2023-10-23 RX ORDER — ATORVASTATIN CALCIUM 80 MG/1
TABLET, FILM COATED ORAL
Qty: 30 TABLET | Refills: 0 | Status: SHIPPED | OUTPATIENT
Start: 2023-10-23

## 2023-10-23 RX ORDER — TRAZODONE HYDROCHLORIDE 100 MG/1
TABLET ORAL
Qty: 30 TABLET | Refills: 0 | Status: SHIPPED | OUTPATIENT
Start: 2023-10-23

## 2023-10-23 RX ORDER — ARIPIPRAZOLE 20 MG/1
TABLET ORAL
Qty: 30 TABLET | Refills: 0 | Status: SHIPPED | OUTPATIENT
Start: 2023-10-23

## 2023-11-06 RX ORDER — ATORVASTATIN CALCIUM 80 MG/1
TABLET, FILM COATED ORAL
Qty: 30 TABLET | Refills: 3 | Status: SHIPPED | OUTPATIENT
Start: 2023-11-06

## 2023-11-17 DIAGNOSIS — F51.01 PRIMARY INSOMNIA: Chronic | ICD-10-CM

## 2023-11-17 RX ORDER — TRAZODONE HYDROCHLORIDE 100 MG/1
TABLET ORAL
Qty: 30 TABLET | Refills: 0 | Status: SHIPPED | OUTPATIENT
Start: 2023-11-17

## 2023-11-20 RX ORDER — ARIPIPRAZOLE 20 MG/1
TABLET ORAL
Qty: 30 TABLET | Refills: 0 | Status: SHIPPED | OUTPATIENT
Start: 2023-11-20

## 2023-11-22 RX ORDER — PIOGLITAZONEHYDROCHLORIDE 15 MG/1
15 TABLET ORAL
Qty: 90 TABLET | Refills: 3 | Status: SHIPPED | OUTPATIENT
Start: 2023-11-22

## 2023-12-06 DIAGNOSIS — E78.2 MIXED HYPERLIPIDEMIA: ICD-10-CM

## 2023-12-06 DIAGNOSIS — K21.9 GASTROESOPHAGEAL REFLUX DISEASE WITHOUT ESOPHAGITIS: ICD-10-CM

## 2023-12-06 RX ORDER — ASPIRIN 81 MG/1
TABLET ORAL
Qty: 30 TABLET | Refills: 1 | Status: SHIPPED | OUTPATIENT
Start: 2023-12-06

## 2023-12-06 RX ORDER — FAMOTIDINE 40 MG/1
TABLET, FILM COATED ORAL
Qty: 60 TABLET | Refills: 0 | Status: SHIPPED | OUTPATIENT
Start: 2023-12-06

## 2023-12-19 DIAGNOSIS — F51.01 PRIMARY INSOMNIA: Chronic | ICD-10-CM

## 2023-12-19 RX ORDER — TRAZODONE HYDROCHLORIDE 100 MG/1
TABLET ORAL
Qty: 30 TABLET | Refills: 0 | Status: SHIPPED | OUTPATIENT
Start: 2023-12-19

## 2023-12-19 RX ORDER — ARIPIPRAZOLE 20 MG/1
TABLET ORAL
Qty: 30 TABLET | Refills: 0 | Status: SHIPPED | OUTPATIENT
Start: 2023-12-19

## 2024-01-09 DIAGNOSIS — K21.9 GASTROESOPHAGEAL REFLUX DISEASE WITHOUT ESOPHAGITIS: ICD-10-CM

## 2024-01-09 DIAGNOSIS — E03.9 HYPOTHYROIDISM, UNSPECIFIED TYPE: Chronic | ICD-10-CM

## 2024-01-09 DIAGNOSIS — E78.2 MIXED HYPERLIPIDEMIA: ICD-10-CM

## 2024-01-09 RX ORDER — FAMOTIDINE 40 MG/1
TABLET, FILM COATED ORAL
Qty: 60 TABLET | Refills: 0 | Status: SHIPPED | OUTPATIENT
Start: 2024-01-09

## 2024-01-09 RX ORDER — LEVOTHYROXINE SODIUM 0.05 MG/1
TABLET ORAL
Qty: 90 TABLET | Refills: 0 | Status: SHIPPED | OUTPATIENT
Start: 2024-01-09

## 2024-01-09 RX ORDER — CITALOPRAM 20 MG/1
TABLET ORAL
Qty: 90 TABLET | Refills: 0 | Status: SHIPPED | OUTPATIENT
Start: 2024-01-09

## 2024-01-09 RX ORDER — BUMETANIDE 1 MG/1
TABLET ORAL
Qty: 90 TABLET | Refills: 0 | Status: SHIPPED | OUTPATIENT
Start: 2024-01-09

## 2024-01-12 DIAGNOSIS — F51.01 PRIMARY INSOMNIA: Chronic | ICD-10-CM

## 2024-01-12 RX ORDER — TRAZODONE HYDROCHLORIDE 100 MG/1
TABLET ORAL
Qty: 30 TABLET | Refills: 0 | Status: SHIPPED | OUTPATIENT
Start: 2024-01-12

## 2024-01-16 RX ORDER — ARIPIPRAZOLE 20 MG/1
TABLET ORAL
Qty: 30 TABLET | Refills: 0 | Status: SHIPPED | OUTPATIENT
Start: 2024-01-16

## 2024-02-05 DIAGNOSIS — E78.2 MIXED HYPERLIPIDEMIA: ICD-10-CM

## 2024-02-05 DIAGNOSIS — K21.9 GASTROESOPHAGEAL REFLUX DISEASE WITHOUT ESOPHAGITIS: ICD-10-CM

## 2024-02-05 RX ORDER — FAMOTIDINE 40 MG/1
TABLET, FILM COATED ORAL
Qty: 60 TABLET | Refills: 0 | Status: SHIPPED | OUTPATIENT
Start: 2024-02-05

## 2024-02-05 RX ORDER — ASPIRIN 81 MG/1
TABLET ORAL
Qty: 30 TABLET | Refills: 0 | Status: SHIPPED | OUTPATIENT
Start: 2024-02-05

## 2024-02-12 DIAGNOSIS — F51.01 PRIMARY INSOMNIA: Chronic | ICD-10-CM

## 2024-02-12 RX ORDER — TRAZODONE HYDROCHLORIDE 100 MG/1
TABLET ORAL
Qty: 30 TABLET | Refills: 0 | Status: SHIPPED | OUTPATIENT
Start: 2024-02-12

## 2024-02-12 RX ORDER — ORAL SEMAGLUTIDE 14 MG/1
1 TABLET ORAL EVERY MORNING
Qty: 30 TABLET | Refills: 4 | Status: SHIPPED | OUTPATIENT
Start: 2024-02-12

## 2024-02-12 RX ORDER — EMPAGLIFLOZIN 25 MG/1
25 TABLET, FILM COATED ORAL EVERY MORNING
Qty: 30 TABLET | Refills: 4 | Status: SHIPPED | OUTPATIENT
Start: 2024-02-12

## 2024-02-12 RX ORDER — METFORMIN HYDROCHLORIDE 500 MG/1
TABLET, EXTENDED RELEASE ORAL
Qty: 120 TABLET | Refills: 0 | Status: SHIPPED | OUTPATIENT
Start: 2024-02-12

## 2024-02-13 RX ORDER — ARIPIPRAZOLE 20 MG/1
TABLET ORAL
Qty: 30 TABLET | Refills: 0 | Status: SHIPPED | OUTPATIENT
Start: 2024-02-13

## 2024-02-22 DIAGNOSIS — E78.2 MIXED HYPERLIPIDEMIA: ICD-10-CM

## 2024-02-22 DIAGNOSIS — K21.9 GASTROESOPHAGEAL REFLUX DISEASE WITHOUT ESOPHAGITIS: ICD-10-CM

## 2024-02-22 RX ORDER — ASPIRIN 81 MG/1
81 TABLET, COATED ORAL DAILY
Qty: 30 TABLET | Refills: 0 | Status: SHIPPED | OUTPATIENT
Start: 2024-02-22

## 2024-02-22 RX ORDER — ARIPIPRAZOLE 20 MG/1
TABLET ORAL
Qty: 30 TABLET | Refills: 0 | Status: SHIPPED | OUTPATIENT
Start: 2024-02-22

## 2024-02-22 RX ORDER — METFORMIN HYDROCHLORIDE 500 MG/1
TABLET, EXTENDED RELEASE ORAL
Qty: 120 TABLET | Refills: 0 | Status: SHIPPED | OUTPATIENT
Start: 2024-02-22

## 2024-02-22 RX ORDER — FAMOTIDINE 40 MG/1
TABLET, FILM COATED ORAL
Qty: 60 TABLET | Refills: 0 | Status: SHIPPED | OUTPATIENT
Start: 2024-02-22

## 2024-03-08 DIAGNOSIS — F51.01 PRIMARY INSOMNIA: Chronic | ICD-10-CM

## 2024-03-08 RX ORDER — TRAZODONE HYDROCHLORIDE 100 MG/1
TABLET ORAL
Qty: 30 TABLET | Refills: 0 | Status: SHIPPED | OUTPATIENT
Start: 2024-03-08

## 2024-03-29 DIAGNOSIS — F51.01 PRIMARY INSOMNIA: Chronic | ICD-10-CM

## 2024-03-29 RX ORDER — ATORVASTATIN CALCIUM 80 MG/1
TABLET, FILM COATED ORAL
Qty: 30 TABLET | Refills: 2 | Status: SHIPPED | OUTPATIENT
Start: 2024-03-29

## 2024-03-29 RX ORDER — BUMETANIDE 1 MG/1
TABLET ORAL
Qty: 90 TABLET | Refills: 0 | Status: SHIPPED | OUTPATIENT
Start: 2024-03-29

## 2024-03-29 RX ORDER — TRAZODONE HYDROCHLORIDE 100 MG/1
TABLET ORAL
Qty: 30 TABLET | Refills: 0 | Status: SHIPPED | OUTPATIENT
Start: 2024-03-29

## 2024-03-29 RX ORDER — CITALOPRAM 20 MG/1
TABLET ORAL
Qty: 90 TABLET | Refills: 0 | Status: SHIPPED | OUTPATIENT
Start: 2024-03-29

## 2024-04-01 DIAGNOSIS — E03.9 HYPOTHYROIDISM, UNSPECIFIED TYPE: Chronic | ICD-10-CM

## 2024-04-01 RX ORDER — LEVOTHYROXINE SODIUM 0.05 MG/1
TABLET ORAL
Qty: 90 TABLET | Refills: 0 | Status: SHIPPED | OUTPATIENT
Start: 2024-04-01

## 2024-04-15 DIAGNOSIS — K21.9 GASTROESOPHAGEAL REFLUX DISEASE WITHOUT ESOPHAGITIS: ICD-10-CM

## 2024-04-15 DIAGNOSIS — E78.2 MIXED HYPERLIPIDEMIA: ICD-10-CM

## 2024-04-15 RX ORDER — FAMOTIDINE 40 MG/1
TABLET, FILM COATED ORAL
Qty: 60 TABLET | Refills: 0 | Status: SHIPPED | OUTPATIENT
Start: 2024-04-15

## 2024-04-15 RX ORDER — ASPIRIN 81 MG/1
81 TABLET, COATED ORAL DAILY
Qty: 30 TABLET | Refills: 0 | Status: SHIPPED | OUTPATIENT
Start: 2024-04-15

## 2024-04-15 RX ORDER — ARIPIPRAZOLE 20 MG/1
TABLET ORAL
Qty: 30 TABLET | Refills: 0 | Status: SHIPPED | OUTPATIENT
Start: 2024-04-15

## 2024-04-15 RX ORDER — METFORMIN HYDROCHLORIDE 500 MG/1
TABLET, EXTENDED RELEASE ORAL
Qty: 120 TABLET | Refills: 0 | Status: SHIPPED | OUTPATIENT
Start: 2024-04-15

## 2024-04-30 ENCOUNTER — TELEPHONE (OUTPATIENT)
Dept: FAMILY MEDICINE CLINIC | Facility: CLINIC | Age: 64
End: 2024-04-30
Payer: MEDICARE

## 2024-04-30 DIAGNOSIS — E78.5 HYPERLIPIDEMIA, UNSPECIFIED HYPERLIPIDEMIA TYPE: ICD-10-CM

## 2024-04-30 RX ORDER — FENOFIBRATE 145 MG/1
145 TABLET, COATED ORAL DAILY
Qty: 90 TABLET | Refills: 1 | Status: SHIPPED | OUTPATIENT
Start: 2024-04-30

## 2024-04-30 NOTE — TELEPHONE ENCOUNTER
MR MONTANEZ STOPPED IN TO MAKE AN APPT, CURRENTLY WE DO NOT ACCEPT HIS MEDICARE PLAN IN [facility] [place]. HE WILL CONTACT MEDICARE AND FIND OUT WHAT TO DO TO CHANGE HIS CURRRENT PLAN TO STAY WITH HIS PROVIDER.

## 2024-05-08 RX ORDER — DM/PE/ACETAMINOPHEN/CHLORPHENR 10-5-325-2
TABLET, SEQUENTIAL ORAL
Qty: 90 TABLET | Refills: 1 | Status: SHIPPED | OUTPATIENT
Start: 2024-05-08

## 2024-05-13 DIAGNOSIS — K21.9 GASTROESOPHAGEAL REFLUX DISEASE WITHOUT ESOPHAGITIS: ICD-10-CM

## 2024-05-13 DIAGNOSIS — E78.2 MIXED HYPERLIPIDEMIA: ICD-10-CM

## 2024-05-13 RX ORDER — ARIPIPRAZOLE 20 MG/1
TABLET ORAL
Qty: 30 TABLET | Refills: 0 | Status: SHIPPED | OUTPATIENT
Start: 2024-05-13

## 2024-05-13 RX ORDER — FAMOTIDINE 40 MG/1
TABLET, FILM COATED ORAL
Qty: 60 TABLET | Refills: 0 | Status: SHIPPED | OUTPATIENT
Start: 2024-05-13

## 2024-05-13 RX ORDER — METFORMIN HYDROCHLORIDE 500 MG/1
TABLET, EXTENDED RELEASE ORAL
Qty: 120 TABLET | Refills: 0 | Status: SHIPPED | OUTPATIENT
Start: 2024-05-13

## 2024-05-13 RX ORDER — ASPIRIN 81 MG/1
81 TABLET, COATED ORAL DAILY
Qty: 30 TABLET | Refills: 0 | Status: SHIPPED | OUTPATIENT
Start: 2024-05-13

## 2024-05-22 ENCOUNTER — OFFICE VISIT (OUTPATIENT)
Dept: FAMILY MEDICINE CLINIC | Facility: CLINIC | Age: 64
End: 2024-05-22
Payer: COMMERCIAL

## 2024-05-22 VITALS
HEIGHT: 68 IN | BODY MASS INDEX: 31.19 KG/M2 | HEART RATE: 83 BPM | RESPIRATION RATE: 16 BRPM | TEMPERATURE: 98.7 F | DIASTOLIC BLOOD PRESSURE: 79 MMHG | OXYGEN SATURATION: 95 % | WEIGHT: 205.8 LBS | SYSTOLIC BLOOD PRESSURE: 177 MMHG

## 2024-05-22 DIAGNOSIS — E03.9 HYPOTHYROIDISM, UNSPECIFIED TYPE: ICD-10-CM

## 2024-05-22 DIAGNOSIS — F51.01 PRIMARY INSOMNIA: ICD-10-CM

## 2024-05-22 DIAGNOSIS — E78.2 MIXED HYPERLIPIDEMIA: Chronic | ICD-10-CM

## 2024-05-22 DIAGNOSIS — E11.65 TYPE 2 DIABETES MELLITUS WITH HYPERGLYCEMIA, WITHOUT LONG-TERM CURRENT USE OF INSULIN: Primary | Chronic | ICD-10-CM

## 2024-05-22 DIAGNOSIS — F33.0 MILD EPISODE OF RECURRENT MAJOR DEPRESSIVE DISORDER: Chronic | ICD-10-CM

## 2024-05-22 LAB
EXPIRATION DATE: ABNORMAL
HBA1C MFR BLD: 6.3 % (ref 4.5–5.7)
Lab: ABNORMAL

## 2024-05-22 PROCEDURE — 90471 IMMUNIZATION ADMIN: CPT | Performed by: FAMILY MEDICINE

## 2024-05-22 PROCEDURE — 3044F HG A1C LEVEL LT 7.0%: CPT | Performed by: FAMILY MEDICINE

## 2024-05-22 PROCEDURE — 3078F DIAST BP <80 MM HG: CPT | Performed by: FAMILY MEDICINE

## 2024-05-22 PROCEDURE — 3077F SYST BP >= 140 MM HG: CPT | Performed by: FAMILY MEDICINE

## 2024-05-22 PROCEDURE — 1126F AMNT PAIN NOTED NONE PRSNT: CPT | Performed by: FAMILY MEDICINE

## 2024-05-22 PROCEDURE — 90677 PCV20 VACCINE IM: CPT | Performed by: FAMILY MEDICINE

## 2024-05-22 PROCEDURE — 83036 HEMOGLOBIN GLYCOSYLATED A1C: CPT | Performed by: FAMILY MEDICINE

## 2024-05-22 PROCEDURE — 99214 OFFICE O/P EST MOD 30 MIN: CPT | Performed by: FAMILY MEDICINE

## 2024-05-22 RX ORDER — ARIPIPRAZOLE 30 MG/1
30 TABLET ORAL DAILY
Qty: 30 TABLET | Refills: 2 | Status: CANCELLED | OUTPATIENT
Start: 2024-05-22

## 2024-05-31 DIAGNOSIS — F51.01 PRIMARY INSOMNIA: Chronic | ICD-10-CM

## 2024-05-31 RX ORDER — TRAZODONE HYDROCHLORIDE 100 MG/1
TABLET ORAL
Qty: 30 TABLET | Refills: 0 | Status: SHIPPED | OUTPATIENT
Start: 2024-05-31

## 2024-06-05 ENCOUNTER — OFFICE VISIT (OUTPATIENT)
Dept: FAMILY MEDICINE CLINIC | Facility: CLINIC | Age: 64
End: 2024-06-05
Payer: MEDICARE

## 2024-06-05 VITALS
TEMPERATURE: 97.8 F | DIASTOLIC BLOOD PRESSURE: 77 MMHG | SYSTOLIC BLOOD PRESSURE: 172 MMHG | HEART RATE: 84 BPM | HEIGHT: 68 IN | RESPIRATION RATE: 16 BRPM | OXYGEN SATURATION: 97 % | BODY MASS INDEX: 32.89 KG/M2 | WEIGHT: 217 LBS

## 2024-06-05 DIAGNOSIS — E11.65 TYPE 2 DIABETES MELLITUS WITH HYPERGLYCEMIA, WITHOUT LONG-TERM CURRENT USE OF INSULIN: Chronic | ICD-10-CM

## 2024-06-05 DIAGNOSIS — Z00.00 PHYSICAL EXAM, ANNUAL: Primary | ICD-10-CM

## 2024-06-05 DIAGNOSIS — I10 ESSENTIAL HYPERTENSION: Chronic | ICD-10-CM

## 2024-06-05 PROCEDURE — 3077F SYST BP >= 140 MM HG: CPT | Performed by: FAMILY MEDICINE

## 2024-06-05 PROCEDURE — 1160F RVW MEDS BY RX/DR IN RCRD: CPT | Performed by: FAMILY MEDICINE

## 2024-06-05 PROCEDURE — 1126F AMNT PAIN NOTED NONE PRSNT: CPT | Performed by: FAMILY MEDICINE

## 2024-06-05 PROCEDURE — 3044F HG A1C LEVEL LT 7.0%: CPT | Performed by: FAMILY MEDICINE

## 2024-06-05 PROCEDURE — 3078F DIAST BP <80 MM HG: CPT | Performed by: FAMILY MEDICINE

## 2024-06-05 PROCEDURE — 99397 PER PM REEVAL EST PAT 65+ YR: CPT | Performed by: FAMILY MEDICINE

## 2024-06-05 PROCEDURE — 1159F MED LIST DOCD IN RCRD: CPT | Performed by: FAMILY MEDICINE

## 2024-06-05 RX ORDER — TELMISARTAN 20 MG/1
20 TABLET ORAL DAILY
Qty: 30 TABLET | Refills: 2 | Status: SHIPPED | OUTPATIENT
Start: 2024-06-05 | End: 2024-06-12 | Stop reason: SDUPTHER

## 2024-06-10 NOTE — PROGRESS NOTES
"Chief Complaint  Depression and Annual Exam    Subjective          Alberto Rachel presents to [facility]  Depression  His past medical history is significant for depression.       Patient presents struggling with depression like to have a support animal her puppy to help with managing her improving mental health and behavioral health concerns    Other chronic conditions hypertension not well-controlled at home 177/79 today recommend adjusting medications and taking as prescribed and monitoring at home    Patient also with diabetes needs to continue managing and monitoring blood sugars at home goal less than 150 fasting recheck A1c    Objective   Vital Signs:   /79 (BP Location: Left arm, Patient Position: Sitting, Cuff Size: Adult)   Pulse 83   Temp 98.7 °F (37.1 °C)   Resp 16   Ht 172.7 cm (68\")   Wt 93.4 kg (205 lb 12.8 oz)   SpO2 95%   BMI 31.29 kg/m²            Physical Exam  Vitals reviewed.   Constitutional:       Appearance: Normal appearance. He is well-developed.   HENT:      Head: Normocephalic and atraumatic.      Right Ear: External ear normal.      Left Ear: External ear normal.      Nose: Nose normal.   Eyes:      Conjunctiva/sclera: Conjunctivae normal.      Pupils: Pupils are equal, round, and reactive to light.   Cardiovascular:      Rate and Rhythm: Normal rate.   Pulmonary:      Effort: Pulmonary effort is normal.      Breath sounds: Normal breath sounds.   Abdominal:      General: There is no distension.   Skin:     General: Skin is warm and dry.   Neurological:      Mental Status: He is alert and oriented to person, place, and time. Mental status is at baseline.   Psychiatric:         Mood and Affect: Mood and affect normal.         Behavior: Behavior normal.         Thought Content: Thought content normal.         Judgment: Judgment normal.          Result Review :   The following data was reviewed by: Magnus Euceda DO on 05/22/2024:  Common labs "          9/6/2023    11:36 5/22/2024    15:29   Common Labs   Glucose 244     BUN 22     Creatinine 1.61     Sodium 136     Potassium 4.0     Chloride 100     Calcium 9.3     Albumin 4.8     Total Bilirubin 0.2     Alkaline Phosphatase 48     AST (SGOT) 20     ALT (SGPT) 20     WBC 8.88     Hemoglobin 15.7     Hematocrit 47.5     Platelets 237     Total Cholesterol 154     Triglycerides 129     HDL Cholesterol 38     LDL Cholesterol  93     Hemoglobin A1C 6.10  6.3    Microalbumin, Urine <1.2     PSA 2.100                     Assessment and Plan    Diagnoses and all orders for this visit:    1. Type 2 diabetes mellitus with hyperglycemia, without long-term current use of insulin (Primary)  -     Pneumococcal Conjugate Vaccine 20-Valent (PCV20)  -     POC Glycosylated Hemoglobin (Hb A1C)    2. Mild episode of recurrent major depressive disorder    3. Mixed hyperlipidemia    4. Hypothyroidism, unspecified type    5. Primary insomnia      Pneumonia vaccine today, A1c at goal less than 7 continue medicines as prescribed and monitoring diet    Monitor blood pressure at home goal less than 140/90 adjusted medications today and reviewed at length    Continue medicine for hypothyroidism as well as insomnia as prescribed and adjust as appropriate if indicated    Will work with patient on getting a service animal that would help improve depression seeing specialist if indicated      Follow Up   Return in about 2 weeks (around 6/5/2024), or if symptoms worsen or fail to improve, for Next scheduled follow up, Recheck, BP & Log.  Patient was given instructions and counseling regarding his condition or for health maintenance advice. Please see specific information pulled into the AVS if appropriate.     Transcribed from ambient dictation for Magnus Euceda DO by Magnus Euceda DO.  06/10/24   08:17 EDT

## 2024-06-11 DIAGNOSIS — E78.2 MIXED HYPERLIPIDEMIA: ICD-10-CM

## 2024-06-11 DIAGNOSIS — K21.9 GASTROESOPHAGEAL REFLUX DISEASE WITHOUT ESOPHAGITIS: ICD-10-CM

## 2024-06-11 RX ORDER — METFORMIN HYDROCHLORIDE 500 MG/1
TABLET, EXTENDED RELEASE ORAL
Qty: 120 TABLET | Refills: 0 | Status: SHIPPED | OUTPATIENT
Start: 2024-06-11

## 2024-06-11 RX ORDER — FAMOTIDINE 40 MG/1
TABLET, FILM COATED ORAL
Qty: 60 TABLET | Refills: 0 | Status: SHIPPED | OUTPATIENT
Start: 2024-06-11

## 2024-06-11 RX ORDER — ARIPIPRAZOLE 20 MG/1
TABLET ORAL
Qty: 30 TABLET | Refills: 0 | Status: SHIPPED | OUTPATIENT
Start: 2024-06-11

## 2024-06-11 RX ORDER — ASPIRIN 81 MG/1
81 TABLET, COATED ORAL DAILY
Qty: 30 TABLET | Refills: 0 | Status: SHIPPED | OUTPATIENT
Start: 2024-06-11

## 2024-06-12 ENCOUNTER — CLINICAL SUPPORT (OUTPATIENT)
Dept: FAMILY MEDICINE CLINIC | Facility: CLINIC | Age: 64
End: 2024-06-12
Payer: MEDICARE

## 2024-06-12 VITALS
BODY MASS INDEX: 32.99 KG/M2 | SYSTOLIC BLOOD PRESSURE: 176 MMHG | WEIGHT: 217 LBS | HEART RATE: 78 BPM | DIASTOLIC BLOOD PRESSURE: 81 MMHG

## 2024-06-12 DIAGNOSIS — Z01.30 BP CHECK: Primary | ICD-10-CM

## 2024-06-12 DIAGNOSIS — I10 ESSENTIAL HYPERTENSION: Chronic | ICD-10-CM

## 2024-06-12 PROCEDURE — 99212 OFFICE O/P EST SF 10 MIN: CPT | Performed by: FAMILY MEDICINE

## 2024-06-12 RX ORDER — TELMISARTAN 20 MG/1
40 TABLET ORAL DAILY
Qty: 60 TABLET | Refills: 2 | Status: SHIPPED | OUTPATIENT
Start: 2024-06-12

## 2024-06-12 NOTE — PROGRESS NOTES
Dr Euceda notified of patient's BP, increased medication to 2 a day and will come back in a week for recheck BP

## 2024-06-19 ENCOUNTER — CLINICAL SUPPORT (OUTPATIENT)
Dept: FAMILY MEDICINE CLINIC | Facility: CLINIC | Age: 64
End: 2024-06-19
Payer: MEDICARE

## 2024-06-19 VITALS
SYSTOLIC BLOOD PRESSURE: 169 MMHG | BODY MASS INDEX: 33.8 KG/M2 | WEIGHT: 223 LBS | DIASTOLIC BLOOD PRESSURE: 94 MMHG | HEART RATE: 72 BPM | HEIGHT: 68 IN

## 2024-06-19 DIAGNOSIS — I10 ESSENTIAL HYPERTENSION: Primary | Chronic | ICD-10-CM

## 2024-06-19 NOTE — PROGRESS NOTES
Patient seen as Encounter 1 for [facility].  Education provided through [facility] curriculum including Blood Pressure 101.  Patient was a direct refer in office same day from Dr. Euceda.       BP with machine in office 186/84. Demonstrated to patient how to utilize new Shin Raul BP cuff. Instructed patient to take BP at home twice daily--once in the morning and once at night 1-2 hours after taking BP meds.  Instructed patient to take medication as prescribed. Patient takes BP meds twice per day.     Patient instructed to call PCP if symptomatic with any BP readings. Patient verbalized understanding.      Patient to bring BP logs to 2nd encounter appointment.  Second encounter 7/17/2024.     Second encounter will focus on Healthy Eating.

## 2024-06-24 NOTE — PROGRESS NOTES
"Chief Complaint  Annual Exam (Follow up for diabetes) and Diabetes    Subjective          Alberto Rachel presents to [facility]  Diabetes        Patient presents for annual exam and to follow-up on diabetes still having issues with depression monitoring blood sugars at home blood pressure still elevated advised adjusting medication    Objective   Vital Signs:   /77 (BP Location: Left arm, Patient Position: Sitting, Cuff Size: Adult)   Pulse 84   Temp 97.8 °F (36.6 °C)   Resp 16   Ht 172.7 cm (68\")   Wt 98.4 kg (217 lb)   SpO2 97%   BMI 32.99 kg/m²            Physical Exam  Vitals reviewed.   Constitutional:       Appearance: Normal appearance. He is well-developed.   HENT:      Head: Normocephalic and atraumatic.      Right Ear: External ear normal.      Left Ear: External ear normal.      Nose: Nose normal.   Eyes:      Conjunctiva/sclera: Conjunctivae normal.      Pupils: Pupils are equal, round, and reactive to light.   Cardiovascular:      Rate and Rhythm: Normal rate.   Pulmonary:      Effort: Pulmonary effort is normal.      Breath sounds: Normal breath sounds.   Abdominal:      General: There is no distension.   Skin:     General: Skin is warm and dry.   Neurological:      Mental Status: He is alert and oriented to person, place, and time. Mental status is at baseline.   Psychiatric:         Mood and Affect: Mood and affect normal.         Behavior: Behavior normal.         Thought Content: Thought content normal.         Judgment: Judgment normal.          Result Review :   The following data was reviewed by: Magnus Euceda DO on 06/05/2024:  Common labs          9/6/2023    11:36 5/22/2024    15:29   Common Labs   Glucose 244     BUN 22     Creatinine 1.61     Sodium 136     Potassium 4.0     Chloride 100     Calcium 9.3     Albumin 4.8     Total Bilirubin 0.2     Alkaline Phosphatase 48     AST (SGOT) 20     ALT (SGPT) 20     WBC 8.88     Hemoglobin 15.7   "   Hematocrit 47.5     Platelets 237     Total Cholesterol 154     Triglycerides 129     HDL Cholesterol 38     LDL Cholesterol  93     Hemoglobin A1C 6.10  6.3    Microalbumin, Urine <1.2     PSA 2.100                     Assessment and Plan    Diagnoses and all orders for this visit:    1. Physical exam, annual (Primary)    2. Essential hypertension  -     Discontinue: telmisartan (MICARDIS) 20 MG tablet; Take 1 tablet by mouth Daily.  Dispense: 30 tablet; Refill: 2  -     CBC (No Diff); Future  -     TSH+Free T4; Future  -     Comprehensive Metabolic Panel; Future  -     Lipid Panel; Future  -     BNP; Future  -     Vitamin B12; Future  -     Folate; Future    3. Type 2 diabetes mellitus with hyperglycemia, without long-term current use of insulin  -     CBC (No Diff); Future  -     TSH+Free T4; Future  -     Comprehensive Metabolic Panel; Future  -     Lipid Panel; Future  -     BNP; Future  -     Vitamin B12; Future  -     Folate; Future      Reviewed chronic conditions, age risk factors and will order labs today to manage, screen and follow up at least every 12 months    Recommend appropriate cancer screens that are age appropriate unless already previously performed or not currently due to be repeated    Recommend wearing sunscreen and following up on any concerning skin conditions or lesions, wearing seat belts and other risk reduction measures to lessen incident of death, disease or other   Continue to monitor and manage weight, goal BMI approaching 30, calorie restriction <7031-4804 and activity up to 150 min a week suggested to help get with weight loss  Counseled on risks, disease and advice given on screening and continued management of health and condition through the next year until next physical         Follow Up   Return in about 2 weeks (around 6/19/2024), or if symptoms worsen or fail to improve, for Next scheduled follow up, Recheck nurse visit in a week with new BP med and labs.  Patient was  given instructions and counseling regarding his condition or for health maintenance advice. Please see specific information pulled into the AVS if appropriate.     Transcribed from ambient dictation for Magnus Euceda DO by Magnus Euceda DO.  06/24/24   09:46 EDT

## 2024-06-26 DIAGNOSIS — E03.9 HYPOTHYROIDISM, UNSPECIFIED TYPE: Chronic | ICD-10-CM

## 2024-06-26 DIAGNOSIS — F51.01 PRIMARY INSOMNIA: Chronic | ICD-10-CM

## 2024-06-26 RX ORDER — TRAZODONE HYDROCHLORIDE 100 MG/1
TABLET ORAL
Qty: 30 TABLET | Refills: 0 | Status: SHIPPED | OUTPATIENT
Start: 2024-06-26 | End: 2024-06-28

## 2024-06-26 RX ORDER — LEVOTHYROXINE SODIUM 0.05 MG/1
TABLET ORAL
Qty: 90 TABLET | Refills: 0 | Status: SHIPPED | OUTPATIENT
Start: 2024-06-26

## 2024-06-26 NOTE — TELEPHONE ENCOUNTER
Med refill, patient had appt today but cancelled.  Last TSH 2/15/23, ordered recheck but still active.

## 2024-06-28 DIAGNOSIS — F51.01 PRIMARY INSOMNIA: Chronic | ICD-10-CM

## 2024-06-28 RX ORDER — CITALOPRAM 20 MG/1
TABLET ORAL
Qty: 90 TABLET | Refills: 0 | Status: SHIPPED | OUTPATIENT
Start: 2024-06-28

## 2024-06-28 RX ORDER — BUMETANIDE 1 MG/1
TABLET ORAL
Qty: 90 TABLET | Refills: 0 | Status: SHIPPED | OUTPATIENT
Start: 2024-06-28

## 2024-06-28 RX ORDER — TRAZODONE HYDROCHLORIDE 100 MG/1
TABLET ORAL
Qty: 30 TABLET | Refills: 0 | Status: SHIPPED | OUTPATIENT
Start: 2024-06-28

## 2024-06-28 RX ORDER — ATORVASTATIN CALCIUM 80 MG/1
TABLET, FILM COATED ORAL
Qty: 30 TABLET | Refills: 1 | Status: SHIPPED | OUTPATIENT
Start: 2024-06-28

## 2024-07-08 DIAGNOSIS — K21.9 GASTROESOPHAGEAL REFLUX DISEASE WITHOUT ESOPHAGITIS: ICD-10-CM

## 2024-07-08 DIAGNOSIS — E78.2 MIXED HYPERLIPIDEMIA: ICD-10-CM

## 2024-07-08 RX ORDER — ARIPIPRAZOLE 20 MG/1
TABLET ORAL
Qty: 30 TABLET | Refills: 0 | Status: SHIPPED | OUTPATIENT
Start: 2024-07-08

## 2024-07-08 RX ORDER — FAMOTIDINE 40 MG/1
TABLET, FILM COATED ORAL
Qty: 60 TABLET | Refills: 0 | Status: SHIPPED | OUTPATIENT
Start: 2024-07-08

## 2024-07-08 RX ORDER — METFORMIN HYDROCHLORIDE 500 MG/1
TABLET, EXTENDED RELEASE ORAL
Qty: 120 TABLET | Refills: 0 | Status: SHIPPED | OUTPATIENT
Start: 2024-07-08

## 2024-07-08 RX ORDER — ASPIRIN 81 MG/1
81 TABLET, COATED ORAL DAILY
Qty: 30 TABLET | Refills: 0 | Status: SHIPPED | OUTPATIENT
Start: 2024-07-08

## 2024-07-17 ENCOUNTER — CLINICAL SUPPORT (OUTPATIENT)
Dept: FAMILY MEDICINE CLINIC | Facility: CLINIC | Age: 64
End: 2024-07-17
Payer: MEDICARE

## 2024-07-17 VITALS
WEIGHT: 218 LBS | HEIGHT: 68 IN | HEART RATE: 81 BPM | DIASTOLIC BLOOD PRESSURE: 71 MMHG | SYSTOLIC BLOOD PRESSURE: 142 MMHG | BODY MASS INDEX: 33.04 KG/M2

## 2024-07-17 DIAGNOSIS — I10 PRIMARY HYPERTENSION: Primary | ICD-10-CM

## 2024-07-17 NOTE — PROGRESS NOTES
Patient seen as Encounter 2 for [facility].  Education provided through [facility] curriculum including Healthy Eating.       Patient forgot to bring logs to encounter.  BP was 142/71  with machine in office.   Patient instructed to call PCP if symptomatic with any BP readings. Patient verbalized understanding.             Encounter 3 scheduled for 8/14/24 will focus on Aim to Move More.

## 2024-07-18 ENCOUNTER — OFFICE VISIT (OUTPATIENT)
Dept: FAMILY MEDICINE CLINIC | Facility: CLINIC | Age: 64
End: 2024-07-18
Payer: MEDICARE

## 2024-07-18 VITALS
HEART RATE: 80 BPM | OXYGEN SATURATION: 99 % | WEIGHT: 218 LBS | HEIGHT: 68 IN | TEMPERATURE: 98.4 F | BODY MASS INDEX: 33.04 KG/M2 | SYSTOLIC BLOOD PRESSURE: 144 MMHG | DIASTOLIC BLOOD PRESSURE: 63 MMHG

## 2024-07-18 DIAGNOSIS — K21.9 GASTROESOPHAGEAL REFLUX DISEASE WITHOUT ESOPHAGITIS: ICD-10-CM

## 2024-07-18 DIAGNOSIS — E78.2 MIXED HYPERLIPIDEMIA: ICD-10-CM

## 2024-07-18 DIAGNOSIS — I10 PRIMARY HYPERTENSION: ICD-10-CM

## 2024-07-18 DIAGNOSIS — Z00.00 ENCOUNTER FOR SUBSEQUENT ANNUAL WELLNESS VISIT (AWV) IN MEDICARE PATIENT: Primary | ICD-10-CM

## 2024-07-18 PROCEDURE — 1126F AMNT PAIN NOTED NONE PRSNT: CPT | Performed by: FAMILY MEDICINE

## 2024-07-18 PROCEDURE — 96160 PT-FOCUSED HLTH RISK ASSMT: CPT | Performed by: FAMILY MEDICINE

## 2024-07-18 PROCEDURE — 3044F HG A1C LEVEL LT 7.0%: CPT | Performed by: FAMILY MEDICINE

## 2024-07-18 PROCEDURE — 1160F RVW MEDS BY RX/DR IN RCRD: CPT | Performed by: FAMILY MEDICINE

## 2024-07-18 PROCEDURE — 1170F FXNL STATUS ASSESSED: CPT | Performed by: FAMILY MEDICINE

## 2024-07-18 PROCEDURE — 3077F SYST BP >= 140 MM HG: CPT | Performed by: FAMILY MEDICINE

## 2024-07-18 PROCEDURE — G0439 PPPS, SUBSEQ VISIT: HCPCS | Performed by: FAMILY MEDICINE

## 2024-07-18 PROCEDURE — 3078F DIAST BP <80 MM HG: CPT | Performed by: FAMILY MEDICINE

## 2024-07-18 PROCEDURE — 99213 OFFICE O/P EST LOW 20 MIN: CPT | Performed by: FAMILY MEDICINE

## 2024-07-18 PROCEDURE — 1159F MED LIST DOCD IN RCRD: CPT | Performed by: FAMILY MEDICINE

## 2024-07-18 RX ORDER — CHLORTHALIDONE 25 MG/1
25 TABLET ORAL DAILY
Qty: 30 TABLET | Refills: 5 | Status: SHIPPED | OUTPATIENT
Start: 2024-07-18

## 2024-07-18 NOTE — PROGRESS NOTES
Subjective   The ABCs of the Annual Wellness Visit  Medicare Wellness Visit      Alberto Rachel is a 64 y.o. patient who presents for a Medicare Wellness Visit.    The following portions of the patient's history were reviewed and   updated as appropriate: allergies, current medications, past family history, past medical history, past social history, past surgical history, and problem list.    Compared to one year ago, the patient's physical   health is the same.  Compared to one year ago, the patient's mental   health is the same.    Recent Hospitalizations:  He was not admitted to the hospital during the last year.     Current Medical Providers:  Patient Care Team:  Magnus Euceda DO as PCP - General (Family Medicine)    Outpatient Medications Prior to Visit   Medication Sig Dispense Refill    albuterol sulfate  (90 Base) MCG/ACT inhaler Inhale 2 puffs Every 4 (Four) Hours As Needed for Wheezing. 18 g 11    ARIPiprazole (ABILIFY) 20 MG tablet TAKE 1 TABLET BY MOUTH ONCE DAILY 30 tablet 0    Aspirin Low Dose 81 MG EC tablet TAKE 1 TABLET BY MOUTH ONCE DAILY 30 tablet 0    atorvastatin (LIPITOR) 80 MG tablet TAKE 1 TABLET BY MOUTH AT BEDTIME FOR CHOLESTEROL 30 tablet 1    Blood Glucose Monitoring Suppl (OneTouch Verio Reflect) w/Device kit       brimonidine-timolol (COMBIGAN) 0.2-0.5 % ophthalmic solution       bumetanide (BUMEX) 1 MG tablet TAKE 1 TABLET BY MOUTH ONCE DAILY FOR FLUID 90 tablet 0    citalopram (CeleXA) 20 MG tablet TAKE 1 TABLET BY MOUTH ONCE DAILY 90 tablet 0    famotidine (PEPCID) 40 MG tablet TAKE 1 TABLET BY MOUTH TWICE DAILY FOR STOMACH 60 tablet 0    fenofibrate (TRICOR) 145 MG tablet TAKE 1 TABLET BY MOUTH ONCE DAILY FOR CHOLESTEROL 90 tablet 1    GNP Vitamin B-12 500 MCG tablet TAKE 1 TABLET BY MOUTH ONCE DAILY 90 tablet 1    Jardiance 25 MG tablet tablet TAKE 1 TABLET BY MOUTH EVERY MORNING 30 tablet 4    latanoprost (XALATAN) 0.005 % ophthalmic solution INSTILL 1 DROP IN BOTH EYES  AT BEDTIME      levothyroxine (SYNTHROID, LEVOTHROID) 50 MCG tablet TAKE 1 TABLET BY MOUTH EVERY MORNING FOR THYROID TAKE ON EMPTY STOMACH 90 tablet 0    Lumigan 0.01 % ophthalmic drops INSTILL 1 DROP IN BOTH EYES AT BEDTIME      metFORMIN ER (GLUCOPHAGE-XR) 500 MG 24 hr tablet TAKE 4 TABLETS BY MOUTH EVERY MORNING TO LOWER BLOOD SUGAR (TAKE WITH FOOD) 120 tablet 0    nitroglycerin (NITROSTAT) 0.4 MG SL tablet Place 1 tablet under the tongue Every 5 (Five) Minutes As Needed for Chest Pain. Take no more than 3 doses in 15 minutes. 15 tablet 5    ondansetron (Zofran) 8 MG tablet Take 1 tablet by mouth Every 8 (Eight) Hours As Needed for Nausea or Vomiting. 20 tablet 0    OneTouch Delica Lancets 30G misc       OneTouch Verio test strip       pioglitazone (ACTOS) 15 MG tablet Take 1 tablet by mouth every night at bedtime. 90 tablet 3    Rybelsus 14 MG tablet TAKE 1 TABLET BY MOUTH EVERY MORNING 30 tablet 4    sodium zirconium cyclosilicate (Lokelma) 10 g pack Take 10 g by mouth 3 (Three) Times a Day. Indications: High Amount of Potassium in the Blood 11 each 0    telmisartan (MICARDIS) 20 MG tablet Take 2 tablets by mouth Daily. 60 tablet 2    traZODone (DESYREL) 100 MG tablet TAKE 1 TABLET BY MOUTH AT BEDTIME FOR SLEEP 30 tablet 0    triamcinolone (KENALOG) 0.1 % ointment Apply 1 application topically to the appropriate area as directed 2 (Two) Times a Day. 15 g 0     No facility-administered medications prior to visit.     No opioid medication identified on active medication list. I have reviewed chart for other potential  high risk medication/s and harmful drug interactions in the elderly.      Aspirin is on active medication list. Aspirin use is indicated based on review of current medical condition/s. Pros and cons of this therapy have been discussed today. Benefits of this medication outweigh potential harm.  Patient has been encouraged to continue taking this medication.  .      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    Essential hypertension    Depression    Mixed hyperlipidemia    Type 2 diabetes mellitus with hyperglycemia, without long-term current use of insulin    Hypothyroidism    Arthritis of knee    Illiteracy    Cramps, extremity    Tachycardia    Obesity (BMI 30-39.9)    Degenerative disc disease, lumbar    Primary insomnia    Gastroesophageal reflux disease without esophagitis    Close exposure to COVID-19 virus    Mild intermittent asthma    Candidal dermatitis    Bronchitis    Bilateral shoulder pain    Hyperkalemia    Stage 3a chronic kidney disease (CKD)     Advance Care Planning (Click this link to access ACP Navigator)  Advance Directive is not on file.  ACP discussion was declined by the patient. Patient has an advance directive (not in EMR), copy requested.        Objective   Vitals:    07/18/24 1342   BP: 144/63   BP Location: Left arm   Patient Position: Sitting   Cuff Size: Adult   Pulse: 80   Temp: 98.4 °F (36.9 °C)   SpO2: 99%   Weight: 98.9 kg (218 lb)   Height: 172.7 cm (68\")   PainSc: 0-No pain       Estimated body mass index is 33.15 kg/m² as calculated from the following:    Height as of this encounter: 172.7 cm (68\").    Weight as of this encounter: 98.9 kg (218 lb).             Does the patient have evidence of cognitive impairment? No  Lab Results   Component Value Date    HGBA1C 6.3 (A) 05/22/2024                                                                                                Health  Risk Assessment    Smoking Status:  Social History     Tobacco Use   Smoking Status Never    Passive exposure: Past   Smokeless Tobacco Current    Types: Chew     Alcohol Consumption:  Social History     Substance and Sexual Activity   Alcohol Use Not Currently     Fall Risk Screen:  STEADI Fall Risk Assessment was completed, and patient is at LOW risk for falls.Assessment completed " on:2024    Depression Screenin/18/2024     1:38 PM   PHQ-2/PHQ-9 Depression Screening   Little Interest or Pleasure in Doing Things 0-->not at all   Feeling Down, Depressed or Hopeless 0-->not at all   PHQ-9: Brief Depression Severity Measure Score 0     Health Habits and Functional and Cognitive Screenin/18/2024     1:39 PM   Functional & Cognitive Status   Do you have difficulty preparing food and eating? No   Do you have difficulty bathing yourself, getting dressed or grooming yourself? No   Do you have difficulty using the toilet? No   Do you have difficulty moving around from place to place? No   Do you have trouble with steps or getting out of a bed or a chair? No   Current Diet Well Balanced Diet   Dental Exam Up to date   Eye Exam Up to date   Exercise (times per week) 4 times per week   Current Exercises Include Walking   Do you need help using the phone?  No   Are you deaf or do you have serious difficulty hearing?  No   Do you need help to go to places out of walking distance? No   Do you need help shopping? No   Do you need help preparing meals?  No   Do you need help with housework?  No   Do you need help with laundry? No   Do you need help taking your medications? No   Do you need help managing money? No   Do you ever drive or ride in a car without wearing a seat belt? No   Have you felt unusual stress, anger or loneliness in the last month? No   Who do you live with? Alone   If you need help, do you have trouble finding someone available to you? No   Have you been bothered in the last four weeks by sexual problems? No   Do you have difficulty concentrating, remembering or making decisions? No             Age-appropriate Screening Schedule:  Refer to the list below for future screening recommendations based on patient's age, sex and/or medical conditions. Orders for these recommended tests are listed in the plan section. The patient has been provided with a written  "plan.    Health Maintenance List  Health Maintenance   Topic Date Due    ZOSTER VACCINE (1 of 2) Never done    DIABETIC EYE EXAM  07/19/2024    COVID-19 Vaccine (4 - 2023-24 season) 08/11/2024 (Originally 9/1/2023)    TDAP/TD VACCINES (1 - Tdap) 05/22/2025 (Originally 7/12/1979)    INFLUENZA VACCINE  08/01/2024    LIPID PANEL  09/06/2024    URINE MICROALBUMIN  09/06/2024    HEMOGLOBIN A1C  11/22/2024    BMI FOLLOWUP  02/12/2025    ANNUAL WELLNESS VISIT  07/18/2025    DIABETIC FOOT EXAM  07/18/2025    COLORECTAL CANCER SCREENING  05/23/2026    HEPATITIS C SCREENING  Completed    Pneumococcal Vaccine 65+  Completed                                                                                                                                                CMS Preventative Services Quick Reference  Risk Factors Identified During Encounter  reviewed    The above risks/problems have been discussed with the patient.  Pertinent information has been shared with the patient in the After Visit Summary.  An After Visit Summary and PPPS were made available to the patient.    Follow Up:   Next Medicare Wellness visit to be scheduled in 1 year.         Additional E&M Note during same encounter follows:  Patient has additional, significant, and separately identifiable condition(s)/problem(s) that require work above and beyond the Medicare Wellness Visit     Chief Complaint  Medicare Wellness-subsequent    Subjective   HPI  Alberto is also being seen today for additional medical problem/s.    Blood pressure elevated at home he is on telmisartan difficulty with literacy we will try to add additional medicine and continue to monitor at home and get labs rechecked at next appointment                Objective   Vital Signs:  /63 (BP Location: Left arm, Patient Position: Sitting, Cuff Size: Adult)   Pulse 80   Temp 98.4 °F (36.9 °C)   Ht 172.7 cm (68\")   Wt 98.9 kg (218 lb)   SpO2 99%   BMI 33.15 kg/m²   Physical Exam  Vitals " reviewed.   Constitutional:       Appearance: Normal appearance. He is well-developed.   HENT:      Head: Normocephalic and atraumatic.      Right Ear: External ear normal.      Left Ear: External ear normal.      Nose: Nose normal.   Eyes:      Conjunctiva/sclera: Conjunctivae normal.      Pupils: Pupils are equal, round, and reactive to light.   Cardiovascular:      Rate and Rhythm: Normal rate.   Pulmonary:      Effort: Pulmonary effort is normal.      Breath sounds: Normal breath sounds.   Abdominal:      General: There is no distension.   Skin:     General: Skin is warm and dry.   Neurological:      Mental Status: He is alert and oriented to person, place, and time. Mental status is at baseline.   Psychiatric:         Mood and Affect: Mood and affect normal.         Behavior: Behavior normal.         Thought Content: Thought content normal.         Judgment: Judgment normal.                 Assessment and Plan               Gastroesophageal reflux disease without esophagitis    Primary hypertension    Mixed hyperlipidemia     Encounter for subsequent annual wellness visit (AWV) in Medicare patient      Orders Placed This Encounter   Procedures    Lipid panel     Standing Status:   Future     Standing Expiration Date:   7/18/2025     Order Specific Question:   Release to patient     Answer:   Routine Release [[id number]]    Comprehensive metabolic panel     Standing Status:   Future     Standing Expiration Date:   7/18/2025     Order Specific Question:   Release to patient     Answer:   Routine Release [[id number]]    TSH     Standing Status:   Future     Standing Expiration Date:   7/18/2025     Order Specific Question:   Release to patient     Answer:   Routine Release [[id number]]    CBC w AUTO Differential     Standing Status:   Future     Standing Expiration Date:   7/18/2025     Order Specific Question:   Manual Differential     Answer:   No     Order Specific Question:   Release to patient     Answer:    Routine Release [[id number]]     New Medications Ordered This Visit   Medications    chlorthalidone (HYGROTON) 25 MG tablet     Sig: Take 1 tablet by mouth Daily.     Dispense:  30 tablet     Refill:  5     Reviewed AWV recommendations and ordered as appropriate, follow up annually for review, sooner if concerns    No depression, falls, dementia symptoms or other  Risk and home safety assessment good    Followed up on chronic conditions and ordered refills, appropriate monitoring labs additionally as needed every 6 months or sooner if indicated, controlled stable    Follow up at least every 3-6 months for chronic conditions and as scheduled with appropriate specialists as indicated otherwise    Recheck blood pressure at home in the next couple weeks and review if new chlorthalidone is improving and labs at next appointment         Follow Up   Return in about 2 weeks (around 8/1/2024), or if symptoms worsen or fail to improve, for BP & Log.  Patient was given instructions and counseling regarding his condition or for health maintenance advice. Please see specific information pulled into the AVS if appropriate.

## 2024-07-22 DIAGNOSIS — E11.65 TYPE 2 DIABETES MELLITUS WITH HYPERGLYCEMIA, WITHOUT LONG-TERM CURRENT USE OF INSULIN: Primary | Chronic | ICD-10-CM

## 2024-07-22 RX ORDER — ORAL SEMAGLUTIDE 14 MG/1
1 TABLET ORAL EVERY MORNING
Qty: 30 TABLET | Refills: 4 | Status: SHIPPED | OUTPATIENT
Start: 2024-07-22

## 2024-08-05 RX ORDER — ASPIRIN 81 MG/1
81 TABLET, COATED ORAL DAILY
Qty: 30 TABLET | Refills: 0 | Status: SHIPPED | OUTPATIENT
Start: 2024-08-05

## 2024-08-05 RX ORDER — METFORMIN HYDROCHLORIDE 500 MG/1
TABLET, EXTENDED RELEASE ORAL
Qty: 120 TABLET | Refills: 0 | Status: SHIPPED | OUTPATIENT
Start: 2024-08-05

## 2024-08-05 RX ORDER — ARIPIPRAZOLE 20 MG/1
TABLET ORAL
Qty: 30 TABLET | Refills: 0 | Status: SHIPPED | OUTPATIENT
Start: 2024-08-05

## 2024-08-05 NOTE — TELEPHONE ENCOUNTER
Problem: Adult Inpatient Plan of Care  Goal: Plan of Care Review  Outcome: Ongoing, Progressing  Flowsheets (Taken 12/15/2020 7189)  Progress: declining  Plan of Care Reviewed With: patient  Outcome Summary: pt feels worse today with increase in abd pain and nausea after eating   Goal Outcome Evaluation:  Plan of Care Reviewed With: patient  Progress: declining  Outcome Summary: pt feels worse today with increase in abd pain and nausea after eating   LOV:07/18/2024  NOV:08/08/2024

## 2024-08-07 DIAGNOSIS — E78.2 MIXED HYPERLIPIDEMIA: ICD-10-CM

## 2024-08-07 DIAGNOSIS — K21.9 GASTROESOPHAGEAL REFLUX DISEASE WITHOUT ESOPHAGITIS: ICD-10-CM

## 2024-08-07 RX ORDER — FAMOTIDINE 40 MG/1
TABLET, FILM COATED ORAL
Qty: 60 TABLET | Refills: 0 | Status: SHIPPED | OUTPATIENT
Start: 2024-08-07

## 2024-08-08 ENCOUNTER — LAB (OUTPATIENT)
Dept: LAB | Facility: HOSPITAL | Age: 64
End: 2024-08-08
Payer: MEDICARE

## 2024-08-08 ENCOUNTER — OFFICE VISIT (OUTPATIENT)
Dept: FAMILY MEDICINE CLINIC | Facility: CLINIC | Age: 64
End: 2024-08-08
Payer: MEDICARE

## 2024-08-08 VITALS
HEIGHT: 68 IN | WEIGHT: 223.6 LBS | BODY MASS INDEX: 33.89 KG/M2 | SYSTOLIC BLOOD PRESSURE: 141 MMHG | TEMPERATURE: 98 F | DIASTOLIC BLOOD PRESSURE: 65 MMHG | HEART RATE: 81 BPM | RESPIRATION RATE: 16 BRPM | OXYGEN SATURATION: 98 %

## 2024-08-08 DIAGNOSIS — I10 PRIMARY HYPERTENSION: ICD-10-CM

## 2024-08-08 DIAGNOSIS — I10 ESSENTIAL HYPERTENSION: Primary | Chronic | ICD-10-CM

## 2024-08-08 DIAGNOSIS — E78.2 MIXED HYPERLIPIDEMIA: ICD-10-CM

## 2024-08-08 DIAGNOSIS — J30.2 SEASONAL ALLERGIC RHINITIS, UNSPECIFIED TRIGGER: ICD-10-CM

## 2024-08-08 DIAGNOSIS — E11.65 TYPE 2 DIABETES MELLITUS WITH HYPERGLYCEMIA, WITHOUT LONG-TERM CURRENT USE OF INSULIN: Chronic | ICD-10-CM

## 2024-08-08 DIAGNOSIS — I10 ESSENTIAL HYPERTENSION: Chronic | ICD-10-CM

## 2024-08-08 LAB
ALBUMIN SERPL-MCNC: 4.5 G/DL (ref 3.5–5.2)
ALBUMIN/GLOB SERPL: 1.5 G/DL
ALP SERPL-CCNC: 41 U/L (ref 39–117)
ALT SERPL W P-5'-P-CCNC: 30 U/L (ref 1–41)
ANION GAP SERPL CALCULATED.3IONS-SCNC: 13.4 MMOL/L (ref 5–15)
AST SERPL-CCNC: 31 U/L (ref 1–40)
BASOPHILS # BLD AUTO: 0.08 10*3/MM3 (ref 0–0.2)
BASOPHILS NFR BLD AUTO: 0.7 % (ref 0–1.5)
BILIRUB SERPL-MCNC: 0.4 MG/DL (ref 0–1.2)
BUN SERPL-MCNC: 21 MG/DL (ref 8–23)
BUN/CREAT SERPL: 13.9 (ref 7–25)
CALCIUM SPEC-SCNC: 9.5 MG/DL (ref 8.6–10.5)
CHLORIDE SERPL-SCNC: 98 MMOL/L (ref 98–107)
CHOLEST SERPL-MCNC: 123 MG/DL (ref 0–200)
CO2 SERPL-SCNC: 23.6 MMOL/L (ref 22–29)
CREAT SERPL-MCNC: 1.51 MG/DL (ref 0.76–1.27)
DEPRECATED RDW RBC AUTO: 44.9 FL (ref 37–54)
EGFRCR SERPLBLD CKD-EPI 2021: 50.6 ML/MIN/1.73
EOSINOPHIL # BLD AUTO: 0.2 10*3/MM3 (ref 0–0.4)
EOSINOPHIL NFR BLD AUTO: 1.6 % (ref 0.3–6.2)
ERYTHROCYTE [DISTWIDTH] IN BLOOD BY AUTOMATED COUNT: 13.4 % (ref 12.3–15.4)
FOLATE SERPL-MCNC: 11.2 NG/ML (ref 4.78–24.2)
GLOBULIN UR ELPH-MCNC: 3.1 GM/DL
GLUCOSE SERPL-MCNC: 126 MG/DL (ref 65–99)
HCT VFR BLD AUTO: 43.6 % (ref 37.5–51)
HDLC SERPL-MCNC: 11 MG/DL (ref 40–60)
HGB BLD-MCNC: 14.5 G/DL (ref 13–17.7)
IMM GRANULOCYTES # BLD AUTO: 0.12 10*3/MM3 (ref 0–0.05)
IMM GRANULOCYTES NFR BLD AUTO: 1 % (ref 0–0.5)
LDLC SERPL CALC-MCNC: 83 MG/DL (ref 0–100)
LDLC/HDLC SERPL: 7.24 {RATIO}
LYMPHOCYTES # BLD AUTO: 2.76 10*3/MM3 (ref 0.7–3.1)
LYMPHOCYTES NFR BLD AUTO: 22.7 % (ref 19.6–45.3)
MCH RBC QN AUTO: 30.1 PG (ref 26.6–33)
MCHC RBC AUTO-ENTMCNC: 33.3 G/DL (ref 31.5–35.7)
MCV RBC AUTO: 90.5 FL (ref 79–97)
MONOCYTES # BLD AUTO: 0.89 10*3/MM3 (ref 0.1–0.9)
MONOCYTES NFR BLD AUTO: 7.3 % (ref 5–12)
NEUTROPHILS NFR BLD AUTO: 66.7 % (ref 42.7–76)
NEUTROPHILS NFR BLD AUTO: 8.12 10*3/MM3 (ref 1.7–7)
NRBC BLD AUTO-RTO: 0 /100 WBC (ref 0–0.2)
NT-PROBNP SERPL-MCNC: 134 PG/ML (ref 0–900)
PLATELET # BLD AUTO: 306 10*3/MM3 (ref 140–450)
PMV BLD AUTO: 11.2 FL (ref 6–12)
POTASSIUM SERPL-SCNC: 4 MMOL/L (ref 3.5–5.2)
PROT SERPL-MCNC: 7.6 G/DL (ref 6–8.5)
RBC # BLD AUTO: 4.82 10*6/MM3 (ref 4.14–5.8)
SODIUM SERPL-SCNC: 135 MMOL/L (ref 136–145)
T4 FREE SERPL-MCNC: 1.36 NG/DL (ref 0.92–1.68)
TRIGL SERPL-MCNC: 162 MG/DL (ref 0–150)
TSH SERPL DL<=0.05 MIU/L-ACNC: 3.37 UIU/ML (ref 0.27–4.2)
VIT B12 BLD-MCNC: 1173 PG/ML (ref 211–946)
VLDLC SERPL-MCNC: 29 MG/DL (ref 5–40)
WBC NRBC COR # BLD AUTO: 12.17 10*3/MM3 (ref 3.4–10.8)

## 2024-08-08 PROCEDURE — 84439 ASSAY OF FREE THYROXINE: CPT

## 2024-08-08 PROCEDURE — 3077F SYST BP >= 140 MM HG: CPT | Performed by: FAMILY MEDICINE

## 2024-08-08 PROCEDURE — 80053 COMPREHEN METABOLIC PANEL: CPT

## 2024-08-08 PROCEDURE — 36415 COLL VENOUS BLD VENIPUNCTURE: CPT

## 2024-08-08 PROCEDURE — 99214 OFFICE O/P EST MOD 30 MIN: CPT | Performed by: FAMILY MEDICINE

## 2024-08-08 PROCEDURE — 82746 ASSAY OF FOLIC ACID SERUM: CPT

## 2024-08-08 PROCEDURE — 3044F HG A1C LEVEL LT 7.0%: CPT | Performed by: FAMILY MEDICINE

## 2024-08-08 PROCEDURE — 84443 ASSAY THYROID STIM HORMONE: CPT

## 2024-08-08 PROCEDURE — 3078F DIAST BP <80 MM HG: CPT | Performed by: FAMILY MEDICINE

## 2024-08-08 PROCEDURE — 82607 VITAMIN B-12: CPT

## 2024-08-08 PROCEDURE — 1126F AMNT PAIN NOTED NONE PRSNT: CPT | Performed by: FAMILY MEDICINE

## 2024-08-08 PROCEDURE — 85025 COMPLETE CBC W/AUTO DIFF WBC: CPT

## 2024-08-08 PROCEDURE — 80061 LIPID PANEL: CPT

## 2024-08-08 PROCEDURE — 83880 ASSAY OF NATRIURETIC PEPTIDE: CPT

## 2024-08-08 RX ORDER — GUAIFENESIN AND DEXTROMETHORPHAN HYDROBROMIDE 600; 30 MG/1; MG/1
1 TABLET, EXTENDED RELEASE ORAL 2 TIMES DAILY PRN
Qty: 30 TABLET | Refills: 0 | Status: SHIPPED | OUTPATIENT
Start: 2024-08-08

## 2024-08-08 RX ORDER — AZITHROMYCIN 250 MG/1
TABLET, FILM COATED ORAL
Qty: 6 TABLET | Refills: 0 | Status: SHIPPED | OUTPATIENT
Start: 2024-08-08

## 2024-08-13 NOTE — PROGRESS NOTES
"Chief Complaint  Diabetes and Allergies (Has had hoarseness recently. )    Subjective          Alberto Rachel presents to [facility]  Diabetes    Allergies        Patient presents to follow-up on blood pressure diabetes also having some allergy issues taking medicine as prescribed no headache palpitations or other blood pressure is improving    Objective   Vital Signs:   /65 (BP Location: Left arm, Patient Position: Sitting, Cuff Size: Adult)   Pulse 81   Temp 98 °F (36.7 °C)   Resp 16   Ht 172.7 cm (68\")   Wt 101 kg (223 lb 9.6 oz)   SpO2 98%   BMI 34.00 kg/m²            Physical Exam  Vitals reviewed.   Constitutional:       Appearance: Normal appearance. He is well-developed.   HENT:      Head: Normocephalic and atraumatic.      Right Ear: External ear normal.      Left Ear: External ear normal.      Nose: Congestion and rhinorrhea present.   Eyes:      Conjunctiva/sclera: Conjunctivae normal.      Pupils: Pupils are equal, round, and reactive to light.   Cardiovascular:      Rate and Rhythm: Normal rate.   Pulmonary:      Effort: Pulmonary effort is normal.      Breath sounds: Normal breath sounds.   Abdominal:      General: There is no distension.   Skin:     General: Skin is warm and dry.   Neurological:      Mental Status: He is alert and oriented to person, place, and time. Mental status is at baseline.   Psychiatric:         Mood and Affect: Mood and affect normal.         Behavior: Behavior normal.         Thought Content: Thought content normal.         Judgment: Judgment normal.          Result Review :   The following data was reviewed by: Magnus Euceda DO on 08/08/2024:  Common labs          9/6/2023    11:36 5/22/2024    15:29 8/8/2024    13:09   Common Labs   Glucose 244   126    BUN 22   21    Creatinine 1.61   1.51    Sodium 136   135    Potassium 4.0   4.0    Chloride 100   98    Calcium 9.3   9.5    Albumin 4.8   4.5    Total Bilirubin 0.2   0.4  "   Alkaline Phosphatase 48   41    AST (SGOT) 20   31    ALT (SGPT) 20   30    WBC 8.88   12.17    Hemoglobin 15.7   14.5    Hematocrit 47.5   43.6    Platelets 237   306    Total Cholesterol 154   123    Triglycerides 129   162    HDL Cholesterol 38   11    LDL Cholesterol  93   83    Hemoglobin A1C 6.10  6.3     Microalbumin, Urine <1.2      PSA 2.100                      Assessment and Plan    Diagnoses and all orders for this visit:    1. Essential hypertension (Primary)    2. Mixed hyperlipidemia    3. Type 2 diabetes mellitus with hyperglycemia, without long-term current use of insulin    4. Seasonal allergic rhinitis, unspecified trigger    Other orders  -     azithromycin (Zithromax Z-Aldo) 250 MG tablet; Take 2 tablets by mouth on day 1, then 1 tablet daily on days 2-5  Dispense: 6 tablet; Refill: 0  -     guaifenesin-dextromethorphan 600-30 mg (MUCINEX DM)  MG tablet sustained-release 12 hour; Take 1 tablet by mouth 2 (Two) Times a Day As Needed (congestion).  Dispense: 30 tablet; Refill: 0      Continue monitoring blood pressure at home goal less than 140/90    Continue medicine for diabetes and managing and monitoring at home goal A1c less than 7    Will prescribe medicines for allergies above I am antibiotics for potential upper respiratory or sinus infection and follow-up in improvement or worse      Follow Up   Return in about 4 weeks (around 9/5/2024), or if symptoms worsen or fail to improve, for Next scheduled follow up.  Patient was given instructions and counseling regarding his condition or for health maintenance advice. Please see specific information pulled into the AVS if appropriate.     Transcribed from ambient dictation for Magnus Euceda DO by Magnus Euceda DO.  08/13/24   11:18 EDT

## 2024-08-21 RX ORDER — ATORVASTATIN CALCIUM 80 MG/1
TABLET, FILM COATED ORAL
Qty: 30 TABLET | Refills: 0 | Status: SHIPPED | OUTPATIENT
Start: 2024-08-21

## 2024-08-28 DIAGNOSIS — E78.2 MIXED HYPERLIPIDEMIA: ICD-10-CM

## 2024-08-28 DIAGNOSIS — K21.9 GASTROESOPHAGEAL REFLUX DISEASE WITHOUT ESOPHAGITIS: ICD-10-CM

## 2024-08-28 RX ORDER — ASPIRIN 81 MG/1
81 TABLET, COATED ORAL DAILY
Qty: 30 TABLET | Refills: 0 | Status: SHIPPED | OUTPATIENT
Start: 2024-08-28

## 2024-08-28 RX ORDER — ARIPIPRAZOLE 20 MG/1
TABLET ORAL
Qty: 30 TABLET | Refills: 0 | Status: SHIPPED | OUTPATIENT
Start: 2024-08-28

## 2024-08-28 RX ORDER — METFORMIN HCL 500 MG
TABLET, EXTENDED RELEASE 24 HR ORAL
Qty: 120 TABLET | Refills: 0 | Status: SHIPPED | OUTPATIENT
Start: 2024-08-28

## 2024-08-28 RX ORDER — FAMOTIDINE 40 MG/1
TABLET, FILM COATED ORAL
Qty: 60 TABLET | Refills: 0 | Status: SHIPPED | OUTPATIENT
Start: 2024-08-28

## 2024-09-13 DIAGNOSIS — K21.9 GASTROESOPHAGEAL REFLUX DISEASE WITHOUT ESOPHAGITIS: ICD-10-CM

## 2024-09-13 DIAGNOSIS — F51.01 PRIMARY INSOMNIA: Chronic | ICD-10-CM

## 2024-09-13 DIAGNOSIS — E78.2 MIXED HYPERLIPIDEMIA: ICD-10-CM

## 2024-09-13 DIAGNOSIS — E03.9 HYPOTHYROIDISM, UNSPECIFIED TYPE: Chronic | ICD-10-CM

## 2024-09-13 RX ORDER — ARIPIPRAZOLE 20 MG/1
TABLET ORAL
Qty: 30 TABLET | Refills: 0 | Status: SHIPPED | OUTPATIENT
Start: 2024-09-13

## 2024-09-13 RX ORDER — TRAZODONE HYDROCHLORIDE 100 MG/1
TABLET ORAL
Qty: 30 TABLET | Refills: 0 | Status: SHIPPED | OUTPATIENT
Start: 2024-09-13

## 2024-09-13 RX ORDER — CITALOPRAM HYDROBROMIDE 20 MG/1
TABLET ORAL
Qty: 90 TABLET | Refills: 0 | Status: SHIPPED | OUTPATIENT
Start: 2024-09-13

## 2024-09-13 RX ORDER — METFORMIN HCL 500 MG
TABLET, EXTENDED RELEASE 24 HR ORAL
Qty: 120 TABLET | Refills: 0 | Status: SHIPPED | OUTPATIENT
Start: 2024-09-13

## 2024-09-13 RX ORDER — ATORVASTATIN CALCIUM 80 MG/1
TABLET, FILM COATED ORAL
Qty: 30 TABLET | Refills: 0 | Status: SHIPPED | OUTPATIENT
Start: 2024-09-13

## 2024-09-13 RX ORDER — LEVOTHYROXINE SODIUM 50 UG/1
TABLET ORAL
Qty: 90 TABLET | Refills: 0 | Status: SHIPPED | OUTPATIENT
Start: 2024-09-13

## 2024-09-13 RX ORDER — FAMOTIDINE 40 MG/1
TABLET, FILM COATED ORAL
Qty: 60 TABLET | Refills: 0 | Status: SHIPPED | OUTPATIENT
Start: 2024-09-13

## 2024-09-13 RX ORDER — BUMETANIDE 1 MG/1
TABLET ORAL
Qty: 90 TABLET | Refills: 0 | Status: SHIPPED | OUTPATIENT
Start: 2024-09-13

## 2024-09-13 RX ORDER — ASPIRIN 81 MG/1
81 TABLET, COATED ORAL DAILY
Qty: 30 TABLET | Refills: 0 | Status: SHIPPED | OUTPATIENT
Start: 2024-09-13

## 2024-10-07 DIAGNOSIS — E78.5 HYPERLIPIDEMIA, UNSPECIFIED HYPERLIPIDEMIA TYPE: ICD-10-CM

## 2024-10-07 DIAGNOSIS — F51.01 PRIMARY INSOMNIA: Chronic | ICD-10-CM

## 2024-10-07 RX ORDER — FENOFIBRATE 145 MG/1
145 TABLET, COATED ORAL DAILY
Qty: 90 TABLET | Refills: 0 | Status: SHIPPED | OUTPATIENT
Start: 2024-10-07

## 2024-10-07 RX ORDER — ATORVASTATIN CALCIUM 80 MG/1
TABLET, FILM COATED ORAL
Qty: 30 TABLET | Refills: 0 | Status: SHIPPED | OUTPATIENT
Start: 2024-10-07

## 2024-10-07 RX ORDER — TRAZODONE HYDROCHLORIDE 100 MG/1
TABLET ORAL
Qty: 30 TABLET | Refills: 0 | Status: SHIPPED | OUTPATIENT
Start: 2024-10-07

## 2024-10-11 RX ORDER — PIOGLITAZONEHYDROCHLORIDE 15 MG/1
15 TABLET ORAL
Qty: 90 TABLET | Refills: 2 | Status: SHIPPED | OUTPATIENT
Start: 2024-10-11

## 2024-10-29 ENCOUNTER — CLINICAL SUPPORT (OUTPATIENT)
Dept: FAMILY MEDICINE CLINIC | Facility: CLINIC | Age: 64
End: 2024-10-29
Payer: MEDICARE

## 2024-10-29 VITALS
SYSTOLIC BLOOD PRESSURE: 137 MMHG | BODY MASS INDEX: 34.25 KG/M2 | HEART RATE: 78 BPM | WEIGHT: 226 LBS | DIASTOLIC BLOOD PRESSURE: 59 MMHG | HEIGHT: 68 IN

## 2024-10-29 DIAGNOSIS — I10 PRIMARY HYPERTENSION: Primary | ICD-10-CM

## 2024-10-29 NOTE — PROGRESS NOTES
Patient seen as Encounter 3 for [facility].  Education provided through [facility] curriculum including Aim to Move More.       Patient brought logs to encounter.  Logs shared with provider in office.  BP was 137/59  with machine in office.   Patient instructed to call PCP if symptomatic with any symptoms with BP readings. Patient verbalized understanding.      Additional BP med added three weeks ago. Patient states his BP readings have improved. Patient states he is trying to eat less to lose weight.    Program discontinued due to completion..

## 2024-11-04 DIAGNOSIS — E78.2 MIXED HYPERLIPIDEMIA: ICD-10-CM

## 2024-11-04 DIAGNOSIS — K21.9 GASTROESOPHAGEAL REFLUX DISEASE WITHOUT ESOPHAGITIS: ICD-10-CM

## 2024-11-04 RX ORDER — METFORMIN HYDROCHLORIDE 500 MG/1
TABLET, EXTENDED RELEASE ORAL
Qty: 120 TABLET | Refills: 0 | Status: SHIPPED | OUTPATIENT
Start: 2024-11-04

## 2024-11-04 RX ORDER — ASPIRIN 81 MG/1
81 TABLET, COATED ORAL DAILY
Qty: 30 TABLET | Refills: 0 | Status: SHIPPED | OUTPATIENT
Start: 2024-11-04

## 2024-11-04 RX ORDER — ATORVASTATIN CALCIUM 80 MG/1
TABLET, FILM COATED ORAL
Qty: 30 TABLET | Refills: 0 | Status: SHIPPED | OUTPATIENT
Start: 2024-11-04

## 2024-11-04 RX ORDER — ARIPIPRAZOLE 20 MG/1
TABLET ORAL
Qty: 30 TABLET | Refills: 0 | Status: SHIPPED | OUTPATIENT
Start: 2024-11-04

## 2024-11-04 RX ORDER — FAMOTIDINE 40 MG/1
TABLET, FILM COATED ORAL
Qty: 60 TABLET | Refills: 0 | Status: SHIPPED | OUTPATIENT
Start: 2024-11-04

## 2024-11-12 DIAGNOSIS — F51.01 PRIMARY INSOMNIA: Chronic | ICD-10-CM

## 2024-11-12 RX ORDER — TRAZODONE HYDROCHLORIDE 100 MG/1
TABLET ORAL
Qty: 30 TABLET | Refills: 0 | Status: SHIPPED | OUTPATIENT
Start: 2024-11-12

## 2024-11-19 DIAGNOSIS — I10 ESSENTIAL HYPERTENSION: Chronic | ICD-10-CM

## 2024-11-19 RX ORDER — TELMISARTAN 20 MG/1
20 TABLET ORAL DAILY
Qty: 30 TABLET | Refills: 0 | Status: SHIPPED | OUTPATIENT
Start: 2024-11-19

## 2024-11-19 RX ORDER — DM/PE/ACETAMINOPHEN/CHLORPHENR 10-5-325-2
TABLET, SEQUENTIAL ORAL
Qty: 90 TABLET | Refills: 0 | Status: SHIPPED | OUTPATIENT
Start: 2024-11-19

## 2024-12-03 DIAGNOSIS — E11.65 TYPE 2 DIABETES MELLITUS WITH HYPERGLYCEMIA, WITHOUT LONG-TERM CURRENT USE OF INSULIN: Chronic | ICD-10-CM

## 2024-12-03 DIAGNOSIS — E78.2 MIXED HYPERLIPIDEMIA: ICD-10-CM

## 2024-12-03 DIAGNOSIS — E03.9 HYPOTHYROIDISM, UNSPECIFIED TYPE: Chronic | ICD-10-CM

## 2024-12-03 DIAGNOSIS — K21.9 GASTROESOPHAGEAL REFLUX DISEASE WITHOUT ESOPHAGITIS: ICD-10-CM

## 2024-12-03 RX ORDER — CITALOPRAM HYDROBROMIDE 20 MG/1
TABLET ORAL
Qty: 90 TABLET | Refills: 0 | Status: SHIPPED | OUTPATIENT
Start: 2024-12-03

## 2024-12-03 RX ORDER — EMPAGLIFLOZIN 25 MG/1
TABLET, FILM COATED ORAL
Qty: 30 TABLET | Refills: 3 | Status: SHIPPED | OUTPATIENT
Start: 2024-12-03

## 2024-12-03 RX ORDER — ATORVASTATIN CALCIUM 80 MG/1
TABLET, FILM COATED ORAL
Qty: 30 TABLET | Refills: 0 | Status: SHIPPED | OUTPATIENT
Start: 2024-12-03

## 2024-12-03 RX ORDER — METFORMIN HYDROCHLORIDE 500 MG/1
TABLET, EXTENDED RELEASE ORAL
Qty: 120 TABLET | Refills: 0 | Status: SHIPPED | OUTPATIENT
Start: 2024-12-03

## 2024-12-03 RX ORDER — FAMOTIDINE 40 MG/1
TABLET, FILM COATED ORAL
Qty: 60 TABLET | Refills: 0 | Status: SHIPPED | OUTPATIENT
Start: 2024-12-03

## 2024-12-03 RX ORDER — ASPIRIN 81 MG/1
81 TABLET, COATED ORAL DAILY
Qty: 30 TABLET | Refills: 0 | Status: SHIPPED | OUTPATIENT
Start: 2024-12-03

## 2024-12-03 RX ORDER — ORAL SEMAGLUTIDE 14 MG/1
1 TABLET ORAL EVERY MORNING
Qty: 30 TABLET | Refills: 3 | Status: SHIPPED | OUTPATIENT
Start: 2024-12-03

## 2024-12-03 RX ORDER — BUMETANIDE 1 MG/1
TABLET ORAL
Qty: 90 TABLET | Refills: 0 | Status: SHIPPED | OUTPATIENT
Start: 2024-12-03

## 2024-12-03 RX ORDER — LEVOTHYROXINE SODIUM 50 UG/1
TABLET ORAL
Qty: 90 TABLET | Refills: 0 | Status: SHIPPED | OUTPATIENT
Start: 2024-12-03

## 2024-12-03 RX ORDER — ARIPIPRAZOLE 20 MG/1
TABLET ORAL
Qty: 30 TABLET | Refills: 0 | Status: SHIPPED | OUTPATIENT
Start: 2024-12-03

## 2024-12-06 DIAGNOSIS — F51.01 PRIMARY INSOMNIA: Chronic | ICD-10-CM

## 2024-12-06 RX ORDER — TRAZODONE HYDROCHLORIDE 100 MG/1
TABLET ORAL
Qty: 30 TABLET | Refills: 0 | Status: SHIPPED | OUTPATIENT
Start: 2024-12-06

## 2024-12-13 DIAGNOSIS — I10 ESSENTIAL HYPERTENSION: Chronic | ICD-10-CM

## 2024-12-14 RX ORDER — TELMISARTAN 20 MG/1
20 TABLET ORAL DAILY
Qty: 30 TABLET | Refills: 3 | Status: SHIPPED | OUTPATIENT
Start: 2024-12-14

## 2024-12-27 DIAGNOSIS — K21.9 GASTROESOPHAGEAL REFLUX DISEASE WITHOUT ESOPHAGITIS: ICD-10-CM

## 2024-12-27 DIAGNOSIS — E78.2 MIXED HYPERLIPIDEMIA: ICD-10-CM

## 2024-12-27 DIAGNOSIS — E78.5 HYPERLIPIDEMIA, UNSPECIFIED HYPERLIPIDEMIA TYPE: ICD-10-CM

## 2024-12-27 RX ORDER — ASPIRIN 81 MG/1
81 TABLET, COATED ORAL DAILY
Qty: 30 TABLET | Refills: 0 | Status: SHIPPED | OUTPATIENT
Start: 2024-12-27

## 2024-12-27 RX ORDER — METFORMIN HYDROCHLORIDE 500 MG/1
1000 TABLET, EXTENDED RELEASE ORAL 2 TIMES DAILY
Qty: 120 TABLET | Refills: 0 | Status: SHIPPED | OUTPATIENT
Start: 2024-12-27

## 2024-12-27 RX ORDER — FAMOTIDINE 40 MG/1
TABLET, FILM COATED ORAL
Qty: 60 TABLET | Refills: 0 | Status: SHIPPED | OUTPATIENT
Start: 2024-12-27

## 2024-12-27 RX ORDER — ATORVASTATIN CALCIUM 80 MG/1
TABLET, FILM COATED ORAL
Qty: 30 TABLET | Refills: 0 | Status: SHIPPED | OUTPATIENT
Start: 2024-12-27

## 2024-12-27 RX ORDER — CHLORTHALIDONE 25 MG/1
25 TABLET ORAL DAILY
Qty: 30 TABLET | Refills: 4 | Status: SHIPPED | OUTPATIENT
Start: 2024-12-27

## 2024-12-27 RX ORDER — ARIPIPRAZOLE 20 MG/1
TABLET ORAL
Qty: 30 TABLET | Refills: 0 | Status: SHIPPED | OUTPATIENT
Start: 2024-12-27

## 2024-12-27 RX ORDER — FENOFIBRATE 145 MG/1
145 TABLET, COATED ORAL DAILY
Qty: 90 TABLET | Refills: 0 | Status: SHIPPED | OUTPATIENT
Start: 2024-12-27

## 2025-01-02 DIAGNOSIS — F51.01 PRIMARY INSOMNIA: Chronic | ICD-10-CM

## 2025-01-02 RX ORDER — TRAZODONE HYDROCHLORIDE 100 MG/1
TABLET ORAL
Qty: 30 TABLET | Refills: 0 | Status: SHIPPED | OUTPATIENT
Start: 2025-01-02

## 2025-01-22 DIAGNOSIS — E78.2 MIXED HYPERLIPIDEMIA: ICD-10-CM

## 2025-01-22 DIAGNOSIS — K21.9 GASTROESOPHAGEAL REFLUX DISEASE WITHOUT ESOPHAGITIS: ICD-10-CM

## 2025-01-22 RX ORDER — ARIPIPRAZOLE 20 MG/1
TABLET ORAL
Qty: 30 TABLET | Refills: 0 | Status: SHIPPED | OUTPATIENT
Start: 2025-01-22

## 2025-01-22 RX ORDER — ATORVASTATIN CALCIUM 80 MG/1
TABLET, FILM COATED ORAL
Qty: 30 TABLET | Refills: 0 | Status: SHIPPED | OUTPATIENT
Start: 2025-01-22

## 2025-01-22 RX ORDER — FAMOTIDINE 40 MG/1
TABLET, FILM COATED ORAL
Qty: 60 TABLET | Refills: 0 | Status: SHIPPED | OUTPATIENT
Start: 2025-01-22

## 2025-01-22 RX ORDER — METFORMIN HYDROCHLORIDE 500 MG/1
1000 TABLET, EXTENDED RELEASE ORAL 2 TIMES DAILY
Qty: 120 TABLET | Refills: 0 | Status: SHIPPED | OUTPATIENT
Start: 2025-01-22

## 2025-01-22 RX ORDER — ASPIRIN 81 MG/1
81 TABLET, COATED ORAL DAILY
Qty: 30 TABLET | Refills: 0 | Status: SHIPPED | OUTPATIENT
Start: 2025-01-22

## 2025-02-11 DIAGNOSIS — F51.01 PRIMARY INSOMNIA: Chronic | ICD-10-CM

## 2025-02-11 RX ORDER — TRAZODONE HYDROCHLORIDE 100 MG/1
TABLET ORAL
Qty: 30 TABLET | Refills: 0 | Status: SHIPPED | OUTPATIENT
Start: 2025-02-11

## 2025-02-14 RX ORDER — ATORVASTATIN CALCIUM 80 MG/1
TABLET, FILM COATED ORAL
Qty: 30 TABLET | Refills: 0 | Status: SHIPPED | OUTPATIENT
Start: 2025-02-14

## 2025-02-14 RX ORDER — DM/PE/ACETAMINOPHEN/CHLORPHENR 10-5-325-2
TABLET, SEQUENTIAL ORAL
Qty: 90 TABLET | Refills: 0 | Status: SHIPPED | OUTPATIENT
Start: 2025-02-14

## 2025-02-21 DIAGNOSIS — E03.9 HYPOTHYROIDISM, UNSPECIFIED TYPE: Chronic | ICD-10-CM

## 2025-02-21 DIAGNOSIS — E78.2 MIXED HYPERLIPIDEMIA: ICD-10-CM

## 2025-02-21 DIAGNOSIS — K21.9 GASTROESOPHAGEAL REFLUX DISEASE WITHOUT ESOPHAGITIS: ICD-10-CM

## 2025-02-21 RX ORDER — FAMOTIDINE 40 MG/1
TABLET, FILM COATED ORAL
Qty: 60 TABLET | Refills: 0 | Status: SHIPPED | OUTPATIENT
Start: 2025-02-21

## 2025-02-21 RX ORDER — LEVOTHYROXINE SODIUM 50 UG/1
TABLET ORAL
Qty: 90 TABLET | Refills: 0 | Status: SHIPPED | OUTPATIENT
Start: 2025-02-21

## 2025-02-21 RX ORDER — CITALOPRAM HYDROBROMIDE 20 MG/1
TABLET ORAL
Qty: 90 TABLET | Refills: 0 | Status: SHIPPED | OUTPATIENT
Start: 2025-02-21

## 2025-02-21 RX ORDER — ASPIRIN 81 MG/1
81 TABLET, COATED ORAL DAILY
Qty: 30 TABLET | Refills: 0 | Status: SHIPPED | OUTPATIENT
Start: 2025-02-21

## 2025-02-21 RX ORDER — METFORMIN HYDROCHLORIDE 500 MG/1
1000 TABLET, EXTENDED RELEASE ORAL 2 TIMES DAILY
Qty: 120 TABLET | Refills: 0 | Status: SHIPPED | OUTPATIENT
Start: 2025-02-21

## 2025-02-21 RX ORDER — ARIPIPRAZOLE 20 MG/1
TABLET ORAL
Qty: 30 TABLET | Refills: 0 | Status: SHIPPED | OUTPATIENT
Start: 2025-02-21

## 2025-02-21 RX ORDER — BUMETANIDE 1 MG/1
TABLET ORAL
Qty: 90 TABLET | Refills: 0 | Status: SHIPPED | OUTPATIENT
Start: 2025-02-21

## 2025-03-18 DIAGNOSIS — F51.01 PRIMARY INSOMNIA: Chronic | ICD-10-CM

## 2025-03-18 DIAGNOSIS — E11.65 TYPE 2 DIABETES MELLITUS WITH HYPERGLYCEMIA, WITHOUT LONG-TERM CURRENT USE OF INSULIN: Chronic | ICD-10-CM

## 2025-03-18 DIAGNOSIS — K21.9 GASTROESOPHAGEAL REFLUX DISEASE WITHOUT ESOPHAGITIS: ICD-10-CM

## 2025-03-18 DIAGNOSIS — E78.2 MIXED HYPERLIPIDEMIA: ICD-10-CM

## 2025-03-18 RX ORDER — EMPAGLIFLOZIN 25 MG/1
TABLET, FILM COATED ORAL
Qty: 30 TABLET | Refills: 2 | Status: SHIPPED | OUTPATIENT
Start: 2025-03-18

## 2025-03-18 RX ORDER — TRAZODONE HYDROCHLORIDE 100 MG/1
100 TABLET ORAL NIGHTLY
Qty: 30 TABLET | Refills: 0 | Status: SHIPPED | OUTPATIENT
Start: 2025-03-18

## 2025-03-18 RX ORDER — ASPIRIN 81 MG/1
81 TABLET, COATED ORAL DAILY
Qty: 30 TABLET | Refills: 0 | Status: SHIPPED | OUTPATIENT
Start: 2025-03-18

## 2025-03-18 RX ORDER — ORAL SEMAGLUTIDE 14 MG/1
1 TABLET ORAL EVERY MORNING
Qty: 30 TABLET | Refills: 2 | Status: SHIPPED | OUTPATIENT
Start: 2025-03-18

## 2025-03-18 RX ORDER — ARIPIPRAZOLE 20 MG/1
20 TABLET ORAL DAILY
Qty: 30 TABLET | Refills: 0 | Status: SHIPPED | OUTPATIENT
Start: 2025-03-18

## 2025-03-18 RX ORDER — ATORVASTATIN CALCIUM 80 MG/1
80 TABLET, FILM COATED ORAL NIGHTLY
Qty: 30 TABLET | Refills: 0 | Status: SHIPPED | OUTPATIENT
Start: 2025-03-18

## 2025-03-18 RX ORDER — METFORMIN HYDROCHLORIDE 500 MG/1
1000 TABLET, EXTENDED RELEASE ORAL 2 TIMES DAILY
Qty: 120 TABLET | Refills: 0 | Status: SHIPPED | OUTPATIENT
Start: 2025-03-18

## 2025-03-18 RX ORDER — FAMOTIDINE 40 MG/1
TABLET, FILM COATED ORAL
Qty: 60 TABLET | Refills: 0 | Status: SHIPPED | OUTPATIENT
Start: 2025-03-18

## 2025-03-25 DIAGNOSIS — E78.5 HYPERLIPIDEMIA, UNSPECIFIED HYPERLIPIDEMIA TYPE: ICD-10-CM

## 2025-03-25 RX ORDER — FENOFIBRATE 145 MG/1
145 TABLET, COATED ORAL DAILY
Qty: 90 TABLET | Refills: 0 | Status: SHIPPED | OUTPATIENT
Start: 2025-03-25

## 2025-04-09 DIAGNOSIS — I10 ESSENTIAL HYPERTENSION: Chronic | ICD-10-CM

## 2025-04-09 RX ORDER — TELMISARTAN 20 MG/1
20 TABLET ORAL DAILY
Qty: 30 TABLET | Refills: 2 | Status: SHIPPED | OUTPATIENT
Start: 2025-04-09

## 2025-04-15 ENCOUNTER — APPOINTMENT (OUTPATIENT)
Dept: GENERAL RADIOLOGY | Facility: HOSPITAL | Age: 65
End: 2025-04-15
Payer: MEDICARE

## 2025-04-15 ENCOUNTER — APPOINTMENT (OUTPATIENT)
Dept: CT IMAGING | Facility: HOSPITAL | Age: 65
End: 2025-04-15
Payer: MEDICARE

## 2025-04-15 ENCOUNTER — HOSPITAL ENCOUNTER (INPATIENT)
Facility: HOSPITAL | Age: 65
LOS: 15 days | Discharge: NURSING, REHAB OR LONG-TERM CARE | End: 2025-04-30
Attending: EMERGENCY MEDICINE | Admitting: STUDENT IN AN ORGANIZED HEALTH CARE EDUCATION/TRAINING PROGRAM
Payer: MEDICARE

## 2025-04-15 DIAGNOSIS — M62.82 NON-TRAUMATIC RHABDOMYOLYSIS: ICD-10-CM

## 2025-04-15 DIAGNOSIS — E87.1 HYPONATREMIA: Primary | ICD-10-CM

## 2025-04-15 DIAGNOSIS — R26.2 DIFFICULTY WALKING: ICD-10-CM

## 2025-04-15 DIAGNOSIS — N17.9 AKI (ACUTE KIDNEY INJURY): ICD-10-CM

## 2025-04-15 DIAGNOSIS — N18.31 STAGE 3A CHRONIC KIDNEY DISEASE (CKD): ICD-10-CM

## 2025-04-15 DIAGNOSIS — Z78.9 DECREASED ACTIVITIES OF DAILY LIVING (ADL): ICD-10-CM

## 2025-04-15 DIAGNOSIS — R13.12 OROPHARYNGEAL DYSPHAGIA: ICD-10-CM

## 2025-04-15 DIAGNOSIS — I48.0 PAROXYSMAL ATRIAL FIBRILLATION: ICD-10-CM

## 2025-04-15 LAB
ALBUMIN SERPL-MCNC: 4.7 G/DL (ref 3.5–5.2)
ALBUMIN/GLOB SERPL: 1.5 G/DL
ALP SERPL-CCNC: 52 U/L (ref 39–117)
ALT SERPL W P-5'-P-CCNC: 39 U/L (ref 1–41)
AMPHET+METHAMPHET UR QL: NEGATIVE
AMPHETAMINES UR QL: NEGATIVE
ANION GAP SERPL CALCULATED.3IONS-SCNC: 15.9 MMOL/L (ref 5–15)
AST SERPL-CCNC: 104 U/L (ref 1–40)
BACTERIA UR QL AUTO: NORMAL /HPF
BARBITURATES UR QL SCN: NEGATIVE
BASOPHILS # BLD AUTO: 0.02 10*3/MM3 (ref 0–0.2)
BASOPHILS NFR BLD AUTO: 0.1 % (ref 0–1.5)
BENZODIAZ UR QL SCN: NEGATIVE
BILIRUB SERPL-MCNC: 1.8 MG/DL (ref 0–1.2)
BILIRUB UR QL STRIP: NEGATIVE
BUN SERPL-MCNC: 28 MG/DL (ref 8–23)
BUN/CREAT SERPL: 18.8 (ref 7–25)
BUPRENORPHINE SERPL-MCNC: NEGATIVE NG/ML
CALCIUM SPEC-SCNC: 9.3 MG/DL (ref 8.6–10.5)
CANNABINOIDS SERPL QL: NEGATIVE
CHLORIDE SERPL-SCNC: 64 MMOL/L (ref 98–107)
CK SERPL-CCNC: 3056 U/L (ref 20–200)
CLARITY UR: CLEAR
CO2 SERPL-SCNC: 23.1 MMOL/L (ref 22–29)
COCAINE UR QL: NEGATIVE
COLOR UR: YELLOW
CREAT SERPL-MCNC: 1.49 MG/DL (ref 0.76–1.27)
D-LACTATE SERPL-SCNC: 1.2 MMOL/L (ref 0.5–2)
DEPRECATED RDW RBC AUTO: 35 FL (ref 37–54)
EGFRCR SERPLBLD CKD-EPI 2021: 51.4 ML/MIN/1.73
EOSINOPHIL # BLD AUTO: 0.04 10*3/MM3 (ref 0–0.4)
EOSINOPHIL NFR BLD AUTO: 0.2 % (ref 0.3–6.2)
ERYTHROCYTE [DISTWIDTH] IN BLOOD BY AUTOMATED COUNT: 12.1 % (ref 12.3–15.4)
ETHANOL BLD-MCNC: <10 MG/DL (ref 0–10)
ETHANOL UR QL: <0.01 %
FENTANYL UR-MCNC: NEGATIVE NG/ML
GEN 5 1HR TROPONIN T REFLEX: 111 NG/L
GLOBULIN UR ELPH-MCNC: 3.2 GM/DL
GLUCOSE BLDC GLUCOMTR-MCNC: 71 MG/DL (ref 70–99)
GLUCOSE BLDC GLUCOMTR-MCNC: 88 MG/DL (ref 70–99)
GLUCOSE SERPL-MCNC: 102 MG/DL (ref 65–99)
GLUCOSE UR STRIP-MCNC: ABNORMAL MG/DL
HCT VFR BLD AUTO: 40.1 % (ref 37.5–51)
HGB BLD-MCNC: 14.9 G/DL (ref 13–17.7)
HGB UR QL STRIP.AUTO: ABNORMAL
HOLD SPECIMEN: NORMAL
HYALINE CASTS UR QL AUTO: NORMAL /LPF
IMM GRANULOCYTES # BLD AUTO: 0.07 10*3/MM3 (ref 0–0.05)
IMM GRANULOCYTES NFR BLD AUTO: 0.4 % (ref 0–0.5)
KETONES UR QL STRIP: NEGATIVE
LEUKOCYTE ESTERASE UR QL STRIP.AUTO: NEGATIVE
LYMPHOCYTES # BLD AUTO: 0.96 10*3/MM3 (ref 0.7–3.1)
LYMPHOCYTES NFR BLD AUTO: 5.5 % (ref 19.6–45.3)
MAGNESIUM SERPL-MCNC: 1.6 MG/DL (ref 1.6–2.4)
MCH RBC QN AUTO: 29.7 PG (ref 26.6–33)
MCHC RBC AUTO-ENTMCNC: 37.2 G/DL (ref 31.5–35.7)
MCV RBC AUTO: 79.9 FL (ref 79–97)
METHADONE UR QL SCN: NEGATIVE
MONOCYTES # BLD AUTO: 1.58 10*3/MM3 (ref 0.1–0.9)
MONOCYTES NFR BLD AUTO: 9 % (ref 5–12)
NEUTROPHILS NFR BLD AUTO: 14.82 10*3/MM3 (ref 1.7–7)
NEUTROPHILS NFR BLD AUTO: 84.8 % (ref 42.7–76)
NITRITE UR QL STRIP: NEGATIVE
NRBC BLD AUTO-RTO: 0 /100 WBC (ref 0–0.2)
OPIATES UR QL: NEGATIVE
OSMOLALITY SERPL: 229 MOSM/KG (ref 280–301)
OSMOLALITY UR: 271 MOSM/KG (ref 50–1400)
OXYCODONE UR QL SCN: NEGATIVE
PCP UR QL SCN: NEGATIVE
PH UR STRIP.AUTO: 5.5 [PH] (ref 5–8)
PLATELET # BLD AUTO: 329 10*3/MM3 (ref 140–450)
PMV BLD AUTO: 9.3 FL (ref 6–12)
POTASSIUM SERPL-SCNC: 3.5 MMOL/L (ref 3.5–5.2)
POTASSIUM SERPL-SCNC: 3.5 MMOL/L (ref 3.5–5.2)
POTASSIUM SERPL-SCNC: 3.7 MMOL/L (ref 3.5–5.2)
PROCALCITONIN SERPL-MCNC: 0.2 NG/ML (ref 0–0.25)
PROT SERPL-MCNC: 7.9 G/DL (ref 6–8.5)
PROT UR QL STRIP: NEGATIVE
RBC # BLD AUTO: 5.02 10*6/MM3 (ref 4.14–5.8)
RBC # UR STRIP: NORMAL /HPF
REF LAB TEST METHOD: NORMAL
SODIUM SERPL-SCNC: 103 MMOL/L (ref 136–145)
SODIUM SERPL-SCNC: 104 MMOL/L (ref 136–145)
SODIUM SERPL-SCNC: 104 MMOL/L (ref 136–145)
SODIUM UR-SCNC: <20 MMOL/L
SP GR UR STRIP: 1.01 (ref 1–1.03)
SQUAMOUS #/AREA URNS HPF: NORMAL /HPF
T4 FREE SERPL-MCNC: 1.6 NG/DL (ref 0.92–1.68)
TRICYCLICS UR QL SCN: NEGATIVE
TRIGL SERPL-MCNC: 96 MG/DL (ref 0–150)
TROPONIN T % DELTA: -13
TROPONIN T NUMERIC DELTA: -17 NG/L
TROPONIN T SERPL HS-MCNC: 128 NG/L
TSH SERPL DL<=0.05 MIU/L-ACNC: 2.7 UIU/ML (ref 0.27–4.2)
TSH SERPL DL<=0.05 MIU/L-ACNC: 2.74 UIU/ML (ref 0.27–4.2)
URATE SERPL-MCNC: 6.9 MG/DL (ref 3.4–7)
UROBILINOGEN UR QL STRIP: ABNORMAL
WBC # UR STRIP: NORMAL /HPF
WBC NRBC COR # BLD AUTO: 17.49 10*3/MM3 (ref 3.4–10.8)
WHOLE BLOOD HOLD COAG: NORMAL
WHOLE BLOOD HOLD SPECIMEN: NORMAL

## 2025-04-15 PROCEDURE — 84439 ASSAY OF FREE THYROXINE: CPT | Performed by: NURSE PRACTITIONER

## 2025-04-15 PROCEDURE — 84145 PROCALCITONIN (PCT): CPT | Performed by: STUDENT IN AN ORGANIZED HEALTH CARE EDUCATION/TRAINING PROGRAM

## 2025-04-15 PROCEDURE — 36415 COLL VENOUS BLD VENIPUNCTURE: CPT | Performed by: STUDENT IN AN ORGANIZED HEALTH CARE EDUCATION/TRAINING PROGRAM

## 2025-04-15 PROCEDURE — 84443 ASSAY THYROID STIM HORMONE: CPT | Performed by: EMERGENCY MEDICINE

## 2025-04-15 PROCEDURE — 84443 ASSAY THYROID STIM HORMONE: CPT | Performed by: NURSE PRACTITIONER

## 2025-04-15 PROCEDURE — 83935 ASSAY OF URINE OSMOLALITY: CPT | Performed by: NURSE PRACTITIONER

## 2025-04-15 PROCEDURE — 99223 1ST HOSP IP/OBS HIGH 75: CPT | Performed by: STUDENT IN AN ORGANIZED HEALTH CARE EDUCATION/TRAINING PROGRAM

## 2025-04-15 PROCEDURE — 93010 ELECTROCARDIOGRAM REPORT: CPT | Performed by: INTERNAL MEDICINE

## 2025-04-15 PROCEDURE — 99291 CRITICAL CARE FIRST HOUR: CPT

## 2025-04-15 PROCEDURE — 99222 1ST HOSP IP/OBS MODERATE 55: CPT | Performed by: STUDENT IN AN ORGANIZED HEALTH CARE EDUCATION/TRAINING PROGRAM

## 2025-04-15 PROCEDURE — 82077 ASSAY SPEC XCP UR&BREATH IA: CPT | Performed by: EMERGENCY MEDICINE

## 2025-04-15 PROCEDURE — 84132 ASSAY OF SERUM POTASSIUM: CPT | Performed by: INTERNAL MEDICINE

## 2025-04-15 PROCEDURE — 80053 COMPREHEN METABOLIC PANEL: CPT | Performed by: EMERGENCY MEDICINE

## 2025-04-15 PROCEDURE — 84300 ASSAY OF URINE SODIUM: CPT | Performed by: NURSE PRACTITIONER

## 2025-04-15 PROCEDURE — 84550 ASSAY OF BLOOD/URIC ACID: CPT | Performed by: NURSE PRACTITIONER

## 2025-04-15 PROCEDURE — 87040 BLOOD CULTURE FOR BACTERIA: CPT | Performed by: STUDENT IN AN ORGANIZED HEALTH CARE EDUCATION/TRAINING PROGRAM

## 2025-04-15 PROCEDURE — 71045 X-RAY EXAM CHEST 1 VIEW: CPT

## 2025-04-15 PROCEDURE — 70450 CT HEAD/BRAIN W/O DYE: CPT

## 2025-04-15 PROCEDURE — 82550 ASSAY OF CK (CPK): CPT | Performed by: EMERGENCY MEDICINE

## 2025-04-15 PROCEDURE — 25810000003 SODIUM CHLORIDE 3 % SOLUTION: Performed by: INTERNAL MEDICINE

## 2025-04-15 PROCEDURE — 93005 ELECTROCARDIOGRAM TRACING: CPT | Performed by: EMERGENCY MEDICINE

## 2025-04-15 PROCEDURE — 82948 REAGENT STRIP/BLOOD GLUCOSE: CPT

## 2025-04-15 PROCEDURE — 80307 DRUG TEST PRSMV CHEM ANLYZR: CPT | Performed by: EMERGENCY MEDICINE

## 2025-04-15 PROCEDURE — 83735 ASSAY OF MAGNESIUM: CPT | Performed by: EMERGENCY MEDICINE

## 2025-04-15 PROCEDURE — 84478 ASSAY OF TRIGLYCERIDES: CPT | Performed by: NURSE PRACTITIONER

## 2025-04-15 PROCEDURE — 81001 URINALYSIS AUTO W/SCOPE: CPT | Performed by: EMERGENCY MEDICINE

## 2025-04-15 PROCEDURE — 85025 COMPLETE CBC W/AUTO DIFF WBC: CPT | Performed by: EMERGENCY MEDICINE

## 2025-04-15 PROCEDURE — 84295 ASSAY OF SERUM SODIUM: CPT | Performed by: STUDENT IN AN ORGANIZED HEALTH CARE EDUCATION/TRAINING PROGRAM

## 2025-04-15 PROCEDURE — 84295 ASSAY OF SERUM SODIUM: CPT | Performed by: INTERNAL MEDICINE

## 2025-04-15 PROCEDURE — 83605 ASSAY OF LACTIC ACID: CPT | Performed by: NURSE PRACTITIONER

## 2025-04-15 PROCEDURE — 84484 ASSAY OF TROPONIN QUANT: CPT | Performed by: EMERGENCY MEDICINE

## 2025-04-15 PROCEDURE — 83930 ASSAY OF BLOOD OSMOLALITY: CPT | Performed by: NURSE PRACTITIONER

## 2025-04-15 RX ORDER — AMOXICILLIN 250 MG
2 CAPSULE ORAL 2 TIMES DAILY
Status: DISCONTINUED | OUTPATIENT
Start: 2025-04-15 | End: 2025-04-25

## 2025-04-15 RX ORDER — ALBUTEROL SULFATE 0.83 MG/ML
2.5 SOLUTION RESPIRATORY (INHALATION) EVERY 6 HOURS PRN
Status: DISCONTINUED | OUTPATIENT
Start: 2025-04-15 | End: 2025-04-30 | Stop reason: HOSPADM

## 2025-04-15 RX ORDER — NICOTINE POLACRILEX 4 MG
15 LOZENGE BUCCAL
Status: DISCONTINUED | OUTPATIENT
Start: 2025-04-15 | End: 2025-04-30 | Stop reason: HOSPADM

## 2025-04-15 RX ORDER — NITROGLYCERIN 0.4 MG/1
0.4 TABLET SUBLINGUAL
Status: DISCONTINUED | OUTPATIENT
Start: 2025-04-15 | End: 2025-04-25

## 2025-04-15 RX ORDER — IBUPROFEN 600 MG/1
1 TABLET ORAL
Status: DISCONTINUED | OUTPATIENT
Start: 2025-04-15 | End: 2025-04-30 | Stop reason: HOSPADM

## 2025-04-15 RX ORDER — ONDANSETRON 4 MG/1
4 TABLET, ORALLY DISINTEGRATING ORAL EVERY 6 HOURS PRN
Status: DISCONTINUED | OUTPATIENT
Start: 2025-04-15 | End: 2025-04-30 | Stop reason: HOSPADM

## 2025-04-15 RX ORDER — ONDANSETRON 2 MG/ML
4 INJECTION INTRAMUSCULAR; INTRAVENOUS EVERY 6 HOURS PRN
Status: DISCONTINUED | OUTPATIENT
Start: 2025-04-15 | End: 2025-04-30 | Stop reason: HOSPADM

## 2025-04-15 RX ORDER — BISACODYL 10 MG
10 SUPPOSITORY, RECTAL RECTAL DAILY PRN
Status: DISCONTINUED | OUTPATIENT
Start: 2025-04-15 | End: 2025-04-30 | Stop reason: HOSPADM

## 2025-04-15 RX ORDER — DEXTROSE MONOHYDRATE 50 MG/ML
6 INJECTION, SOLUTION INTRAVENOUS CONTINUOUS PRN
Status: DISCONTINUED | OUTPATIENT
Start: 2025-04-15 | End: 2025-04-18

## 2025-04-15 RX ORDER — DEXTROSE MONOHYDRATE 25 G/50ML
25 INJECTION, SOLUTION INTRAVENOUS
Status: DISCONTINUED | OUTPATIENT
Start: 2025-04-15 | End: 2025-04-30 | Stop reason: HOSPADM

## 2025-04-15 RX ORDER — SODIUM CHLORIDE 0.9 % (FLUSH) 0.9 %
10 SYRINGE (ML) INJECTION AS NEEDED
Status: DISCONTINUED | OUTPATIENT
Start: 2025-04-15 | End: 2025-04-30 | Stop reason: HOSPADM

## 2025-04-15 RX ORDER — BISACODYL 5 MG/1
5 TABLET, DELAYED RELEASE ORAL DAILY PRN
Status: DISCONTINUED | OUTPATIENT
Start: 2025-04-15 | End: 2025-04-30 | Stop reason: HOSPADM

## 2025-04-15 RX ORDER — 3% SODIUM CHLORIDE 3 G/100ML
20 INJECTION, SOLUTION INTRAVENOUS CONTINUOUS
Status: DISPENSED | OUTPATIENT
Start: 2025-04-15 | End: 2025-04-15

## 2025-04-15 RX ORDER — ACETAMINOPHEN 650 MG/1
650 SUPPOSITORY RECTAL EVERY 4 HOURS PRN
Status: DISCONTINUED | OUTPATIENT
Start: 2025-04-15 | End: 2025-04-30 | Stop reason: HOSPADM

## 2025-04-15 RX ORDER — POLYETHYLENE GLYCOL 3350 17 G/17G
17 POWDER, FOR SOLUTION ORAL DAILY PRN
Status: DISCONTINUED | OUTPATIENT
Start: 2025-04-15 | End: 2025-04-30 | Stop reason: HOSPADM

## 2025-04-15 RX ORDER — SODIUM CHLORIDE 9 MG/ML
40 INJECTION, SOLUTION INTRAVENOUS AS NEEDED
Status: DISCONTINUED | OUTPATIENT
Start: 2025-04-15 | End: 2025-04-30 | Stop reason: HOSPADM

## 2025-04-15 RX ORDER — SODIUM CHLORIDE 0.9 % (FLUSH) 0.9 %
10 SYRINGE (ML) INJECTION EVERY 12 HOURS SCHEDULED
Status: DISCONTINUED | OUTPATIENT
Start: 2025-04-15 | End: 2025-04-30 | Stop reason: HOSPADM

## 2025-04-15 RX ORDER — ACETAMINOPHEN 325 MG/1
650 TABLET ORAL EVERY 4 HOURS PRN
Status: DISCONTINUED | OUTPATIENT
Start: 2025-04-15 | End: 2025-04-30 | Stop reason: HOSPADM

## 2025-04-15 RX ADMIN — Medication 10 ML: at 20:40

## 2025-04-15 RX ADMIN — MUPIROCIN 1 APPLICATION: 20 OINTMENT TOPICAL at 18:12

## 2025-04-15 RX ADMIN — ACETAMINOPHEN 650 MG: 650 SUPPOSITORY RECTAL at 20:50

## 2025-04-15 RX ADMIN — SODIUM CHLORIDE 20 ML/HR: 3 INJECTION, SOLUTION INTRAVENOUS at 17:26

## 2025-04-15 NOTE — ED PROVIDER NOTES
Time: 4:16 PM EDT  Date of encounter:  4/15/2025  Independent Historian/Clinical History and Information was obtained by:   EMS  Chief Complaint: Altered mental status    History is limited by:  Confusion    History of Present Illness:  Patient is a 64 y.o. year old male who presents to the emergency department for evaluation of altered mental status and confusion.  Patient was brought to the ER via EMS from the patient's apartment.  The patient's brother called EMS after finding the patient laying facedown on the bed.  Brother reports to EMS that approximately 2 days ago the patient fell at a gas station.  The patient did not seek medical care but has not been his normal self since the fall.  The brother reports he saw him last night.  This morning he found him laying facedown in the bed with altered mental status.  EMS reports that he was covered in fecal material.  In addition the hallway leading to the bedroom had fecal material scattered everywhere.    Patient Care Team  Primary Care Provider: Magnus Euceda DO    Past Medical History:     Allergies   Allergen Reactions    Cyclobenzaprine Unknown - Low Severity     weakness     Past Medical History:   Diagnosis Date    Anxiety     Arthritis     Atherosclerosis of coronary artery bypass graft of native heart without angina pectoris 10/23/2018    Coronary artery disease with previous bypass, LIMA to the LAD, SVG to ramus, and SVG to RCA in 2015    CAD (coronary artery disease) 10/23/2018    Carotid artery stenosis with cerebral infarction 06/01/2015    Dr. Alma Rosa Rosales, extremity 9/10/2021    Depression     DM2 (diabetes mellitus, type 2) 10/23/2018    Duodenal ulcer     Essential hypertension 10/23/2018    Headache     Heart attack 2015    Heartburn     HLD (hyperlipidemia)     HTN (hypertension)     Injury of right leg 8/13/2021    Irregular heart beat     Low back pain 03/18/2019    Mild episode of recurrent depressive disorder 10/23/2018    Rash 03/18/2019     Shortness of breath     SOB (shortness of breath)     Type 2 diabetes mellitus with hyperglycemia, without long-term current use of insulin 06/11/2019    Uncontrolled diabetes mellitus 03/18/2019     Past Surgical History:   Procedure Laterality Date    CARDIAC CATHETERIZATION  06/2015    Dr. St     CIRCUMCISION      age 14 yrs old    CORONARY ARTERY BYPASS GRAFT  06/2015    5 bypasses     Family History   Problem Relation Age of Onset    Heart disease Other        Home Medications:  Prior to Admission medications    Medication Sig Start Date End Date Taking? Authorizing Provider   albuterol sulfate  (90 Base) MCG/ACT inhaler Inhale 2 puffs Every 4 (Four) Hours As Needed for Wheezing. 2/14/22   Magnus Euceda DO   ARIPiprazole (ABILIFY) 20 MG tablet TAKE 1 TABLET BY MOUTH ONCE DAILY 3/18/25   Magnus Euceda DO   Aspirin Low Dose 81 MG EC tablet TAKE 1 TABLET BY MOUTH ONCE DAILY 3/18/25   Magnus Euceda DO   atorvastatin (LIPITOR) 80 MG tablet TAKE 1 TABLET BY MOUTH AT BEDTIME FOR CHOLESTEROL 3/18/25   Magnus Euceda DO   bumetanide (BUMEX) 1 MG tablet TAKE 1 TABLET BY MOUTH ONCE DAILY FOR FLUID  Patient taking differently: Take 1 tablet by mouth Daily. 2/21/25   Magnus Euceda DO   chlorthalidone (HYGROTON) 25 MG tablet TAKE 1 TABLET BY MOUTH ONCE DAILY 12/27/24   Magnus Euceda DO   citalopram (CeleXA) 20 MG tablet TAKE 1 TABLET BY MOUTH ONCE DAILY  Patient taking differently: Take 1 tablet by mouth Daily. 2/21/25   Magnus Euceda DO   famotidine (PEPCID) 40 MG tablet TAKE 1 TABLET BY MOUTH TWICE DAILY FOR STOMACH  Patient taking differently: Take 1 tablet by mouth 2 (Two) Times a Day. 3/18/25   Magnus Euceda DO   fenofibrate (TRICOR) 145 MG tablet TAKE 1 TABLET BY MOUTH ONCE DAILY FOR CHOLESTEROL 3/25/25   Magnus Euceda DO   GNP Vitamin B-12 500 MCG tablet TAKE 1 TABLET BY MOUTH ONCE DAILY  Patient taking differently: Take 1 tablet by mouth Daily. 2/14/25   Magnus Euceda DO    guaifenesin-dextromethorphan 600-30 mg (MUCINEX DM)  MG tablet sustained-release 12 hour Take 1 tablet by mouth 2 (Two) Times a Day As Needed (congestion). 8/8/24   Magnus Euceda DO   Jardiance 25 MG tablet tablet TAKE 1 TABLET BY MOUTH EVERY MORNING TO LOWER BLOOD SUGAR  Patient taking differently: Take 1 tablet by mouth Daily. 3/18/25   Magnus Euceda DO   levothyroxine (SYNTHROID, LEVOTHROID) 50 MCG tablet TAKE 1 TABLET BY MOUTH EVERY MORNING FOR STOMACH  Patient taking differently: Take 1 tablet by mouth Every Morning. 2/21/25   Magnus Euceda DO   metFORMIN ER (GLUCOPHAGE-XR) 500 MG 24 hr tablet TAKE 2 TABLETS BY MOUTH TWICE DAILY 3/18/25   Magnus Euceda DO   nitroglycerin (NITROSTAT) 0.4 MG SL tablet Place 1 tablet under the tongue Every 5 (Five) Minutes As Needed for Chest Pain. Take no more than 3 doses in 15 minutes. 2/14/22   Magnus Euceda DO   ondansetron (Zofran) 8 MG tablet Take 1 tablet by mouth Every 8 (Eight) Hours As Needed for Nausea or Vomiting. 12/16/22   Jessica Brower APRN   pioglitazone (ACTOS) 15 MG tablet TAKE 1 TABLET BY MOUTH EVERY NIGHT AT BEDTIME. 10/11/24   Magnus Euceda DO   Rybelsus 14 MG tablet TAKE 1 TABLET BY MOUTH EVERY MORNING 3/18/25   Magnus Euceda DO   telmisartan (MICARDIS) 20 MG tablet TAKE 1 TABLET BY MOUTH ONCE DAILY FOR BLOOD PRESSURE 4/9/25   Magnus Euceda DO   traZODone (DESYREL) 100 MG tablet TAKE 1 TABLET BY MOUTH AT BEDTIME FOR SLEEP 3/18/25   Magnus Euceda DO   azithromycin (Zithromax Z-Aldo) 250 MG tablet Take 2 tablets by mouth on day 1, then 1 tablet daily on days 2-5 8/8/24 4/15/25  Magnus Euceda DO   Blood Glucose Monitoring Suppl (OneTouch Verio Reflect) w/Device kit  3/14/22 4/15/25  Kenney Conn MD   brimonidine-timolol (COMBIGAN) 0.2-0.5 % ophthalmic solution  7/5/22 4/15/25  Kenney Conn MD   latanoprost (XALATAN) 0.005 % ophthalmic solution INSTILL 1 DROP IN BOTH EYES AT BEDTIME 1/14/22 4/15/25  Provider,  MD Gerda Moulton 0.01 % ophthalmic drops INSTILL 1 DROP IN BOTH EYES AT BEDTIME 3/15/22 4/15/25  Provider, Historical, MD   OneTouch Delica Lancets 30G misc  3/11/22 4/15/25  Provider, Historical, MD   OneTouch Verio test strip  3/11/22 4/15/25  ProviderKenney MD   sodium zirconium cyclosilicate (Lokelma) 10 g pack Take 10 g by mouth 3 (Three) Times a Day. Indications: High Amount of Potassium in the Blood 11/16/22 4/15/25  Magnus Euceda DO   triamcinolone (KENALOG) 0.1 % ointment Apply 1 application topically to the appropriate area as directed 2 (Two) Times a Day. 2/15/23 4/15/25  Magnus Euceda DO        Social History:   Social History     Tobacco Use    Smoking status: Never     Passive exposure: Past    Smokeless tobacco: Current     Types: Chew   Vaping Use    Vaping status: Never Used   Substance Use Topics    Alcohol use: Not Currently    Drug use: Never         Review of Systems:  Review of Systems   Unable to perform ROS: Mental status change        Physical Exam:  /74 (BP Location: Left arm, Patient Position: Sitting)   Pulse 91   Temp 98.3 °F (36.8 °C) (Oral)   Resp 18   Wt 102 kg (223 lb 15.8 oz)   SpO2 95%   BMI 34.06 kg/m²     Physical Exam    Vital signs were reviewed under triage note.  General appearance - Patient appears well-developed and well-nourished.  Patient is in no acute distress.  The patient appears to be disheveled and poor hygiene.  Head - Normocephalic.  Patient has some soft tissue swelling and erythema involving his forehead, eyebrows, nose, and chin what appears to be pressure marks.  Pupils - Equal, round, reactive to light.  Extraocular muscles are intact.  Conjunctiva is clear.  Nasal - Normal inspection.  No evidence of trauma or epistaxis.  Tympanic membranes - Gray, intact without erythema or retractions.  Oral mucosa - Pink and moist without lesions or erythema.  Uvula is midline.  Chest wall - Atraumatic.  Chest wall is nontender.  There are no  vesicular rashes noted.  Neck - Supple.  Trachea was midline.  There is no palpable lymphadenopathy or thyromegaly.  There are no meningeal signs  Lungs - Clear to auscultation and percussion bilaterally.  Heart - Regular rate and rhythm without any murmurs, clicks, or gallops.  Abdomen - Soft.  Bowel sounds are present.  There is no palpable tenderness.  There is no rebound, guarding, or rigidity.  There are no palpable masses.  There are no pulsatile masses.  Back - Spine is straight and midline.  There is no CVA tenderness.  Extremities - Intact x4 with full range of motion.  There is no palpable edema.  Pulses are intact x4 and equal.  Neurologic - Patient is awake and alert.  Patient is confused.  Cranial nerves II through XII are grossly intact.  Motor and sensory functions grossly intact.  Cerebellar function was normal.  Integument - There are no rashes.  There are no petechia or purpura lesions noted.  There are no vesicular lesions noted.           Procedures:  Procedures      Medical Decision Making:      Comorbidities that affect care:    Anxiety, carotid stenosis, diabetes, hypertension, coronary artery disease, depression, hyperlipidemia    External Notes reviewed:    Previous Clinic Note: Office visit with Dr. Euceda from 8/8/2024 was reviewed by me.      The following orders were placed and all results were independently analyzed by me:  Orders Placed This Encounter   Procedures    XR Chest 1 View    CT Head Without Contrast    [place] Draw    Comprehensive Metabolic Panel    High Sensitivity Troponin T    Magnesium    Urinalysis With Microscopic If Indicated (No Culture) - Urine, Clean Catch    CBC Auto Differential    CK    TSH Rfx On Abnormal To Free T4    Ethanol    Urine Drug Screen - Urine, Clean Catch    High Sensitivity Troponin T 1Hr    Fentanyl, Urine - Urine, Clean Catch    Urinalysis, Microscopic Only - Urine, Clean Catch    Sodium    Osmolality, Urine - Urine, Clean Catch    Osmolality,  Serum    TSH    T4, Free    Sodium, Urine, Random - Urine, Clean Catch    Uric Acid    Triglycerides    Lactic Acid, Plasma    NPO Diet NPO Type: Strict NPO    Undress & Gown    Continuous Pulse Oximetry    Vital Signs    Orthostatic Blood Pressure    Code Status and Medical Interventions: CPR (Attempt to Resuscitate); Full Support    Hospitalist (on-call MD unless specified)    Pulmonology (on-call MD unless specified)    Nephrology  (on-call MD unless specified)    Inpatient Nephrology Consult    Oxygen Therapy- Nasal Cannula; Titrate 1-6 LPM Per SpO2; 90 - 95%    POC Glucose Once    ECG 12 Lead Altered Mental Status    Insert Peripheral IV    Inpatient Admission    Fall Precautions    CBC & Differential    Green Top (Gel)    Lavender Top    Gold Top - SST    Light Blue Top    Extra Tubes    Gray Top       Medications Given in the Emergency Department:  Medications   sodium chloride 0.9 % flush 10 mL (has no administration in time range)        ED Course:    ED Course as of 04/16/25 1856 Wed Apr 16, 2025 1854 EKG performed at 1322 was interpreted by me that show a normal sinus rhythm with a ventricular rate of 78 bpm.  MD interval was 137 ms.  P waves were normal.  QRS is wide 133 ms.  Patient has a nonspecific intraventricular conduction delay.  Axis is leftward at -6 degrees.  There are some nonspecific ST-T wave change identified.  QT corrected was 477 ms [TB]      ED Course User Index  [TB] Mikey Alonso,    The patient was seen and evaluated in the ED by me.  The above history and physical examination was performed as documented.  Diagnostic data was obtained.  Results reviewed.  Findings were discussed with the patient.  I am not sure how much the patient fully comprehends at this time.  There is been no family members present during my repeat visits to the room.  Subsequently, have consult the hospital service for admission.  I also consulted the ICU doctor, Dr. Jensen as well as nephrology, Dr. Flores.   Dr. Flores requested that I start the patient on 3% normal saline at 20 mL/h x 5 hours.  He will see the patient and address additional treatment of his critical hyponatremia.  Patient be admitted to the ICU.    Labs:    Lab Results (last 24 hours)       Procedure Component Value Units Date/Time    CBC & Differential [[id number]]  (Abnormal) Collected: 04/15/25 1235    Specimen: Blood Updated: 04/15/25 1318    Narrative:      The following orders were created for panel order CBC & Differential.  Procedure                               Abnormality         Status                     ---------                               -----------         ------                     CBC Auto Differential[[id number]]        Abnormal            Final result                 Please view results for these tests on the individual orders.    Comprehensive Metabolic Panel [[id number]]  (Abnormal) Collected: 04/15/25 1235    Specimen: Blood Updated: 04/15/25 1343     Glucose 102 mg/dL      BUN 28 mg/dL      Creatinine 1.49 mg/dL      Sodium 103 mmol/L      Potassium 3.7 mmol/L      Chloride 64 mmol/L      CO2 23.1 mmol/L      Calcium 9.3 mg/dL      Total Protein 7.9 g/dL      Albumin 4.7 g/dL      ALT (SGPT) 39 U/L      AST (SGOT) 104 U/L      Alkaline Phosphatase 52 U/L      Total Bilirubin 1.8 mg/dL      Globulin 3.2 gm/dL      A/G Ratio 1.5 g/dL      BUN/Creatinine Ratio 18.8     Anion Gap 15.9 mmol/L      eGFR 51.4 mL/min/1.73     Narrative:      GFR Categories in Chronic Kidney Disease (CKD)      GFR Category          GFR (mL/min/1.73)    Interpretation  G1                     90 or greater         Normal or high (1)  G2                      60-89                Mild decrease (1)  G3a                   45-59                Mild to moderate decrease  G3b                   30-44                Moderate to severe decrease  G4                    15-29                Severe decrease  G5                    14 or less           Kidney  failure          (1)In the absence of evidence of kidney disease, neither GFR category G1 or G2 fulfill the criteria for CKD.    eGFR calculation 2021 CKD-EPI creatinine equation, which does not include race as a factor    High Sensitivity Troponin T [[id number]]  (Abnormal) Collected: 04/15/25 1235    Specimen: Blood Updated: 04/15/25 1343     HS Troponin T 128 ng/L     Narrative:      High Sensitive Troponin T Reference Range:  <14.0 ng/L- Negative Female for AMI  <22.0 ng/L- Negative Male for AMI  >=14 - Abnormal Female indicating possible myocardial injury.  >=22 - Abnormal Male indicating possible myocardial injury.   Clinicians would have to utilize clinical acumen, EKG, Troponin, and serial changes to determine if it is an Acute Myocardial Infarction or myocardial injury due to an underlying chronic condition.         Magnesium [[id number]]  (Normal) Collected: 04/15/25 1235    Specimen: Blood Updated: 04/15/25 1307     Magnesium 1.6 mg/dL     CBC Auto Differential [[id number]]  (Abnormal) Collected: 04/15/25 1235    Specimen: Blood Updated: 04/15/25 1318     WBC 17.49 10*3/mm3      RBC 5.02 10*6/mm3      Hemoglobin 14.9 g/dL      Hematocrit 40.1 %      MCV 79.9 fL      MCH 29.7 pg      MCHC 37.2 g/dL      RDW 12.1 %      RDW-SD 35.0 fl      MPV 9.3 fL      Platelets 329 10*3/mm3      Neutrophil % 84.8 %      Lymphocyte % 5.5 %      Monocyte % 9.0 %      Eosinophil % 0.2 %      Basophil % 0.1 %      Immature Grans % 0.4 %      Neutrophils, Absolute 14.82 10*3/mm3      Lymphocytes, Absolute 0.96 10*3/mm3      Monocytes, Absolute 1.58 10*3/mm3      Eosinophils, Absolute 0.04 10*3/mm3      Basophils, Absolute 0.02 10*3/mm3      Immature Grans, Absolute 0.07 10*3/mm3      nRBC 0.0 /100 WBC     CK [[id number]]  (Abnormal) Collected: 04/15/25 1235    Specimen: Blood Updated: 04/15/25 1346     Creatine Kinase 3,056 U/L     TSH Rfx On Abnormal To Free T4 [[id number]]  (Normal) Collected: 04/15/25 1235    Specimen: Blood  Updated: 04/15/25 1340     TSH 2.700 uIU/mL     Ethanol [[id number]] Collected: 04/15/25 1235    Specimen: Blood Updated: 04/15/25 1334     Ethanol <10 mg/dL      Ethanol % <0.010 %     Narrative:      Ethanol (Plasma)  <10 Essentially Negative    Toxic Concentrations           mg/dL    Flushing, slowing of reflexes    Impaired visual activity         Depression of CNS              >100  Possible Coma                  >300       Osmolality, Serum [[id number]]  (Abnormal) Collected: 04/15/25 1235    Specimen: Blood Updated: 04/15/25 1613     Osmolality 229 mOsm/kg     TSH [[id number]] Collected: 04/15/25 1235    Specimen: Blood Updated: 04/15/25 1606    T4, Free [[id number]] Collected: 04/15/25 1235    Specimen: Blood Updated: 04/15/25 1606    High Sensitivity Troponin T 1Hr [[id number]] Updated: 04/15/25 1508    Specimen: Blood     Urinalysis With Microscopic If Indicated (No Culture) - Urine, Clean Catch [[id number]]  (Abnormal) Collected: 04/15/25 1515    Specimen: Urine, Clean Catch Updated: 04/15/25 1524     Color, UA Yellow     Appearance, UA Clear     pH, UA 5.5     Specific Gravity, UA 1.012     Glucose, UA >=1000 mg/dL (3+)     Ketones, UA Negative     Bilirubin, UA Negative     Blood, UA Moderate (2+)     Protein, UA Negative     Leuk Esterase, UA Negative     Nitrite, UA Negative     Urobilinogen, UA 1.0 E.U./dL    Urine Drug Screen - Urine, Clean Catch [[id number]]  (Normal) Collected: 04/15/25 1515    Specimen: Urine, Clean Catch Updated: 04/15/25 1528     THC, Screen, Urine Negative     Phencyclidine (PCP), Urine Negative     Cocaine Screen, Urine Negative     Methamphetamine, Ur Negative     Opiate Screen Negative     Amphetamine Screen, Urine Negative     Benzodiazepine Screen, Urine Negative     Tricyclic Antidepressants Screen Negative     Methadone Screen, Urine Negative     Barbiturates Screen, Urine Negative     Oxycodone Screen, Urine Negative     Buprenorphine, Screen, Urine Negative     Narrative:      Cutoff For Drugs Screened:    Amphetamines               500 ng/ml  Barbiturates               200 ng/ml  Benzodiazepines            150 ng/ml  Cocaine                    150 ng/ml  Methadone                  200 ng/ml  Opiates                    100 ng/ml  Phencyclidine               25 ng/ml  THC                         50 ng/ml  Methamphetamine            500 ng/ml  Tricyclic Antidepressants  300 ng/ml  Oxycodone                  100 ng/ml  Buprenorphine               10 ng/ml    The normal value for all drugs tested is negative. This report includes unconfirmed screening results, with the cutoff values listed, to be used for medical treatment purposes only.  Unconfirmed results must not be used for non-medical purposes such as employment or legal testing.  Clinical consideration should be applied to any drug of abuse test, particularly when unconfirmed results are used.      Fentanyl, Urine - Urine, Clean Catch [[id number]]  (Normal) Collected: 04/15/25 1515    Specimen: Urine, Clean Catch Updated: 04/15/25 1530     Fentanyl, Urine Negative    Narrative:      Negative Threshold:      Fentanyl 5 ng/mL     The normal value for the drug tested is negative. This report includes final unconfirmed screening results to be used for medical treatment purposes only. Unconfirmed results must not be used for non-medical purposes such as employment or legal testing. Clinical consideration should be applied to any drug of abuse test, particularly when unconfirmed results are used.           Urinalysis, Microscopic Only - Urine, Clean Catch [[id number]] Collected: 04/15/25 1515    Specimen: Urine, Clean Catch Updated: 04/15/25 1524     RBC, UA 0-2 /HPF      WBC, UA 0-2 /HPF      Bacteria, UA None Seen /HPF      Squamous Epithelial Cells, UA 0-2 /HPF      Hyaline Casts, UA 0-2 /LPF      Methodology Automated Microscopy    Osmolality, Urine - Urine, Clean Catch [[id number]]  (Normal) Collected: 04/15/25 1515     Specimen: Urine, Clean Catch Updated: 04/15/25 1615     Osmolality, Urine 271 mOsm/kg     Sodium, Urine, Random - Urine, Clean Catch [[id number]] Collected: 04/15/25 1515    Specimen: Urine, Clean Catch Updated: 04/15/25 1611             Imaging:    CT Head Without Contrast  Result Date: 4/15/2025  CT HEAD WO CONTRAST Date of Exam: 4/15/2025 1:36 PM EDT Indication: Altered mental status?recent fall with change in mental status. Comparison: None available. Technique: Axial CT images were obtained of the head without contrast administration.  Reconstructed coronal and sagittal images were also obtained. Automated exposure control and iterative construction methods were used. Findings: There is mild parenchymal volume loss. No evidence of mass effect, midline shift, acute hemorrhage or edema. Ventricles are appropriate in size and configuration. There is mild periventricular white matter hypodensity most commonly secondary to chronic small vessel ischemic change. No abnormal fluid collections are identified. There is mucosal thickening in the left maxillary sinus. Probable bilateral cerumen impactions. Orbits are unremarkable for age.     Impression: 1.No acute intracranial abnormality identified by noncontrast CT. 2.Mild parenchymal volume loss and probable chronic small vessel ischemic change. Electronically Signed: Heriberto Casillas MD  4/15/2025 1:48 PM EDT  Workstation ID: [id number]    XR Chest 1 View  Result Date: 4/15/2025  XR CHEST 1 VW Date of Exam: 4/15/2025 12:38 PM EDT Indication: Weak/Dizzy/AMS triage protocol Comparison: Chest x-ray 11/4/2022 Findings: The heart is stable in size. Median sternotomy wires are noted. Some mild left basilar atelectasis is not excluded. No evidence for pneumothorax or large pleural effusion.     Impression: Suspected mild left basilar atelectasis. Electronically Signed: Theresa Mancilla MD  4/15/2025 1:00 PM EDT  Workstation ID: [id number]        Differential Diagnosis and  Discussion:    Altered Mental Status: Based on the patient's signs and symptoms, differential diagnosis includes but is not limited to meningitis, stroke, sepsis, subarachnoid hemorrhage, intracranial bleeding, encephalitis, and metabolic encephalopathy.    Labs were collected in the emergency department and all labs were reviewed and interpreted by me.  X-ray were performed in the emergency department and all X-ray impressions were independently interpreted by me.  An EKG was performed and the EKG was interpreted by me.  CT scan was performed in the emergency department and the CT scan radiology impression was interpreted by me.    [facility]       Critical Care Note: Total Critical Care time of 49 minutes. Total critical care time documented does not include time spent on separately billed procedures for services of nurses or physician assistants. I personally saw and examined the patient. I have reviewed all diagnostic interpretations and treatment plans as written. I was present for the key portions of any procedures performed and the inclusive time noted in any critical care statement. Critical care time includes patient management by me, time spent at the patients bedside,  time to review lab and imaging results, discussing patient care, documentation in the medical record, and time spent with family or caregiver.    Patient Care Considerations:    SEPSIS IS NOT PRESENT IN THE EMERGENCY DEPARTMENT: Patient meets SIRS criteria in the emergency department however the patient does not have a known source of bacterial infection to confirm the diagnosis of sepsis.        Consultants/Shared Management Plan:      Dr. Olivarez, hospitalist was consulted.  He agreed to admit the patient.  Dr. Jensen, intensivist was consulted to see the patient in the ICU.  Dr. Flores, nephrologist was consulted and agreed to see the patient in consultation.  He also recommends starting the patient on 3% normal saline at 20 mL/h for 5  hours.    Social Determinants of Health:    Patient is unable to carry out activities of daily life. Escalation of care is necessary.       Disposition and Care Coordination:    Admit:   Through independent evaluation of the patient's history, physical, and imperical data, the patient meets criteria for inpatient admission to the hospital.        Final diagnoses:   Hyponatremia   Non-traumatic rhabdomyolysis   ESVIN (acute kidney injury)        ED Disposition       ED Disposition   Decision to Admit    Condition   --    Comment   Level of Care: Critical Care [6]   Diagnosis: Hyponatremia [291121]   Certification: I Certify That Inpatient Hospital Services Are Medically Necessary For Greater Than 2 Midnights                 This medical record created using voice recognition software.             Mikey Alonso DO  04/16/25 6312

## 2025-04-15 NOTE — H&P
Patient Care Team:  Magnus Euceda DO as PCP - General (Family Medicine)    Chief complaint altered mental status    Subjective     Patient is a 64 y.o. male with history of HTN, HLD, NIDDM, CAD s/p CABG, hypothyroidism, depression, asthma presents with altered mental status. He was found face-down on his bed minimally responsive. He has apparently had multiple falls the past week. He complains of lower extremity weakness. Patient has apparently had copious diarrhea. He had a sodium 103. Denies recent medication changes.     In the ED patient hemodynamically stable, room air.  UDS negative.  Labs significant for sodium 103, creatinine 1.49, troponin 128, white blood count 17, CK 3000.  CT head negative for acute change.  Nephrology consulted and started on hypertonic saline.  Patient subsequently admitted to the ICU for further care.    Review of Systems   Pertinent items are noted in HPI    History  Past Medical History:   Diagnosis Date    Anxiety     Arthritis     Atherosclerosis of coronary artery bypass graft of native heart without angina pectoris 10/23/2018    Coronary artery disease with previous bypass, LIMA to the LAD, SVG to ramus, and SVG to RCA in 2015    CAD (coronary artery disease) 10/23/2018    Carotid artery stenosis with cerebral infarction 06/01/2015    Dr. Alma Rosa Rosales, extremity 9/10/2021    Depression     DM2 (diabetes mellitus, type 2) 10/23/2018    Duodenal ulcer     Essential hypertension 10/23/2018    Headache     Heart attack 2015    Heartburn     HLD (hyperlipidemia)     HTN (hypertension)     Injury of right leg 8/13/2021    Irregular heart beat     Low back pain 03/18/2019    Mild episode of recurrent depressive disorder 10/23/2018    Rash 03/18/2019    Shortness of breath     SOB (shortness of breath)     Type 2 diabetes mellitus with hyperglycemia, without long-term current use of insulin 06/11/2019    Uncontrolled diabetes mellitus 03/18/2019     Past Surgical History:    Procedure Laterality Date    CARDIAC CATHETERIZATION  06/2015    Dr. St     CIRCUMCISION      age 14 yrs old    CORONARY ARTERY BYPASS GRAFT  06/2015    5 bypasses     Family History   Problem Relation Age of Onset    Heart disease Other      Social History     Tobacco Use    Smoking status: Never     Passive exposure: Past    Smokeless tobacco: Current     Types: Chew   Vaping Use    Vaping status: Never Used   Substance Use Topics    Alcohol use: Not Currently    Drug use: Never     Medications Prior to Admission   Medication Sig Dispense Refill Last Dose/Taking    albuterol sulfate  (90 Base) MCG/ACT inhaler Inhale 2 puffs Every 4 (Four) Hours As Needed for Wheezing. 18 g 11 Unknown    ARIPiprazole (ABILIFY) 20 MG tablet TAKE 1 TABLET BY MOUTH ONCE DAILY 30 tablet 0 Unknown    Aspirin Low Dose 81 MG EC tablet TAKE 1 TABLET BY MOUTH ONCE DAILY 30 tablet 0 Unknown    atorvastatin (LIPITOR) 80 MG tablet TAKE 1 TABLET BY MOUTH AT BEDTIME FOR CHOLESTEROL 30 tablet 0 Unknown    bumetanide (BUMEX) 1 MG tablet TAKE 1 TABLET BY MOUTH ONCE DAILY FOR FLUID (Patient taking differently: Take 1 tablet by mouth Daily.) 90 tablet 0 Unknown    chlorthalidone (HYGROTON) 25 MG tablet TAKE 1 TABLET BY MOUTH ONCE DAILY 30 tablet 4 Unknown    citalopram (CeleXA) 20 MG tablet TAKE 1 TABLET BY MOUTH ONCE DAILY (Patient taking differently: Take 1 tablet by mouth Daily.) 90 tablet 0 Unknown    famotidine (PEPCID) 40 MG tablet TAKE 1 TABLET BY MOUTH TWICE DAILY FOR STOMACH (Patient taking differently: Take 1 tablet by mouth 2 (Two) Times a Day.) 60 tablet 0 Unknown    fenofibrate (TRICOR) 145 MG tablet TAKE 1 TABLET BY MOUTH ONCE DAILY FOR CHOLESTEROL 90 tablet 0 Unknown    GNP Vitamin B-12 500 MCG tablet TAKE 1 TABLET BY MOUTH ONCE DAILY (Patient taking differently: Take 1 tablet by mouth Daily.) 90 tablet 0 Unknown    guaifenesin-dextromethorphan 600-30 mg (MUCINEX DM)  MG tablet sustained-release 12 hour Take 1 tablet  by mouth 2 (Two) Times a Day As Needed (congestion). 30 tablet 0 Unknown    Jardiance 25 MG tablet tablet TAKE 1 TABLET BY MOUTH EVERY MORNING TO LOWER BLOOD SUGAR (Patient taking differently: Take 1 tablet by mouth Daily.) 30 tablet 2 Unknown    levothyroxine (SYNTHROID, LEVOTHROID) 50 MCG tablet TAKE 1 TABLET BY MOUTH EVERY MORNING FOR STOMACH (Patient taking differently: Take 1 tablet by mouth Every Morning.) 90 tablet 0 Unknown    metFORMIN ER (GLUCOPHAGE-XR) 500 MG 24 hr tablet TAKE 2 TABLETS BY MOUTH TWICE DAILY 120 tablet 0 Unknown    nitroglycerin (NITROSTAT) 0.4 MG SL tablet Place 1 tablet under the tongue Every 5 (Five) Minutes As Needed for Chest Pain. Take no more than 3 doses in 15 minutes. 15 tablet 5 Unknown    ondansetron (Zofran) 8 MG tablet Take 1 tablet by mouth Every 8 (Eight) Hours As Needed for Nausea or Vomiting. 20 tablet 0 Unknown    pioglitazone (ACTOS) 15 MG tablet TAKE 1 TABLET BY MOUTH EVERY NIGHT AT BEDTIME. 90 tablet 2 Unknown    Rybelsus 14 MG tablet TAKE 1 TABLET BY MOUTH EVERY MORNING 30 tablet 2 Unknown    telmisartan (MICARDIS) 20 MG tablet TAKE 1 TABLET BY MOUTH ONCE DAILY FOR BLOOD PRESSURE 30 tablet 2 Unknown    traZODone (DESYREL) 100 MG tablet TAKE 1 TABLET BY MOUTH AT BEDTIME FOR SLEEP 30 tablet 0 Unknown     Allergies:  Cyclobenzaprine    Objective     Vital Signs  Temp:  [98.3 °F (36.8 °C)-98.7 °F (37.1 °C)] 98.7 °F (37.1 °C)  Heart Rate:  [78-91] 86  Resp:  [18-20] 19  BP: (130-156)/(68-75) 141/68    Physical Exam:      General Appearance:  Alert, cooperative, in no acute distress   Head:  Normocephalic, without obvious abnormality, atraumatic   Eyes:  Lids and lashes normal, conjunctivae and sclerae normal, no icterus, no pallor, corneas clear, PERRLA   Ears:  Ears appear intact with no abnormalities noted   Throat:  No oral lesions, no thrush, oral mucosa moist   Neck:  No adenopathy, supple, trachea midline, no thyromegaly, no carotid bruit, no JVD   Back:  No kyphosis  present, no scoliosis present, no skin lesions, erythema or scars, no tenderness to percussion or palpation, range of motion normal   Lungs:  Clear to auscultation, respirations regular, even and unlabored    Heart:  Regular rhythm and normal rate, normal S1 and S2, no murmur, no gallop, no rub, no click   Chest Wall:  No abnormalities observed   Abdomen:  Normal bowel sounds, no masses, no organomegaly, soft non-tender, non-distended, no guarding, no rebound tenderness   Rectal:  Deferred   Extremities:  Moves all extremities well, no edema, no cyanosis, no redness   Pulses:  Pulses palpable and equal bilaterally   Skin:  No bleeding, bruising or rash   Lymph nodes:  No palpable adenopathy   Neurologic:  Cranial nerves 2 - 12 grossly intact, sensation intact, DTR present and equal bilaterally       Results Review:    I reviewed the patient's new clinical results.  I reviewed the patient's new imaging results and agree with the interpretation.  I reviewed the patient's other test results and agree with the interpretation  I personally viewed and interpreted the patient's EKG/Telemetry data    Assessment & Plan       Hyponatremia  Rhabdomyolysis  Generalized weakness  Acute encephalopathy 2/2 hyponatremia    Admit to ICU  Telemetry  Hypertonic saline for goal sodium correction 4-6meq in first 24 hours  Seizure precautions  Q6h sodium  Urine osm, sodium   Nephroconsult  Trend CK  NPO  F/u cultures and procal  Full Code        Dino Olivarez MD  04/15/25  17:35 EDT

## 2025-04-15 NOTE — CONSULTS
INTENSIVIST   PROGRESS NOTE          SUBJECTIVE     Alberto 64 y.o. male is followed for:Altered Mental Status       Hyponatremia    As an Intensivist, we provide an integrated approach to the ICU patient and family, medical management of comorbid conditions, including but not limited to electrolytes, glycemic control, organ dysfunction, lead interdisciplinary rounds and coordinate the care with all other services, including those from other specialists.     HPI:  Alberto is a 64 y.o. male with PMH hypertension, hyperlipidemia, non-insulin-dependent type 2 diabetes, CAD status post CABG, hypothyroidism, depression, mild intermittent asthma who presented to this Hospital on 4/15/2025 due to being found down.  Patient states over the past week he has been falling.  States that his lower extremities bilaterally have been weak.  Denies any sensory changes.  Otherwise states he has been feeling well without any symptoms.  Denies fever, lightheadedness/changes in vision, nausea, vomiting, diarrhea, change in PO intake, seizure. Denies alcohol use or drug use. Chews tobacco. Lives at home alone.     In the ED patient hemodynamically stable, room air.  UDS negative.  Labs significant for sodium 103, creatinine 1.49, troponin 128, white blood count 17, CK 3000.  CT head negative for acute change.  Nephrology consulted and started on hypertonic saline.  Patient subsequently admitted to the ICU for further care.         PMH: He  has a past medical history of Anxiety, Arthritis, Atherosclerosis of coronary artery bypass graft of native heart without angina pectoris (10/23/2018), CAD (coronary artery disease) (10/23/2018), Carotid artery stenosis with cerebral infarction (06/01/2015), Cramps, extremity (9/10/2021), Depression, DM2 (diabetes mellitus, type 2) (10/23/2018), Duodenal ulcer, Essential hypertension (10/23/2018), Headache, Heart attack (2015), Heartburn, HLD (hyperlipidemia), HTN (hypertension), Injury of right leg  (2021), Irregular heart beat, Low back pain (2019), Mild episode of recurrent depressive disorder (10/23/2018), Rash (2019), Shortness of breath, SOB (shortness of breath), Type 2 diabetes mellitus with hyperglycemia, without long-term current use of insulin (2019), and Uncontrolled diabetes mellitus (2019).   PSxH: He  has a past surgical history that includes Cardiac catheterization (2015); Circumcision, non-; and Coronary artery bypass graft (2015).     Medications:  No current facility-administered medications on file prior to encounter.     Current Outpatient Medications on File Prior to Encounter   Medication Sig    albuterol sulfate  (90 Base) MCG/ACT inhaler Inhale 2 puffs Every 4 (Four) Hours As Needed for Wheezing.    ARIPiprazole (ABILIFY) 20 MG tablet TAKE 1 TABLET BY MOUTH ONCE DAILY    Aspirin Low Dose 81 MG EC tablet TAKE 1 TABLET BY MOUTH ONCE DAILY    atorvastatin (LIPITOR) 80 MG tablet TAKE 1 TABLET BY MOUTH AT BEDTIME FOR CHOLESTEROL    bumetanide (BUMEX) 1 MG tablet TAKE 1 TABLET BY MOUTH ONCE DAILY FOR FLUID (Patient taking differently: Take 1 tablet by mouth Daily.)    chlorthalidone (HYGROTON) 25 MG tablet TAKE 1 TABLET BY MOUTH ONCE DAILY    citalopram (CeleXA) 20 MG tablet TAKE 1 TABLET BY MOUTH ONCE DAILY (Patient taking differently: Take 1 tablet by mouth Daily.)    famotidine (PEPCID) 40 MG tablet TAKE 1 TABLET BY MOUTH TWICE DAILY FOR STOMACH (Patient taking differently: Take 1 tablet by mouth 2 (Two) Times a Day.)    fenofibrate (TRICOR) 145 MG tablet TAKE 1 TABLET BY MOUTH ONCE DAILY FOR CHOLESTEROL    GNP Vitamin B-12 500 MCG tablet TAKE 1 TABLET BY MOUTH ONCE DAILY (Patient taking differently: Take 1 tablet by mouth Daily.)    guaifenesin-dextromethorphan 600-30 mg (MUCINEX DM)  MG tablet sustained-release 12 hour Take 1 tablet by mouth 2 (Two) Times a Day As Needed (congestion).    Jardiance 25 MG tablet tablet TAKE 1 TABLET BY  MOUTH EVERY MORNING TO LOWER BLOOD SUGAR (Patient taking differently: Take 1 tablet by mouth Daily.)    levothyroxine (SYNTHROID, LEVOTHROID) 50 MCG tablet TAKE 1 TABLET BY MOUTH EVERY MORNING FOR STOMACH (Patient taking differently: Take 1 tablet by mouth Every Morning.)    metFORMIN ER (GLUCOPHAGE-XR) 500 MG 24 hr tablet TAKE 2 TABLETS BY MOUTH TWICE DAILY    nitroglycerin (NITROSTAT) 0.4 MG SL tablet Place 1 tablet under the tongue Every 5 (Five) Minutes As Needed for Chest Pain. Take no more than 3 doses in 15 minutes.    ondansetron (Zofran) 8 MG tablet Take 1 tablet by mouth Every 8 (Eight) Hours As Needed for Nausea or Vomiting.    pioglitazone (ACTOS) 15 MG tablet TAKE 1 TABLET BY MOUTH EVERY NIGHT AT BEDTIME.    Rybelsus 14 MG tablet TAKE 1 TABLET BY MOUTH EVERY MORNING    telmisartan (MICARDIS) 20 MG tablet TAKE 1 TABLET BY MOUTH ONCE DAILY FOR BLOOD PRESSURE    traZODone (DESYREL) 100 MG tablet TAKE 1 TABLET BY MOUTH AT BEDTIME FOR SLEEP    [DISCONTINUED] azithromycin (Zithromax Z-Aldo) 250 MG tablet Take 2 tablets by mouth on day 1, then 1 tablet daily on days 2-5    [DISCONTINUED] Blood Glucose Monitoring Suppl (OneTouch Verio Reflect) w/Device kit     [DISCONTINUED] brimonidine-timolol (COMBIGAN) 0.2-0.5 % ophthalmic solution     [DISCONTINUED] latanoprost (XALATAN) 0.005 % ophthalmic solution INSTILL 1 DROP IN BOTH EYES AT BEDTIME    [DISCONTINUED] Lumigan 0.01 % ophthalmic drops INSTILL 1 DROP IN BOTH EYES AT BEDTIME    [DISCONTINUED] OneTouch Delica Lancets 30G misc     [DISCONTINUED] OneTouch Verio test strip     [DISCONTINUED] sodium zirconium cyclosilicate (Lokelma) 10 g pack Take 10 g by mouth 3 (Three) Times a Day. Indications: High Amount of Potassium in the Blood    [DISCONTINUED] triamcinolone (KENALOG) 0.1 % ointment Apply 1 application topically to the appropriate area as directed 2 (Two) Times a Day.        Allergies: He is allergic to cyclobenzaprine.   FH: His family history includes  Heart disease in an other family member.   SH: He  reports that he has never smoked. He has been exposed to tobacco smoke. His smokeless tobacco use includes chew. He reports that he does not currently use alcohol. He reports that he does not use drugs.     The patient's relevant past medical, surgical and social history were reviewed and updated in Epic as appropriate.                 Review of Systems  As described in the HPI.       OBJECTIVE     Vitals:  Temp: 98.3 °F (36.8 °C) (04/15/25 1220) Temp  Min: 98.3 °F (36.8 °C)  Max: 98.3 °F (36.8 °C)   Temp core:      BP: 130/74 (04/15/25 1546) BP  Min: 130/74  Max: 156/75   MAP (non-invasive)   Noninvasive MAP (mmHg)  Av  Min: 89  Max: 97   Pulse: 91 (04/15/25 1546) Pulse  Min: 78  Max: 91   Resp: 18 (04/15/25 1546) Resp  Min: 18  Max: 20   SpO2: 95 % (04/15/25 1546) SpO2  Min: 94 %  Max: 95 %   Device: room air (04/15/25 1546)    Flow Rate:   No data recorded         04/15/25  1220   Weight: 102 kg (223 lb 15.8 oz)        Intake/Ouptut 24 hrs (7:00AM - 6:59 AM)  Intake & Output (last 2 days)          0701   0700  0701  04/15 0700 04/15 0701   0700           Urine Unmeasured Occurrence   1 x              Physical Examination  Telemetry:  Rhythm: normal sinus rhythm (04/15/25 1240)         Constitutional:  No acute distress.Unkempt appearance      Cardiovascular: RRR.    Respiratory: Normal breath sounds  No adventitious sounds   Abdominal:  Soft with no tenderness.   Extremities: No Edema   Neurological:   Alert, Oriented, Cooperative.                      Lines, Drains & Airways       Active LDAs       Name Placement date Placement time Site Days    Peripheral IV 04/15/25 1435 20 G Right Antecubital 04/15/25  1435  Antecubital  less than 1                    Results Reviewed:  Laboratory  Microbiology  Radiology  Pathology    Hematology:  Results from last 7 days   Lab Units 04/15/25  1235   WBC 10*3/mm3 17.49*   HEMOGLOBIN g/dL 14.9   MCV  "fL 79.9   PLATELETS 10*3/mm3 329     Results from last 7 days   Lab Units 04/15/25  1235   NEUTROS ABS 10*3/mm3 14.82*   LYMPHS ABS 10*3/mm3 0.96   EOS ABS 10*3/mm3 0.04     Chemistry:  Estimated Creatinine Clearance: 56.4 mL/min (A) (by C-G formula based on SCr of 1.49 mg/dL (H)).    Results from last 7 days   Lab Units 04/15/25  1235   SODIUM mmol/L 103*   POTASSIUM mmol/L 3.7   CHLORIDE mmol/L 64*   CO2 mmol/L 23.1   BUN mg/dL 28*   CREATININE mg/dL 1.49*   GLUCOSE mg/dL 102*     Results from last 7 days   Lab Units 04/15/25  1235   CALCIUM mg/dL 9.3   MAGNESIUM mg/dL 1.6       Hepatic Panel:  Results from last 7 days   Lab Units 04/15/25  1235   ALBUMIN g/dL 4.7   TOTAL PROTEIN g/dL 7.9   BILIRUBIN mg/dL 1.8*   AST (SGOT) U/L 104*   ALT (SGPT) U/L 39   ALK PHOS U/L 52        Coagulation Labs:         Cardiac Labs:  Results from last 7 days   Lab Units 04/15/25  1235   CK TOTAL U/L 3,056*   HSTROP T ng/L 128*       Biomarkers:        U/A  Results from last 7 days   Lab Units 04/15/25  1515   COLOR UA  Yellow   CLARITY UA  Clear   PH, URINE  5.5   SPECIFIC GRAVITY, URINE  1.012   GLUCOSE UA  >=1000 mg/dL (3+)*   KETONES UA  Negative   BILIRUBIN UA  Negative   PROTEIN UA  Negative   BLOOD UA  Moderate (2+)*   LEUKOCYTES UA  Negative   NITRITE UA  Negative   UROBILINOGEN UA  1.0 E.U./dL       Results from last 7 days   Lab Units 04/15/25  1515   RBC UA /HPF 0-2   WBC UA /HPF 0-2   BACTERIA UA /HPF None Seen   SQUAM EPITHEL UA /HPF 0-2   HYALINE CASTS UA /LPF 0-2       COVID-19  No results found for: \"COVID19\"    Arterial Blood Gases:        Images:  CT Head Without Contrast  Result Date: 4/15/2025  Impression: 1.No acute intracranial abnormality identified by noncontrast CT. 2.Mild parenchymal volume loss and probable chronic small vessel ischemic change. Electronically Signed: Heriberto Casillas MD  4/15/2025 1:48 PM EDT  Workstation ID: [id number]    XR Chest 1 View  Result Date: 4/15/2025  Impression: Suspected mild left " basilar atelectasis. Electronically Signed: Theresa Mancilla MD  4/15/2025 1:00 PM EDT  Workstation ID: [id number]      Echo:      Results: Reviewed.  I reviewed the patient's new laboratory and imaging results.  I independently reviewed the patient's new images.    Medications: Reviewed.    Assessment    A/P     Hospital:  LOS: 0 days   ICU: Patient does not have an ICU stay during this admission.     Active Hospital Problems    Diagnosis  POA    **Hyponatremia [E87.1]  Yes     Alberto is a 64 y.o. male admitted on 4/15/2025 with Hyponatremia [E87.1]    Assessment/Management/Treatment Plan:    Acute hypotonic hyponatremia  Metabolic encephalopathy  NSTEMI, type 2   PMH: hypertension, hyperlipidemia, non-insulin-dependent type 2 diabetes, CAD status post CABG, hypothyroidism, depression, mild intermittent asthma (not in exacerbation)    Pulmonary   Room air. CXR reviewed, atelectasis. Albuterol PRN for hx asthma.   Cardiovascular  HDS. No augmentation required. Avoid IVF.   Trop 128, repeat pending. No chest pain. EKG w/o ST changes.   Neuro  UDS and ethanol negative   GI/Hepatology  NPO  Renal  Nephro closely following and managing hypoNa. Initial sodium 103, 8/2024 sodium 135. Started on 3% saline at 20/hr. Trend Na closely. Neurochecks in place.    Cr 1.49, appears at baseline since 12/2022.   TSH within normal limits, uric acid 6.9.  CK 3K, repeat in AM.   ID/Antibiotics  Leukocytosis noted. UA and CXR negative. Send blood cultures and procal. No antbx for now.   Hematology  Hgb and plts wnl. SCDs for DVT prophy.   Endocrine  Body mass index is 34.06 kg/m². Obese Class I: 30-34.9kg/m2  Type 2 diabetes.. Accuchecks q6hr.     Lab Results   Lab Value Date/Time    HGBA1C 6.3 (A) 05/22/2024 1529    HGBA1C 6.10 (H) 09/06/2023 1136    HGBA1C 5.80 (H) 02/15/2023 1143           Diet: NPO Diet NPO Type: Strict NPO  No active supplement orders      Advance Directives: Code Status and Medical Interventions: CPR (Attempt to  Resuscitate); Full Support   Ordered at: 04/15/25 1554     Code Status (Patient has no pulse and is not breathing):    CPR (Attempt to Resuscitate)     Medical Interventions (Patient has pulse or is breathing):    Full Support        VTE Prophylaxis:  Mechanical VTE prophylaxis orders are signed & held.          Disposition: Admit to ICU    Plan of care and goals reviewed during interdisciplinary rounds.  I discussed the patient's findings and my recommendations with patient    Time: 38 minutes of critical care provided. This time excludes other billable procedures. Time does include preparation of documents, medical consultations, review of old records, and direct bedside care. Patient is at high risk for life-threatening deterioration due to Severe Electrolyte imbalance.    He has a high risk of imminent or life-threatening  deterioration, which requires the highest level of physician preparedness to intervene urgently. I devoted my full attention to the direct care of this patient for the amount of time indicated above.     Time spent with family or surrogate(s) is included only if the patient was incapable of providing the necessary information or participating in medical decision making.        Kayla Jensen MD  Pulmonary Critical Care Medicine

## 2025-04-16 ENCOUNTER — APPOINTMENT (OUTPATIENT)
Dept: CARDIOLOGY | Facility: HOSPITAL | Age: 65
End: 2025-04-16
Payer: MEDICARE

## 2025-04-16 ENCOUNTER — APPOINTMENT (OUTPATIENT)
Dept: GENERAL RADIOLOGY | Facility: HOSPITAL | Age: 65
End: 2025-04-16
Payer: MEDICARE

## 2025-04-16 LAB
ALBUMIN SERPL-MCNC: 4 G/DL (ref 3.5–5.2)
ANION GAP SERPL CALCULATED.3IONS-SCNC: 15.6 MMOL/L (ref 5–15)
BUN SERPL-MCNC: 24 MG/DL (ref 8–23)
BUN/CREAT SERPL: 18.3 (ref 7–25)
CALCIUM SPEC-SCNC: 9.2 MG/DL (ref 8.6–10.5)
CHLORIDE SERPL-SCNC: 73 MMOL/L (ref 98–107)
CK SERPL-CCNC: 3767 U/L (ref 20–200)
CO2 SERPL-SCNC: 20.4 MMOL/L (ref 22–29)
CREAT SERPL-MCNC: 1.31 MG/DL (ref 0.76–1.27)
DEPRECATED RDW RBC AUTO: 36.3 FL (ref 37–54)
EGFRCR SERPLBLD CKD-EPI 2021: 60 ML/MIN/1.73
ERYTHROCYTE [DISTWIDTH] IN BLOOD BY AUTOMATED COUNT: 12.2 % (ref 12.3–15.4)
GLUCOSE BLDC GLUCOMTR-MCNC: 137 MG/DL (ref 70–99)
GLUCOSE BLDC GLUCOMTR-MCNC: 190 MG/DL (ref 70–99)
GLUCOSE BLDC GLUCOMTR-MCNC: 70 MG/DL (ref 70–99)
GLUCOSE BLDC GLUCOMTR-MCNC: 81 MG/DL (ref 70–99)
GLUCOSE SERPL-MCNC: 79 MG/DL (ref 65–99)
HCT VFR BLD AUTO: 41.8 % (ref 37.5–51)
HGB BLD-MCNC: 15.2 G/DL (ref 13–17.7)
MAGNESIUM SERPL-MCNC: 1.9 MG/DL (ref 1.6–2.4)
MCH RBC QN AUTO: 29.5 PG (ref 26.6–33)
MCHC RBC AUTO-ENTMCNC: 36.4 G/DL (ref 31.5–35.7)
MCV RBC AUTO: 81.2 FL (ref 79–97)
PHOSPHATE SERPL-MCNC: 3 MG/DL (ref 2.5–4.5)
PLATELET # BLD AUTO: 306 10*3/MM3 (ref 140–450)
PMV BLD AUTO: 9.2 FL (ref 6–12)
POTASSIUM SERPL-SCNC: 3.4 MMOL/L (ref 3.5–5.2)
POTASSIUM SERPL-SCNC: 3.6 MMOL/L (ref 3.5–5.2)
POTASSIUM SERPL-SCNC: 3.7 MMOL/L (ref 3.5–5.2)
POTASSIUM SERPL-SCNC: 4 MMOL/L (ref 3.5–5.2)
POTASSIUM SERPL-SCNC: 4 MMOL/L (ref 3.5–5.2)
RBC # BLD AUTO: 5.15 10*6/MM3 (ref 4.14–5.8)
SODIUM SERPL-SCNC: 106 MMOL/L (ref 136–145)
SODIUM SERPL-SCNC: 109 MMOL/L (ref 136–145)
SODIUM SERPL-SCNC: 111 MMOL/L (ref 136–145)
SODIUM SERPL-SCNC: 113 MMOL/L (ref 136–145)
WBC NRBC COR # BLD AUTO: 15.52 10*3/MM3 (ref 3.4–10.8)

## 2025-04-16 PROCEDURE — 82948 REAGENT STRIP/BLOOD GLUCOSE: CPT

## 2025-04-16 PROCEDURE — 25010000002 POTASSIUM CHLORIDE 10 MEQ/100ML SOLUTION: Performed by: INTERNAL MEDICINE

## 2025-04-16 PROCEDURE — 93306 TTE W/DOPPLER COMPLETE: CPT

## 2025-04-16 PROCEDURE — 25810000003 SODIUM CHLORIDE 0.9 % SOLUTION 1,000 ML FLEX CONT: Performed by: STUDENT IN AN ORGANIZED HEALTH CARE EDUCATION/TRAINING PROGRAM

## 2025-04-16 PROCEDURE — 74230 X-RAY XM SWLNG FUNCJ C+: CPT

## 2025-04-16 PROCEDURE — 25010000002 SULFUR HEXAFLUORIDE MICROSPH 60.7-25 MG RECONSTITUTED SUSPENSION: Performed by: FAMILY MEDICINE

## 2025-04-16 PROCEDURE — 80069 RENAL FUNCTION PANEL: CPT | Performed by: INTERNAL MEDICINE

## 2025-04-16 PROCEDURE — 99233 SBSQ HOSP IP/OBS HIGH 50: CPT | Performed by: FAMILY MEDICINE

## 2025-04-16 PROCEDURE — 83735 ASSAY OF MAGNESIUM: CPT | Performed by: INTERNAL MEDICINE

## 2025-04-16 PROCEDURE — 99232 SBSQ HOSP IP/OBS MODERATE 35: CPT | Performed by: STUDENT IN AN ORGANIZED HEALTH CARE EDUCATION/TRAINING PROGRAM

## 2025-04-16 PROCEDURE — 84132 ASSAY OF SERUM POTASSIUM: CPT | Performed by: INTERNAL MEDICINE

## 2025-04-16 PROCEDURE — 85027 COMPLETE CBC AUTOMATED: CPT | Performed by: INTERNAL MEDICINE

## 2025-04-16 PROCEDURE — 82550 ASSAY OF CK (CPK): CPT | Performed by: STUDENT IN AN ORGANIZED HEALTH CARE EDUCATION/TRAINING PROGRAM

## 2025-04-16 PROCEDURE — 25810000003 SODIUM CHLORIDE 3 % SOLUTION: Performed by: INTERNAL MEDICINE

## 2025-04-16 PROCEDURE — 84295 ASSAY OF SERUM SODIUM: CPT | Performed by: INTERNAL MEDICINE

## 2025-04-16 PROCEDURE — 92610 EVALUATE SWALLOWING FUNCTION: CPT

## 2025-04-16 PROCEDURE — 25010000002 MORPHINE PER 10 MG: Performed by: STUDENT IN AN ORGANIZED HEALTH CARE EDUCATION/TRAINING PROGRAM

## 2025-04-16 PROCEDURE — 92611 MOTION FLUOROSCOPY/SWALLOW: CPT

## 2025-04-16 PROCEDURE — 36415 COLL VENOUS BLD VENIPUNCTURE: CPT | Performed by: INTERNAL MEDICINE

## 2025-04-16 RX ORDER — ASPIRIN 81 MG/1
81 TABLET ORAL DAILY
Status: DISCONTINUED | OUTPATIENT
Start: 2025-04-16 | End: 2025-04-30 | Stop reason: HOSPADM

## 2025-04-16 RX ORDER — 3% SODIUM CHLORIDE 3 G/100ML
30 INJECTION, SOLUTION INTRAVENOUS CONTINUOUS
Status: DISPENSED | OUTPATIENT
Start: 2025-04-16 | End: 2025-04-16

## 2025-04-16 RX ORDER — SODIUM CHLORIDE 9 MG/ML
100 INJECTION, SOLUTION INTRAVENOUS CONTINUOUS
Status: ACTIVE | OUTPATIENT
Start: 2025-04-16 | End: 2025-04-16

## 2025-04-16 RX ORDER — MORPHINE SULFATE 2 MG/ML
2 INJECTION, SOLUTION INTRAMUSCULAR; INTRAVENOUS ONCE
Status: COMPLETED | OUTPATIENT
Start: 2025-04-16 | End: 2025-04-16

## 2025-04-16 RX ORDER — OXYCODONE AND ACETAMINOPHEN 5; 325 MG/1; MG/1
1 TABLET ORAL ONCE
Refills: 0 | Status: COMPLETED | OUTPATIENT
Start: 2025-04-16 | End: 2025-04-16

## 2025-04-16 RX ORDER — POTASSIUM CHLORIDE 7.45 MG/ML
10 INJECTION INTRAVENOUS
Status: COMPLETED | OUTPATIENT
Start: 2025-04-16 | End: 2025-04-16

## 2025-04-16 RX ADMIN — BARIUM SULFATE 20 ML: 400 PASTE ORAL at 13:16

## 2025-04-16 RX ADMIN — SODIUM CHLORIDE 100 ML/HR: 9 INJECTION, SOLUTION INTRAVENOUS at 10:20

## 2025-04-16 RX ADMIN — OXYCODONE HYDROCHLORIDE AND ACETAMINOPHEN 1 TABLET: 5; 325 TABLET ORAL at 20:17

## 2025-04-16 RX ADMIN — SENNOSIDES AND DOCUSATE SODIUM 2 TABLET: 50; 8.6 TABLET ORAL at 20:17

## 2025-04-16 RX ADMIN — BARIUM SULFATE 55 ML: 0.81 POWDER, FOR SUSPENSION ORAL at 13:16

## 2025-04-16 RX ADMIN — MORPHINE SULFATE 2 MG: 2 INJECTION, SOLUTION INTRAMUSCULAR; INTRAVENOUS at 06:00

## 2025-04-16 RX ADMIN — BARIUM SULFATE 50 ML: 400 SUSPENSION ORAL at 13:16

## 2025-04-16 RX ADMIN — SULFUR HEXAFLUORIDE 5 ML: KIT at 19:03

## 2025-04-16 RX ADMIN — POTASSIUM CHLORIDE 10 MEQ: 7.46 INJECTION, SOLUTION INTRAVENOUS at 01:15

## 2025-04-16 RX ADMIN — Medication 10 ML: at 20:22

## 2025-04-16 RX ADMIN — SODIUM CHLORIDE 30 ML/HR: 3 INJECTION, SOLUTION INTRAVENOUS at 01:16

## 2025-04-16 RX ADMIN — DEXTROSE MONOHYDRATE 25 G: 25 INJECTION, SOLUTION INTRAVENOUS at 11:33

## 2025-04-16 RX ADMIN — POTASSIUM CHLORIDE 10 MEQ: 7.46 INJECTION, SOLUTION INTRAVENOUS at 03:13

## 2025-04-16 RX ADMIN — MUPIROCIN 1 APPLICATION: 20 OINTMENT TOPICAL at 09:33

## 2025-04-16 RX ADMIN — ASPIRIN 81 MG: 81 TABLET, COATED ORAL at 16:05

## 2025-04-16 RX ADMIN — ACETAMINOPHEN 650 MG: 325 TABLET ORAL at 16:07

## 2025-04-16 RX ADMIN — MUPIROCIN 1 APPLICATION: 20 OINTMENT TOPICAL at 20:22

## 2025-04-16 RX ADMIN — Medication 10 ML: at 09:33

## 2025-04-16 RX ADMIN — POTASSIUM CHLORIDE 10 MEQ: 7.46 INJECTION, SOLUTION INTRAVENOUS at 02:12

## 2025-04-16 RX ADMIN — POTASSIUM CHLORIDE 10 MEQ: 7.46 INJECTION, SOLUTION INTRAVENOUS at 04:05

## 2025-04-16 NOTE — MBS/VFSS/FEES
Acute Care - Speech Language Pathology   Modified Barium Swallow Study  MAINOR Parson     Patient Name: Alberto Rachel  : 1960  MRN: [id number]  Today's Date: 2025               Admit Date: 4/15/2025    Visit Dx:     ICD-10-CM ICD-9-CM   1. Hyponatremia  E87.1 276.1   2. Non-traumatic rhabdomyolysis  M62.82 728.88   3. ESVIN (acute kidney injury)  N17.9 584.9   4. Oropharyngeal dysphagia  R13.12 787.22     Patient Active Problem List   Diagnosis    Anxiety    Atherosclerosis of coronary artery bypass graft of native heart without angina pectoris    Essential hypertension    Depression    Mixed hyperlipidemia    Type 2 diabetes mellitus with hyperglycemia, without long-term current use of insulin    Hypothyroidism    Arthritis of knee    Illiteracy    Cramps, extremity    Tachycardia    Obesity (BMI 30-39.9)    Degenerative disc disease, lumbar    Primary insomnia    Gastroesophageal reflux disease without esophagitis    Close exposure to COVID-19 virus    Mild intermittent asthma    Candidal dermatitis    Bronchitis    Bilateral shoulder pain    Hyperkalemia    Stage 3a chronic kidney disease (CKD)    Hyponatremia     Past Medical History:   Diagnosis Date    Anxiety     Arthritis     Atherosclerosis of coronary artery bypass graft of native heart without angina pectoris 10/23/2018    Coronary artery disease with previous bypass, LIMA to the LAD, SVG to ramus, and SVG to RCA in     CAD (coronary artery disease) 10/23/2018    Carotid artery stenosis with cerebral infarction 2015    Dr. St     Cramps, extremity 9/10/2021    Depression     DM2 (diabetes mellitus, type 2) 10/23/2018    Duodenal ulcer     Essential hypertension 10/23/2018    Headache     Heart attack 2015    Heartburn     HLD (hyperlipidemia)     HTN (hypertension)     Injury of right leg 2021    Irregular heart beat     Low back pain 2019    Mild episode of recurrent depressive disorder 10/23/2018    Rash 2019     Shortness of breath     SOB (shortness of breath)     Type 2 diabetes mellitus with hyperglycemia, without long-term current use of insulin 06/11/2019    Uncontrolled diabetes mellitus 03/18/2019     Past Surgical History:   Procedure Laterality Date    CARDIAC CATHETERIZATION  06/2015    Dr. St     CIRCUMCISION      age 14 yrs old    CORONARY ARTERY BYPASS GRAFT  06/2015    5 bypasses       SLP Recommendation and Plan      MODIFIED BARIUM SWALLOW STUDY: SPEECH PATHOLOGY REPORT        DATE OF SERVICE:  04/16/2025    PERTINENT INFORMATION:  Alberto is a 64 year old male with incoordination and slow movement of oral mechanisms.    Alberto was referred for an MBSS by Dr. Wanda Horowitz to rule out aspiration as well as to determine appropriate treatment plan for this patient.      PROCEDURE:    Alberto was alert and cooperative.  The patient was viewed in the lateral position.  The following Ba consistencies were administered: level 0 thin liquids, level 2 mildly thick/nectar-thick liquids, level 4 pureed solids, level 6 soft and bite sized/mech-soft solids, and level 7 regular solids.  The following compensatory swallowing strategies were performed: double swallow.       RESULTS:    Patient was positioned upright in chair. Patient was given trials of level 0 thin liquids, level 2 mildly thick/nectar-thick liquids, level 4 pureed solids, level 6 soft and bite sized/mech-soft solids, and level 7 regular solids mixed with barium.     Level 0 Thin barium: Patient was given trial of thin barium by cup. Normal swallow. Trace residue in vallecula. No penetration or aspiration noted via cup. Shallow penetration noted via straw.      Level 2 Mildly thick/Nectar-thick barium: Patient was given nectar-thick barium by cup. No premature spillage noted. Mild residue in the vallecula and posterior pharyngeal wall. No penetration or aspiration noted.     Level 4 Puree solids (Applesauce): Patient was given trials of puree solids mixed  with barium. Premature spillage to the vallecula. Mild residue in the oral cavity. Trace residue in the vallecula and posterior pharyngeal wall. No penetration or aspiration noted.      Level 6 Soft and Bite sized/Mechanical soft solids (Softened cracker): Patient was given trials of [facility] soft solids mixed with barium. Premature spillage to the vallecula. Mild residue in the vallecula which begins to spill over but does not reach the pyriform sinuses. Mild residue at the PES. No penetration or aspiration noted.     Level 7 Regular solids (Mukund cracker): Patient was given trial of regular solid mixed with barium. Premature spillage to the vallecula. Mild residue in the oral cavity. Trace residue in the pyriform sinuses and PES. Secondary swallow successfully clears majority of residue. No penetration or aspiration noted.       IMPRESSIONS:    Alberto demonstrated mild oropharyngeal dysphagia characterized by premature spillage, shallow penetration via straw with thin liquids, and residue after the swallow.     FUNCTIONAL DEFICIT: Patient scored level 5 of 7 on Functional Communication Measures for swallowing indicating a 25% limitation in function for current status, goal status, and discharge status.      RECOMMENDATIONS:   1.  SLP recommends the patient consume level 0 thin liquids and level 6 soft and bite sized solids with chopped meats.   2.  Patient may take his medication whole with water.   3.  Patient should eat at a slow rate and take small bites and sips.   4.  Patient should double swallow with all solids to provide clearance of residue.   5.  Patient would continue to benefit from follow up speech services to improve swallow strength to a more functional level.       Patient/responsible party agrees with the plan of care and has been informed of all alternatives, risks and benefits.    Thank you for this referral.                                                                                     EDUCATION  The patient has been educated in the following areas:   Dysphagia (Swallowing Impairment) Oral Care/Hydration.                Time Calculation:         Therapy Charges for Today       Code Description Service Date Service Provider Modifiers Qty    [id number]  ST EVAL ORAL PHARYNG SWALLOW 4 4/16/2025 Roslyn Barnes, SLP GN 1    [id number]  ST MOTION FLUORO EVAL SWALLOW 6 4/16/2025 Roslyn Barnes SLP GN 1                 IAIN Sanchez  4/17/2025

## 2025-04-16 NOTE — CONSULTS
[facility]   Consult Note    Patient Name: Alberto Rachel  : 1960  MRN: [id number]  Primary Care Physician: Magnus Euceda DO  Referring Physician: No ref. provider found  Date of admission: 4/15/2025    Subjective   Subjective     Reason for Consult: Cardiovascular evaluation    HPI:  Alberto Rachel is a 64 y.o. male patient previously seen by Dr. Paulino, Dr. Cruz in Dr. Zavaleta, apparently has history of coronary artery disease, hypertension, hyperlipidemia patient also had a coronary artery graft surgery patient has diabetes, hypothyroidism, patient was not following my office apparently I saw him in the emergency room once on  but after that he was followed by Dr. Nancy Zavaleta and Dr. Paulino, patient reports no chest pain or shortness of breath he was brought to the emergency room because patient for because of weakness in the lower extremities according to history patient is a very poor historian patient was noted to have severe hyponatremia, creatinine was 1.49 initial troponin was 128 but then became almost flat and decreased to 106, the CK was 3,767 that could explain the elevated troponin. potassium was 3.4, white blood cell count was 15.5, currently evaluated by nephrology.  Patient had a swallowing test with the speech pathology, patient also had CT without contrast no acute abnormality chest x-ray showed left basilar atelectasis.  EKG showed sinus rhythm nonspecific intraventricular conduction delay, there is tremor nonspecific ST abnormalities.    Review of Systems  Review of Systems difficult to obtain    Personal History     Past Medical History:   Diagnosis Date    Anxiety     Arthritis     Atherosclerosis of coronary artery bypass graft of native heart without angina pectoris 10/23/2018    Coronary artery disease with previous bypass, LIMA to the LAD, SVG to ramus, and SVG to RCA in     CAD (coronary artery disease) 10/23/2018    Carotid artery stenosis with cerebral  infarction 06/01/2015    Dr. St     Cramps, extremity 9/10/2021    Depression     DM2 (diabetes mellitus, type 2) 10/23/2018    Duodenal ulcer     Essential hypertension 10/23/2018    Headache     Heart attack 2015    Heartburn     HLD (hyperlipidemia)     HTN (hypertension)     Injury of right leg 8/13/2021    Irregular heart beat     Low back pain 03/18/2019    Mild episode of recurrent depressive disorder 10/23/2018    Rash 03/18/2019    Shortness of breath     SOB (shortness of breath)     Type 2 diabetes mellitus with hyperglycemia, without long-term current use of insulin 06/11/2019    Uncontrolled diabetes mellitus 03/18/2019       Past Surgical History:   Procedure Laterality Date    CARDIAC CATHETERIZATION  06/2015    Dr. St     CIRCUMCISION      age 14 yrs old    CORONARY ARTERY BYPASS GRAFT  06/2015    5 bypasses       Family History: family history includes Heart disease in an other family member. Otherwise pertinent FHx was reviewed and not pertinent to current issue.    Social History:  reports that he has never smoked. He has been exposed to tobacco smoke. His smokeless tobacco use includes chew. He reports that he does not currently use alcohol. He reports that he does not use drugs.    Home Medications:  ARIPiprazole, Semaglutide, albuterol sulfate HFA, aspirin, atorvastatin, bumetanide, chlorthalidone, citalopram, cyanocobalamin, empagliflozin, famotidine, fenofibrate, guaifenesin-dextromethorphan 600-30 mg, levothyroxine, metFORMIN ER, nitroglycerin, ondansetron, pioglitazone, telmisartan, and traZODone    Hospital Medications:    Current Facility-Administered Medications:     acetaminophen (TYLENOL) tablet 650 mg, 650 mg, Oral, Q4H PRN, 650 mg at 04/16/25 1607 **OR** acetaminophen (TYLENOL) suppository 650 mg, 650 mg, Rectal, Q4H PRN, Dino Olivarez MD, 650 mg at 04/15/25 2050    albuterol (PROVENTIL) nebulizer solution 0.083% 2.5 mg/3mL, 2.5 mg, Nebulization, Q6H PRN, Kayla Jensen MD     aspirin EC tablet 81 mg, 81 mg, Oral, Daily, Wanda Horowitz MD, 81 mg at 04/16/25 1605    sennosides-docusate (PERICOLACE) 8.6-50 MG per tablet 2 tablet, 2 tablet, Oral, BID **AND** polyethylene glycol (MIRALAX) packet 17 g, 17 g, Oral, Daily PRN **AND** bisacodyl (DULCOLAX) EC tablet 5 mg, 5 mg, Oral, Daily PRN **AND** bisacodyl (DULCOLAX) suppository 10 mg, 10 mg, Rectal, Daily PRN, Dino Olivarez MD    dextrose (D50W) (25 g/50 mL) IV injection 25 g, 25 g, Intravenous, Q15 Min PRN, Dino Olivarez MD, 25 g at 04/16/25 1133    dextrose (D5W) 5 % infusion 612 mL, 6 mL/kg, Intravenous, Continuous PRN, Abner Flores MD    dextrose (GLUTOSE) oral gel 15 g, 15 g, Oral, Q15 Min PRN, Dino Olivarez MD    glucagon (GLUCAGEN) injection 1 mg, 1 mg, Intramuscular, Q15 Min PRN, Dino Olivarez MD    insulin regular (humuLIN R,novoLIN R) injection 2-7 Units, 2-7 Units, Subcutaneous, Q6H, Dino Olivarez MD    mupirocin (BACTROBAN) 2 % nasal ointment 1 Application, 1 Application, Each Nare, BID, Dino Olivarez MD, 1 Application at 04/16/25 0933    nitroglycerin (NITROSTAT) SL tablet 0.4 mg, 0.4 mg, Sublingual, Q5 Min PRN, Dino Olivarez MD    ondansetron ODT (ZOFRAN-ODT) disintegrating tablet 4 mg, 4 mg, Oral, Q6H PRN **OR** ondansetron (ZOFRAN) injection 4 mg, 4 mg, Intravenous, Q6H PRN, Dino Olivarez MD    sodium chloride 0.9 % flush 10 mL, 10 mL, Intravenous, PRN, Mikey Alonso DO    sodium chloride 0.9 % flush 10 mL, 10 mL, Intravenous, Q12H, Dino Olivarez MD, 10 mL at 04/16/25 0933    sodium chloride 0.9 % flush 10 mL, 10 mL, Intravenous, PRN, Dino Olivarez MD    sodium chloride 0.9 % infusion 40 mL, 40 mL, Intravenous, PRN, Dino Olivarez MD    sodium chloride 0.9 % infusion, 100 mL/hr, Intravenous, Continuous, Rajendra Lion MD, Last Rate: 100 mL/hr at 04/16/25 1020, 100 mL/hr at 04/16/25 1020    Allergies:  Allergies   Allergen Reactions    Cyclobenzaprine Unknown - Low Severity     weakness        Objective    Objective   Vitals:  Temp:  [97.6 °F (36.4 °C)-99.1 °F (37.3 °C)] 98.6 °F (37 °C)  Heart Rate:  [67-96] 76  Resp:  [15-20] 18  BP: ()/(34-73) 137/51    Physical Exam:  Physical Exam  HENT:      Head: Atraumatic.   Cardiovascular:      Rate and Rhythm: Normal rate.      Heart sounds: No murmur heard.  Pulmonary:      Breath sounds: No rhonchi or rales.   Abdominal:      Palpations: Abdomen is soft.   Musculoskeletal:         General: Normal range of motion.      Comments: Lower extremity showed poor care in his feet and toenails   Skin:     General: Skin is warm.   Neurological:      General: No focal deficit present.      Mental Status: He is alert.      Comments: Difficult to evaluate appeared to be oriented and answers questions reported no weakness in the arms or legs now he has some bilateral weakness in the lower extremities when he fell.   Psychiatric:      Comments: Difficult to evaluate           Result Review    Result Review:  I have personally reviewed the results from the time of this admission to 04/16/25 5:34 PM EDT and agree with these findings:  [x]  Laboratory  []  Microbiology  []  Radiology  [x]  EKG/Telemetry   []  Cardiology/Vascular   []  Pathology  []  Old records  []  Other:    Most notable findings include: 64-year-old gentleman admitted to hospital with severe hyponatremia and weakness in both lower extremities, patient was previously followed by Dr. Cruz, Dr. Paulino, Dr. Tierney, apparently he was not followed recently by a cardiologist, patient has history of coronary artery disease and open heart surgery and currently notes currently he says that he want to go home, reported no chest pain or shortness of breath at rest.    Assessment & Plan   Assessment / Plan     Active Hospital Problems:  Active Hospital Problems    Diagnosis     **Hyponatremia        Plan:   , Patient will be evaluated, he will get an echocardiogram, his sodium probably will be corrected   Callum Lion, patient indicated that he lives close to her daughter, I think social service needs to be involved in the case.    Electronically signed by Jacques St MD, 04/16/25, 5:34 PM EDT.

## 2025-04-16 NOTE — PROGRESS NOTES
INTENSIVIST   PROGRESS NOTE        SUBJECTIVE     Alberto 64 y.o. male is followed for: Altered Mental Status       Hyponatremia    As an Intensivist, we provide an integrated approach to the ICU patient and family, medical management of comorbid conditions, including but not limited to electrolytes, glycemic control, organ dysfunction, lead interdisciplinary rounds and coordinate the care with all other services, including those from other specialists.     Interval History:  No acute events overnight.  Patient is oriented to person and place but not time.  Unclear of his baseline.    Temp  Min: 97.6 °F (36.4 °C)  Max: 99.1 °F (37.3 °C)                The patient's relevant past medical, surgical and social history were reviewed and updated in Epic as appropriate.        OBJECTIVE     Vitals:  Temp: 98.2 °F (36.8 °C) (25) Temp  Min: 97.6 °F (36.4 °C)  Max: 99.1 °F (37.3 °C)   Temp core:      BP: 91/44 (25) BP  Min: 80/36  Max: 156/75   MAP (non-invasive) Noninvasive MAP (mmHg): 60 (25) Noninvasive MAP (mmHg)  Av.6  Min: 48  Max: 90   Pulse: 79 (25) Pulse  Min: 77  Max: 96   Resp: 17 (25) Resp  Min: 17  Max: 20   SpO2: 94 % (25) SpO2  Min: 79 %  Max: 100 %   Device: room air (25)    Flow Rate:   No data recorded         04/15/25  1719 25  0410   Weight: 101 kg (223 lb 1.7 oz) 100 kg (220 lb 10.9 oz)        Intake/Ouptut 24 hrs (7:00AM - 6:59 AM)  Intake & Output (last 2 days)          0701  04/15 0700 04/15 07 07 07 07    IV Piggyback  400     Total Intake(mL/kg)  400 (4)     Urine (mL/kg/hr)  1925     Total Output  1925     Net  -1525            Urine Unmeasured Occurrence  1 x             Medications (drips):  dextrose         Physical Examination  Telemetry:  Rhythm: normal sinus rhythm (25 0400)      Constitutional:  No acute distress.   Cardiovascular: RRR.    Respiratory: Normal breath  sounds  No adventitious sounds   Abdominal:  Soft with no tenderness.   Extremities: No Edema   Neurological:   Alert, oriented x 2  Best Eye Response: 4-->(E4) spontaneous (04/16/25 0600)  Best Motor Response: 6-->(M6) obeys commands (04/16/25 0600)  Best Verbal Response: 4-->(V4) confused (04/16/25 0600)  [place] Coma Scale Score: 14 (04/16/25 0600)                    Lines, Drains & Airways       Active LDAs       Name Placement date Placement time Site Days    Peripheral IV 04/15/25 1435 20 G Right Antecubital 04/15/25  1435  Antecubital  less than 1    Peripheral IV 04/15/25 1900 20 G Anterior;Distal;Right;Upper Arm 04/15/25  1900  Arm  less than 1    Urethral Catheter 04/15/25  1900  -- less than 1                    Results Reviewed:  Laboratory  Microbiology  Radiology  Pathology    Hematology:  Results from last 7 days   Lab Units 04/16/25  0531 04/15/25  1235   WBC 10*3/mm3 15.52* 17.49*   HEMOGLOBIN g/dL 15.2 14.9   MCV fL 81.2 79.9   PLATELETS 10*3/mm3 306 329     Results from last 7 days   Lab Units 04/15/25  1235   NEUTROS ABS 10*3/mm3 14.82*   LYMPHS ABS 10*3/mm3 0.96   EOS ABS 10*3/mm3 0.04     Chemistry:  Estimated Creatinine Clearance: 67.9 mL/min (A) (by C-G formula based on SCr of 1.31 mg/dL (H)).    Results from last 7 days   Lab Units 04/16/25  0531 04/15/25  2356 04/15/25  1621 04/15/25  1235   SODIUM mmol/L 109* 106*   < > 103*   POTASSIUM mmol/L 4.0  4.0 3.4*   < > 3.7   CHLORIDE mmol/L 73*  --   --  64*   CO2 mmol/L 20.4*  --   --  23.1   BUN mg/dL 24*  --   --  28*   CREATININE mg/dL 1.31*  --   --  1.49*   GLUCOSE mg/dL 79  --   --  102*    < > = values in this interval not displayed.     Results from last 7 days   Lab Units 04/16/25  0531 04/15/25  1235   CALCIUM mg/dL 9.2 9.3   MAGNESIUM mg/dL 1.9 1.6   PHOSPHORUS mg/dL 3.0  --      Hepatic Panel:  Results from last 7 days   Lab Units 04/16/25  0531 04/15/25  1235   ALBUMIN g/dL 4.0 4.7   TOTAL PROTEIN g/dL  --  7.9   BILIRUBIN mg/dL   "--  1.8*   AST (SGOT) U/L  --  104*   ALT (SGPT) U/L  --  39   ALK PHOS U/L  --  52     Coagulation Labs:       Cardiac Labs:  Results from last 7 days   Lab Units 04/16/25  0531 04/15/25  1621 04/15/25  1235   CK TOTAL U/L 3,767*  --  3,056*   HSTROP T ng/L  --  111* 128*     Biomarkers:  Results from last 7 days   Lab Units 04/15/25  1735 04/15/25  1621   LACTATE mmol/L 1.2  --    PROCALCITONIN ng/mL  --  0.20       U/A  Results from last 7 days   Lab Units 04/15/25  1515   COLOR UA  Yellow   CLARITY UA  Clear   PH, URINE  5.5   SPECIFIC GRAVITY, URINE  1.012   GLUCOSE UA  >=1000 mg/dL (3+)*   KETONES UA  Negative   BILIRUBIN UA  Negative   PROTEIN UA  Negative   BLOOD UA  Moderate (2+)*   LEUKOCYTES UA  Negative   NITRITE UA  Negative   UROBILINOGEN UA  1.0 E.U./dL     Results from last 7 days   Lab Units 04/15/25  1515   RBC UA /HPF 0-2   WBC UA /HPF 0-2   BACTERIA UA /HPF None Seen   SQUAM EPITHEL UA /HPF 0-2   HYALINE CASTS UA /LPF 0-2       Interleukin:  No results found for: \"INTERLEUN6\"  COVID-19  No results found for: \"COVID19\"    Arterial Blood Gases:        Images:  CT Head Without Contrast  Result Date: 4/15/2025  Impression: 1.No acute intracranial abnormality identified by noncontrast CT. 2.Mild parenchymal volume loss and probable chronic small vessel ischemic change. Electronically Signed: Heriberto Casillas MD  4/15/2025 1:48 PM EDT  Workstation ID: [id number]    XR Chest 1 View  Result Date: 4/15/2025  Impression: Suspected mild left basilar atelectasis. Electronically Signed: Theresa Mancilla MD  4/15/2025 1:00 PM EDT  Workstation ID: [id number]      Echo:      Results: Reviewed.  I reviewed the patient's new laboratory and imaging results.  I independently reviewed the patient's new images.    Medications: Reviewed.    Assessment   A/P     Hospital:  LOS: 1 day   ICU: 14h     Active Hospital Problems    Diagnosis  POA    **Hyponatremia [E87.1]  Yes     Alberto is a 64 y.o. male admitted on 4/15/2025 with " Hyponatremia [E87.1]  ESVIN (acute kidney injury) [N17.9]  Non-traumatic rhabdomyolysis [M62.82]    Assessment/Management/Treatment Plan:    Acute hypotonic hyponatremia  Metabolic encephalopathy  NSTEMI, type 2   PMH: hypertension, hyperlipidemia, non-insulin-dependent type 2 diabetes, CAD status post CABG, hypothyroidism, depression, mild intermittent asthma (not in exacerbation)       Pulmonary         Room air. CXR reviewed, atelectasis. Albuterol PRN for hx asthma.   Cardiovascular  HDS. No augmentation required.   Trop 128, repeat 111. No chest pain. EKG w/o ST changes.   Neuro  UDS and ethanol negative   GI/Hepatology  SLP, diet per their recommendations  Renal  Nephro closely following and managing hypoNa. Initial sodium 103, repeat this morning 109.Received 3% saline overnight.currently on normal saline at 100/hr. Trend Na every 6 hours. Neurochecks in place.    Cr 1.31, improving.  Appears at baseline since 12/2022.   TSH within normal limits, uric acid 6.9.  CK 3K, repeat 3700.  Repeat in AM.  ID/Antibiotics  Afebrile.  Leukocytosis, improving. UA and CXR negative.  Blood cultures in process.  No antbx for now.   Hematology  Hgb and plts wnl. SCDs for DVT prophy.   Endocrine  Body mass index is 34.06 kg/m². Obese Class I: 30-34.9kg/m2  Type 2 diabetes. Accuchecks q6hr. sliding scale insulin    Lab Results   Lab Value Date/Time    HGBA1C 6.3 (A) 05/22/2024 1529    HGBA1C 6.10 (H) 09/06/2023 1136    HGBA1C 5.80 (H) 02/15/2023 1143     Results from last 7 days   Lab Units 04/16/25  0531 04/15/25  2356 04/15/25  1739   GLUCOSE mg/dL 81 71 88       Diet: NPO Diet NPO Type: Strict NPO   Advance Directives: Code Status and Medical Interventions: CPR (Attempt to Resuscitate); Full Support   Ordered at: 04/15/25 1312     Code Status (Patient has no pulse and is not breathing):    CPR (Attempt to Resuscitate)     Medical Interventions (Patient has pulse or is breathing):    Full Support        VTE  Prophylaxis:  Mechanical VTE prophylaxis orders are present.        Disposition: Keep in ICU.    Plan of care and goals reviewed during interdisciplinary rounds.  I discussed the patient's findings and my recommendations with patient and nursing staff    Time: was greater than 30 critical care minutes. This is non-concurrent time.   (This excludes time spent performing separately reportable procedures and services).    He has a high risk of imminent or life-threatening  deterioration, which requires the highest level of physician preparedness to intervene urgently. I devoted my full attention to the direct care of this patient for the amount of time indicated above.     Time spent with family or surrogate(s) is included only if the patient was incapable of providing the necessary information or participating in medical decision making.    He has the following organ/system impairments Severe Electrolyte imbalance.          Copied text in this note has been reviewed and is accurate as of 04/16/25    Kayla Jensen MD  Pulmonary Critical Care Medicine

## 2025-04-16 NOTE — PROGRESS NOTES
[facility]   Hospitalist Progress Note  Date: 2025  Patient Name: Alberto Rachel  : 1960  MRN: [id number]  Date of admission: 4/15/2025      Subjective   Subjective     Chief complaint: Altered mental status    Summary:  64-year-old male with history of anxiety, hypertension, CAD status post bypass, history of stroke, carotid artery stenosis, depression, diabetes, duodenal ulcer, hypertension, dyslipidemia, diabetes mellitus, hospitalized on 4/15/2025 chief complaint of altered mental status, multiple falls, found to have significant life-threatening hyponatremia, nephrology consulted, placed in the critical care unit with critical care consulted, given a round of hypertonic saline, placed on IV fluids several home medications held in the setting of severe hyponatremia including chlorthalidone, Bumex, Abilify, Celexa among others    Interval follow-up: Seen and examined, no distress, no events overnight, much more oriented than described, still confused, slow to respond, serum sodium up a little bit to 109.  Slow steady increase, creatinine 1.31, blood sugars in the 100 range.  White blood cell count 15,000, hemoglobin 15.2.  Patient denied chest pain.  Now on normal saline IV fluids.  Periods of time where his SpO2 was down with a good Pleth.    Review of systems:  Unable to obtain review of systems due to altered mental status    Objective   Objective     Vitals:   Temp:  [97.6 °F (36.4 °C)-99.1 °F (37.3 °C)] 97.9 °F (36.6 °C)  Heart Rate:  [67-96] 77  Resp:  [15-20] 15  BP: ()/(34-75) 116/59  Physical Exam      Constitutional: Awake, alert, no acute distress   Eyes: Pupils equal, sclerae anicteric, no conjunctival injection   HENT: NCAT, mucous membranes dry   Neck: Supple, full range of motion   Respiratory: Diminished to auscultation bilaterally, nonlabored respirations    Cardiovascular: RRR, no murmurs, rubs, or gallops, palpable pedal pulses bilaterally   Gastrointestinal: Positive  bowel sounds, soft, nontender, nondistended   Musculoskeletal: No bilateral ankle edema, no clubbing or cyanosis to extremities   Psychiatric: Appropriate affect, cooperative   Neurologic: Oriented x 1 self, strength symmetric in all extremities with weakness throughout, Cranial Nerves grossly intact to confrontation, speech clear   Skin: No rashes, cracked dry discolored skin lower extremities    Result Review    Result Review:  I have personally reviewed the pertinent results from the past 24 hours to 4/16/2025 12:13 EDT and agree with these findings:  [x]  Laboratory   CBC          8/8/2024    13:09 4/15/2025    12:35 4/16/2025    05:31   CBC   WBC 12.17  17.49  15.52    RBC 4.82  5.02  5.15    Hemoglobin 14.5  14.9  15.2    Hematocrit 43.6  40.1  41.8    MCV 90.5  79.9  81.2    MCH 30.1  29.7  29.5    MCHC 33.3  37.2  36.4    RDW 13.4  12.1  12.2    Platelets 306  329  306      BMP          8/8/2024    13:09 4/15/2025    12:35 4/15/2025    16:21 4/15/2025    17:35 4/15/2025    23:56 4/16/2025    05:31   BMP   BUN 21  28     24    Creatinine 1.51  1.49     1.31    Sodium 135  103  104  104  106  109    Potassium 4.0  3.7  3.5  3.5  3.4  4.0     4.0    Chloride 98  64     73    CO2 23.6  23.1     20.4    Calcium 9.5  9.3     9.2      LIVER FUNCTION TESTS:      Lab 04/16/25  0531 04/15/25  1235   TOTAL PROTEIN  --  7.9   ALBUMIN 4.0 4.7   GLOBULIN  --  3.2   ALT (SGPT)  --  39   AST (SGOT)  --  104*   BILIRUBIN  --  1.8*   ALK PHOS  --  52       [x]  Microbiology   Microbiology Results (last 10 days)       ** No results found for the last 240 hours. **              [x]  Radiology CT Head Without Contrast  Result Date: 4/15/2025  Impression: 1.No acute intracranial abnormality identified by noncontrast CT. 2.Mild parenchymal volume loss and probable chronic small vessel ischemic change. Electronically Signed: Heriberto Casillas MD  4/15/2025 1:48 PM EDT  Workstation ID: [id number]    XR Chest 1 View  Result Date:  4/15/2025  Impression: Suspected mild left basilar atelectasis. Electronically Signed: Theresa Mancilla MD  4/15/2025 1:00 PM EDT  Workstation ID: [id number]        []  EKG/Telemetry   ECG 12 Lead Altered Mental Status   Preliminary Result   HEART RATE=78  bpm   RR Sqwnjmvc=673  ms   MT Jywnuguz=118  ms   P Horizontal Axis=-61  deg   P Front Axis=243  deg   QRSD Ubebijmm=497  ms   QT Mxjbhcwl=398  ms   PFqG=342  ms   QRS Axis=-6  deg   T Wave Axis=240  deg   - ABNORMAL ECG -   Sinus or ectopic atrial rhythm   Nonspecific intraventricular conduction delay   Nonspecific T abnormalities, inferior leads   Borderline ST elevation, anterior leads   Date and Time of Study:2025-04-15 13:22:55          []  Cardiology/Vascular   []  Pathology  [x]  Old records  []  Other:    Assessment & Plan   Assessment / Plan     Assessment/Plan:  Assessment:  Acute life-threatening hyponatremia  Symptomatic hyponatremia  Acute metabolic encephalopathy  Asthma mild intermittent  Rhabdomyolysis  Anxiety  CAD with history of CABG, with elevated troponins likely type II MI secondary to hyponatremia  Hyponatremia, hypovolemia, hypochloremia  History of stroke  Carotid artery stenosis  Depression  Diabetes mellitus  History of duodenal ulcer  Essential hypertension  Dyslipidemia  ESVIN  Metabolic acidosis    Plan:  Labs and imaging reviewed  Critically ill in the critical care unit with severe life-threatening natremia  Continue normal saline at 100 cc/h  Continue trending serum sodium and potassium every 6 hours  Modified barium swallow with speech therapy  Diet per speech therapy  Hold home medications which could be driving hyponatremia and hypovolemia  Following blood pressures  Follow-up echo  Watch for acute hypoxemia with IV fluids  Continue insulin sliding scale coverage  Nephrology consultation appreciated  Critical care consultation appreciated discussed with Dr. Jensen  Fall precautions  Aspiration precautions  Trending CK  A.m. labs  Full  code  DVT prophylaxis with SCDs  Clinical course to dictate further management of this critically ill male in the ICU with hyponatremia requiring slow correction in close monitoring requiring high degree of level of medical decision making  Discussed with nurse at the bedside    VTE Prophylaxis:  Mechanical VTE prophylaxis orders are present.        CODE STATUS:   Code Status (Patient has no pulse and is not breathing): CPR (Attempt to Resuscitate)  Medical Interventions (Patient has pulse or is breathing): Full Support        Electronically signed by Wanda Horowitz MD, 4/16/2025, 12:13 EDT.    Portions of this documentation were transcribed electronically from a voice recognition software.  I confirm all data accurately represents the service(s) I performed at today's visit.

## 2025-04-16 NOTE — CONSULTS
[facility]   Nephrology Consult Note      Patient Name: Alberto Rachel  : 1960  MRN: [id number]  Primary Care Physician:  Magnus Euceda DO  Referring Physician: No ref. provider found  Date of admission: 4/15/2025    Subjective   Subjective     Reason for Consult/ Chief Complaint: Severe hyponatremia    HPI:  Alberto Rachel is a 64 y.o. male with history of hypertension, diabetes, coronary disease and hypothyroidism who was admitted after he sustained a fall.  He was found to have a sodium of 123.  He had some altered mentation and has poor recollection of events.  Patient was started on 3% saline and his sodium by midnight was up to 106.  This morning it was up to 109.  Patient was altered when I saw him this morning, blood pressure was stable not requiring pressors though it was low initially.  There is 1925 cc urine output recorded last 24 hours.  Creatinine was 1.49.  Patient is not aware of any prior kidney disease.  He had previous episode of hyponatremia but in between his sodium has been normal.  He was on Bumex, chlorthalidone and Celexa all of which are on hold.  Patient is sore from his fall.    Review of Systems   Negative other than what is mentioned above.    Personal History     Past Medical History:   Diagnosis Date    Anxiety     Arthritis     Atherosclerosis of coronary artery bypass graft of native heart without angina pectoris 10/23/2018    Coronary artery disease with previous bypass, LIMA to the LAD, SVG to ramus, and SVG to RCA in     CAD (coronary artery disease) 10/23/2018    Carotid artery stenosis with cerebral infarction 2015    Dr. Alma Rosa Rosales, extremity 9/10/2021    Depression     DM2 (diabetes mellitus, type 2) 10/23/2018    Duodenal ulcer     Essential hypertension 10/23/2018    Headache     Heart attack 2015    Heartburn     HLD (hyperlipidemia)     HTN (hypertension)     Injury of right leg 2021    Irregular heart beat     Low back pain  03/18/2019    Mild episode of recurrent depressive disorder 10/23/2018    Rash 03/18/2019    Shortness of breath     SOB (shortness of breath)     Type 2 diabetes mellitus with hyperglycemia, without long-term current use of insulin 06/11/2019    Uncontrolled diabetes mellitus 03/18/2019       Past Surgical History:   Procedure Laterality Date    CARDIAC CATHETERIZATION  06/2015    Dr. Alma Rosa GOMEZ      age 14 yrs old    CORONARY ARTERY BYPASS GRAFT  06/2015    5 bypasses       Family History: family history includes Heart disease in an other family member. Otherwise pertinent FHx was reviewed and not pertinent to current issue.    Social History:  reports that he has never smoked. He has been exposed to tobacco smoke. His smokeless tobacco use includes chew. He reports that he does not currently use alcohol. He reports that he does not use drugs.    Home Medications:  ARIPiprazole, Semaglutide, albuterol sulfate HFA, aspirin, atorvastatin, bumetanide, chlorthalidone, citalopram, cyanocobalamin, empagliflozin, famotidine, fenofibrate, guaifenesin-dextromethorphan 600-30 mg, levothyroxine, metFORMIN ER, nitroglycerin, ondansetron, pioglitazone, telmisartan, and traZODone    Allergies:  Allergies   Allergen Reactions    Cyclobenzaprine Unknown - Low Severity     weakness       Objective    Objective     Vitals:   Temp:  [97.6 °F (36.4 °C)-99.1 °F (37.3 °C)] 97.9 °F (36.6 °C)  Heart Rate:  [67-96] 77  Resp:  [15-20] 15  BP: ()/(34-74) 116/59     Physical Exam    Constitutional: Awake, alert, mildly altered.   Eyes: sclerae anicteric, no conjunctival injection   HENT: mucous membranes moist   Neck: Supple, no thyromegaly, no lymphadenopathy, trachea midline, No JVD   Respiratory: Clear to auscultation bilaterally, nonlabored respirations    Cardiovascular: RRR, no murmurs, rubs, or gallops.   Gastrointestinal: Positive bowel sounds, soft, nontender, nondistended, poor hygiene   Musculoskeletal: No edema,  no clubbing or cyanosis   Psychiatric: Appropriate affect, cooperative   Neurologic: Oriented, moving all extremities, Cranial Nerves grossly intact, speech is mumbled   Skin: warm and dry, no rashes    Orozco catheter in place    Result Review    Result Reviewed:  I have personally reviewed the results from the time of this admission to 4/16/2025 14:24 EDT and agree with these findings:  [x]  Laboratory  []  Microbiology  [x]  Radiology  []  EKG/Telemetry   []  Cardiology/Vascular   []  Pathology  [x]  Old records  []  Other:  LAB RESULTS:    LAB RESULTS:        Lab 04/16/25  1207 04/16/25  0531 04/15/25  2356 04/15/25  1735 04/15/25  1621 04/15/25  1235   SODIUM 111* 109* 106* 104* 104* 103*   POTASSIUM 3.7 4.0  4.0 3.4* 3.5 3.5 3.7   CHLORIDE  --  73*  --   --   --  64*   CO2  --  20.4*  --   --   --  23.1   BUN  --  24*  --   --   --  28*   CREATININE  --  1.31*  --   --   --  1.49*   GLUCOSE  --  79  --   --   --  102*   EGFR  --  60.0*  --   --   --  51.4*   ANION GAP  --  15.6*  --   --   --  15.9*   MAGNESIUM  --  1.9  --   --   --  1.6   PHOSPHORUS  --  3.0  --   --   --   --            Most notable findings include: As above.    Assessment & Plan   Assessment / Plan     Brief Patient Summary:  Alberto Rachel is a 64 y.o. male who sustained a fall and was admitted with altered mentation and found to have severe hyponatremia.    Active Hospital Problems:  Active Hospital Problems    Diagnosis     **Hyponatremia    Probable underlying CKD  Hypertension  Coronary artery disease  Diabetes    Assessment and Plan:   - Euvolemic hyponatremia, possibly secondary to chlorthalidone, urine osmolality 271.  TSH okay.  No signs or symptoms of adrenal insufficiency.  Sodium slowly better.  Goal sodium for today below 115.  He appears a little bit on the low side in terms of volume.  Will give saline at 100 cc an hour x 1 L.  Checking sodium level every 6 hours.  I asked to be called with next level.  I have no  objection to starting diet.  - Chronic kidney disease stage III, baseline creatinine lately around 1.4-1.5.  Has bland UA and minimal proteinuria from 2 years ago.  - Hypokalemia, replaced and improved.  Magnesium is okay.  - Rhabdomyolysis, possibly secondary to fall versus hyponatremia.  Will monitor.  May need hypotonic IV fluid.  Recheck CPK level in AM.  - Type 2 diabetes, per primary.  Discussed with ICU team.  Will follow    Thank you very much for this consult!    Electronically signed by Rajendra Lion MD, 4/16/2025, 14:24 EDT.

## 2025-04-16 NOTE — PLAN OF CARE
Goal Outcome Evaluation:  Plan of Care Reviewed With: patient        Progress: improving  Outcome Evaluation: Pt in overall good spirits. Pt continues with AOx1-3 orientation, with orientation waxing and waning & intermittent confusion. Pt complains of mild-to-moderate aching sore pain to the BLE. Electrolyte repletement implemented as ordered, with additional hypertonic saline doses & potassium repletement doses administered, as ordered. Pt continues to have marked diaphoresis & flushing to skin.

## 2025-04-16 NOTE — THERAPY EVALUATION
Acute Care - Speech Language Pathology   Swallow Initial Evaluation MAINOR [facility]     Patient Name: Alberto Rachel  : 1960  MRN: [id number]  Today's Date: 2025               Admit Date: 4/15/2025    Visit Dx:     ICD-10-CM ICD-9-CM   1. Hyponatremia  E87.1 276.1   2. Non-traumatic rhabdomyolysis  M62.82 728.88   3. ESVIN (acute kidney injury)  N17.9 584.9   4. Oropharyngeal dysphagia  R13.12 787.22     Patient Active Problem List   Diagnosis    Anxiety    Atherosclerosis of coronary artery bypass graft of native heart without angina pectoris    Essential hypertension    Depression    Mixed hyperlipidemia    Type 2 diabetes mellitus with hyperglycemia, without long-term current use of insulin    Hypothyroidism    Arthritis of knee    Illiteracy    Cramps, extremity    Tachycardia    Obesity (BMI 30-39.9)    Degenerative disc disease, lumbar    Primary insomnia    Gastroesophageal reflux disease without esophagitis    Close exposure to COVID-19 virus    Mild intermittent asthma    Candidal dermatitis    Bronchitis    Bilateral shoulder pain    Hyperkalemia    Stage 3a chronic kidney disease (CKD)    Hyponatremia     Past Medical History:   Diagnosis Date    Anxiety     Arthritis     Atherosclerosis of coronary artery bypass graft of native heart without angina pectoris 10/23/2018    Coronary artery disease with previous bypass, LIMA to the LAD, SVG to ramus, and SVG to RCA in     CAD (coronary artery disease) 10/23/2018    Carotid artery stenosis with cerebral infarction 2015    Dr. St     Cramps, extremity 9/10/2021    Depression     DM2 (diabetes mellitus, type 2) 10/23/2018    Duodenal ulcer     Essential hypertension 10/23/2018    Headache     Heart attack 2015    Heartburn     HLD (hyperlipidemia)     HTN (hypertension)     Injury of right leg 2021    Irregular heart beat     Low back pain 2019    Mild episode of recurrent depressive disorder 10/23/2018    Rash 2019     "Shortness of breath     SOB (shortness of breath)     Type 2 diabetes mellitus with hyperglycemia, without long-term current use of insulin 06/11/2019    Uncontrolled diabetes mellitus 03/18/2019     Past Surgical History:   Procedure Laterality Date    CARDIAC CATHETERIZATION  06/2015    Dr. St     CIRCUMCISION      age 14 yrs old    CORONARY ARTERY BYPASS GRAFT  06/2015    5 bypasses       SLP Recommendation and Plan          Inpatient Speech Pathology Dysphagia Evaluation        PAIN SCALE: Patient did not indicate that he was having any pain at time of evaluation.     PRECAUTIONS/CONTRAINDICATIONS:  Aspiration precautions. Fall precautions. Standard precautions.     SUSPECTED ABUSE/NEGLECT/EXPLOITATION:  None observed or reported.     SOCIAL/PSYCHOLOGICAL NEEDS/BARRIERS:  N/A    PAST SOCIAL HISTORY:  Independent at home.     PRIOR FUNCTION:  Patient denied any prior difficulty with swallow.     PATIENT GOALS/EXPECTATIONS:  To consume least restrictive diet without signs/symptoms of aspiration. To identify type and severity of current deficits and provide appropriate treatment.     HISTORY:  64-year-old male with history of anxiety, hypertension, CAD status post bypass, history of stroke, carotid artery stenosis, depression, diabetes, duodenal ulcer, hypertension, dyslipidemia, and diabetes mellitus. Patient presented to the hospital due to altered mental status and multiple falls. Chest x-ray, \"Suspected mild left basilar atelectasis.\" CT Head reported, \"1.No acute intracranial abnormality identified by noncontrast CT. 2.Mild parenchymal volume loss and probable chronic small vessel ischemic change.\"    CURRENT DIET LEVEL:  NPO    OBJECTIVE:    TEST ADMINISTERED:  Oral mechanisms exam and clinical swallow evaluation.     COGNITION/SAFETY AWARENESS:  Patient required repetitions at times and appeared slow to process. Not formally assessed.     BEHAVIORAL OBSERVATIONS:  Patient was pleasant and cooperative. " Patient was agreeable to working with SLP and reported that he was hungry. Patient did well answering questions but did appear to have a slow griffin to his speech.    ORAL MOTOR EXAM:  Patient demonstrated slow movement of oral mechanisms and difficulty coordinating movement during oral mechanisms exam. Patient consistently demonstrated prolonged mastication as well as attempted mastication of liquids.     VOICE QUALITY:  Normal.     REFLEX EXAM:  Vellum could not be well observed.     POSTURE:  Upright for oral care and when eating or drinking.     FEEDING/SWALLOWING FUNCTION:  Oral care provided prior to trials. Patient participated in clinical swallow evaluation and trialed level 0 thin liquids via spoon and cup, level 2 mildly/nectar thick liquids, level 4 pureed solids, level 6 soft and bite sized solids. Patient presented with cough and oxygen desaturation with level 0 thin liquids via cup. Patient presented with delayed throat clear with all other consistencies.     CLINICAL OBSERVATIONS:  Patient demonstrated significantly delayed initiation of swallow which appeared improved with nectar thick liquids but remained delayed with other consistencies. Patient demonstrated slightly reduced hyolaryngeal excursion. Based on patient's current condition, SLP recommends the patient undergo MBSS to rule out aspiration and residue in the pharynx due to throat clearing after trials of PO consistencies. Patient reported that he was agreeable to MBSS.     DYSPHAGIA CRITERIA:  Oropharyngeal dysphagia.    FUNCTIONAL ASSESSMENT INSTRUMENT: Patient currently scored a level 1 of 7 on Functional Communication Measures for swallowing indicating a 100% limitation in function.    ASSESSMENT/ PLAN OF CARE:  Pt presents with limitations, noted below, that impede his ability to swallow. The skills of a therapist will be required to safely and effectively implement the following treatment plan to restore maximal level of  function.    PROBLEMS:  1. Dysphagia     LTG 1: 30 days: The patient will maintain adequate hydration/nutrition with optimum safety and efficiency of swallowing function on P.O. intake without overt signs and symptoms of aspiration for the highest appropriate diet level      STG 1a: 14 days: The client will demonstrate the ability to adequately self-monitor swallowing skills and perform appropriate compensatory techniques to reduce signs and symptoms of aspiration in 100% of trials with no cues.      STG 1b: 14 days: The patient will tolerate recommended diet without signs/symptoms of aspiration.      STG 1c: 14 days: Patient/family teaching regarding diet recommendations, safe swallow strategies and signs and symptoms of aspiration.      STG 1d: 14 days: Patient will increase ability to tolerate least restrictive diet improve functional communication measure for swallowing to a 6 of 7      STG 1e: 14 days: Patient will complete base of tongue exercises to include 5 reps and 10 second holds with no weakness noted.      STG 1f: 14 days: Patient will complete 5 sets of the Mendelson maneuver to include 1-3 reps with a rest or a drink between sets to increase pharyngeal strength and coordination to eliminate signs and symptoms of laryngeal penetration and/or aspiration.      STG 1 days: Patient will complete 5 sets of the Geni maneuver to include 1-3 reps with minimal cues required to complete.      STG 1h: 14 days: Patient will complete 5 sets of the effortful swallow include 1-3 reps with minimal cues required to complete.      TREATMENT: Patient will continue to be monitored to ensure that he is tolerating the recommended diet without signs/symptoms of aspiration. Patient will participate in swallowing exercises to improve strength of swallow.     FREQUENCY/DURATION:  once daily/ 5 times a week.     REHAB POTENTIAL:  Pt has good rehab potential.  The following limitations may influence improvement/ length of  tx  medical status/mental status.    RECOMMENDATIONS:   1.   DIET: NPO until MBSS can be completed.     2.  POSITION: Upright for oral care.    Pt/responsible party agrees with plan of care and has been informed of all alternatives, risks and benefits.                                                                                    EDUCATION  The patient has been educated in the following areas:   Dysphagia (Swallowing Impairment).                Time Calculation:    Time Calculation- SLP       Row Name 04/16/25 1100             Time Calculation- SLP    SLP Start Time 1100  -AW      SLP Stop Time 1200  -AW      SLP Time Calculation (min) 60 min  -AW      SLP Received On 04/16/25  -AW         Untimed Charges    21232-ZR Eval Oral Pharyng Swallow Minutes 60  -AW         Total Minutes    Untimed Charges Total Minutes 60  -AW       Total Minutes 60  -AW                User Key  (r) = Recorded By, (t) = Taken By, (c) = Cosigned By      Initials Name Provider Type    Roslyn Lynn SLP Speech and Language Pathologist                    Therapy Charges for Today       Code Description Service Date Service Provider Modifiers Qty    [id number] HC ST EVAL ORAL PHARYNG SWALLOW 4 4/16/2025 Roslyn Barnes SLP GN 1                 IAIN Sanchez  4/16/2025

## 2025-04-17 DIAGNOSIS — F51.01 PRIMARY INSOMNIA: Chronic | ICD-10-CM

## 2025-04-17 DIAGNOSIS — E78.2 MIXED HYPERLIPIDEMIA: ICD-10-CM

## 2025-04-17 DIAGNOSIS — K21.9 GASTROESOPHAGEAL REFLUX DISEASE WITHOUT ESOPHAGITIS: ICD-10-CM

## 2025-04-17 PROBLEM — L60.0 ONYCHOCRYPTOSIS: Status: ACTIVE | Noted: 2025-04-17

## 2025-04-17 PROBLEM — M79.671 FOOT PAIN, BILATERAL: Status: ACTIVE | Noted: 2025-04-17

## 2025-04-17 PROBLEM — B35.1 ONYCHOMYCOSIS: Status: ACTIVE | Noted: 2025-04-17

## 2025-04-17 PROBLEM — I73.9 PVD (PERIPHERAL VASCULAR DISEASE): Status: ACTIVE | Noted: 2025-04-17

## 2025-04-17 PROBLEM — M79.672 FOOT PAIN, BILATERAL: Status: ACTIVE | Noted: 2025-04-17

## 2025-04-17 LAB
ALBUMIN SERPL-MCNC: 4 G/DL (ref 3.5–5.2)
ALP SERPL-CCNC: 49 U/L (ref 39–117)
ALT SERPL W P-5'-P-CCNC: 47 U/L (ref 1–41)
ANION GAP SERPL CALCULATED.3IONS-SCNC: 15 MMOL/L (ref 5–15)
AORTIC DIMENSIONLESS INDEX: 0.94 (DI)
AST SERPL-CCNC: 105 U/L (ref 1–40)
AV MEAN PRESS GRAD SYS DOP V1V2: 3 MMHG
AV VMAX SYS DOP: 118 CM/SEC
BASOPHILS # BLD AUTO: 0.05 10*3/MM3 (ref 0–0.2)
BASOPHILS NFR BLD AUTO: 0.3 % (ref 0–1.5)
BH CV ECHO MEAS - AO MAX PG: 5.6 MMHG
BH CV ECHO MEAS - AO ROOT DIAM: 2.8 CM
BH CV ECHO MEAS - AO V2 VTI: 20.7 CM
BH CV ECHO MEAS - AVA(I,D): 3 CM2
BH CV ECHO MEAS - EDV(CUBED): 110.6 ML
BH CV ECHO MEAS - EDV(MOD-SP2): 133 ML
BH CV ECHO MEAS - EDV(MOD-SP4): 115 ML
BH CV ECHO MEAS - EF(MOD-SP2): 65.5 %
BH CV ECHO MEAS - EF(MOD-SP4): 49.5 %
BH CV ECHO MEAS - ESV(CUBED): 50.7 ML
BH CV ECHO MEAS - ESV(MOD-SP2): 45.9 ML
BH CV ECHO MEAS - ESV(MOD-SP4): 58.1 ML
BH CV ECHO MEAS - FS: 22.9 %
BH CV ECHO MEAS - IVS/LVPW: 1.1 CM
BH CV ECHO MEAS - IVSD: 1.1 CM
BH CV ECHO MEAS - LA DIMENSION: 4 CM
BH CV ECHO MEAS - LAT PEAK E' VEL: 14 CM/SEC
BH CV ECHO MEAS - LV DIASTOLIC VOL/BSA (35-75): 52.4 CM2
BH CV ECHO MEAS - LV MASS(C)D: 181.9 GRAMS
BH CV ECHO MEAS - LV MAX PG: 4.2 MMHG
BH CV ECHO MEAS - LV MEAN PG: 2 MMHG
BH CV ECHO MEAS - LV SYSTOLIC VOL/BSA (12-30): 26.5 CM2
BH CV ECHO MEAS - LV V1 MAX: 102 CM/SEC
BH CV ECHO MEAS - LV V1 VTI: 19.5 CM
BH CV ECHO MEAS - LVIDD: 4.8 CM
BH CV ECHO MEAS - LVIDS: 3.7 CM
BH CV ECHO MEAS - LVOT AREA: 3.1 CM2
BH CV ECHO MEAS - LVOT DIAM: 2 CM
BH CV ECHO MEAS - LVPWD: 1 CM
BH CV ECHO MEAS - MED PEAK E' VEL: 11.2 CM/SEC
BH CV ECHO MEAS - MV A MAX VEL: 82.3 CM/SEC
BH CV ECHO MEAS - MV DEC SLOPE: 689 CM/SEC2
BH CV ECHO MEAS - MV DEC TIME: 0.12 SEC
BH CV ECHO MEAS - MV E MAX VEL: 81.2 CM/SEC
BH CV ECHO MEAS - MV E/A: 0.99
BH CV ECHO MEAS - MV MEAN PG: 2 MMHG
BH CV ECHO MEAS - MV V2 VTI: 22.7 CM
BH CV ECHO MEAS - MVA(VTI): 2.7 CM2
BH CV ECHO MEAS - RVDD: 3 CM
BH CV ECHO MEAS - SV(LVOT): 61.3 ML
BH CV ECHO MEAS - SV(MOD-SP2): 87.1 ML
BH CV ECHO MEAS - SV(MOD-SP4): 56.9 ML
BH CV ECHO MEAS - SVI(LVOT): 27.9 ML/M2
BH CV ECHO MEAS - SVI(MOD-SP2): 39.7 ML/M2
BH CV ECHO MEAS - SVI(MOD-SP4): 25.9 ML/M2
BH CV ECHO MEASUREMENTS AVERAGE E/E' RATIO: 6.44
BILIRUB CONJ SERPL-MCNC: 0.5 MG/DL (ref 0–0.3)
BILIRUB INDIRECT SERPL-MCNC: 0.6 MG/DL
BILIRUB SERPL-MCNC: 1.1 MG/DL (ref 0–1.2)
BUN SERPL-MCNC: 21 MG/DL (ref 8–23)
BUN/CREAT SERPL: 17.1 (ref 7–25)
CALCIUM SPEC-SCNC: 9.2 MG/DL (ref 8.6–10.5)
CHLORIDE SERPL-SCNC: 77 MMOL/L (ref 98–107)
CK SERPL-CCNC: 1188 U/L (ref 20–200)
CO2 SERPL-SCNC: 24 MMOL/L (ref 22–29)
CREAT SERPL-MCNC: 1.23 MG/DL (ref 0.76–1.27)
DEPRECATED RDW RBC AUTO: 37.7 FL (ref 37–54)
EGFRCR SERPLBLD CKD-EPI 2021: 64.7 ML/MIN/1.73
EOSINOPHIL # BLD AUTO: 0.08 10*3/MM3 (ref 0–0.4)
EOSINOPHIL NFR BLD AUTO: 0.5 % (ref 0.3–6.2)
ERYTHROCYTE [DISTWIDTH] IN BLOOD BY AUTOMATED COUNT: 12.4 % (ref 12.3–15.4)
GLUCOSE BLDC GLUCOMTR-MCNC: 104 MG/DL (ref 70–99)
GLUCOSE BLDC GLUCOMTR-MCNC: 131 MG/DL (ref 70–99)
GLUCOSE BLDC GLUCOMTR-MCNC: 181 MG/DL (ref 70–99)
GLUCOSE BLDC GLUCOMTR-MCNC: 85 MG/DL (ref 70–99)
GLUCOSE BLDC GLUCOMTR-MCNC: 92 MG/DL (ref 70–99)
GLUCOSE SERPL-MCNC: 87 MG/DL (ref 65–99)
HCT VFR BLD AUTO: 40.9 % (ref 37.5–51)
HGB BLD-MCNC: 14.8 G/DL (ref 13–17.7)
IMM GRANULOCYTES # BLD AUTO: 0.09 10*3/MM3 (ref 0–0.05)
IMM GRANULOCYTES NFR BLD AUTO: 0.6 % (ref 0–0.5)
LEFT ATRIUM VOLUME INDEX: 17.3 ML/M2
LV EF BIPLANE MOD: 57.9 %
LYMPHOCYTES # BLD AUTO: 2.1 10*3/MM3 (ref 0.7–3.1)
LYMPHOCYTES NFR BLD AUTO: 12.9 % (ref 19.6–45.3)
MAGNESIUM SERPL-MCNC: 2 MG/DL (ref 1.6–2.4)
MCH RBC QN AUTO: 30 PG (ref 26.6–33)
MCHC RBC AUTO-ENTMCNC: 36.2 G/DL (ref 31.5–35.7)
MCV RBC AUTO: 83 FL (ref 79–97)
MONOCYTES # BLD AUTO: 1.85 10*3/MM3 (ref 0.1–0.9)
MONOCYTES NFR BLD AUTO: 11.3 % (ref 5–12)
NEUTROPHILS NFR BLD AUTO: 12.13 10*3/MM3 (ref 1.7–7)
NEUTROPHILS NFR BLD AUTO: 74.4 % (ref 42.7–76)
NRBC BLD AUTO-RTO: 0 /100 WBC (ref 0–0.2)
PHOSPHATE SERPL-MCNC: 2.3 MG/DL (ref 2.5–4.5)
PLATELET # BLD AUTO: 268 10*3/MM3 (ref 140–450)
PMV BLD AUTO: 9.2 FL (ref 6–12)
POTASSIUM SERPL-SCNC: 3.1 MMOL/L (ref 3.5–5.2)
POTASSIUM SERPL-SCNC: 3.2 MMOL/L (ref 3.5–5.2)
POTASSIUM SERPL-SCNC: 3.5 MMOL/L (ref 3.5–5.2)
POTASSIUM SERPL-SCNC: 3.5 MMOL/L (ref 3.5–5.2)
POTASSIUM SERPL-SCNC: 3.7 MMOL/L (ref 3.5–5.2)
PROT SERPL-MCNC: 7.2 G/DL (ref 6–8.5)
RBC # BLD AUTO: 4.93 10*6/MM3 (ref 4.14–5.8)
SODIUM SERPL-SCNC: 112 MMOL/L (ref 136–145)
SODIUM SERPL-SCNC: 113 MMOL/L (ref 136–145)
SODIUM SERPL-SCNC: 114 MMOL/L (ref 136–145)
SODIUM SERPL-SCNC: 116 MMOL/L (ref 136–145)
SODIUM SERPL-SCNC: 117 MMOL/L (ref 136–145)
WBC NRBC COR # BLD AUTO: 16.3 10*3/MM3 (ref 3.4–10.8)

## 2025-04-17 PROCEDURE — 11721 DEBRIDE NAIL 6 OR MORE: CPT | Performed by: PODIATRIST

## 2025-04-17 PROCEDURE — 84100 ASSAY OF PHOSPHORUS: CPT | Performed by: FAMILY MEDICINE

## 2025-04-17 PROCEDURE — 99233 SBSQ HOSP IP/OBS HIGH 50: CPT | Performed by: FAMILY MEDICINE

## 2025-04-17 PROCEDURE — 85025 COMPLETE CBC W/AUTO DIFF WBC: CPT | Performed by: FAMILY MEDICINE

## 2025-04-17 PROCEDURE — 0HBRXZZ EXCISION OF TOE NAIL, EXTERNAL APPROACH: ICD-10-PCS | Performed by: PODIATRIST

## 2025-04-17 PROCEDURE — 63710000001 INSULIN REGULAR HUMAN PER 5 UNITS: Performed by: STUDENT IN AN ORGANIZED HEALTH CARE EDUCATION/TRAINING PROGRAM

## 2025-04-17 PROCEDURE — 80076 HEPATIC FUNCTION PANEL: CPT | Performed by: FAMILY MEDICINE

## 2025-04-17 PROCEDURE — 84132 ASSAY OF SERUM POTASSIUM: CPT | Performed by: INTERNAL MEDICINE

## 2025-04-17 PROCEDURE — 82948 REAGENT STRIP/BLOOD GLUCOSE: CPT

## 2025-04-17 PROCEDURE — 84295 ASSAY OF SERUM SODIUM: CPT | Performed by: INTERNAL MEDICINE

## 2025-04-17 PROCEDURE — 83735 ASSAY OF MAGNESIUM: CPT | Performed by: FAMILY MEDICINE

## 2025-04-17 PROCEDURE — 99232 SBSQ HOSP IP/OBS MODERATE 35: CPT | Performed by: STUDENT IN AN ORGANIZED HEALTH CARE EDUCATION/TRAINING PROGRAM

## 2025-04-17 PROCEDURE — 25810000003 SODIUM CHLORIDE 0.9 % SOLUTION: Performed by: INTERNAL MEDICINE

## 2025-04-17 PROCEDURE — 97161 PT EVAL LOW COMPLEX 20 MIN: CPT

## 2025-04-17 PROCEDURE — 97165 OT EVAL LOW COMPLEX 30 MIN: CPT

## 2025-04-17 PROCEDURE — 25010000003 DEXTROSE 5 % SOLUTION: Performed by: FAMILY MEDICINE

## 2025-04-17 PROCEDURE — 82550 ASSAY OF CK (CPK): CPT | Performed by: STUDENT IN AN ORGANIZED HEALTH CARE EDUCATION/TRAINING PROGRAM

## 2025-04-17 PROCEDURE — 80048 BASIC METABOLIC PNL TOTAL CA: CPT | Performed by: FAMILY MEDICINE

## 2025-04-17 RX ORDER — POTASSIUM CHLORIDE 750 MG/1
40 CAPSULE, EXTENDED RELEASE ORAL ONCE
Status: COMPLETED | OUTPATIENT
Start: 2025-04-17 | End: 2025-04-17

## 2025-04-17 RX ORDER — TRAZODONE HYDROCHLORIDE 100 MG/1
100 TABLET ORAL NIGHTLY
Qty: 30 TABLET | Refills: 0 | Status: SHIPPED | OUTPATIENT
Start: 2025-04-17

## 2025-04-17 RX ORDER — ARIPIPRAZOLE 20 MG/1
20 TABLET ORAL DAILY
Qty: 30 TABLET | Refills: 0 | Status: SHIPPED | OUTPATIENT
Start: 2025-04-17

## 2025-04-17 RX ORDER — METFORMIN HYDROCHLORIDE 500 MG/1
1000 TABLET, EXTENDED RELEASE ORAL 2 TIMES DAILY
Qty: 120 TABLET | Refills: 0 | Status: SHIPPED | OUTPATIENT
Start: 2025-04-17

## 2025-04-17 RX ORDER — SODIUM CHLORIDE 9 MG/ML
100 INJECTION, SOLUTION INTRAVENOUS CONTINUOUS
Status: DISCONTINUED | OUTPATIENT
Start: 2025-04-17 | End: 2025-04-17

## 2025-04-17 RX ORDER — ASPIRIN 81 MG/1
81 TABLET, COATED ORAL DAILY
Qty: 30 TABLET | Refills: 0 | Status: SHIPPED | OUTPATIENT
Start: 2025-04-17

## 2025-04-17 RX ORDER — ATORVASTATIN CALCIUM 80 MG/1
80 TABLET, FILM COATED ORAL DAILY
Qty: 30 TABLET | Refills: 0 | Status: SHIPPED | OUTPATIENT
Start: 2025-04-17

## 2025-04-17 RX ORDER — FAMOTIDINE 40 MG/1
TABLET, FILM COATED ORAL
Qty: 60 TABLET | Refills: 0 | Status: SHIPPED | OUTPATIENT
Start: 2025-04-17

## 2025-04-17 RX ORDER — POTASSIUM CHLORIDE 1500 MG/1
40 TABLET, EXTENDED RELEASE ORAL EVERY 4 HOURS
Status: COMPLETED | OUTPATIENT
Start: 2025-04-17 | End: 2025-04-18

## 2025-04-17 RX ADMIN — MUPIROCIN 1 APPLICATION: 20 OINTMENT TOPICAL at 08:11

## 2025-04-17 RX ADMIN — ACETAMINOPHEN 650 MG: 325 TABLET ORAL at 20:04

## 2025-04-17 RX ADMIN — POTASSIUM CHLORIDE 40 MEQ: 750 CAPSULE, EXTENDED RELEASE ORAL at 14:20

## 2025-04-17 RX ADMIN — MUPIROCIN 1 APPLICATION: 20 OINTMENT TOPICAL at 20:01

## 2025-04-17 RX ADMIN — INSULIN HUMAN 2 UNITS: 100 INJECTION, SOLUTION PARENTERAL at 11:34

## 2025-04-17 RX ADMIN — Medication 10 ML: at 20:01

## 2025-04-17 RX ADMIN — SODIUM CHLORIDE 100 ML/HR: 9 INJECTION, SOLUTION INTRAVENOUS at 08:47

## 2025-04-17 RX ADMIN — ASPIRIN 81 MG: 81 TABLET, COATED ORAL at 08:11

## 2025-04-17 RX ADMIN — POTASSIUM CHLORIDE 40 MEQ: 1500 TABLET, EXTENDED RELEASE ORAL at 20:01

## 2025-04-17 RX ADMIN — ACETAMINOPHEN 650 MG: 325 TABLET ORAL at 09:46

## 2025-04-17 RX ADMIN — Medication 10 ML: at 08:11

## 2025-04-17 RX ADMIN — SENNOSIDES AND DOCUSATE SODIUM 2 TABLET: 50; 8.6 TABLET ORAL at 08:11

## 2025-04-17 RX ADMIN — DEXTROSE MONOHYDRATE 15 MMOL: 50 INJECTION, SOLUTION INTRAVENOUS at 07:20

## 2025-04-17 RX ADMIN — POTASSIUM CHLORIDE 40 MEQ: 750 CAPSULE, EXTENDED RELEASE ORAL at 09:17

## 2025-04-17 RX ADMIN — SENNOSIDES AND DOCUSATE SODIUM 2 TABLET: 50; 8.6 TABLET ORAL at 20:01

## 2025-04-17 NOTE — CONSULTS
[facility] - PODIATRY    Today's Date: 04/17/25    Patient Name: Alberto Rachel  MRN: [id number]  CSN: [id number]  PCP: Magnus Euceda DO  Referring Provider: No ref. provider found  Attending Provider: Wanda Horowitz MD  Length of Stay: 2    SUBJECTIVE   Chief Complaint: Painful toenails    HPI: Alberto Rachel, a 64 y.o.male,    Patient was consulted for painful elongated incurvated toenails.    Patient denies any fevers, chills, nausea, vomiting, shortness of breath, nor any other constitutional signs nor symptoms.    Past Medical History:   Diagnosis Date    Anxiety     Arthritis     Atherosclerosis of coronary artery bypass graft of native heart without angina pectoris 10/23/2018    Coronary artery disease with previous bypass, LIMA to the LAD, SVG to ramus, and SVG to RCA in 2015    CAD (coronary artery disease) 10/23/2018    Carotid artery stenosis with cerebral infarction 06/01/2015    Dr. St     Cramps, extremity 9/10/2021    Depression     DM2 (diabetes mellitus, type 2) 10/23/2018    Duodenal ulcer     Essential hypertension 10/23/2018    Headache     Heart attack 2015    Heartburn     HLD (hyperlipidemia)     HTN (hypertension)     Injury of right leg 8/13/2021    Irregular heart beat     Low back pain 03/18/2019    Mild episode of recurrent depressive disorder 10/23/2018    Rash 03/18/2019    Shortness of breath     SOB (shortness of breath)     Type 2 diabetes mellitus with hyperglycemia, without long-term current use of insulin 06/11/2019    Uncontrolled diabetes mellitus 03/18/2019     Past Surgical History:   Procedure Laterality Date    CARDIAC CATHETERIZATION  06/2015    Dr. St     CIRCUMCISION      age 14 yrs old    CORONARY ARTERY BYPASS GRAFT  06/2015    5 bypasses     Family History   Problem Relation Age of Onset    Heart disease Other      Social History     Socioeconomic History    Marital status:    Tobacco Use    Smoking status: Never     Passive  exposure: Past    Smokeless tobacco: Current     Types: Chew   Vaping Use    Vaping status: Never Used   Substance and Sexual Activity    Alcohol use: Not Currently    Drug use: Never    Sexual activity: Defer     Allergies   Allergen Reactions    Cyclobenzaprine Unknown - Low Severity     weakness     Current Facility-Administered Medications   Medication Dose Route Frequency Provider Last Rate Last Admin    acetaminophen (TYLENOL) tablet 650 mg  650 mg Oral Q4H PRN Dino Olivarez MD   650 mg at 04/17/25 0946    Or    acetaminophen (TYLENOL) suppository 650 mg  650 mg Rectal Q4H PRN Dino Olivarez MD   650 mg at 04/15/25 2050    albuterol (PROVENTIL) nebulizer solution 0.083% 2.5 mg/3mL  2.5 mg Nebulization Q6H PRN Kayla Jensen MD        aspirin EC tablet 81 mg  81 mg Oral Daily Wanda Horowitz MD   81 mg at 04/17/25 0811    sennosides-docusate (PERICOLACE) 8.6-50 MG per tablet 2 tablet  2 tablet Oral BID Dino Olivarez MD   2 tablet at 04/17/25 0811    And    polyethylene glycol (MIRALAX) packet 17 g  17 g Oral Daily PRN Dino Olivarez MD        And    bisacodyl (DULCOLAX) EC tablet 5 mg  5 mg Oral Daily PRN Dino Olivarez MD        And    bisacodyl (DULCOLAX) suppository 10 mg  10 mg Rectal Daily PRN Dino Olivarez MD        dextrose (D50W) (25 g/50 mL) IV injection 25 g  25 g Intravenous Q15 Min PRN Dino Olivarez MD   25 g at 04/16/25 1133    dextrose (D5W) 5 % infusion 612 mL  6 mL/kg Intravenous Continuous PRN Abner Flores MD        dextrose (GLUTOSE) oral gel 15 g  15 g Oral Q15 Min PRN Dino Olivarez MD        glucagon (GLUCAGEN) injection 1 mg  1 mg Intramuscular Q15 Min PRN Dino Olivarez MD        insulin regular (humuLIN R,novoLIN R) injection 2-7 Units  2-7 Units Subcutaneous Q6H Dino Olivarez MD   2 Units at 04/17/25 1134    mupirocin (BACTROBAN) 2 % nasal ointment 1 Application  1 Application Each Nare BID Dino Olivarez MD   1 Application at 04/17/25 0860    nitroglycerin  (NITROSTAT) SL tablet 0.4 mg  0.4 mg Sublingual Q5 Min PRN Dino Olivarez MD        ondansetron ODT (ZOFRAN-ODT) disintegrating tablet 4 mg  4 mg Oral Q6H PRN Dino Olivarez MD        Or    ondansetron (ZOFRAN) injection 4 mg  4 mg Intravenous Q6H PRN Dino Olivarez MD        sodium chloride 0.9 % flush 10 mL  10 mL Intravenous PRN Mikey Alonso DO        sodium chloride 0.9 % flush 10 mL  10 mL Intravenous Q12H Dino Olivarez MD   10 mL at 04/17/25 0811    sodium chloride 0.9 % flush 10 mL  10 mL Intravenous PRN Dino Olivarez MD        sodium chloride 0.9 % infusion 40 mL  40 mL Intravenous PRN Dino Olivarez MD        sodium chloride 0.9 % infusion  100 mL/hr Intravenous Continuous Abner Flores  mL/hr at 04/17/25 0847 100 mL/hr at 04/17/25 0847       OBJECTIVE     Vitals:    04/17/25 1300   BP: 132/67   Pulse: 77   Resp: 19   Temp: 98.6 °F (37 °C)   SpO2: 99%       PHYSICAL EXAM  GEN:   A&Ox3, NAD. Pt presents in hospital bed. Accompanied by the patient's nurse.     Foot/Ankle Exam    GENERAL  Appearance:  elderly, chronically ill, appears ill and frail  Orientation:  AAOx3  Affect:  appropriate  Gait:  unimpaired    VASCULAR     Right Foot Vascularity   Dorsalis pedis:  Absent  Posterior tibial:  Absent  Skin temperature:  cool  Edema grading:  None  CFT:  < 3 seconds  Pedal hair growth:  Absent  Varicosities:  moderate varicosities     Left Foot Vascularity   Dorsalis pedis:  Absent  Posterior tibial:  Absent  Skin temperature:  cool  Edema grading:  None  CFT:  < 3 seconds  Pedal hair growth:  Absent  Varicosities:  moderate varicosities     NEUROLOGIC     Right Foot Neurologic   Normal sensation    Light touch sensation: normal  Vibratory sensation: normal  Hot/Cold sensation: normal  Protective Sensation using [place]-Irineo Monofilament:   Sites intact: 10  Sites tested: 10     Left Foot Neurologic   Normal sensation    Light touch sensation: normal  Vibratory sensation: normal  Hot/Cold  sensation:  normal  Protective Sensation using [place]-Irineo Monofilament:   Sites intact: 10  Sites tested: 10    MUSCLE STRENGTH     Right Foot Muscle Strength   Foot dorsiflexion:  4  Foot plantar flexion:  4  Foot inversion:  4  Foot eversion:  4     Left Foot Muscle Strength   Foot dorsiflexion:  4  Foot plantar flexion:  4  Foot inversion:  4  Foot eversion:  4    RANGE OF MOTION     Right Foot Range of Motion   Foot and ankle ROM within normal limits       Left Foot Range of Motion   Foot and ankle ROM within normal limits      DERMATOLOGIC      Right Foot Dermatologic   Skin  Right foot skin is intact.   Nails  1.  Positive for elongated, onychomycosis, abnormal thickness, subungual debris, dystrophic nail and ingrown toenail.  2.  Positive for elongated, onychomycosis, abnormal thickness, subungual debris, dystrophic nail and ingrown toenail.  3.  Positive for elongated, onychomycosis, abnormal thickness, subungual debris, dystrophic nail and ingrown toenail.  4.  Positive for elongated, onychomycosis, abnormal thickness, subungual debris, dystrophic nail and ingrown toenail.  5.  Positive for elongated, onychomycosis, abnormal thickness, subungual debris, dystrophic nail and ingrown toenail.  Nails comment:  Toenails 1, 2, 3, 4, and 5     Left Foot Dermatologic   Skin  Left foot skin is intact.   Nails comment:  Toenails 1, 2, 3, 4, and 5  Nails  1.  Positive for elongated, onychomycosis, abnormal thickness, subungual debris, dystrophic nail and ingrown toenail.  2.  Positive for elongated, onychomycosis, abnormal thickness, subungual debris, dystrophic nail and ingrown toenail.  3.  Positive for elongated, onychomycosis, abnormal thickness, subungual debris, dystrophic nail and ingrown toenail.  4.  Positive for elongated, onychomycosis, abnormally thick, subungual debris, dystrophic nail and ingrown toenail.  5.  Positive for elongated, onychomycosis, abnormally thick, subungual debris, dystrophic  nail and ingrown toenail.     All toenails bilaterally are extremely thickened and dystrophic with the majority of the nails being 3 cm or longer in growth past the hyponychium.    ASSESSMENT/PLAN     Active Hospital Problems:  Active Hospital Problems    Diagnosis     **Hyponatremia     Onychomycosis     Onychocryptosis     PVD (peripheral vascular disease)     Foot pain, bilateral     Type 2 diabetes mellitus with hyperglycemia, without long-term current use of insulin        Comprehensive lower extremity examination and evaluation was performed.    Discussed findings and treatment plan including risks, benefits, and treatment options with patient in detail. Patient agreed with treatment plan.    Toenails 1, 2, 3, 4, 5 on Right and 1, 2, 3, 4, 5 on Left were debrided with nail nippers.  Patient tolerated procedure well without complications.    Patient is to follow up on an as-needed basis as an outpatient.    Discussed with patient's nurse.    Thank you for including me in the care of this patient.    This document has been electronically signed by Temo Gerard DPM on April 17, 2025 13:10 EDT

## 2025-04-17 NOTE — PROGRESS NOTES
INTENSIVIST   PROGRESS NOTE        SUBJECTIVE     Alberto 64 y.o. male is followed for: Altered Mental Status       Hyponatremia    As an Intensivist, we provide an integrated approach to the ICU patient and family, medical management of comorbid conditions, including but not limited to electrolytes, glycemic control, organ dysfunction, lead interdisciplinary rounds and coordinate the care with all other services, including those from other specialists.     Interval History:  No acute events overnight.  Patient is oriented to person and place but not year which is stable.   Temp  Min: 97.1 °F (36.2 °C)  Max: 98.6 °F (37 °C)           The patient's relevant past medical, surgical and social history were reviewed and updated in Epic as appropriate.        OBJECTIVE     Vitals:  Temp: 97.1 °F (36.2 °C) (25) Temp  Min: 97.1 °F (36.2 °C)  Max: 98.6 °F (37 °C)   Temp core:      BP: 93/51 (25) BP  Min: 81/33  Max: 142/47   MAP (non-invasive) Noninvasive MAP (mmHg): 64 (25) Noninvasive MAP (mmHg)  Av.1  Min: 46  Max: 117   Pulse: 70 (25) Pulse  Min: 63  Max: 86   Resp: 18 (25) Resp  Min: 15  Max: 18   SpO2: 96 % (25) SpO2  Min: 90 %  Max: 100 %   Device: room air (25)    Flow Rate:   No data recorded         25  0410 25  0520   Weight: 100 kg (220 lb 10.9 oz) 100 kg (220 lb 10.9 oz)        Intake/Ouptut 24 hrs (7:00AM - 6:59 AM)  Intake & Output (last 2 days)         04/15 07 07    I.V. (mL/kg)  242 (2.4)     IV Piggyback 400      Total Intake(mL/kg) 400 (4) 242 (2.4)     Urine (mL/kg/hr) 1925 2900 (1.2)     Total Output 1925 2900     Net -1525 -2658            Urine Unmeasured Occurrence 1 x              Medications (drips):  dextrose         Physical Examination  Telemetry:  Rhythm: normal sinus rhythm (25 0400)  Conduction Defect: first degree AV block (25)    Constitutional:  No acute distress.   Cardiovascular: RRR.    Respiratory: Normal breath sounds  No adventitious sounds   Abdominal:  Soft with no tenderness.   Extremities: No Edema   Neurological:   Alert, oriented x 2  Best Eye Response: 4-->(E4) spontaneous (04/17/25 0400)  Best Motor Response: 6-->(M6) obeys commands (04/17/25 0400)  Best Verbal Response: 4-->(V4) confused (04/17/25 0400)  Antonio Coma Scale Score: 14 (04/17/25 0400)         Confusion Assessment Method-ICU (CAM-ICU)  Feature 1: Acute Onset or Fluctuating Course: Negative (04/16/25 2000)  Feature 2: Inattention: Negative (04/16/25 2000)  Feature 4: Disorganized Thinking: Negative (04/16/25 2000)  Overall CAM-ICU: Negative (04/16/25 2000)          Lines, Drains & Airways       Active LDAs       Name Placement date Placement time Site Days    Peripheral IV 04/15/25 1435 20 G Right Antecubital 04/15/25  1435  Antecubital  less than 1    Peripheral IV 04/15/25 1900 20 G Anterior;Distal;Right;Upper Arm 04/15/25  1900  Arm  less than 1    Urethral Catheter 04/15/25  1900  -- less than 1                    Results Reviewed:  Laboratory  Microbiology  Radiology  Pathology    Hematology:  Results from last 7 days   Lab Units 04/17/25 0543 04/16/25  0531 04/15/25  1235   WBC 10*3/mm3 16.30* 15.52* 17.49*   HEMOGLOBIN g/dL 14.8 15.2 14.9   MCV fL 83.0 81.2 79.9   PLATELETS 10*3/mm3 268 306 329     Results from last 7 days   Lab Units 04/17/25  0543 04/15/25  1235   NEUTROS ABS 10*3/mm3 12.13* 14.82*   LYMPHS ABS 10*3/mm3 2.10 0.96   EOS ABS 10*3/mm3 0.08 0.04     Chemistry:  Estimated Creatinine Clearance: 67.9 mL/min (A) (by C-G formula based on SCr of 1.31 mg/dL (H)).    Results from last 7 days   Lab Units 04/17/25  0543 04/16/25  2356 04/16/25  1207 04/16/25  0531 04/15/25  1621 04/15/25  1235   SODIUM mmol/L 114* 112*   < > 109*   < > 103*   POTASSIUM mmol/L 3.5 3.7   < > 4.0  4.0   < > 3.7   CHLORIDE mmol/L  --   --   --  73*  --  64*   CO2  "mmol/L  --   --   --  20.4*  --  23.1   BUN mg/dL  --   --   --  24*  --  28*   CREATININE mg/dL  --   --   --  1.31*  --  1.49*   GLUCOSE mg/dL  --   --   --  79  --  102*    < > = values in this interval not displayed.     Results from last 7 days   Lab Units 04/17/25  0543 04/16/25  0531 04/15/25  1235   CALCIUM mg/dL  --  9.2 9.3   MAGNESIUM mg/dL 2.0 1.9 1.6   PHOSPHORUS mg/dL 2.3* 3.0  --      Hepatic Panel:  Results from last 7 days   Lab Units 04/17/25  0543 04/16/25  0531 04/15/25  1235   ALBUMIN g/dL 4.0 4.0 4.7   TOTAL PROTEIN g/dL 7.2  --  7.9   BILIRUBIN mg/dL 1.1  --  1.8*   BILIRUBIN DIRECT mg/dL 0.5*  --   --    AST (SGOT) U/L 105*  --  104*   ALT (SGPT) U/L 47*  --  39   ALK PHOS U/L 49  --  52     Coagulation Labs:       Cardiac Labs:  Results from last 7 days   Lab Units 04/17/25  0543 04/16/25  0531 04/15/25  1621 04/15/25  1235   CK TOTAL U/L 1,188* 3,767*  --  3,056*   HSTROP T ng/L  --   --  111* 128*     Biomarkers:  Results from last 7 days   Lab Units 04/15/25  1735 04/15/25  1621   LACTATE mmol/L 1.2  --    PROCALCITONIN ng/mL  --  0.20       U/A  Results from last 7 days   Lab Units 04/15/25  1515   COLOR UA  Yellow   CLARITY UA  Clear   PH, URINE  5.5   SPECIFIC GRAVITY, URINE  1.012   GLUCOSE UA  >=1000 mg/dL (3+)*   KETONES UA  Negative   BILIRUBIN UA  Negative   PROTEIN UA  Negative   BLOOD UA  Moderate (2+)*   LEUKOCYTES UA  Negative   NITRITE UA  Negative   UROBILINOGEN UA  1.0 E.U./dL     Results from last 7 days   Lab Units 04/15/25  1515   RBC UA /HPF 0-2   WBC UA /HPF 0-2   BACTERIA UA /HPF None Seen   SQUAM EPITHEL UA /HPF 0-2   HYALINE CASTS UA /LPF 0-2       Interleukin:  No results found for: \"INTERLEUN6\"  COVID-19  No results found for: \"COVID19\"    Arterial Blood Gases:        Images:  FL Video Swallow With Speech Single Contrast  Result Date: 4/16/2025  Impression: 1. No tracheal aspiration or laryngeal penetration observed Please consult speech pathology report for " further details regarding the exam and for dietary recommendations. Report dictated by: Racquel Nette  I have personally reviewed this case and agree with the findings above: Electronically Signed: Taco Kennedy MD  4/16/2025 4:20 PM EDT  Workstation ID: [id number]    CT Head Without Contrast  Result Date: 4/15/2025  Impression: 1.No acute intracranial abnormality identified by noncontrast CT. 2.Mild parenchymal volume loss and probable chronic small vessel ischemic change. Electronically Signed: Heriberto Casillas MD  4/15/2025 1:48 PM EDT  Workstation ID: [id number]    XR Chest 1 View  Result Date: 4/15/2025  Impression: Suspected mild left basilar atelectasis. Electronically Signed: Theresa Mancilla MD  4/15/2025 1:00 PM EDT  Workstation ID: [id number]      Echo:      Results: Reviewed.  I reviewed the patient's new laboratory and imaging results.  I independently reviewed the patient's new images.    Medications: Reviewed.    Assessment   A/P     Hospital:  LOS: 2 days   ICU: 1d 14h     Active Hospital Problems    Diagnosis  POA    **Hyponatremia [E87.1]  Yes     Alberto is a 64 y.o. male admitted on 4/15/2025 with Hyponatremia [E87.1]  ESVIN (acute kidney injury) [N17.9]  Non-traumatic rhabdomyolysis [M62.82]    Assessment/Management/Treatment Plan:    Acute hypotonic hyponatremia  Metabolic encephalopathy  NSTEMI, type 2   PMH: hypertension, hyperlipidemia, non-insulin-dependent type 2 diabetes, CAD status post CABG, hypothyroidism, depression, mild intermittent asthma (not in exacerbation)       Pulmonary         Room air. CXR reviewed, atelectasis. Albuterol PRN for hx asthma.   Cardiovascular  Patient followed by cardiology outpatient, following while inpatient.  Echo pending.  HDS. No augmentation required.   Trop 128, repeat 111. No chest pain. EKG w/o ST changes.   Neuro  UDS and ethanol negative   GI/Hepatology  Diet per SLP as below.   Renal  Nephro closely following and managing hypoNa. Initial sodium 103, repeat  this morning 116. Currently on normal saline at 100/hr. Trend Na every 6 hours. Neurochecks in place.    Cr 1.23, improving.  Better than baseline, 12/2022.   TSH within normal limits, uric acid 6.9.  CK 3K, repeat 1188.   ID/Antibiotics  Afebrile.  Leukocytosis, stable. UA and CXR negative.  Blood cultures NGTD.  No antbx for now.   Hematology  Hgb and plts wnl. SCDs for DVT prophy.   Endocrine  Body mass index is 34.06 kg/m². Obese Class I: 30-34.9kg/m2  Type 2 diabetes. Accuchecks q6hr. sliding scale insulin    Lab Results   Lab Value Date/Time    HGBA1C 6.3 (A) 05/22/2024 1529    HGBA1C 6.10 (H) 09/06/2023 1136    HGBA1C 5.80 (H) 02/15/2023 1143     Results from last 7 days   Lab Units 04/17/25  0517 04/16/25  2355 04/16/25  1725 04/16/25  1152 04/16/25  1127 04/16/25  0531 04/15/25  2356 04/15/25  1739   GLUCOSE mg/dL 85 92 137* 190* 70 81 71 88       Diet: Diet: Cardiac, Diabetic; Healthy Heart (2-3 Na+); Consistent Carbohydrate; Texture: Soft to Chew (NDD 3); Soft to Chew: Chopped Meat; Fluid Consistency: Thin (IDDSI 0)   Advance Directives: Code Status and Medical Interventions: CPR (Attempt to Resuscitate); Full Support   Ordered at: 04/15/25 1554     Code Status (Patient has no pulse and is not breathing):    CPR (Attempt to Resuscitate)     Medical Interventions (Patient has pulse or is breathing):    Full Support        VTE Prophylaxis:  Mechanical VTE prophylaxis orders are present.        Disposition: Keep in ICU.    Plan of care and goals reviewed during interdisciplinary rounds.  I discussed the patient's findings and my recommendations with patient and nursing staff and primary team    Time: was greater than 30 critical care minutes. This is non-concurrent time.   (This excludes time spent performing separately reportable procedures and services).    He has a high risk of imminent or life-threatening  deterioration, which requires the highest level of physician preparedness to intervene urgently. I  devoted my full attention to the direct care of this patient for the amount of time indicated above.     Time spent with family or surrogate(s) is included only if the patient was incapable of providing the necessary information or participating in medical decision making.    He has the following organ/system impairments Severe Electrolyte imbalance.          Copied text in this note has been reviewed and is accurate as of 04/17/25    Kayla Jensen MD  Pulmonary Critical Care Medicine

## 2025-04-17 NOTE — PROGRESS NOTES
" LOS: 2 days   Patient Care Team:  Magnus Euceda DO as PCP - General (Family Medicine)    Chief Complaint: Hyponatremia    Subjective     Pt without any acute complaints.  Tolerating po, no events.    History taken from: patient chart RN    Objective     Vital Sign Min/Max for last 24 hours  Temp  Min: 97.1 °F (36.2 °C)  Max: 98.6 °F (37 °C)   BP  Min: 81/33  Max: 142/47   Pulse  Min: 63  Max: 85   Resp  Min: 15  Max: 18   SpO2  Min: 90 %  Max: 100 %   No data recorded   Weight  Min: 100 kg (220 lb 10.9 oz)  Max: 100 kg (220 lb 10.9 oz)     Flowsheet Rows      Flowsheet Row First Filed Value   Admission Height 182.8 cm (71.97\") Documented at 04/15/2025 1719   Admission Weight 102 kg (223 lb 15.8 oz) Documented at 04/15/2025 1220            No intake/output data recorded.  I/O last 3 completed shifts:  In: 642 [I.V.:242; IV Piggyback:400]  Out: 4825 [Urine:4825]    Objective:  General Appearance:  Comfortable.    Vital signs: (most recent): Blood pressure 147/56, pulse 90, temperature 97.4 °F (36.3 °C), temperature source Bladder, resp. rate 23, height 182.8 cm (71.97\"), weight 100 kg (220 lb 10.9 oz), SpO2 100%.  Vital signs are normal.    HEENT: Normal HEENT exam.    Lungs:  Normal effort and normal respiratory rate.    Heart: Normal rate.  Regular rhythm.    Abdomen: Abdomen is soft.  Bowel sounds are normal.   There is no abdominal tenderness.     Extremities: Normal range of motion.  There is no dependent edema.    Pulses: Distal pulses are intact.    Neurological: Patient is alert and oriented to person, place and time.    Pupils:  Pupils are equal, round, and reactive to light.    Skin:  Warm and dry.                Results Review:     I reviewed the patient's new clinical results.  I reviewed the patient's new imaging results and agree with the interpretation.  I reviewed the patient's other test results and agree with the interpretation    WBC WBC   Date Value Ref Range Status   04/17/2025 16.30 (H) 3.40 - " "10.80 10*3/mm3 Final   04/16/2025 15.52 (H) 3.40 - 10.80 10*3/mm3 Final   04/15/2025 17.49 (H) 3.40 - 10.80 10*3/mm3 Final      HGB Hemoglobin   Date Value Ref Range Status   04/17/2025 14.8 13.0 - 17.7 g/dL Final   04/16/2025 15.2 13.0 - 17.7 g/dL Final   04/15/2025 14.9 13.0 - 17.7 g/dL Final      HCT Hematocrit   Date Value Ref Range Status   04/17/2025 40.9 37.5 - 51.0 % Final   04/16/2025 41.8 37.5 - 51.0 % Final   04/15/2025 40.1 37.5 - 51.0 % Final      Platlets No results found for: \"LABPLAT\"   MCV MCV   Date Value Ref Range Status   04/17/2025 83.0 79.0 - 97.0 fL Final   04/16/2025 81.2 79.0 - 97.0 fL Final   04/15/2025 79.9 79.0 - 97.0 fL Final          Sodium Sodium   Date Value Ref Range Status   04/17/2025 116 (C) 136 - 145 mmol/L Final   04/17/2025 114 (C) 136 - 145 mmol/L Final   04/16/2025 112 (C) 136 - 145 mmol/L Final   04/16/2025 113 (C) 136 - 145 mmol/L Final   04/16/2025 111 (C) 136 - 145 mmol/L Final   04/16/2025 109 (C) 136 - 145 mmol/L Final   04/15/2025 106 (C) 136 - 145 mmol/L Final   04/15/2025 104 (C) 136 - 145 mmol/L Final   04/15/2025 104 (C) 136 - 145 mmol/L Final   04/15/2025 103 (C) 136 - 145 mmol/L Final      Potassium Potassium   Date Value Ref Range Status   04/17/2025 3.5 3.5 - 5.2 mmol/L Final     Comment:     Slight hemolysis detected by analyzer. Result may be falsely elevated.   04/17/2025 3.5 3.5 - 5.2 mmol/L Final   04/16/2025 3.7 3.5 - 5.2 mmol/L Final     Comment:     Slight hemolysis detected by analyzer. Result may be falsely elevated.   04/16/2025 3.6 3.5 - 5.2 mmol/L Final   04/16/2025 3.7 3.5 - 5.2 mmol/L Final   04/16/2025 4.0 3.5 - 5.2 mmol/L Final   04/16/2025 4.0 3.5 - 5.2 mmol/L Final   04/15/2025 3.4 (L) 3.5 - 5.2 mmol/L Final     Comment:     Slight hemolysis detected by analyzer. Result may be falsely elevated.   04/15/2025 3.5 3.5 - 5.2 mmol/L Final   04/15/2025 3.5 3.5 - 5.2 mmol/L Final   04/15/2025 3.7 3.5 - 5.2 mmol/L Final      Chloride Chloride   Date " "Value Ref Range Status   04/17/2025 77 (L) 98 - 107 mmol/L Final   04/16/2025 73 (L) 98 - 107 mmol/L Final   04/15/2025 64 (L) 98 - 107 mmol/L Final      CO2 CO2   Date Value Ref Range Status   04/17/2025 24.0 22.0 - 29.0 mmol/L Final   04/16/2025 20.4 (L) 22.0 - 29.0 mmol/L Final   04/15/2025 23.1 22.0 - 29.0 mmol/L Final      BUN BUN   Date Value Ref Range Status   04/17/2025 21 8 - 23 mg/dL Final   04/16/2025 24 (H) 8 - 23 mg/dL Final   04/15/2025 28 (H) 8 - 23 mg/dL Final      Creatinine Creatinine   Date Value Ref Range Status   04/17/2025 1.23 0.76 - 1.27 mg/dL Final   04/16/2025 1.31 (H) 0.76 - 1.27 mg/dL Final   04/15/2025 1.49 (H) 0.76 - 1.27 mg/dL Final      Calcium Calcium   Date Value Ref Range Status   04/17/2025 9.2 8.6 - 10.5 mg/dL Final   04/16/2025 9.2 8.6 - 10.5 mg/dL Final   04/15/2025 9.3 8.6 - 10.5 mg/dL Final      PO4 No results found for: \"CAPO4\"   Albumin Albumin   Date Value Ref Range Status   04/17/2025 4.0 3.5 - 5.2 g/dL Final   04/16/2025 4.0 3.5 - 5.2 g/dL Final   04/15/2025 4.7 3.5 - 5.2 g/dL Final      Magnesium Magnesium   Date Value Ref Range Status   04/17/2025 2.0 1.6 - 2.4 mg/dL Final   04/16/2025 1.9 1.6 - 2.4 mg/dL Final   04/15/2025 1.6 1.6 - 2.4 mg/dL Final      Uric Acid Uric Acid   Date Value Ref Range Status   04/15/2025 6.9 3.4 - 7.0 mg/dL Final        Medication Review:   aspirin, 81 mg, Oral, Daily  insulin regular, 2-7 Units, Subcutaneous, Q6H  mupirocin, 1 Application, Each Nare, BID  senna-docusate sodium, 2 tablet, Oral, BID  sodium chloride, 10 mL, Intravenous, Q12H  sodium phosphate, 15 mmol, Intravenous, Once          Assessment & Plan       Hyponatremia      Assessment & Plan  - hyponatremia, possibly secondary to chlorthalidone, urine osmolality 271.  TSH okay.  No signs or symptoms of adrenal insufficiency.  Sodium slowly better.  Sodium up to 116 now.   Checking sodium level every 6 hours.      - Chronic kidney disease stage III, baseline creatinine lately " around 1.4-1.5.  Has bland UA and minimal proteinuria from 2 years ago.  Better than baseline now.    - Hypokalemia, replaced and improved.  Magnesium is okay.    - Rhabdomyolysis, possibly secondary to fall versus hyponatremia.  Will monitor.  May need hypotonic IV fluid.  Cpk down to 1188.    - Type 2 diabetes, per primary.    Abner Flores MD  04/17/25  08:00 EDT

## 2025-04-17 NOTE — THERAPY EVALUATION
Patient Name: Alberto Rachel  : 1960    MRN: [id number]                              Today's Date: 2025       Admit Date: 4/15/2025    Visit Dx:     ICD-10-CM ICD-9-CM   1. Hyponatremia  E87.1 276.1   2. Non-traumatic rhabdomyolysis  M62.82 728.88   3. ESVIN (acute kidney injury)  N17.9 584.9   4. Oropharyngeal dysphagia  R13.12 787.22   5. Decreased activities of daily living (ADL)  Z78.9 V49.89     Patient Active Problem List   Diagnosis    Anxiety    Atherosclerosis of coronary artery bypass graft of native heart without angina pectoris    Essential hypertension    Depression    Mixed hyperlipidemia    Type 2 diabetes mellitus with hyperglycemia, without long-term current use of insulin    Hypothyroidism    Arthritis of knee    Illiteracy    Cramps, extremity    Tachycardia    Obesity (BMI 30-39.9)    Degenerative disc disease, lumbar    Primary insomnia    Gastroesophageal reflux disease without esophagitis    Close exposure to COVID-19 virus    Mild intermittent asthma    Candidal dermatitis    Bronchitis    Bilateral shoulder pain    Hyperkalemia    Stage 3a chronic kidney disease (CKD)    Hyponatremia    Onychomycosis    Onychocryptosis    PVD (peripheral vascular disease)    Foot pain, bilateral     Past Medical History:   Diagnosis Date    Anxiety     Arthritis     Atherosclerosis of coronary artery bypass graft of native heart without angina pectoris 10/23/2018    Coronary artery disease with previous bypass, LIMA to the LAD, SVG to ramus, and SVG to RCA in     CAD (coronary artery disease) 10/23/2018    Carotid artery stenosis with cerebral infarction 2015    Dr. St     Cramps, extremity 9/10/2021    Depression     DM2 (diabetes mellitus, type 2) 10/23/2018    Duodenal ulcer     Essential hypertension 10/23/2018    Headache     Heart attack 2015    Heartburn     HLD (hyperlipidemia)     HTN (hypertension)     Injury of right leg 2021    Irregular heart beat     Low back pain  03/18/2019    Mild episode of recurrent depressive disorder 10/23/2018    Rash 03/18/2019    Shortness of breath     SOB (shortness of breath)     Type 2 diabetes mellitus with hyperglycemia, without long-term current use of insulin 06/11/2019    Uncontrolled diabetes mellitus 03/18/2019     Past Surgical History:   Procedure Laterality Date    CARDIAC CATHETERIZATION  06/2015    Dr. St     CIRCUMCISION      age 14 yrs old    CORONARY ARTERY BYPASS GRAFT  06/2015    5 bypasses      General Information       Row Name 04/17/25 1455          OT Time and Intention    Document Type evaluation  -     Mode of Treatment individual therapy;occupational therapy  -       Row Name 04/17/25 1455          General Information    Patient Profile Reviewed yes  -     Prior Level of Function --  (I) with ADLs, ambulated with a STC, sponge bathes, has a standard commode, stands to groom, drives, and no home O2.  -     Existing Precautions/Restrictions fall  -     Barriers to Rehab none identified  -       Row Name 04/17/25 1455          Occupational Profile    Reason for Services/Referral (Occupational Profile) Patient is a 64 year old male admitted to [facility] for AMS on April 15th, 2025. Occupational therapy consulted due to recent decline in ADLs/functional transfers. No previous occupational therapy services for current condition.  -       Row Name 04/17/25 1455          Living Environment    Current Living Arrangements home  -     People in Home alone  -       Row Name 04/17/25 1455          Home Main Entrance    Number of Stairs, Main Entrance none  -       Row Name 04/17/25 1455          Cognition    Orientation Status (Cognition) oriented x 3  -       Row Name 04/17/25 1455          Safety Issues/Impairments Affecting Functional Mobility    Impairments Affecting Function (Mobility) balance;endurance/activity tolerance;pain;strength  -               User Key  (r) = Recorded By, (t) = Taken  By, (c) = Cosigned By      Initials Name Provider Type    LF Madhavi Sanchez, OT Occupational Therapist                     Mobility/ADL's       Row Name 04/17/25 1456          Bed Mobility    Bed Mobility supine-sit  -     Supine-Sit Eure (Bed Mobility) moderate assist (50% patient effort)  -     Bed Mobility, Safety Issues decreased use of arms for pushing/pulling;decreased use of legs for bridging/pushing  -     Assistive Device (Bed Mobility) bed rails;head of bed elevated  -[facility] Name 04/17/25 1456          Transfers    Transfers sit-stand transfer;stand-sit transfer  -LF       Row Name 04/17/25 1456          Sit-Stand Transfer    Sit-Stand Eure (Transfers) minimum assist (75% patient effort);moderate assist (50% patient effort)  -     Assistive Device (Sit-Stand Transfers) walker, front-wheeled  -LF       Row Name 04/17/25 1456          Stand-Sit Transfer    Stand-Sit Eure (Transfers) minimum assist (75% patient effort);moderate assist (50% patient effort)  -     Assistive Device (Stand-Sit Transfers) walker, front-wheeled  -LF       Row Name 04/17/25 [facility]6          Functional Mobility    Functional Mobility- Ind. Level minimum assist (75% patient effort)  -     Functional Mobility- Device walker, front-wheeled  -     Functional Mobility- Comment 3-4 steps from EOB to recliner  -LF       Row Name 04/17/25 1456          Activities of Daily Living    BADL Assessment/Intervention bathing;upper body dressing;lower body dressing;grooming;feeding;toileting  -LF       Row Name 04/17/25 1456          Bathing Assessment/Intervention    Eure Level (Bathing) bathing skills;upper body;minimum assist (75% patient effort);lower body;maximum assist (25% patient effort);dependent (less than 25% patient effort)  -[facility] Name 04/17/25 1456          Upper Body Dressing Assessment/Training    Eure Level (Upper Body Dressing) upper body dressing skills;minimum  assist (75% patient effort)  -[facility] Name 04/17/25 1456          Lower Body Dressing Assessment/Training    [place] Level (Lower Body Dressing) lower body dressing skills;maximum assist (25% patient effort);dependent (less than 25% patient effort)  -[facility] Name 04/17/25 1456          Grooming Assessment/Training    [place] Level (Grooming) grooming skills;minimum assist (75% patient effort)  -LF       Row Name 04/17/25 1456          Self-Feeding Assessment/Training    [place] Level (Feeding) feeding skills;set up  -LF       Row Name 04/17/25 1456          Toileting Assessment/Training    [place] Level (Toileting) toileting skills;maximum assist (25% patient effort);dependent (less than 25% patient effort)  -     Comment, (Toileting) Orozco catheter currently in place.  -               User Key  (r) = Recorded By, (t) = Taken By, (c) = Cosigned By      Initials Name Provider Type     Madhavi Sanchez OT Occupational Therapist                   Obj/Interventions       [facility] Name 04/17/25 1457          Sensory Assessment (Somatosensory)    Sensory Assessment (Somatosensory) UE sensation intact  -LF       Row Name 04/17/25 1457          Vision Assessment/Intervention    Visual Impairment/Limitations WFL  -LF       Row Name 04/17/25 1457          Range of Motion Comprehensive    General Range of Motion bilateral upper extremity ROM WFL  -LF       Row Name 04/17/25 1457          Strength Comprehensive (MMT)    Comment, General Manual Muscle Testing (MMT) Assessment 4-/5 BUEs  -LF       Row Name 04/17/25 1457          Motor Skills    Motor Skills coordination;functional endurance  -     Coordination bilateral;upper extremity;WFL  -LF     Functional Endurance Fair-  -LF       Row Name 04/17/25 1457          Balance    Balance Assessment sitting dynamic balance;standing dynamic balance  -     Dynamic Sitting Balance standby assist  -     Position, Sitting Balance unsupported;sitting  edge of bed  -LF     Dynamic Standing Balance minimal assist  -LF     Position/Device Used, Standing Balance supported;walker, front-wheeled  -LF               User Key  (r) = Recorded By, (t) = Taken By, (c) = Cosigned By      Initials Name Provider Type    LF Madhavi Sanchez, OT Occupational Therapist                   Goals/Plan       Row Name 04/17/25 1459          Bed Mobility Goal 1 (OT)    Activity/Assistive Device (Bed Mobility Goal 1, OT) bed mobility activities, all  -LF     [place] Level/Cues Needed (Bed Mobility Goal 1, OT) modified independence  -LF     Time Frame (Bed Mobility Goal 1, OT) long term goal (LTG);10 days  -LF       Row Name 04/17/25 1459          Transfer Goal 1 (OT)    Activity/Assistive Device (Transfer Goal 1, OT) transfers, all  -LF     [place] Level/Cues Needed (Transfer Goal 1, OT) modified independence  -LF     Time Frame (Transfer Goal 1, OT) long term goal (LTG);10 days  -       Row Name 04/17/25 1459          Bathing Goal 1 (OT)    Activity/Device (Bathing Goal 1, OT) bathing skills, all  -LF     [place] Level/Cues Needed (Bathing Goal 1, OT) modified independence  -LF     Time Frame (Bathing Goal 1, OT) long term goal (LTG);10 days  -       Row Name 04/17/25 1459          Dressing Goal 1 (OT)    Activity/Device (Dressing Goal 1, OT) dressing skills, all  -LF     [place]/Cues Needed (Dressing Goal 1, OT) modified independence  -LF     Time Frame (Dressing Goal 1, OT) long term goal (LTG);10 days  -       Row Name 04/17/25 1459          Toileting Goal 1 (OT)    Activity/Device (Toileting Goal 1, OT) toileting skills, all  -LF     [place] Level/Cues Needed (Toileting Goal 1, OT) modified independence  -LF     Time Frame (Toileting Goal 1, OT) long term goal (LTG);10 days  -LF       Row Name 04/17/25 1459          Problem Specific Goal 1 (OT)    Problem Specific Goal 1 (OT) Patient will demonstrate good- endurance to support ADLs/functional transfers.   -     Time Frame (Problem Specific Goal 1, OT) long term goal (LTG);10 days  -       Row Name 04/17/25 1450          Therapy Assessment/Plan (OT)    Planned Therapy Interventions (OT) activity tolerance training;BADL retraining;functional balance retraining;occupation/activity based interventions;patient/caregiver education/training;strengthening exercise;transfer/mobility retraining  -               User Key  (r) = Recorded By, (t) = Taken By, (c) = Cosigned By      Initials Name Provider Type     Madhavi Sanchez, MAL Occupational Therapist                   Clinical Impression       Row Name 04/17/25 1458          Pain Assessment    Additional Documentation Pain Scale: FACES Pre/Post-Treatment (Group)  -       Row Name 04/17/25 1453          Pain Scale: FACES Pre/Post-Treatment    Pain: FACES Scale, Pretreatment 2-->hurts little bit  -     Posttreatment Pain Rating 2-->hurts little bit  -       Row Name 04/17/25 145          Plan of Care Review    Plan of Care Reviewed With patient  -     Progress no change  -     Outcome Evaluation Patient presents with limitations in self-care, functional transfers, balance, and endurance. He would benefit from continued skilled occupational therapy services to maximize independence with ADLs/functional transfers.  -       Row Name 04/17/25 1459          Therapy Assessment/Plan (OT)    Patient/Family Therapy Goal Statement (OT) To maximize independence.  -     Rehab Potential (OT) good  -     Criteria for Skilled Therapeutic Interventions Met (OT) yes;meets criteria;skilled treatment is necessary  -     Therapy Frequency (OT) 5 times/wk  -       Row Name 04/17/25 1457          Therapy Plan Review/Discharge Plan (OT)    Anticipated Discharge Disposition (OT) inpatient rehabilitation facility  -       Row Name 04/17/25 1459          Vital Signs    O2 Delivery Pre Treatment room air  -LF     O2 Delivery Intra Treatment room air  -LF     O2 Delivery  Post Treatment room air  -LF       Row Name 04/17/25 1458          Positioning and Restraints    Pre-Treatment Position in bed  -LF     Post Treatment Position chair  -LF     In Chair reclined;call light within reach;encouraged to call for assist;notified nsg  no alarm active on arrival  -LF               User Key  (r) = Recorded By, (t) = Taken By, (c) = Cosigned By      Initials Name Provider Type     Madhavi Sanchez OT Occupational Therapist                   Outcome Measures       Row Name 04/17/25 1459          How much help from another is currently needed...    Putting on and taking off regular lower body clothing? 2  -LF     Bathing (including washing, rinsing, and drying) 2  -LF     Toileting (which includes using toilet bed pan or urinal) 1  -LF     Putting on and taking off regular upper body clothing 3  -LF     Taking care of personal grooming (such as brushing teeth) 3  -LF     Eating meals 4  -LF     AM-PAC 6 Clicks Score (OT) 15  -LF       Row Name 04/17/25 0800          How much help from another person do you currently need...    Turning from your back to your side while in flat bed without using bedrails? 2  -KS     Moving from lying on back to sitting on the side of a flat bed without bedrails? 2  -KS     Moving to and from a bed to a chair (including a wheelchair)? 1  -KS     Standing up from a chair using your arms (e.g., wheelchair, bedside chair)? 1  -KS     Climbing 3-5 steps with a railing? 1  -KS     To walk in hospital room? 1  -KS     AM-PAC 6 Clicks Score (PT) 8  -KS       Row Name 04/17/25 1459          Functional Assessment    Outcome Measure Options AM-PAC 6 Clicks Daily Activity (OT);Optimal Instrument  -LF       Row Name 04/17/25 5981          Optimal Instrument    Optimal Instrument Optimal - 3  -LF     Bending/Stooping 4  -LF     Standing 3  -LF     Reaching 1  -LF     From the list, choose the 3 activities you would most like to be able to do without any difficulty  Bending/stooping;Standing;Reaching  -LF     Total Score Optimal - 3 8  -LF               User Key  (r) = Recorded By, (t) = Taken By, (c) = Cosigned By      Initials Name Provider Type    Nenita Marsh RN Registered Nurse    Madhavi Washburn OT Occupational Therapist                    Occupational Therapy Education       Title: PT OT SLP Therapies (In Progress)       Topic: Occupational Therapy (In Progress)       Point: ADL training (In Progress)       Learning Progress Summary            Patient Acceptance, E,TB, NR by  at 4/17/2025 1459                      Point: Precautions (In Progress)       Learning Progress Summary            Patient Acceptance, E,TB, NR by  at 4/17/2025 1459                      Point: Body mechanics (In Progress)       Learning Progress Summary            Patient Acceptance, E,TB, NR by  at 4/17/2025 1459                                      User Key       Initials Effective Dates Name Provider Type [facility] 06/16/21 -  Madhavi Sanchez OT Occupational Therapist OT                  OT Recommendation and Plan  Planned Therapy Interventions (OT): activity tolerance training, BADL retraining, functional balance retraining, occupation/activity based interventions, patient/caregiver education/training, strengthening exercise, transfer/mobility retraining  Therapy Frequency (OT): 5 times/wk  Plan of Care Review  Plan of Care Reviewed With: patient  Progress: no change  Outcome Evaluation: Patient presents with limitations in self-care, functional transfers, balance, and endurance. He would benefit from continued skilled occupational therapy services to maximize independence with ADLs/functional transfers.     Time Calculation:   Evaluation Complexity (OT)  Review Occupational Profile/Medical/Therapy History Complexity: brief/low complexity  Assessment, Occupational Performance/Identification of Deficit Complexity: 3-5 performance deficits  Clinical Decision Making  Complexity (OT): problem focused assessment/low complexity  Overall Complexity of Evaluation (OT): low complexity     Time Calculation- OT       Row Name 04/17/25 1459             Time Calculation- OT    OT Received On 04/17/25  -LF      OT Goal Re-Cert Due Date 04/26/25  -LF         Untimed Charges    OT Eval/Re-eval Minutes 46  -LF         Total Minutes    Untimed Charges Total Minutes 46  -LF       Total Minutes 46  -LF                User Key  (r) = Recorded By, (t) = Taken By, (c) = Cosigned By      Initials Name Provider Type    LF Madhavi Sanchez OT Occupational Therapist                  Therapy Charges for Today       Code Description Service Date Service Provider Modifiers Qty    [id number] HC OT EVAL LOW COMPLEXITY 4 4/17/2025 Madhavi Sanchez OT GO 1                 Madhavi Sanchez OT  4/17/2025

## 2025-04-17 NOTE — PLAN OF CARE
Goal Outcome Evaluation:         Pt progressing. Remains AAOx2-3, to self, place and situation intermittently. Pt states he feels better overall. Sodium on morning labs up to 114

## 2025-04-17 NOTE — PLAN OF CARE
"Goal Outcome Evaluation:           Progress: no change  Outcome Evaluation: Patients sodium level was 113 on the 1200 check. Dr Flores D/C'd IVF and initiated a 2L fluid restriction. Patient was up to chair with PT/OT. Assist of 2 with gait belt. Case management was consulted per patient request for \"help around my house\".  Nenita Main RN                             "

## 2025-04-17 NOTE — PROGRESS NOTES
[facility]   Hospitalist Progress Note  Date: 2025  Patient Name: Alberto Rachel  : 1960  MRN: [id number]  Date of admission: 4/15/2025      Subjective   Subjective   Chief complaint: Altered mental status    Summary:  64-year-old male with history of anxiety, hypertension, CAD status post bypass, history of stroke, carotid artery stenosis, depression, diabetes, duodenal ulcer, hypertension, dyslipidemia, diabetes mellitus, hospitalized on 4/15/2025 chief complaint of altered mental status, multiple falls, found to have significant life-threatening hyponatremia, nephrology consulted, placed in the critical care unit with critical care consulted, given a round of hypertonic saline, placed on IV fluids several home medications held in the setting of severe hyponatremia including chlorthalidone, Bumex, Abilify, Celexa among others.  Cardiology consulted elevated troponins.    Interval follow-up: Seen and examined, no distress, no events overnight, remains alert and awake, mentation slowly improving with improvements in serum sodium, gradually improving up to 116 this morning, chloride at 77, CK at 1188, AST and ALT remained about the same.  White blood cell count 16,000.  Toenails need to be trimmed, podiatry consulted, echo ordered by cardiology.  Await final read.  Potassium 3.5, bicarb 24, creatinine 1.23    Review of systems:  Review of systems obtained, all systems reviewed and negative except for weakness, fatigue, intermittent confusion      Objective   Objective     Vitals:   Temp:  [97.1 °F (36.2 °C)-98.6 °F (37 °C)] 98 °F (36.7 °C)  Heart Rate:  [63-90] 80  Resp:  [15-23] 18  BP: ()/() 113/61  Physical Exam    Constitutional: Awake, alert, no acute distress laying in bed   Eyes: Pupils equal, sclerae anicteric, no conjunctival injection   HENT: NCAT, mucous membranes dry   Neck: Supple, full range of motion   Respiratory: Diminished to auscultation bilaterally, nonlabored  respirations    Cardiovascular: RRR, no murmurs, rubs, or gallops, palpable pedal pulses bilaterally   Gastrointestinal: Positive bowel sounds, soft, nontender, nondistended   Musculoskeletal: No bilateral ankle edema, no clubbing or cyanosis to extremities   Psychiatric: Appropriate affect, cooperative   Neurologic: Oriented x 2 self and surroundings, strength symmetric in all extremities with weakness throughout, Cranial Nerves grossly intact to confrontation, speech clear   Skin: No rashes, cracked dry discolored skin lower extremities      Result Review    Result Review:  I have personally reviewed the pertinent results from the past 24 hours to 4/17/2025 10:06 EDT and agree with these findings:  [x]  Laboratory   CBC          4/15/2025    12:35 4/16/2025    05:31 4/17/2025    05:43   CBC   WBC 17.49  15.52  16.30    RBC 5.02  5.15  4.93    Hemoglobin 14.9  15.2  14.8    Hematocrit 40.1  41.8  40.9    MCV 79.9  81.2  83.0    MCH 29.7  29.5  30.0    MCHC 37.2  36.4  36.2    RDW 12.1  12.2  12.4    Platelets 329  306  268      BMP          4/15/2025    12:35 4/15/2025    16:21 4/15/2025    17:35 4/15/2025    23:56 4/16/2025    05:31 4/16/2025    12:07 4/16/2025    17:34 4/16/2025    23:56 4/17/2025    05:43   BMP   BUN 28     24        Creatinine 1.49     1.31        Sodium 103  104  104  106  109  111  113  112  114    Potassium 3.7  3.5  3.5  3.4  4.0     4.0  3.7  3.6  3.7  3.5    Chloride 64     73        CO2 23.1     20.4        Calcium 9.3     9.2              4/17/2025    06:39   BMP   BUN 21    Creatinine 1.23    Sodium 116    Potassium 3.5    Chloride 77    CO2 24.0    Calcium 9.2      LIVER FUNCTION TESTS:      Lab 04/17/25  0543 04/16/25  0531 04/15/25  1235   TOTAL PROTEIN 7.2  --  7.9   ALBUMIN 4.0 4.0 4.7   GLOBULIN  --   --  3.2   ALT (SGPT) 47*  --  39   AST (SGOT) 105*  --  104*   BILIRUBIN 1.1  --  1.8*   INDIRECT BILIRUBIN 0.6  --   --    BILIRUBIN DIRECT 0.5*  --   --    ALK PHOS 49  --  52        [x]  Microbiology   Microbiology Results (last 10 days)       Procedure Component Value - Date/Time    Blood Culture - Blood, Hand, Right [[id number]]  (Normal) Collected: 04/15/25 1736    Lab Status: Preliminary result Specimen: Blood from Hand, Right Updated: 04/16/25 1745     Blood Culture No growth at 24 hours    Narrative:      Less than seven (7) mL's of blood was collected.  Insufficient quantity may yield false negative results.    Blood Culture - Blood, Arm, Left [[id number]]  (Normal) Collected: 04/15/25 1735    Lab Status: Preliminary result Specimen: Blood from Arm, Left Updated: 04/16/25 1745     Blood Culture No growth at 24 hours    Narrative:      Less than seven (7) mL's of blood was collected.  Insufficient quantity may yield false negative results.              [x]  Radiology FL Video Swallow With Speech Single Contrast  Result Date: 4/16/2025  Impression: 1. No tracheal aspiration or laryngeal penetration observed Please consult speech pathology report for further details regarding the exam and for dietary recommendations. Report dictated by: Racquel Perdue  I have personally reviewed this case and agree with the findings above: Electronically Signed: Taco Kennedy MD  4/16/2025 4:20 PM EDT  Workstation ID: [id number]    CT Head Without Contrast  Result Date: 4/15/2025  Impression: 1.No acute intracranial abnormality identified by noncontrast CT. 2.Mild parenchymal volume loss and probable chronic small vessel ischemic change. Electronically Signed: Heriberto Casillas MD  4/15/2025 1:48 PM EDT  Workstation ID: [id number]    XR Chest 1 View  Result Date: 4/15/2025  Impression: Suspected mild left basilar atelectasis. Electronically Signed: Theresa Mancilla MD  4/15/2025 1:00 PM EDT  Workstation ID: [id number]        []  EKG/Telemetry   ECG 12 Lead Altered Mental Status   Preliminary Result   HEART RATE=78  bpm   RR Dvfwbzfl=698  ms   CA Gzasuvsd=955  ms   P Horizontal Axis=-61  deg   P Front Axis=243   deg   QRSD Gjdtjxde=474  ms   QT Wdsruisu=507  ms   YYyA=226  ms   QRS Axis=-6  deg   T Wave Axis=240  deg   - ABNORMAL ECG -   Sinus or ectopic atrial rhythm   Nonspecific intraventricular conduction delay   Nonspecific T abnormalities, inferior leads   Borderline ST elevation, anterior leads   Date and Time of Study:2025-04-15 13:22:55          []  Cardiology/Vascular   []  Pathology  [x]  Old records  []  Other:    Assessment & Plan   Assessment / Plan     Assessment/Plan:  Assessment:  Acute life-threatening hyponatremia  Symptomatic hyponatremia  Acute metabolic encephalopathy  Asthma mild intermittent  Rhabdomyolysis  Anxiety  CAD with history of CABG, with elevated troponins likely type II MI secondary to hyponatremia  Hyponatremia, hypovolemia, hypochloremia  History of stroke  Carotid artery stenosis  Depression  Diabetes mellitus  History of duodenal ulcer  Essential hypertension  Dyslipidemia  ESVIN  Metabolic acidosis    Plan:  Labs and imaging reviewed  Critically ill in the critical care unit with severe life-threatening hyponatremia  Continue normal saline at 100 cc/h with close monitoring for rapid correction  Continue trending serum sodium and potassium every 6 hours  Modified barium swallow with speech therapy  Diet per speech therapy  Hold home medications which could be driving hyponatremia and hypovolemia  Following blood pressures  Follow-up echo  Follow-up cardiology recommendations  Watch for acute hypoxemia with IV fluids  Podiatry consulted for toenail trimming discussed with Dr. Gerard  Continue insulin sliding scale coverage  Nephrology consultation appreciated  Critical care consultation appreciated discussed with Dr. Jensen; will consider transfer out of ICU if sodium continues to correct gradually  Fall precautions  Aspiration precautions  Trending CK  A.m. labs  Full code  DVT prophylaxis with SCDs  Clinical course to dictate further management of this critically ill male in the ICU  with hyponatremia requiring slow correction in close monitoring requiring high degree of level of medical decision making and monitoring  Discussed with nurse at the bedside      VTE Prophylaxis:  Mechanical VTE prophylaxis orders are present.        CODE STATUS:   Code Status (Patient has no pulse and is not breathing): CPR (Attempt to Resuscitate)  Medical Interventions (Patient has pulse or is breathing): Full Support        Electronically signed by Wanda Horowitz MD, 4/17/2025, 10:06 EDT.    Portions of this documentation were transcribed electronically from a voice recognition software.  I confirm all data accurately represents the service(s) I performed at today's visit.

## 2025-04-17 NOTE — THERAPY EVALUATION
Acute Care - Physical Therapy Initial Evaluation   [place]     Patient Name: Alberto Rachel  : 1960  MRN: [id number]  Today's Date: 2025      Visit Dx:     ICD-10-CM ICD-9-CM   1. Hyponatremia  E87.1 276.1   2. Non-traumatic rhabdomyolysis  M62.82 728.88   3. ESVIN (acute kidney injury)  N17.9 584.9   4. Oropharyngeal dysphagia  R13.12 787.22   5. Decreased activities of daily living (ADL)  Z78.9 V49.89   6. Difficulty walking  R26.2 719.7     Patient Active Problem List   Diagnosis    Anxiety    Atherosclerosis of coronary artery bypass graft of native heart without angina pectoris    Essential hypertension    Depression    Mixed hyperlipidemia    Type 2 diabetes mellitus with hyperglycemia, without long-term current use of insulin    Hypothyroidism    Arthritis of knee    Illiteracy    Cramps, extremity    Tachycardia    Obesity (BMI 30-39.9)    Degenerative disc disease, lumbar    Primary insomnia    Gastroesophageal reflux disease without esophagitis    Close exposure to COVID-19 virus    Mild intermittent asthma    Candidal dermatitis    Bronchitis    Bilateral shoulder pain    Hyperkalemia    Stage 3a chronic kidney disease (CKD)    Hyponatremia    Onychomycosis    Onychocryptosis    PVD (peripheral vascular disease)    Foot pain, bilateral     Past Medical History:   Diagnosis Date    Anxiety     Arthritis     Atherosclerosis of coronary artery bypass graft of native heart without angina pectoris 10/23/2018    Coronary artery disease with previous bypass, LIMA to the LAD, SVG to ramus, and SVG to RCA in     CAD (coronary artery disease) 10/23/2018    Carotid artery stenosis with cerebral infarction 2015    Dr. St     Cramps, extremity 9/10/2021    Depression     DM2 (diabetes mellitus, type 2) 10/23/2018    Duodenal ulcer     Essential hypertension 10/23/2018    Headache     Heart attack 2015    Heartburn     HLD (hyperlipidemia)     HTN (hypertension)     Injury of right leg  8/13/2021    Irregular heart beat     Low back pain 03/18/2019    Mild episode of recurrent depressive disorder 10/23/2018    Rash 03/18/2019    Shortness of breath     SOB (shortness of breath)     Type 2 diabetes mellitus with hyperglycemia, without long-term current use of insulin 06/11/2019    Uncontrolled diabetes mellitus 03/18/2019     Past Surgical History:   Procedure Laterality Date    CARDIAC CATHETERIZATION  06/2015    Dr. St     CIRCUMCISION      age 14 yrs old    CORONARY ARTERY BYPASS GRAFT  06/2015    5 bypasses     PT Assessment (Last 12 Hours)       PT Evaluation and Treatment       Row Name 04/17/25 1500          Physical Therapy Time and Intention    Document Type evaluation  -AV     Mode of Treatment individual therapy;physical therapy  -AV       Row Name 04/17/25 1500          General Information    Patient Profile Reviewed yes  -AV     Patient Observations alert;cooperative;agree to therapy  -AV     Prior Level of Function independent:;all household mobility;gait;transfer;ADL's  Ambulated with STC. No home O2. 2 falls in the past few months.  -AV     Equipment Currently Used at Home cane, straight  -AV     Existing Precautions/Restrictions fall  -AV       Row Name 04/17/25 1500          Living Environment    Current Living Arrangements home  -AV     People in Home alone  -AV       Row Name 04/17/25 1500          Pain    Additional Documentation Pain Scale: FACES Pre/Post-Treatment (Group)  -AV       Row Name 04/17/25 1500          Pain Scale: FACES Pre/Post-Treatment    Pain: FACES Scale, Pretreatment 2-->hurts little bit  -AV     Posttreatment Pain Rating 2-->hurts little bit  -AV       Row Name 04/17/25 1500          Cognition    Orientation Status (Cognition) oriented x 3  -AV       Row Name 04/17/25 1500          Range of Motion (ROM)    Range of Motion bilateral lower extremities;ROM is WFL  -AV       Row Name 04/17/25 1500          Strength (Manual Muscle Testing)    Strength (Manual  Muscle Testing) bilateral lower extremities  4-/5  -AV       Row Name 04/17/25 1500          Bed Mobility    Comment, (Bed Mobility) Patient seated upright in recliner upon therapist entry  -AV       Row Name 04/17/25 1500          Transfers    Transfers sit-stand transfer;stand-sit transfer  -AV       Row Name 04/17/25 1500          Sit-Stand Transfer    Sit-Stand Gill (Transfers) minimum assist (75% patient effort);moderate assist (50% patient effort)  -AV     Assistive Device (Sit-Stand Transfers) walker, front-wheeled  -AV       Row Name 04/17/25 1500          Stand-Sit Transfer    Stand-Sit Gill (Transfers) minimum assist (75% patient effort);moderate assist (50% patient effort)  -AV     Assistive Device (Stand-Sit Transfers) walker, front-wheeled  -AV       Row Name 04/17/25 1500          Gait/Stairs (Locomotion)    Gait/Stairs Locomotion gait/ambulation independence;gait/ambulation assistive device;distance ambulated  -AV     [place] Level (Gait) minimum assist (75% patient effort)  -AV     Assistive Device (Gait) walker, front-wheeled  -AV     Distance in Feet (Gait) 20  -AV     Deviations/Abnormal Patterns (Gait) base of support, narrow;gait speed decreased;stride length decreased  -AV       Row Name 04/17/25 1500          Safety Issues/Impairments Affecting Functional Mobility    Impairments Affecting Function (Mobility) balance;endurance/activity tolerance;pain;strength  -AV       Row Name 04/17/25 1500          Balance    Balance Assessment standing dynamic balance  -AV     Dynamic Standing Balance minimal assist  -AV     Position/Device Used, Standing Balance supported;walker, front-wheeled  -AV       Row Name 04/17/25 1500          Plan of Care Review    Plan of Care Reviewed With patient  -AV     Progress no change  -AV     Outcome Evaluation Patient presents with deficits in balance, endurance, transfers, and ambulation. Patient will benefit from skilled PT services to address  these mobility deficits and decrease risk of falls.  -AV       Row Name 04/17/25 1500          Positioning and Restraints    Pre-Treatment Position sitting in chair/recliner  -AV     Post Treatment Position chair  -AV     In Chair reclined;call light within reach;encouraged to call for assist  No alarms active upon therapist entry  -AV       Row Name 04/17/25 1500          Therapy Assessment/Plan (PT)    Rehab Potential (PT) good  -AV     Criteria for Skilled Interventions Met (PT) yes;meets criteria  -AV     Therapy Frequency (PT) daily  -AV     Predicted Duration of Therapy Intervention (PT) 10 days  -AV     Problem List (PT) problems related to;balance;mobility;strength;pain  -AV     Activity Limitations Related to Problem List (PT) unable to ambulate safely;unable to transfer safely  -AV       Row Name 04/17/25 1500          PT Evaluation Complexity    History, PT Evaluation Complexity no personal factors and/or comorbidities  -AV     Examination of Body Systems (PT Eval Complexity) total of 4 or more elements  -AV     Clinical Presentation (PT Evaluation Complexity) stable  -AV     Clinical Decision Making (PT Evaluation Complexity) low complexity  -AV     Overall Complexity (PT Evaluation Complexity) low complexity  -AV       Row Name 04/17/25 1500          Therapy Plan Review/Discharge Plan (PT)    Therapy Plan Review (PT) evaluation/treatment results reviewed;patient  -AV       Row Name 04/17/25 1500          Physical Therapy Goals    Bed Mobility Goal Selection (PT) bed mobility, PT goal 1  -AV     Transfer Goal Selection (PT) transfer, PT goal 1  -AV     Gait Training Goal Selection (PT) gait training, PT goal 1  -AV       Row Name 04/17/25 1500          Bed Mobility Goal 1 (PT)    Activity/Assistive Device (Bed Mobility Goal 1, PT) sit to supine/supine to sit  -AV     [place] Level/Cues Needed (Bed Mobility Goal 1, PT) modified independence  -AV     Time Frame (Bed Mobility Goal 1, PT) 10 days  -AV        Row Name 04/17/25 1500          Transfer Goal 1 (PT)    Activity/Assistive Device (Transfer Goal 1, PT) sit-to-stand/stand-to-sit;bed-to-chair/chair-to-bed;walker, rolling  -AV     [place] Level/Cues Needed (Transfer Goal 1, PT) modified independence  -AV     Time Frame (Transfer Goal 1, PT) 10 days  -AV       Row Name 04/17/25 1500          Gait Training Goal 1 (PT)    Activity/Assistive Device (Gait Training Goal 1, PT) gait (walking locomotion);assistive device use;walker, rolling  -AV     [place] Level (Gait Training Goal 1, PT) modified independence  -AV     Distance (Gait Training Goal 1, PT) 200  -AV     Time Frame (Gait Training Goal 1, PT) 10 days  -AV               User Key  (r) = Recorded By, (t) = Taken By, (c) = Cosigned By      Initials Name Provider Type    AV Reji Lux, PT Physical Therapist                    Physical Therapy Education       Title: PT OT SLP Therapies (In Progress)       Topic: Physical Therapy (In Progress)       Point: Mobility training (Done)       Learning Progress Summary            Patient Acceptance, E,TB, VU by AV at 4/17/2025 1516                      Point: Home exercise program (Not Started)       Learner Progress:  Not documented in this visit.              Point: Body mechanics (Done)       Learning Progress Summary            Patient Acceptance, E,TB, VU by AV at 4/17/2025 1516                      Point: Precautions (Done)       Learning Progress Summary            Patient Acceptance, E,TB, VU by AV at 4/17/2025 1516                                      User Key       Initials Effective Dates Name Provider Type Discipline     06/11/21 -  Reji Lux, PT Physical Therapist PT                  PT Recommendation and Plan  Anticipated Discharge Disposition (PT): inpatient rehabilitation facility  Planned Therapy Interventions (PT): balance training, bed mobility training, gait training, home exercise program, neuromuscular re-education,  strengthening, transfer training  Therapy Frequency (PT): daily  Plan of Care Reviewed With: patient  Progress: no change  Outcome Evaluation: Patient presents with deficits in balance, endurance, transfers, and ambulation. Patient will benefit from skilled PT services to address these mobility deficits and decrease risk of falls.   Outcome Measures       Row Name 04/17/25 1500             How much help from another person do you currently need...    Turning from your back to your side while in flat bed without using bedrails? 2  -AV      Moving from lying on back to sitting on the side of a flat bed without bedrails? 2  -AV      Moving to and from a bed to a chair (including a wheelchair)? 3  -AV      Standing up from a chair using your arms (e.g., wheelchair, bedside chair)? 2  -AV      Climbing 3-5 steps with a railing? 2  -AV      To walk in hospital room? 3  -AV      AM-PAC 6 Clicks Score (PT) 14  -AV         Functional Assessment    Outcome Measure Options AM-PAC 6 Clicks Basic Mobility (PT)  -AV                User Key  (r) = Recorded By, (t) = Taken By, (c) = Cosigned By      Initials Name Provider Type    AV Reji Lux, PT Physical Therapist                     Time Calculation:    PT Charges       Row Name 04/17/25 1515             Time Calculation    PT Received On 04/17/25  -AV      PT Goal Re-Cert Due Date 04/26/25  -AV         Untimed Charges    PT Eval/Re-eval Minutes 35  -AV         Total Minutes    Untimed Charges Total Minutes 35  -AV       Total Minutes 35  -AV                User Key  (r) = Recorded By, (t) = Taken By, (c) = Cosigned By      Initials Name Provider Type    Reji Shaw, CHRISTIAN Physical Therapist                  Therapy Charges for Today       Code Description Service Date Service Provider Modifiers Qty    09882874985 HC PT EVAL LOW COMPLEXITY 3 4/17/2025 Reji Lux, PT GP 1            PT G-Codes  Outcome Measure Options: AM-PAC 6 Clicks Basic Mobility  (PT)  AM-PAC 6 Clicks Score (PT): 14  AM-PAC 6 Clicks Score (OT): 15    Reji Lux, PT  4/17/2025

## 2025-04-18 LAB
ALBUMIN SERPL-MCNC: 4 G/DL (ref 3.5–5.2)
ALP SERPL-CCNC: 52 U/L (ref 39–117)
ALT SERPL W P-5'-P-CCNC: 42 U/L (ref 1–41)
ANION GAP SERPL CALCULATED.3IONS-SCNC: 13.8 MMOL/L (ref 5–15)
AST SERPL-CCNC: 65 U/L (ref 1–40)
BASOPHILS # BLD AUTO: 0.05 10*3/MM3 (ref 0–0.2)
BASOPHILS NFR BLD AUTO: 0.4 % (ref 0–1.5)
BILIRUB CONJ SERPL-MCNC: 0.5 MG/DL (ref 0–0.3)
BILIRUB INDIRECT SERPL-MCNC: 0.3 MG/DL
BILIRUB SERPL-MCNC: 0.8 MG/DL (ref 0–1.2)
BUN SERPL-MCNC: 15 MG/DL (ref 8–23)
BUN/CREAT SERPL: 13 (ref 7–25)
CALCIUM SPEC-SCNC: 9.4 MG/DL (ref 8.6–10.5)
CHLORIDE SERPL-SCNC: 84 MMOL/L (ref 98–107)
CO2 SERPL-SCNC: 22.2 MMOL/L (ref 22–29)
CREAT SERPL-MCNC: 1.15 MG/DL (ref 0.76–1.27)
DEPRECATED RDW RBC AUTO: 39.8 FL (ref 37–54)
EGFRCR SERPLBLD CKD-EPI 2021: 70.2 ML/MIN/1.73
EOSINOPHIL # BLD AUTO: 0.12 10*3/MM3 (ref 0–0.4)
EOSINOPHIL NFR BLD AUTO: 0.8 % (ref 0.3–6.2)
ERYTHROCYTE [DISTWIDTH] IN BLOOD BY AUTOMATED COUNT: 12.9 % (ref 12.3–15.4)
GLUCOSE BLDC GLUCOMTR-MCNC: 122 MG/DL (ref 70–99)
GLUCOSE BLDC GLUCOMTR-MCNC: 134 MG/DL (ref 70–99)
GLUCOSE BLDC GLUCOMTR-MCNC: 90 MG/DL (ref 70–99)
GLUCOSE SERPL-MCNC: 106 MG/DL (ref 65–99)
HCT VFR BLD AUTO: 42.3 % (ref 37.5–51)
HGB BLD-MCNC: 15 G/DL (ref 13–17.7)
IMM GRANULOCYTES # BLD AUTO: 0.08 10*3/MM3 (ref 0–0.05)
IMM GRANULOCYTES NFR BLD AUTO: 0.6 % (ref 0–0.5)
LYMPHOCYTES # BLD AUTO: 1.73 10*3/MM3 (ref 0.7–3.1)
LYMPHOCYTES NFR BLD AUTO: 12.1 % (ref 19.6–45.3)
MAGNESIUM SERPL-MCNC: 2 MG/DL (ref 1.6–2.4)
MCH RBC QN AUTO: 30.2 PG (ref 26.6–33)
MCHC RBC AUTO-ENTMCNC: 35.5 G/DL (ref 31.5–35.7)
MCV RBC AUTO: 85.3 FL (ref 79–97)
MONOCYTES # BLD AUTO: 1.3 10*3/MM3 (ref 0.1–0.9)
MONOCYTES NFR BLD AUTO: 9.1 % (ref 5–12)
NEUTROPHILS NFR BLD AUTO: 10.99 10*3/MM3 (ref 1.7–7)
NEUTROPHILS NFR BLD AUTO: 77 % (ref 42.7–76)
NRBC BLD AUTO-RTO: 0 /100 WBC (ref 0–0.2)
PHOSPHATE SERPL-MCNC: 2.7 MG/DL (ref 2.5–4.5)
PLATELET # BLD AUTO: 280 10*3/MM3 (ref 140–450)
PMV BLD AUTO: 9.1 FL (ref 6–12)
POTASSIUM SERPL-SCNC: 3.8 MMOL/L (ref 3.5–5.2)
POTASSIUM SERPL-SCNC: 3.9 MMOL/L (ref 3.5–5.2)
PROT SERPL-MCNC: 7.1 G/DL (ref 6–8.5)
RBC # BLD AUTO: 4.96 10*6/MM3 (ref 4.14–5.8)
SODIUM SERPL-SCNC: 115 MMOL/L (ref 136–145)
SODIUM SERPL-SCNC: 119 MMOL/L (ref 136–145)
SODIUM SERPL-SCNC: 120 MMOL/L (ref 136–145)
SODIUM SERPL-SCNC: 120 MMOL/L (ref 136–145)
WBC NRBC COR # BLD AUTO: 14.27 10*3/MM3 (ref 3.4–10.8)

## 2025-04-18 PROCEDURE — 99232 SBSQ HOSP IP/OBS MODERATE 35: CPT | Performed by: FAMILY MEDICINE

## 2025-04-18 PROCEDURE — 80048 BASIC METABOLIC PNL TOTAL CA: CPT | Performed by: FAMILY MEDICINE

## 2025-04-18 PROCEDURE — 84100 ASSAY OF PHOSPHORUS: CPT | Performed by: FAMILY MEDICINE

## 2025-04-18 PROCEDURE — 85025 COMPLETE CBC W/AUTO DIFF WBC: CPT | Performed by: FAMILY MEDICINE

## 2025-04-18 PROCEDURE — 82948 REAGENT STRIP/BLOOD GLUCOSE: CPT

## 2025-04-18 PROCEDURE — 83735 ASSAY OF MAGNESIUM: CPT | Performed by: FAMILY MEDICINE

## 2025-04-18 PROCEDURE — 80076 HEPATIC FUNCTION PANEL: CPT | Performed by: FAMILY MEDICINE

## 2025-04-18 PROCEDURE — 36415 COLL VENOUS BLD VENIPUNCTURE: CPT | Performed by: FAMILY MEDICINE

## 2025-04-18 PROCEDURE — 92526 ORAL FUNCTION THERAPY: CPT

## 2025-04-18 PROCEDURE — 84132 ASSAY OF SERUM POTASSIUM: CPT | Performed by: INTERNAL MEDICINE

## 2025-04-18 PROCEDURE — 84295 ASSAY OF SERUM SODIUM: CPT | Performed by: INTERNAL MEDICINE

## 2025-04-18 RX ORDER — TRAMADOL HYDROCHLORIDE 50 MG/1
50 TABLET ORAL EVERY 6 HOURS PRN
Status: DISPENSED | OUTPATIENT
Start: 2025-04-18 | End: 2025-04-23

## 2025-04-18 RX ORDER — PANTOPRAZOLE SODIUM 40 MG/1
40 TABLET, DELAYED RELEASE ORAL NIGHTLY
Status: DISCONTINUED | OUTPATIENT
Start: 2025-04-18 | End: 2025-04-20

## 2025-04-18 RX ORDER — ALUMINA, MAGNESIA, AND SIMETHICONE 2400; 2400; 240 MG/30ML; MG/30ML; MG/30ML
30 SUSPENSION ORAL ONCE
Status: COMPLETED | OUTPATIENT
Start: 2025-04-18 | End: 2025-04-18

## 2025-04-18 RX ADMIN — ACETAMINOPHEN 650 MG: 325 TABLET ORAL at 00:20

## 2025-04-18 RX ADMIN — PANTOPRAZOLE SODIUM 40 MG: 40 TABLET, DELAYED RELEASE ORAL at 20:03

## 2025-04-18 RX ADMIN — Medication 10 ML: at 20:04

## 2025-04-18 RX ADMIN — TRAMADOL HYDROCHLORIDE 50 MG: 50 TABLET, COATED ORAL at 10:00

## 2025-04-18 RX ADMIN — POTASSIUM CHLORIDE 40 MEQ: 1500 TABLET, EXTENDED RELEASE ORAL at 00:20

## 2025-04-18 RX ADMIN — MUPIROCIN 1 APPLICATION: 20 OINTMENT TOPICAL at 08:20

## 2025-04-18 RX ADMIN — MUPIROCIN 1 APPLICATION: 20 OINTMENT TOPICAL at 20:04

## 2025-04-18 RX ADMIN — ASPIRIN 81 MG: 81 TABLET, COATED ORAL at 08:20

## 2025-04-18 RX ADMIN — Medication 10 ML: at 08:20

## 2025-04-18 RX ADMIN — ALUMINUM HYDROXIDE, MAGNESIUM HYDROXIDE, AND DIMETHICONE 30 ML: 400; 400; 40 SUSPENSION ORAL at 20:03

## 2025-04-18 RX ADMIN — SENNOSIDES AND DOCUSATE SODIUM 2 TABLET: 50; 8.6 TABLET ORAL at 08:20

## 2025-04-18 RX ADMIN — ACETAMINOPHEN 650 MG: 325 TABLET ORAL at 05:55

## 2025-04-18 NOTE — PROGRESS NOTES
[facility]   Progress Note    Patient Name: Alberto Rachel  : 1960  MRN: [id number]  Primary Care Physician: Magnus Euceda DO  Date of admission: 4/15/2025    Subjective   Subjective     HPI:  Patient without chest pain or shortness of breath feels weak, still his sodium and potassium are low this is evaluated in follow-up with Dr. Lion nephrologist, echocardiogram showed normal LV function, patient has had persistent electrolyte imbalance.  Abnormal liver function test, creatinine kinase is better.  Mental condition is much better, patient indicated he wants to go home.    Review of Systems  Review of Systems   Constitutional:  Positive for fatigue.   HENT: Negative.     Eyes: Negative.    Respiratory: Negative.     Cardiovascular: Negative.    Gastrointestinal: Negative.    Genitourinary: Negative.    Musculoskeletal:         General weakness   Skin: Negative.        Objective   Objective     Vitals:  Temp:  [97.1 °F (36.2 °C)-98.6 °F (37 °C)] 98.3 °F (36.8 °C)  Heart Rate:  [63-90] 75  Resp:  [9-23] 14  BP: ()/(41-88) 147/53  Flow (L/min) (Oxygen Therapy):  [2] 2    Physical Exam:  Physical Exam  Constitutional:       Appearance: Normal appearance.   HENT:      Head: Normocephalic.      Nose: Nose normal.   Eyes:      Extraocular Movements: Extraocular movements intact.   Cardiovascular:      Rate and Rhythm: Normal rate.      Heart sounds: No murmur heard.  Pulmonary:      Breath sounds: No rales.   Abdominal:      Palpations: Abdomen is soft.   Musculoskeletal:      Comments: Patient toenails need to be treated also his feet, will need a podiatric consult   Skin:     General: Skin is warm.   Neurological:      Mental Status: He is alert.   Psychiatric:         Mood and Affect: Mood normal.         Result Review    Result Review:  I have personally reviewed the results from the time of this admission to 25 8:40 PM EDT and agree with these findings:  [x]  Laboratory  []   Microbiology  []  Radiology  [x]  EKG/Telemetry   []  Cardiology/Vascular   []  Pathology  []  Old records  []  Other:    Most notable findings include: 64, year old gentleman, admitted to hospital because of multiple falls changing mental status, electrolyte imbalance, elevated CPK and troponin, abnormal liver function test, echocardiogram today showed normal LV function patient has history of coronary artery disease.    Assessment & Plan   Assessment / Plan     Active Hospital Problems:  Active Hospital Problems    Diagnosis     **Hyponatremia     Onychomycosis     Onychocryptosis     PVD (peripheral vascular disease)     Foot pain, bilateral     Type 2 diabetes mellitus with hyperglycemia, without long-term current use of insulin        Plan:   Continue current therapy for now when patient gets better, he will need a nuclear study.    DVT prophylaxis: As per internal medicine    CODE STATUS:    Code Status and Medical Interventions: CPR (Attempt to Resuscitate); Full Support   Ordered at: 04/15/25 1554     Code Status (Patient has no pulse and is not breathing):    CPR (Attempt to Resuscitate)     Medical Interventions (Patient has pulse or is breathing):    Full Support       Disposition:  I expect patient to be discharged when patient gets better.    Electronically signed by Jacques St MD, 04/17/25, 8:40 PM EDT.

## 2025-04-18 NOTE — PLAN OF CARE
Goal Outcome Evaluation:  Plan of Care Reviewed With: patient        Progress: improving  Outcome Evaluation: Pt most recent Na+ of 115 at MN draw. Pt continues tolerating 2L fluid restriction well. No current gtts/infusions. Pt still notably weak and continues to complain of lower back pain radiating into BLE.

## 2025-04-18 NOTE — PROGRESS NOTES
[facility]   Hospitalist Progress Note  Date: 2025  Patient Name: Alberto Rachel  : 1960  MRN: [id number]  Date of admission: 4/15/2025      Subjective   Subjective   Chief complaint: Altered mental status    Summary:  64-year-old male with history of anxiety, hypertension, CAD status post bypass, history of stroke, carotid artery stenosis, depression, diabetes, duodenal ulcer, hypertension, dyslipidemia, diabetes mellitus, hospitalized on 4/15/2025 chief complaint of altered mental status, multiple falls, found to have significant life-threatening hyponatremia, nephrology consulted, placed in the critical care unit with critical care consulted, given a round of hypertonic saline, placed on IV fluids several home medications held in the setting of severe hyponatremia including chlorthalidone, Bumex, Abilify, Celexa among others.  Cardiology consulted elevated troponins.  Planning nuclear stress test.  Transferred out of ICU.    Interval follow-up: Seen and examined, no distress, no events overnight, transferred out of ICU this morning, faring well.  Serum sodium still low at 120 but improved from previous day, creatinine 1.15, BUN 15, AST and ALT trending down, white blood cell count 14,000.  Indicated with the patient he probably needs a stress test in the next coming days to determine what is going on with his heart.  He is on 2 L.  He has generalized aches and fatigue.  Still has some subtle intermittent confusion.    Review of systems:  Review of systems obtained, all systems reviewed and negative except for weakness, fatigue, intermittent confusion    Objective   Objective     Vitals:   Temp:  [97.6 °F (36.4 °C)-98.6 °F (37 °C)] 97.6 °F (36.4 °C)  Heart Rate:  [63-88] 72  Resp:  [9-19] 16  BP: ()/() 128/83  Flow (L/min) (Oxygen Therapy):  [2] 2  Physical Exam    Constitutional: Awake, alert, no acute distress laying in bed   Eyes: Pupils equal, sclerae anicteric, no conjunctival  injection   HENT: NCAT, mucous membranes moist   Neck: Supple, full range of motion   Respiratory: Diminished to auscultation bilaterally, nonlabored respirations    Cardiovascular: RRR, no murmurs, rubs, or gallops, palpable pedal pulses bilaterally   Gastrointestinal: Positive bowel sounds, soft, nontender, nondistended   Musculoskeletal: No bilateral ankle edema, no clubbing or cyanosis to extremities   Psychiatric: Appropriate affect, cooperative   Neurologic: Oriented x 2 self and surroundings, strength symmetric in all extremities with weakness throughout, Cranial Nerves grossly intact to confrontation, speech clear   Skin: No rashes, cracked dry discolored skin lower extremities      Result Review    Result Review:  I have personally reviewed the pertinent results from the past 24 hours to 4/18/2025 12:09 EDT and agree with these findings:  [x]  Laboratory   CBC          4/16/2025    05:31 4/17/2025    05:43 4/18/2025    05:48   CBC   WBC 15.52  16.30  14.27    RBC 5.15  4.93  4.96    Hemoglobin 15.2  14.8  15.0    Hematocrit 41.8  40.9  42.3    MCV 81.2  83.0  85.3    MCH 29.5  30.0  30.2    MCHC 36.4  36.2  35.5    RDW 12.2  12.4  12.9    Platelets 306  268  280      BMP          4/16/2025    05:31 4/16/2025    12:07 4/16/2025    17:34 4/16/2025    23:56 4/17/2025    05:43 4/17/2025    06:39 4/17/2025    11:56 4/17/2025    18:32 4/17/2025    23:54   BMP   BUN 24      21       Creatinine 1.31      1.23       Sodium 109  111  113  112  114  116  113  117  115    Potassium 4.0     4.0  3.7  3.6  3.7  3.5  3.5  3.2  3.1  3.8    Chloride 73      77       CO2 20.4      24.0       Calcium 9.2      9.2             4/18/2025    05:48   BMP   BUN 15    Creatinine 1.15    Sodium 120    Potassium 3.9    Chloride 84    CO2 22.2    Calcium 9.4      LIVER FUNCTION TESTS:      Lab 04/18/25  0548 04/17/25  0543 04/16/25  0531 04/15/25  1235   TOTAL PROTEIN 7.1 7.2  --  7.9   ALBUMIN 4.0 4.0 4.0 4.7   GLOBULIN  --   --   --   3.2   ALT (SGPT) 42* 47*  --  39   AST (SGOT) 65* 105*  --  104*   BILIRUBIN 0.8 1.1  --  1.8*   INDIRECT BILIRUBIN 0.3 0.6  --   --    BILIRUBIN DIRECT 0.5* 0.5*  --   --    ALK PHOS 52 49  --  52       [x]  Microbiology   Microbiology Results (last 10 days)       Procedure Component Value - Date/Time    Blood Culture - Blood, Hand, Right [[id number]]  (Normal) Collected: 04/15/25 1736    Lab Status: Preliminary result Specimen: Blood from Hand, Right Updated: 04/17/25 1745     Blood Culture No growth at 2 days    Narrative:      Less than seven (7) mL's of blood was collected.  Insufficient quantity may yield false negative results.    Blood Culture - Blood, Arm, Left [[id number]]  (Normal) Collected: 04/15/25 1735    Lab Status: Preliminary result Specimen: Blood from Arm, Left Updated: 04/17/25 1745     Blood Culture No growth at 2 days    Narrative:      Less than seven (7) mL's of blood was collected.  Insufficient quantity may yield false negative results.              [x]  Radiology FL Video Swallow With Speech Single Contrast  Result Date: 4/16/2025  Impression: 1. No tracheal aspiration or laryngeal penetration observed Please consult speech pathology report for further details regarding the exam and for dietary recommendations. Report dictated by: Racquel Perdue  I have personally reviewed this case and agree with the findings above: Electronically Signed: Taco Kennedy MD  4/16/2025 4:20 PM EDT  Workstation ID: [id number]    CT Head Without Contrast  Result Date: 4/15/2025  Impression: 1.No acute intracranial abnormality identified by noncontrast CT. 2.Mild parenchymal volume loss and probable chronic small vessel ischemic change. Electronically Signed: Heriberto Casillas MD  4/15/2025 1:48 PM EDT  Workstation ID: [id number]    XR Chest 1 View  Result Date: 4/15/2025  Impression: Suspected mild left basilar atelectasis. Electronically Signed: Theresa Mancilla MD  4/15/2025 1:00 PM EDT  Workstation ID:  [id number]        []  EKG/Telemetry   ECG 12 Lead Altered Mental Status   Preliminary Result   HEART RATE=78  bpm   RR Zlurqxoo=511  ms   WV Fripwdzq=982  ms   P Horizontal Axis=-61  deg   P Front Axis=243  deg   QRSD Usxkpwhs=630  ms   QT Mmjpopsc=408  ms   JEaV=661  ms   QRS Axis=-6  deg   T Wave Axis=240  deg   - ABNORMAL ECG -   Sinus or ectopic atrial rhythm   Nonspecific intraventricular conduction delay   Nonspecific T abnormalities, inferior leads   Borderline ST elevation, anterior leads   Date and Time of Study:2025-04-15 13:22:55          []  Cardiology/Vascular   []  Pathology  [x]  Old records  []  Other:    Assessment & Plan   Assessment / Plan     Assessment/Plan:    Assessment:  Acute life-threatening hyponatremia  Symptomatic hyponatremia  Acute metabolic encephalopathy  Asthma mild intermittent  Rhabdomyolysis  Anxiety  CAD with history of CABG, with elevated troponins likely type II MI secondary to hyponatremia  Hyponatremia, hypovolemia, hypochloremia  History of stroke  Carotid artery stenosis  Depression  Diabetes mellitus  History of duodenal ulcer  Essential hypertension  Dyslipidemia  ESVIN  Metabolic acidosis    Plan:  Labs and imaging reviewed  Insulin sliding scale coverage continued  Ultram for pain control  IV fluids discontinued while we continue to monitor serum sodium correction  Stress test per cardiology  Diet per speech therapy  Hold home medications which could be driving hyponatremia and hypovolemia  Following blood pressures  Nephrology consultation appreciated  Fall precautions  A.m. labs  Full code  DVT prophylaxis with SCDs  Clinical course to dictate further management  Discussed with nurse at the bedside      VTE Prophylaxis:  Mechanical VTE prophylaxis orders are present.        CODE STATUS:   Code Status (Patient has no pulse and is not breathing): CPR (Attempt to Resuscitate)  Medical Interventions (Patient has pulse or is breathing): Full Support        Electronically  signed by Wanda Horowitz MD, 4/18/2025, 12:09 EDT.    Portions of this documentation were transcribed electronically from a voice recognition software.  I confirm all data accurately represents the service(s) I performed at today's visit.

## 2025-04-18 NOTE — PROGRESS NOTES
[facility]   Progress Note    Patient Name: Alberto Rachel  : 1960  MRN: [id number]  Primary Care Physician: Magnus Euceda DO  Date of admission: 4/15/2025    Subjective   Subjective     HPI:  Patient without chest pain or shortness of breath at rest still weak, today was with a family member,  he drinks alcohol very frequently at home.  Patient sodium is very low 120, liver function improving.  Patient still very weak unable to walk.    Review of Systems  Review of Systems   HENT: Negative.     Eyes: Negative.    Respiratory: Negative.     Cardiovascular: Negative.    Gastrointestinal: Negative.    Genitourinary: Negative.    Musculoskeletal:         General weakness   Skin: Negative.    Neurological:  Positive for weakness.   Psychiatric/Behavioral: Negative.         Objective   Objective     Vitals:  Temp:  [97.5 °F (36.4 °C)-98.4 °F (36.9 °C)] 97.5 °F (36.4 °C)  Heart Rate:  [63-88] 80  Resp:  [14-19] 18  BP: ()/() 147/58  Flow (L/min) (Oxygen Therapy):  [2] 2    Physical Exam:  Physical Exam  Constitutional:       Appearance: He is obese.   HENT:      Head: Normocephalic.      Nose: Nose normal.   Eyes:      Extraocular Movements: Extraocular movements intact.   Cardiovascular:      Rate and Rhythm: Normal rate.   Pulmonary:      Breath sounds: No rales.   Abdominal:      Palpations: Abdomen is soft.   Musculoskeletal:      Right lower leg: No edema.      Left lower leg: No edema.   Skin:     General: Skin is warm.   Neurological:      Mental Status: He is alert. Mental status is at baseline.   Psychiatric:         Mood and Affect: Mood normal.         Result Review    Result Review:  I have personally reviewed the results from the time of this admission to 25 6:39 PM EDT and agree with these findings:  [x]  Laboratory  []  Microbiology  []  Radiology  [x]  EKG/Telemetry   []  Cardiology/Vascular   []  Pathology  []  Old records  []  Other:    Most notable findings include:  64-year-old gentleman admitted to the hospital because of change in mental status and electrolyte imbalance, elevated troponin and CPK, patient fell at home, patient drinks alcohol frequently patient also had elevated liver enzymes may represent alcoholic hepatitis, patient underwent open heart surgery, he indicated 15 years ago, patient has diabetes, peripheral vascular disease.    Assessment & Plan   Assessment / Plan     Active Hospital Problems:  Active Hospital Problems    Diagnosis     **Hyponatremia     Onychomycosis     Onychocryptosis     PVD (peripheral vascular disease)     Foot pain, bilateral     Type 2 diabetes mellitus with hyperglycemia, without long-term current use of insulin        Plan:   Continue observation for now as per nephrology and internal medicine to improve his electrolyte imbalance, patient was told not to drink alcohol at home, when he gets better we will go ahead and do a nuclear study probably on Monday    DVT prophylaxis: As per internal medicine    CODE STATUS:    Code Status and Medical Interventions: CPR (Attempt to Resuscitate); Full Support   Ordered at: 04/15/25 1554     Code Status (Patient has no pulse and is not breathing):    CPR (Attempt to Resuscitate)     Medical Interventions (Patient has pulse or is breathing):    Full Support       Disposition:  I expect patient to be discharged when patient gets better.    Electronically signed by Jacques St MD, 04/18/25, 6:39 PM EDT.

## 2025-04-18 NOTE — PROGRESS NOTES
" LOS: 3 days   Patient Care Team:  Magnus Euceda DO as PCP - General (Family Medicine)    Chief Complaint: Hyponatremia    Subjective     Pt without any acute complaints.  Tolerating po, no events.  Wants to go home.  2800 cc urine output recorded last 24 hours.    History taken from: patient chart RN    Objective     Vital Sign Min/Max for last 24 hours  Temp  Min: 97.6 °F (36.4 °C)  Max: 98.5 °F (36.9 °C)   BP  Min: 95/48  Max: 166/83   Pulse  Min: 63  Max: 88   Resp  Min: 9  Max: 19   SpO2  Min: 72 %  Max: 100 %   Flow (L/min) (Oxygen Therapy)  Min: 2  Max: 2   Weight  Min: 99.9 kg (220 lb 3.8 oz)  Max: 99.9 kg (220 lb 3.8 oz)     Flowsheet Rows      Flowsheet Row First Filed Value   Admission Height 182.8 cm (71.97\") Documented at 04/15/2025 1719   Admission Weight 102 kg (223 lb 15.8 oz) Documented at 04/15/2025 1220            I/O this shift:  In: 200 [P.O.:200]  Out: -   I/O last 3 completed shifts:  In: 2640 [P.O.:1200; I.V.:1440]  Out: 4375 [Urine:4375]    Objective:  General Appearance:  Comfortable, in no acute distress and not in pain.    Vital signs: (most recent): Blood pressure 135/54, pulse 85, temperature 97.9 °F (36.6 °C), temperature source Oral, resp. rate 18, height 182.8 cm (71.97\"), weight 99.9 kg (220 lb 3.8 oz), SpO2 100%.  Vital signs are normal.  No fever.    Output: Producing urine.    HEENT: Normal HEENT exam.    Lungs:  Normal effort and normal respiratory rate.    Heart: Normal rate.  Regular rhythm.    Abdomen: Abdomen is soft.  Bowel sounds are normal.   There is no abdominal tenderness.     Extremities: Normal range of motion.  There is no dependent edema.    Pulses: Distal pulses are intact.    Neurological: Patient is alert and oriented to person, place and time.    Pupils:  Pupils are equal, round, and reactive to light.    Skin:  Warm and dry.                Results Review:     I reviewed the patient's new clinical results.  I reviewed the patient's new imaging results and " "agree with the interpretation.  I reviewed the patient's other test results and agree with the interpretation    WBC WBC   Date Value Ref Range Status   04/18/2025 14.27 (H) 3.40 - 10.80 10*3/mm3 Final   04/17/2025 16.30 (H) 3.40 - 10.80 10*3/mm3 Final   04/16/2025 15.52 (H) 3.40 - 10.80 10*3/mm3 Final      HGB Hemoglobin   Date Value Ref Range Status   04/18/2025 15.0 13.0 - 17.7 g/dL Final   04/17/2025 14.8 13.0 - 17.7 g/dL Final   04/16/2025 15.2 13.0 - 17.7 g/dL Final      HCT Hematocrit   Date Value Ref Range Status   04/18/2025 42.3 37.5 - 51.0 % Final   04/17/2025 40.9 37.5 - 51.0 % Final   04/16/2025 41.8 37.5 - 51.0 % Final      Platlets No results found for: \"LABPLAT\"   MCV MCV   Date Value Ref Range Status   04/18/2025 85.3 79.0 - 97.0 fL Final   04/17/2025 83.0 79.0 - 97.0 fL Final   04/16/2025 81.2 79.0 - 97.0 fL Final          Sodium Sodium   Date Value Ref Range Status   04/18/2025 119 (C) 136 - 145 mmol/L Final   04/18/2025 120 (L) 136 - 145 mmol/L Final   04/17/2025 115 (C) 136 - 145 mmol/L Final   04/17/2025 117 (C) 136 - 145 mmol/L Final   04/17/2025 113 (C) 136 - 145 mmol/L Final   04/17/2025 116 (C) 136 - 145 mmol/L Final   04/17/2025 114 (C) 136 - 145 mmol/L Final   04/16/2025 112 (C) 136 - 145 mmol/L Final   04/16/2025 113 (C) 136 - 145 mmol/L Final   04/16/2025 111 (C) 136 - 145 mmol/L Final   04/16/2025 109 (C) 136 - 145 mmol/L Final   04/15/2025 106 (C) 136 - 145 mmol/L Final   04/15/2025 104 (C) 136 - 145 mmol/L Final   04/15/2025 104 (C) 136 - 145 mmol/L Final      Potassium Potassium   Date Value Ref Range Status   04/18/2025 3.8 3.5 - 5.2 mmol/L Final     Comment:     Slight hemolysis detected by analyzer. Result may be falsely elevated.   04/18/2025 3.9 3.5 - 5.2 mmol/L Final   04/17/2025 3.8 3.5 - 5.2 mmol/L Final     Comment:     Slight hemolysis detected by analyzer. Result may be falsely elevated.   04/17/2025 3.1 (L) 3.5 - 5.2 mmol/L Final   04/17/2025 3.2 (L) 3.5 - 5.2 mmol/L " "Final   04/17/2025 3.5 3.5 - 5.2 mmol/L Final     Comment:     Slight hemolysis detected by analyzer. Result may be falsely elevated.   04/17/2025 3.5 3.5 - 5.2 mmol/L Final   04/16/2025 3.7 3.5 - 5.2 mmol/L Final     Comment:     Slight hemolysis detected by analyzer. Result may be falsely elevated.   04/16/2025 3.6 3.5 - 5.2 mmol/L Final   04/16/2025 3.7 3.5 - 5.2 mmol/L Final   04/16/2025 4.0 3.5 - 5.2 mmol/L Final   04/16/2025 4.0 3.5 - 5.2 mmol/L Final   04/15/2025 3.4 (L) 3.5 - 5.2 mmol/L Final     Comment:     Slight hemolysis detected by analyzer. Result may be falsely elevated.   04/15/2025 3.5 3.5 - 5.2 mmol/L Final   04/15/2025 3.5 3.5 - 5.2 mmol/L Final      Chloride Chloride   Date Value Ref Range Status   04/18/2025 84 (L) 98 - 107 mmol/L Final   04/17/2025 77 (L) 98 - 107 mmol/L Final   04/16/2025 73 (L) 98 - 107 mmol/L Final      CO2 CO2   Date Value Ref Range Status   04/18/2025 22.2 22.0 - 29.0 mmol/L Final   04/17/2025 24.0 22.0 - 29.0 mmol/L Final   04/16/2025 20.4 (L) 22.0 - 29.0 mmol/L Final      BUN BUN   Date Value Ref Range Status   04/18/2025 15 8 - 23 mg/dL Final   04/17/2025 21 8 - 23 mg/dL Final   04/16/2025 24 (H) 8 - 23 mg/dL Final      Creatinine Creatinine   Date Value Ref Range Status   04/18/2025 1.15 0.76 - 1.27 mg/dL Final   04/17/2025 1.23 0.76 - 1.27 mg/dL Final   04/16/2025 1.31 (H) 0.76 - 1.27 mg/dL Final      Calcium Calcium   Date Value Ref Range Status   04/18/2025 9.4 8.6 - 10.5 mg/dL Final   04/17/2025 9.2 8.6 - 10.5 mg/dL Final   04/16/2025 9.2 8.6 - 10.5 mg/dL Final      PO4 No results found for: \"CAPO4\"   Albumin Albumin   Date Value Ref Range Status   04/18/2025 4.0 3.5 - 5.2 g/dL Final   04/17/2025 4.0 3.5 - 5.2 g/dL Final   04/16/2025 4.0 3.5 - 5.2 g/dL Final      Magnesium Magnesium   Date Value Ref Range Status   04/18/2025 2.0 1.6 - 2.4 mg/dL Final   04/17/2025 2.0 1.6 - 2.4 mg/dL Final   04/16/2025 1.9 1.6 - 2.4 mg/dL Final      Uric Acid No results found for: " "\"URICACID\"       Medication Review:   aspirin, 81 mg, Oral, Daily  insulin regular, 2-7 Units, Subcutaneous, Q6H  mupirocin, 1 Application, Each Nare, BID  senna-docusate sodium, 2 tablet, Oral, BID  sodium chloride, 10 mL, Intravenous, Q12H          Assessment & Plan       Hyponatremia    Type 2 diabetes mellitus with hyperglycemia, without long-term current use of insulin    Onychomycosis    Onychocryptosis    PVD (peripheral vascular disease)    Foot pain, bilateral      Assessment & Plan  - Hyponatremia, possibly secondary to chlorthalidone, urine osmolality 271.  TSH okay.  No signs or symptoms of adrenal insufficiency.  Sodium is slowly better.  Sodium up to 120 this morning.   Checking sodium level every 6 hours.  On p.o. fluid restriction, changed to 1500 cc/day recheck in AM.    - Chronic kidney disease stage III, baseline creatinine lately around 1.4-1.5.  Has bland UA and minimal proteinuria from 2 years ago.  Creatinine is better than baseline now.    - Hypokalemia, replaced and improved.  Magnesium is okay.    - Rhabdomyolysis, possibly secondary to fall versus hyponatremia. Cpk was trending down. May need hypotonic IV fluid.  Monitor.    - Type 2 diabetes, per primary.    Will follow.    Rajendra Lion MD  04/18/25  15:59 EDT            "

## 2025-04-18 NOTE — PLAN OF CARE
Goal Outcome Evaluation:  Plan of Care Reviewed With: patient        Progress: improving  Outcome Evaluation: Patient transferred from ICU up to 4MTU. Patient is AO x4, able to make needs known. Patient is currnelty on 2L NC, no signs of respiratory distress noted. Patient pain treated per MAR. Blood glucose monitored, treated per MAR. Most recent Na level 119. MD notified and aware. No complaints of pain or discomfort at this time. No acute changes throughout shift. Continue with [facility] plan of care

## 2025-04-18 NOTE — THERAPY TREATMENT NOTE
Acute Care - Speech Language Pathology   Swallow Treatment Note MAINOR [facility]     Patient Name: Alberto Rachel  : 1960  MRN: [id number]  Today's Date: 2025               Admit Date: 4/15/2025    Visit Dx:     ICD-10-CM ICD-9-CM   1. Hyponatremia  E87.1 276.1   2. Non-traumatic rhabdomyolysis  M62.82 728.88   3. ESVIN (acute kidney injury)  N17.9 584.9   4. Oropharyngeal dysphagia  R13.12 787.22   5. Decreased activities of daily living (ADL)  Z78.9 V49.89   6. Difficulty walking  R26.2 719.7     Patient Active Problem List   Diagnosis    Anxiety    Atherosclerosis of coronary artery bypass graft of native heart without angina pectoris    Essential hypertension    Depression    Mixed hyperlipidemia    Type 2 diabetes mellitus with hyperglycemia, without long-term current use of insulin    Hypothyroidism    Arthritis of knee    Illiteracy    Cramps, extremity    Tachycardia    Obesity (BMI 30-39.9)    Degenerative disc disease, lumbar    Primary insomnia    Gastroesophageal reflux disease without esophagitis    Close exposure to COVID-19 virus    Mild intermittent asthma    Candidal dermatitis    Bronchitis    Bilateral shoulder pain    Hyperkalemia    Stage 3a chronic kidney disease (CKD)    Hyponatremia    Onychomycosis    Onychocryptosis    PVD (peripheral vascular disease)    Foot pain, bilateral     Past Medical History:   Diagnosis Date    Anxiety     Arthritis     Atherosclerosis of coronary artery bypass graft of native heart without angina pectoris 10/23/2018    Coronary artery disease with previous bypass, LIMA to the LAD, SVG to ramus, and SVG to RCA in     CAD (coronary artery disease) 10/23/2018    Carotid artery stenosis with cerebral infarction 2015    Dr. St     Cramps, extremity 9/10/2021    Depression     DM2 (diabetes mellitus, type 2) 10/23/2018    Duodenal ulcer     Essential hypertension 10/23/2018    Headache     Heart attack 2015    Heartburn     HLD (hyperlipidemia)      HTN (hypertension)     Injury of right leg 8/13/2021    Irregular heart beat     Low back pain 03/18/2019    Mild episode of recurrent depressive disorder 10/23/2018    Rash 03/18/2019    Shortness of breath     SOB (shortness of breath)     Type 2 diabetes mellitus with hyperglycemia, without long-term current use of insulin 06/11/2019    Uncontrolled diabetes mellitus 03/18/2019     Past Surgical History:   Procedure Laterality Date    CARDIAC CATHETERIZATION  06/2015    Dr. St     CIRCUMCISION      age 14 yrs old    CORONARY ARTERY BYPASS GRAFT  06/2015    5 bypasses       SLP Recommendation and Plan      SPEECH PATHOLOGY DYSPHAGIA TREATMENT    Subjective/Behavioral Observations: Patient was pleasant and cooperative.  Patient denied any increased difficulty with eating or drinking.  Patient reported that he has been taking his pills well with no issue.  Patient is eager to get home.        Day/time of Treatment: 0/18/2025.        Current Diet: Level 0 thin liquids and level 6 soft bite-size solids with chopped meats.        Current Strategies: Patient should eat at a slow rate and take small bites and sips. Patient should double swallow with all solids to provide clearance of residue.         Treatment received: Patient participated clinical swallow evaluation of level 0 thin liquids via cup, level 4 puréed solids, and level 6 soft and bite-size solids to ensure continued tolerance of recommended diet without signs or symptoms of aspiration.        Results of treatment: Patient did not demonstrate any overt signs or symptoms of aspiration with any of the consistencies trialed.  Patient did well using double swallow exercises given maximum minimal faded cueing throughout the meal.  Patient appears to be doing well with recommended consistencies and would continue to benefit from skilled speech services to improve swallow strength to a more functional level.        Progress toward goals:  Progressing        Barriers to Achieving goals: Medical status.        Plan of care:/changes in plan: No change to plan of care.  Patient would benefit from continued speech services to address dysphagia.                                                                                                EDUCATION  The patient has been educated in the following areas:   Dysphagia (Swallowing Impairment).                Time Calculation:    Time Calculation- SLP       Row Name 04/18/25 0800             Time Calculation- SLP    SLP Start Time 0800  -AW      SLP Stop Time 0900  -AW      SLP Time Calculation (min) 60 min  -AW         Untimed Charges    94089-HF Treatment Swallow Minutes 60  -AW         Total Minutes    Untimed Charges Total Minutes 60  -AW       Total Minutes 60  -AW                User Key  (r) = Recorded By, (t) = Taken By, (c) = Cosigned By      Initials Name Provider Type    AW Roslyn Barnes SLP Speech and Language Pathologist                    Therapy Charges for Today       Code Description Service Date Service Provider Modifiers Qty    [id number]  ST TREATMENT SWALLOW 4 4/18/2025 Roslyn Barnes SLP GN 1                 IAIN Sanchez  4/18/2025

## 2025-04-19 ENCOUNTER — APPOINTMENT (OUTPATIENT)
Dept: GENERAL RADIOLOGY | Facility: HOSPITAL | Age: 65
End: 2025-04-19
Payer: MEDICARE

## 2025-04-19 ENCOUNTER — APPOINTMENT (OUTPATIENT)
Dept: CT IMAGING | Facility: HOSPITAL | Age: 65
End: 2025-04-19
Payer: MEDICARE

## 2025-04-19 LAB
ALBUMIN SERPL-MCNC: 4.3 G/DL (ref 3.5–5.2)
ALP SERPL-CCNC: 67 U/L (ref 39–117)
ALT SERPL W P-5'-P-CCNC: 40 U/L (ref 1–41)
ANION GAP SERPL CALCULATED.3IONS-SCNC: 18.9 MMOL/L (ref 5–15)
AST SERPL-CCNC: 49 U/L (ref 1–40)
BASOPHILS # BLD AUTO: 0.06 10*3/MM3 (ref 0–0.2)
BASOPHILS NFR BLD AUTO: 0.3 % (ref 0–1.5)
BILIRUB CONJ SERPL-MCNC: 0.5 MG/DL (ref 0–0.3)
BILIRUB INDIRECT SERPL-MCNC: 0.4 MG/DL
BILIRUB SERPL-MCNC: 0.9 MG/DL (ref 0–1.2)
BUN SERPL-MCNC: 18 MG/DL (ref 8–23)
BUN/CREAT SERPL: 14.3 (ref 7–25)
CALCIUM SPEC-SCNC: 10.2 MG/DL (ref 8.6–10.5)
CHLORIDE SERPL-SCNC: 80 MMOL/L (ref 98–107)
CK SERPL-CCNC: 138 U/L (ref 20–200)
CO2 SERPL-SCNC: 20.1 MMOL/L (ref 22–29)
CREAT SERPL-MCNC: 1.26 MG/DL (ref 0.76–1.27)
DEPRECATED RDW RBC AUTO: 40.6 FL (ref 37–54)
EGFRCR SERPLBLD CKD-EPI 2021: 62.9 ML/MIN/1.73
EOSINOPHIL # BLD AUTO: 0.06 10*3/MM3 (ref 0–0.4)
EOSINOPHIL NFR BLD AUTO: 0.3 % (ref 0.3–6.2)
ERYTHROCYTE [DISTWIDTH] IN BLOOD BY AUTOMATED COUNT: 13.1 % (ref 12.3–15.4)
GLUCOSE BLDC GLUCOMTR-MCNC: 106 MG/DL (ref 70–99)
GLUCOSE BLDC GLUCOMTR-MCNC: 129 MG/DL (ref 70–99)
GLUCOSE BLDC GLUCOMTR-MCNC: 164 MG/DL (ref 70–99)
GLUCOSE BLDC GLUCOMTR-MCNC: 174 MG/DL (ref 70–99)
GLUCOSE SERPL-MCNC: 123 MG/DL (ref 65–99)
HCT VFR BLD AUTO: 48.3 % (ref 37.5–51)
HGB BLD-MCNC: 16.8 G/DL (ref 13–17.7)
IMM GRANULOCYTES # BLD AUTO: 0.18 10*3/MM3 (ref 0–0.05)
IMM GRANULOCYTES NFR BLD AUTO: 0.9 % (ref 0–0.5)
LYMPHOCYTES # BLD AUTO: 1.45 10*3/MM3 (ref 0.7–3.1)
LYMPHOCYTES NFR BLD AUTO: 7.2 % (ref 19.6–45.3)
MAGNESIUM SERPL-MCNC: 2.1 MG/DL (ref 1.6–2.4)
MCH RBC QN AUTO: 29.5 PG (ref 26.6–33)
MCHC RBC AUTO-ENTMCNC: 34.8 G/DL (ref 31.5–35.7)
MCV RBC AUTO: 84.7 FL (ref 79–97)
MONOCYTES # BLD AUTO: 1.42 10*3/MM3 (ref 0.1–0.9)
MONOCYTES NFR BLD AUTO: 7 % (ref 5–12)
NEUTROPHILS NFR BLD AUTO: 17.02 10*3/MM3 (ref 1.7–7)
NEUTROPHILS NFR BLD AUTO: 84.3 % (ref 42.7–76)
NRBC BLD AUTO-RTO: 0 /100 WBC (ref 0–0.2)
OSMOLALITY UR: 438 MOSM/KG (ref 50–1400)
PHOSPHATE SERPL-MCNC: 3.2 MG/DL (ref 2.5–4.5)
PLATELET # BLD AUTO: 382 10*3/MM3 (ref 140–450)
PMV BLD AUTO: 9.2 FL (ref 6–12)
POTASSIUM SERPL-SCNC: 3.8 MMOL/L (ref 3.5–5.2)
POTASSIUM SERPL-SCNC: 4 MMOL/L (ref 3.5–5.2)
POTASSIUM SERPL-SCNC: 4.3 MMOL/L (ref 3.5–5.2)
POTASSIUM SERPL-SCNC: 4.4 MMOL/L (ref 3.5–5.2)
PROT SERPL-MCNC: 8.2 G/DL (ref 6–8.5)
QT INTERVAL: 418 MS
QTC INTERVAL: 477 MS
RBC # BLD AUTO: 5.7 10*6/MM3 (ref 4.14–5.8)
SODIUM SERPL-SCNC: 117 MMOL/L (ref 136–145)
SODIUM SERPL-SCNC: 118 MMOL/L (ref 136–145)
SODIUM SERPL-SCNC: 119 MMOL/L (ref 136–145)
SODIUM SERPL-SCNC: 119 MMOL/L (ref 136–145)
WBC NRBC COR # BLD AUTO: 20.19 10*3/MM3 (ref 3.4–10.8)

## 2025-04-19 PROCEDURE — 36415 COLL VENOUS BLD VENIPUNCTURE: CPT | Performed by: INTERNAL MEDICINE

## 2025-04-19 PROCEDURE — 82948 REAGENT STRIP/BLOOD GLUCOSE: CPT

## 2025-04-19 PROCEDURE — 84295 ASSAY OF SERUM SODIUM: CPT | Performed by: INTERNAL MEDICINE

## 2025-04-19 PROCEDURE — 74176 CT ABD & PELVIS W/O CONTRAST: CPT

## 2025-04-19 PROCEDURE — 93005 ELECTROCARDIOGRAM TRACING: CPT | Performed by: SPECIALIST

## 2025-04-19 PROCEDURE — 83935 ASSAY OF URINE OSMOLALITY: CPT | Performed by: INTERNAL MEDICINE

## 2025-04-19 PROCEDURE — 85025 COMPLETE CBC W/AUTO DIFF WBC: CPT | Performed by: FAMILY MEDICINE

## 2025-04-19 PROCEDURE — 74018 RADEX ABDOMEN 1 VIEW: CPT

## 2025-04-19 PROCEDURE — 80076 HEPATIC FUNCTION PANEL: CPT | Performed by: FAMILY MEDICINE

## 2025-04-19 PROCEDURE — 84132 ASSAY OF SERUM POTASSIUM: CPT | Performed by: INTERNAL MEDICINE

## 2025-04-19 PROCEDURE — 63710000001 INSULIN REGULAR HUMAN PER 5 UNITS: Performed by: STUDENT IN AN ORGANIZED HEALTH CARE EDUCATION/TRAINING PROGRAM

## 2025-04-19 PROCEDURE — 99232 SBSQ HOSP IP/OBS MODERATE 35: CPT | Performed by: FAMILY MEDICINE

## 2025-04-19 PROCEDURE — 80048 BASIC METABOLIC PNL TOTAL CA: CPT | Performed by: FAMILY MEDICINE

## 2025-04-19 PROCEDURE — 84100 ASSAY OF PHOSPHORUS: CPT | Performed by: FAMILY MEDICINE

## 2025-04-19 PROCEDURE — 82550 ASSAY OF CK (CPK): CPT | Performed by: FAMILY MEDICINE

## 2025-04-19 PROCEDURE — 83735 ASSAY OF MAGNESIUM: CPT | Performed by: FAMILY MEDICINE

## 2025-04-19 PROCEDURE — 25510000002 IOHEXOL 240 MG/ML SOLUTION: Performed by: FAMILY MEDICINE

## 2025-04-19 RX ORDER — LEVOTHYROXINE SODIUM 50 UG/1
50 TABLET ORAL
Status: DISCONTINUED | OUTPATIENT
Start: 2025-04-20 | End: 2025-04-30 | Stop reason: HOSPADM

## 2025-04-19 RX ORDER — SODIUM CHLORIDE 1 G/1
1 TABLET ORAL ONCE
Status: COMPLETED | OUTPATIENT
Start: 2025-04-19 | End: 2025-04-19

## 2025-04-19 RX ORDER — BUMETANIDE 1 MG/1
1 TABLET ORAL DAILY
Status: DISCONTINUED | OUTPATIENT
Start: 2025-04-19 | End: 2025-04-30 | Stop reason: HOSPADM

## 2025-04-19 RX ORDER — LACTULOSE 10 G/15ML
10 SOLUTION ORAL
Status: COMPLETED | OUTPATIENT
Start: 2025-04-19 | End: 2025-04-19

## 2025-04-19 RX ADMIN — MUPIROCIN 1 APPLICATION: 20 OINTMENT TOPICAL at 20:38

## 2025-04-19 RX ADMIN — LACTULOSE 10 G: 10 SOLUTION ORAL at 17:39

## 2025-04-19 RX ADMIN — PANTOPRAZOLE SODIUM 40 MG: 40 TABLET, DELAYED RELEASE ORAL at 20:38

## 2025-04-19 RX ADMIN — IOHEXOL 500 ML: 240 INJECTION, SOLUTION INTRATHECAL; INTRAVASCULAR; INTRAVENOUS; ORAL at 16:52

## 2025-04-19 RX ADMIN — MUPIROCIN 1 APPLICATION: 20 OINTMENT TOPICAL at 08:43

## 2025-04-19 RX ADMIN — INSULIN HUMAN 2 UNITS: 100 INJECTION, SOLUTION PARENTERAL at 17:38

## 2025-04-19 RX ADMIN — Medication 10 ML: at 08:44

## 2025-04-19 RX ADMIN — Medication 10 ML: at 20:39

## 2025-04-19 RX ADMIN — SODIUM CHLORIDE TAB 1 GM 1 G: 1 TAB at 06:58

## 2025-04-19 RX ADMIN — INSULIN HUMAN 2 UNITS: 100 INJECTION, SOLUTION PARENTERAL at 12:14

## 2025-04-19 RX ADMIN — POLYETHYLENE GLYCOL 3350 17 G: 17 POWDER, FOR SOLUTION ORAL at 00:41

## 2025-04-19 RX ADMIN — TRAMADOL HYDROCHLORIDE 50 MG: 50 TABLET, COATED ORAL at 00:41

## 2025-04-19 RX ADMIN — SENNOSIDES AND DOCUSATE SODIUM 2 TABLET: 50; 8.6 TABLET ORAL at 20:38

## 2025-04-19 RX ADMIN — BUMETANIDE 1 MG: 1 TABLET ORAL at 16:10

## 2025-04-19 RX ADMIN — LACTULOSE 10 G: 10 SOLUTION ORAL at 15:23

## 2025-04-19 RX ADMIN — BISACODYL 10 MG: 10 SUPPOSITORY RECTAL at 05:57

## 2025-04-19 RX ADMIN — BISACODYL 5 MG: 5 TABLET, COATED ORAL at 08:43

## 2025-04-19 RX ADMIN — ASPIRIN 81 MG: 81 TABLET, COATED ORAL at 08:43

## 2025-04-19 RX ADMIN — LACTULOSE 10 G: 10 SOLUTION ORAL at 20:37

## 2025-04-19 RX ADMIN — SENNOSIDES AND DOCUSATE SODIUM 2 TABLET: 50; 8.6 TABLET ORAL at 08:43

## 2025-04-19 RX ADMIN — MINERAL OIL 1 ENEMA: 100 ENEMA RECTAL at 10:32

## 2025-04-19 NOTE — PROGRESS NOTES
" LOS: 4 days   Patient Care Team:  Magnus Euceda DO as PCP - General (Family Medicine)    Chief Complaint: Hyponatremia    Subjective     Pt without any acute complaints.  Tolerating po, no events.  Up in a chair.  Having constipation.  1800 cc urine output recorded.    History taken from: patient chart RN    Objective     Vital Sign Min/Max for last 24 hours  Temp  Min: 97.3 °F (36.3 °C)  Max: 98.6 °F (37 °C)   BP  Min: 129/72  Max: 153/78   Pulse  Min: 84  Max: 98   Resp  Min: 18  Max: 18   SpO2  Min: 95 %  Max: 100 %   Flow (L/min) (Oxygen Therapy)  Min: 1  Max: 2   Weight  Min: 100 kg (221 lb 5.5 oz)  Max: 100 kg (221 lb 5.5 oz)     Flowsheet Rows      Flowsheet Row First Filed Value   Admission Height 182.8 cm (71.97\") Documented at 04/15/2025 1719   Admission Weight 102 kg (223 lb 15.8 oz) Documented at 04/15/2025 1220            I/O this shift:  In: 840 [P.O.:840]  Out: -   I/O last 3 completed shifts:  In: 690 [P.O.:690]  Out: 2875 [Urine:2875]    Objective:  General Appearance:  Comfortable, in no acute distress and not in pain (Up in the chair.).    Vital signs: (most recent): Blood pressure 153/78, pulse 97, temperature 97.9 °F (36.6 °C), temperature source Oral, resp. rate 18, height 182.8 cm (71.97\"), weight 100 kg (221 lb 5.5 oz), SpO2 95%.  Vital signs are normal.  No fever.    Output: Producing urine and minimal stool output.    HEENT: Normal HEENT exam.    Lungs:  Normal effort and normal respiratory rate.  He is not in respiratory distress.    Heart: Normal rate.  Regular rhythm.    Abdomen: Abdomen is soft and distended.  Bowel sounds are normal.   There is no abdominal tenderness.     Extremities: Normal range of motion.  There is no dependent edema.  (Orozco catheter in place.)  Pulses: Distal pulses are intact.    Neurological: Patient is alert and oriented to person, place and time.    Pupils:  Pupils are equal, round, and reactive to light.    Skin:  Warm and dry.                Results " "Review:     I reviewed the patient's new clinical results.  I reviewed the patient's new imaging results and agree with the interpretation.  I reviewed the patient's other test results and agree with the interpretation    WBC WBC   Date Value Ref Range Status   04/19/2025 20.19 (H) 3.40 - 10.80 10*3/mm3 Final   04/18/2025 14.27 (H) 3.40 - 10.80 10*3/mm3 Final   04/17/2025 16.30 (H) 3.40 - 10.80 10*3/mm3 Final      HGB Hemoglobin   Date Value Ref Range Status   04/19/2025 16.8 13.0 - 17.7 g/dL Final   04/18/2025 15.0 13.0 - 17.7 g/dL Final   04/17/2025 14.8 13.0 - 17.7 g/dL Final      HCT Hematocrit   Date Value Ref Range Status   04/19/2025 48.3 37.5 - 51.0 % Final   04/18/2025 42.3 37.5 - 51.0 % Final   04/17/2025 40.9 37.5 - 51.0 % Final      Platlets No results found for: \"LABPLAT\"   MCV MCV   Date Value Ref Range Status   04/19/2025 84.7 79.0 - 97.0 fL Final   04/18/2025 85.3 79.0 - 97.0 fL Final   04/17/2025 83.0 79.0 - 97.0 fL Final          Sodium Sodium   Date Value Ref Range Status   04/19/2025 118 (C) 136 - 145 mmol/L Final   04/19/2025 119 (C) 136 - 145 mmol/L Final   04/18/2025 119 (C) 136 - 145 mmol/L Final   04/18/2025 120 (L) 136 - 145 mmol/L Final   04/18/2025 119 (C) 136 - 145 mmol/L Final   04/18/2025 120 (L) 136 - 145 mmol/L Final   04/17/2025 115 (C) 136 - 145 mmol/L Final   04/17/2025 117 (C) 136 - 145 mmol/L Final   04/17/2025 113 (C) 136 - 145 mmol/L Final   04/17/2025 116 (C) 136 - 145 mmol/L Final   04/17/2025 114 (C) 136 - 145 mmol/L Final   04/16/2025 112 (C) 136 - 145 mmol/L Final   04/16/2025 113 (C) 136 - 145 mmol/L Final      Potassium Potassium   Date Value Ref Range Status   04/19/2025 4.4 3.5 - 5.2 mmol/L Final     Comment:     Slight hemolysis detected by analyzer. Result may be falsely elevated.   04/19/2025 4.0 3.5 - 5.2 mmol/L Final   04/18/2025 3.8 3.5 - 5.2 mmol/L Final     Comment:     Slight hemolysis detected by analyzer. Result may be falsely elevated.   04/18/2025 3.8 3.5 " "- 5.2 mmol/L Final   04/18/2025 3.8 3.5 - 5.2 mmol/L Final     Comment:     Slight hemolysis detected by analyzer. Result may be falsely elevated.   04/18/2025 3.9 3.5 - 5.2 mmol/L Final   04/17/2025 3.8 3.5 - 5.2 mmol/L Final     Comment:     Slight hemolysis detected by analyzer. Result may be falsely elevated.   04/17/2025 3.1 (L) 3.5 - 5.2 mmol/L Final   04/17/2025 3.2 (L) 3.5 - 5.2 mmol/L Final   04/17/2025 3.5 3.5 - 5.2 mmol/L Final     Comment:     Slight hemolysis detected by analyzer. Result may be falsely elevated.   04/17/2025 3.5 3.5 - 5.2 mmol/L Final   04/16/2025 3.7 3.5 - 5.2 mmol/L Final     Comment:     Slight hemolysis detected by analyzer. Result may be falsely elevated.   04/16/2025 3.6 3.5 - 5.2 mmol/L Final      Chloride Chloride   Date Value Ref Range Status   04/19/2025 80 (L) 98 - 107 mmol/L Final   04/18/2025 84 (L) 98 - 107 mmol/L Final   04/17/2025 77 (L) 98 - 107 mmol/L Final      CO2 CO2   Date Value Ref Range Status   04/19/2025 20.1 (L) 22.0 - 29.0 mmol/L Final   04/18/2025 22.2 22.0 - 29.0 mmol/L Final   04/17/2025 24.0 22.0 - 29.0 mmol/L Final      BUN BUN   Date Value Ref Range Status   04/19/2025 18 8 - 23 mg/dL Final   04/18/2025 15 8 - 23 mg/dL Final   04/17/2025 21 8 - 23 mg/dL Final      Creatinine Creatinine   Date Value Ref Range Status   04/19/2025 1.26 0.76 - 1.27 mg/dL Final   04/18/2025 1.15 0.76 - 1.27 mg/dL Final   04/17/2025 1.23 0.76 - 1.27 mg/dL Final      Calcium Calcium   Date Value Ref Range Status   04/19/2025 10.2 8.6 - 10.5 mg/dL Final   04/18/2025 9.4 8.6 - 10.5 mg/dL Final   04/17/2025 9.2 8.6 - 10.5 mg/dL Final      PO4 No results found for: \"CAPO4\"   Albumin Albumin   Date Value Ref Range Status   04/19/2025 4.3 3.5 - 5.2 g/dL Final   04/18/2025 4.0 3.5 - 5.2 g/dL Final   04/17/2025 4.0 3.5 - 5.2 g/dL Final      Magnesium Magnesium   Date Value Ref Range Status   04/19/2025 2.1 1.6 - 2.4 mg/dL Final   04/18/2025 2.0 1.6 - 2.4 mg/dL Final   04/17/2025 2.0 " "1.6 - 2.4 mg/dL Final      Uric Acid No results found for: \"URICACID\"       Medication Review:   aspirin, 81 mg, Oral, Daily  bumetanide, 1 mg, Oral, Daily  insulin regular, 2-7 Units, Subcutaneous, Q6H  Iohexol, 500 mL, Oral, Once  lactulose, 10 g, Oral, Q2H  [START ON 4/20/2025] levothyroxine, 50 mcg, Oral, Q AM  mupirocin, 1 Application, Each Nare, BID  pantoprazole, 40 mg, Oral, Nightly  senna-docusate sodium, 2 tablet, Oral, BID  sodium chloride, 10 mL, Intravenous, Q12H          Assessment & Plan       Hyponatremia    Type 2 diabetes mellitus with hyperglycemia, without long-term current use of insulin    Onychomycosis    Onychocryptosis    PVD (peripheral vascular disease)    Foot pain, bilateral      Assessment & Plan  - Hyponatremia, possibly secondary to chlorthalidone, urine osmolality 271.  TSH okay.  No signs or symptoms of adrenal insufficiency.  Sodium is slowly better.  Continue 1500 cc p.o. fluid restriction, resume Bumex 1 mg p.o. daily.  Repeat urine osmolality.      - Chronic kidney disease stage III, baseline creatinine lately around 1.4-1.5.  Has bland UA and minimal proteinuria from 2 years ago.  Creatinine is better than baseline now.    - Hypokalemia, replaced and improved.  Magnesium is okay.    - Rhabdomyolysis, possibly secondary to fall versus hyponatremia. Cpk improved.     - Type 2 diabetes, per primary.    - Shortness of breath, cardiology planning stress test on Monday.    - Hypothyroidism, per primary.    - Abdominal distention, cause is unclear to me, per primary.      Okay to DC Orozco catheter from renal standpoint.  If so, check PVR afterward.    Discussed with nursing.  Will follow.    Rajendra Lion MD  04/19/25  15:37 EDT            "

## 2025-04-19 NOTE — PROGRESS NOTES
[facility]   Progress Note    Patient Name: Alberto Rachel  : 1960  MRN: [id number]  Primary Care Physician: Magnus Euceda DO  Date of admission: 4/15/2025    Subjective   Subjective     HPI:  Patient with a no chest pain or shortness of breath at rest still very weak sodium is 119 the white blood cell count is elevated, patient has a history of alcohol abuse.  Patient willing to go home.  But he cannot stand up, may need physical therapy and long-term care, patient lives alone.   on the case.    Review of Systems  Review of Systems   Constitutional:  Positive for chills.   HENT: Negative.     Eyes: Negative.    Respiratory: Negative.     Cardiovascular: Negative.    Gastrointestinal: Negative.    Neurological: Negative.    Hematological: Negative.    Psychiatric/Behavioral: Negative.         Objective   Objective     Vitals:  Temp:  [97.3 °F (36.3 °C)-98.6 °F (37 °C)] 97.9 °F (36.6 °C)  Heart Rate:  [80-98] 95  Resp:  [18] 18  BP: (129-153)/(58-82) 153/82  Flow (L/min) (Oxygen Therapy):  [1-2] 1    Physical Exam:  Physical Exam  Constitutional:       Appearance: Normal appearance.   HENT:      Head: Normocephalic.      Nose: Nose normal.   Cardiovascular:      Rate and Rhythm: Normal rate and regular rhythm.      Pulses: Normal pulses.   Pulmonary:      Effort: Pulmonary effort is normal.   Abdominal:      Palpations: Abdomen is soft.   Musculoskeletal:      Right lower leg: No edema.      Left lower leg: No edema.   Skin:     General: Skin is warm.   Neurological:      General: No focal deficit present.   Psychiatric:         Mood and Affect: Mood normal.         Result Review    Result Review:  I have personally reviewed the results from the time of this admission to 25 11:35 AM EDT and agree with these findings:  [x]  Laboratory  []  Microbiology  []  Radiology  [x]  EKG/Telemetry   []  Cardiology/Vascular   []  Pathology  []  Old records  []  Other:    Most notable  findings include: 64-year-old gentleman, history of coronary artery disease status post coronary artery by graft surgery about 15 years ago patient, not very compliant on his follow-ups, he indicated nobody was taking care of him, there is a history of ethanol abuse, patient also has peripheral vascular disease, has diabetes,  persistent electrolyte imbalance.    Assessment & Plan   Assessment / Plan     Active Hospital Problems:  Active Hospital Problems    Diagnosis     **Hyponatremia     Onychomycosis     Onychocryptosis     PVD (peripheral vascular disease)     Foot pain, bilateral     Type 2 diabetes mellitus with hyperglycemia, without long-term current use of insulin        Plan:   Continue observation for now may need some medications to relax him if he has any signs of withdrawal, and plan to do a nuclear on him on Monday.    DVT prophylaxis: As per internal medicine    CODE STATUS:    Code Status and Medical Interventions: CPR (Attempt to Resuscitate); Full Support   Ordered at: 04/15/25 1554     Code Status (Patient has no pulse and is not breathing):    CPR (Attempt to Resuscitate)     Medical Interventions (Patient has pulse or is breathing):    Full Support       Disposition:  I expect patient to be discharged when patient gets better.    Electronically signed by Jacques St MD, 04/19/25, 11:35 AM EDT.

## 2025-04-19 NOTE — PLAN OF CARE
Goal Outcome Evaluation:              Outcome Evaluation: Patient alert and oriented, intermittent confusion, on room air, NSR on monitor, KUB completed, CT of abdomen needs to be completed, fisher catheter removed, start voiding trial and strict I/O, up to chair today X2 assistance with walker, pt had 1 bowel movement today

## 2025-04-19 NOTE — PROGRESS NOTES
[facility]   Hospitalist Progress Note  Date: 2025  Patient Name: Alberto Rachel  : 1960  MRN: [id number]  Date of admission: 4/15/2025      Subjective   Subjective   Chief complaint: Altered mental status    Summary:  64-year-old male with history of anxiety, hypertension, CAD status post bypass, history of stroke, carotid artery stenosis, depression, diabetes, duodenal ulcer, hypertension, dyslipidemia, diabetes mellitus, hospitalized on 4/15/2025 chief complaint of altered mental status, multiple falls, found to have significant life-threatening hyponatremia, nephrology consulted, placed in the critical care unit with critical care consulted, given a round of hypertonic saline, placed on IV fluids several home medications held in the setting of severe hyponatremia including chlorthalidone, Bumex, Abilify, Celexa among others.  Cardiology consulted elevated troponins.  Planning nuclear stress test.  Transferred out of ICU.    Interval follow-up: Seen and examined, no distress, no events overnight, cardiology planning nuclear stress test on Monday.  His abdomen is distended, will he has not had a bowel movement in quite some time, a mineral oil enema was ordered.  KUB ordered.  Patient does report he is passing gas.  He is on 2 L.  His serum sodium is right around 119, bicarb 20, chloride 80, creatinine 1.26, blood sugars in the 100 range, white blood cell count 20,000, hemoglobin 16.8.  Urine output 1.8 L over the past 24 hours.  Still has intermittent confusion.  Very weak, cannot stand.    Review of systems:  Review of systems obtained, all systems reviewed and negative except for weakness, fatigue, intermittent confusion    Objective   Objective     Vitals:   Temp:  [97.3 °F (36.3 °C)-98.6 °F (37 °C)] 97.9 °F (36.6 °C)  Heart Rate:  [80-98] 95  Resp:  [18] 18  BP: (129-153)/(58-82) 153/82  Flow (L/min) (Oxygen Therapy):  [1-2] 1  Physical Exam      Constitutional: Awake, alert, no acute  distress laying in bed   Eyes: Pupils equal, sclerae anicteric, no conjunctival injection   HENT: NCAT, mucous membranes moist   Neck: Supple, full range of motion   Respiratory: Diminished to auscultation bilaterally, nonlabored respirations    Cardiovascular: RRR, no murmurs, rubs, or gallops, palpable pedal pulses bilaterally   Gastrointestinal: Positive bowel sounds, soft, nontender, distended   Musculoskeletal: No bilateral ankle edema, no clubbing or cyanosis to extremities   Psychiatric: Appropriate affect, cooperative   Neurologic: Oriented x 2 self and surroundings, strength symmetric in all extremities with weakness throughout, Cranial Nerves grossly intact to confrontation, speech clear   Skin: No rashes, cracked dry discolored skin lower extremities    Result Review    Result Review:  I have personally reviewed the pertinent results from the past 24 hours to 4/19/2025 14:30 EDT and agree with these findings:  [x]  Laboratory   CBC          4/17/2025    05:43 4/18/2025    05:48 4/19/2025    05:25   CBC   WBC 16.30  14.27  20.19    RBC 4.93  4.96  5.70    Hemoglobin 14.8  15.0  16.8    Hematocrit 40.9  42.3  48.3    MCV 83.0  85.3  84.7    MCH 30.0  30.2  29.5    MCHC 36.2  35.5  34.8    RDW 12.4  12.9  13.1    Platelets 268  280  382      BMP          4/17/2025    05:43 4/17/2025    06:39 4/17/2025    11:56 4/17/2025    18:32 4/17/2025    23:54 4/18/2025    05:48 4/18/2025    11:41 4/18/2025    18:15 4/18/2025    23:43   BMP   BUN  21     15       Creatinine  1.23     1.15       Sodium 114  116  113  117  115  120  119  120  119    Potassium 3.5  3.5  3.2  3.1  3.8  3.9  3.8  3.8  3.8    Chloride  77     84       CO2  24.0     22.2       Calcium  9.2     9.4             4/19/2025    05:25 4/19/2025    12:14   BMP   BUN 18     Creatinine 1.26     Sodium 119  118    Potassium 4.0  4.4    Chloride 80     CO2 20.1     Calcium 10.2       LIVER FUNCTION TESTS:      Lab 04/19/25  0525 04/18/25  0548  04/17/25  0543 04/16/25  0531 04/15/25  1235   TOTAL PROTEIN 8.2 7.1 7.2  --  7.9   ALBUMIN 4.3 4.0 4.0 4.0 4.7   GLOBULIN  --   --   --   --  3.2   ALT (SGPT) 40 42* 47*  --  39   AST (SGOT) 49* 65* 105*  --  104*   BILIRUBIN 0.9 0.8 1.1  --  1.8*   INDIRECT BILIRUBIN 0.4 0.3 0.6  --   --    BILIRUBIN DIRECT 0.5* 0.5* 0.5*  --   --    ALK PHOS 67 52 49  --  52       [x]  Microbiology   Microbiology Results (last 10 days)       Procedure Component Value - Date/Time    Blood Culture - Blood, Hand, Right [[id number]]  (Normal) Collected: 04/15/25 1736    Lab Status: Preliminary result Specimen: Blood from Hand, Right Updated: 04/18/25 1745     Blood Culture No growth at 3 days    Narrative:      Less than seven (7) mL's of blood was collected.  Insufficient quantity may yield false negative results.    Blood Culture - Blood, Arm, Left [[id number]]  (Normal) Collected: 04/15/25 1735    Lab Status: Preliminary result Specimen: Blood from Arm, Left Updated: 04/18/25 1745     Blood Culture No growth at 3 days    Narrative:      Less than seven (7) mL's of blood was collected.  Insufficient quantity may yield false negative results.              [x]  Radiology FL Video Swallow With Speech Single Contrast  Result Date: 4/16/2025  Impression: 1. No tracheal aspiration or laryngeal penetration observed Please consult speech pathology report for further details regarding the exam and for dietary recommendations. Report dictated by: Racquel Perdue  I have personally reviewed this case and agree with the findings above: Electronically Signed: Taco Kennedy MD  4/16/2025 4:20 PM EDT  Workstation ID: [id number]    CT Head Without Contrast  Result Date: 4/15/2025  Impression: 1.No acute intracranial abnormality identified by noncontrast CT. 2.Mild parenchymal volume loss and probable chronic small vessel ischemic change. Electronically Signed: Heriberto Casillas MD  4/15/2025 1:48 PM EDT  Workstation ID: [id number]    XR Chest 1 View  Result  Date: 4/15/2025  Impression: Suspected mild left basilar atelectasis. Electronically Signed: Theresa Mancilla MD  4/15/2025 1:00 PM EDT  Workstation ID: [id number]        []  EKG/Telemetry   ECG 12 Lead Electrolyte Imbalance   Preliminary Result   HEART RATE=97  bpm   RR Cyrkqyeb=519  ms   MO Darfvage=417  ms   P Horizontal Axis=-15  deg   P Front Axis=67  deg   QRSD Wdnihdzg=171  ms   QT Yalsjyde=607  ms   ZOkO=405  ms   QRS Axis=-1  deg   T Wave Axis=182  deg   - ABNORMAL ECG -   Sinus rhythm   Ventricular premature complex   Probable left atrial enlargement   LVH with secondary repolarization abnormality   Date and Time of Study:2025-04-19 06:21:40      ECG 12 Lead Altered Mental Status   Preliminary Result   HEART RATE=78  bpm   RR Vfkdskxc=852  ms   MO Jivhqvgu=512  ms   P Horizontal Axis=-61  deg   P Front Axis=243  deg   QRSD Gmlqshbf=778  ms   QT Jnoffwfw=458  ms   YKbD=244  ms   QRS Axis=-6  deg   T Wave Axis=240  deg   - ABNORMAL ECG -   Sinus or ectopic atrial rhythm   Nonspecific intraventricular conduction delay   Nonspecific T abnormalities, inferior leads   Borderline ST elevation, anterior leads   Date and Time of Study:2025-04-15 13:22:55          []  Cardiology/Vascular   []  Pathology  [x]  Old records  []  Other:    Assessment & Plan   Assessment / Plan     Assessment/Plan:    Assessment:  Acute life-threatening hyponatremia  Symptomatic hyponatremia  Acute metabolic encephalopathy  Asthma mild intermittent  Rhabdomyolysis  Anxiety  CAD with history of CABG, with elevated troponins likely type II MI secondary to hyponatremia  Hyponatremia, hypovolemia, hypochloremia  History of stroke  Carotid artery stenosis  Depression  Diabetes mellitus  History of duodenal ulcer  Essential hypertension  Dyslipidemia  ESVIN  Metabolic acidosis    Plan:  Labs and imaging reviewed  Check KUB  Mineral oil enema  Resume levothyroxine 50 mcg daily  Insulin sliding scale coverage continued  Ultram for pain control  IV  fluids discontinued while we continue to monitor serum sodium correction  Stress test per cardiology  Diet per speech therapy  Hold home medications which could be driving hyponatremia and hypovolemia  Following blood pressures  Nephrology consultation appreciated  Fall precautions  A.m. labs  Full code  DVT prophylaxis with SCDs  Clinical course to dictate further management  Discussed with nurse at the bedside      VTE Prophylaxis:  Mechanical VTE prophylaxis orders are present.        CODE STATUS:   Code Status (Patient has no pulse and is not breathing): CPR (Attempt to Resuscitate)  Medical Interventions (Patient has pulse or is breathing): Full Support        Electronically signed by Wanda Horowitz MD, 4/19/2025, 14:30 EDT.    Portions of this documentation were transcribed electronically from a voice recognition software.  I confirm all data accurately represents the service(s) I performed at today's visit.

## 2025-04-19 NOTE — PLAN OF CARE
Goal Outcome Evaluation:           Progress: no change  Outcome Evaluation: Pt a&ox4, able to make needs known. Patient is on 2LNC, tolerating well. Pain treated per MAR. Complaints of acid reflux, treated per MAR. Given miralax. Recent Na level is 119, MD aware. VSS during shift. Wound care consult put in and wet-to-dry dressings applied, see LDA. Continue plan of care.

## 2025-04-20 ENCOUNTER — APPOINTMENT (OUTPATIENT)
Dept: GENERAL RADIOLOGY | Facility: HOSPITAL | Age: 65
End: 2025-04-20
Payer: MEDICARE

## 2025-04-20 LAB
ALBUMIN SERPL-MCNC: 4 G/DL (ref 3.5–5.2)
ALP SERPL-CCNC: 73 U/L (ref 39–117)
ALT SERPL W P-5'-P-CCNC: 31 U/L (ref 1–41)
ANION GAP SERPL CALCULATED.3IONS-SCNC: 23.9 MMOL/L (ref 5–15)
AST SERPL-CCNC: 32 U/L (ref 1–40)
BACTERIA SPEC AEROBE CULT: NORMAL
BACTERIA SPEC AEROBE CULT: NORMAL
BASOPHILS # BLD AUTO: 0.08 10*3/MM3 (ref 0–0.2)
BASOPHILS NFR BLD AUTO: 0.3 % (ref 0–1.5)
BILIRUB CONJ SERPL-MCNC: 0.5 MG/DL (ref 0–0.3)
BILIRUB INDIRECT SERPL-MCNC: 0.6 MG/DL
BILIRUB SERPL-MCNC: 1.1 MG/DL (ref 0–1.2)
BUN SERPL-MCNC: 39 MG/DL (ref 8–23)
BUN/CREAT SERPL: 17.2 (ref 7–25)
CALCIUM SPEC-SCNC: 10.4 MG/DL (ref 8.6–10.5)
CHLORIDE SERPL-SCNC: 76 MMOL/L (ref 98–107)
CO2 SERPL-SCNC: 19.1 MMOL/L (ref 22–29)
CREAT SERPL-MCNC: 2.27 MG/DL (ref 0.76–1.27)
DEPRECATED RDW RBC AUTO: 41.1 FL (ref 37–54)
EGFRCR SERPLBLD CKD-EPI 2021: 31 ML/MIN/1.73
EOSINOPHIL # BLD AUTO: 0.01 10*3/MM3 (ref 0–0.4)
EOSINOPHIL NFR BLD AUTO: 0 % (ref 0.3–6.2)
ERYTHROCYTE [DISTWIDTH] IN BLOOD BY AUTOMATED COUNT: 13.2 % (ref 12.3–15.4)
GLUCOSE BLDC GLUCOMTR-MCNC: 196 MG/DL (ref 70–99)
GLUCOSE BLDC GLUCOMTR-MCNC: 205 MG/DL (ref 70–99)
GLUCOSE BLDC GLUCOMTR-MCNC: 207 MG/DL (ref 70–99)
GLUCOSE BLDC GLUCOMTR-MCNC: 219 MG/DL (ref 70–99)
GLUCOSE SERPL-MCNC: 189 MG/DL (ref 65–99)
HCT VFR BLD AUTO: 47.9 % (ref 37.5–51)
HGB BLD-MCNC: 16.7 G/DL (ref 13–17.7)
IMM GRANULOCYTES # BLD AUTO: 0.16 10*3/MM3 (ref 0–0.05)
IMM GRANULOCYTES NFR BLD AUTO: 0.7 % (ref 0–0.5)
LYMPHOCYTES # BLD AUTO: 1.58 10*3/MM3 (ref 0.7–3.1)
LYMPHOCYTES NFR BLD AUTO: 6.7 % (ref 19.6–45.3)
MAGNESIUM SERPL-MCNC: 2.4 MG/DL (ref 1.6–2.4)
MCH RBC QN AUTO: 29.6 PG (ref 26.6–33)
MCHC RBC AUTO-ENTMCNC: 34.9 G/DL (ref 31.5–35.7)
MCV RBC AUTO: 84.8 FL (ref 79–97)
MONOCYTES # BLD AUTO: 1.81 10*3/MM3 (ref 0.1–0.9)
MONOCYTES NFR BLD AUTO: 7.7 % (ref 5–12)
NEUTROPHILS NFR BLD AUTO: 19.85 10*3/MM3 (ref 1.7–7)
NEUTROPHILS NFR BLD AUTO: 84.6 % (ref 42.7–76)
NRBC BLD AUTO-RTO: 0 /100 WBC (ref 0–0.2)
PHOSPHATE SERPL-MCNC: 5.5 MG/DL (ref 2.5–4.5)
PLATELET # BLD AUTO: 418 10*3/MM3 (ref 140–450)
PMV BLD AUTO: 8.8 FL (ref 6–12)
POTASSIUM SERPL-SCNC: 4.5 MMOL/L (ref 3.5–5.2)
POTASSIUM SERPL-SCNC: 4.5 MMOL/L (ref 3.5–5.2)
POTASSIUM SERPL-SCNC: 4.6 MMOL/L (ref 3.5–5.2)
POTASSIUM SERPL-SCNC: 4.7 MMOL/L (ref 3.5–5.2)
POTASSIUM SERPL-SCNC: 4.7 MMOL/L (ref 3.5–5.2)
PROT SERPL-MCNC: 8.2 G/DL (ref 6–8.5)
RBC # BLD AUTO: 5.65 10*6/MM3 (ref 4.14–5.8)
SODIUM SERPL-SCNC: 116 MMOL/L (ref 136–145)
SODIUM SERPL-SCNC: 118 MMOL/L (ref 136–145)
SODIUM SERPL-SCNC: 119 MMOL/L (ref 136–145)
SODIUM SERPL-SCNC: 120 MMOL/L (ref 136–145)
WBC NRBC COR # BLD AUTO: 23.49 10*3/MM3 (ref 3.4–10.8)

## 2025-04-20 PROCEDURE — 25810000003 SODIUM CHLORIDE 0.9 % SOLUTION: Performed by: INTERNAL MEDICINE

## 2025-04-20 PROCEDURE — 80048 BASIC METABOLIC PNL TOTAL CA: CPT | Performed by: FAMILY MEDICINE

## 2025-04-20 PROCEDURE — 84100 ASSAY OF PHOSPHORUS: CPT | Performed by: FAMILY MEDICINE

## 2025-04-20 PROCEDURE — 82948 REAGENT STRIP/BLOOD GLUCOSE: CPT | Performed by: STUDENT IN AN ORGANIZED HEALTH CARE EDUCATION/TRAINING PROGRAM

## 2025-04-20 PROCEDURE — 74018 RADEX ABDOMEN 1 VIEW: CPT

## 2025-04-20 PROCEDURE — 85025 COMPLETE CBC W/AUTO DIFF WBC: CPT | Performed by: FAMILY MEDICINE

## 2025-04-20 PROCEDURE — 80076 HEPATIC FUNCTION PANEL: CPT | Performed by: FAMILY MEDICINE

## 2025-04-20 PROCEDURE — 25010000002 ONDANSETRON PER 1 MG: Performed by: STUDENT IN AN ORGANIZED HEALTH CARE EDUCATION/TRAINING PROGRAM

## 2025-04-20 PROCEDURE — 83735 ASSAY OF MAGNESIUM: CPT | Performed by: FAMILY MEDICINE

## 2025-04-20 PROCEDURE — 84295 ASSAY OF SERUM SODIUM: CPT | Performed by: INTERNAL MEDICINE

## 2025-04-20 PROCEDURE — 82948 REAGENT STRIP/BLOOD GLUCOSE: CPT

## 2025-04-20 PROCEDURE — 84132 ASSAY OF SERUM POTASSIUM: CPT | Performed by: INTERNAL MEDICINE

## 2025-04-20 PROCEDURE — 99221 1ST HOSP IP/OBS SF/LOW 40: CPT | Performed by: SURGERY

## 2025-04-20 PROCEDURE — 63710000001 INSULIN REGULAR HUMAN PER 5 UNITS: Performed by: STUDENT IN AN ORGANIZED HEALTH CARE EDUCATION/TRAINING PROGRAM

## 2025-04-20 PROCEDURE — 36415 COLL VENOUS BLD VENIPUNCTURE: CPT | Performed by: INTERNAL MEDICINE

## 2025-04-20 PROCEDURE — 99232 SBSQ HOSP IP/OBS MODERATE 35: CPT | Performed by: FAMILY MEDICINE

## 2025-04-20 RX ORDER — SODIUM CHLORIDE 9 MG/ML
125 INJECTION, SOLUTION INTRAVENOUS CONTINUOUS
Status: ACTIVE | OUTPATIENT
Start: 2025-04-20 | End: 2025-04-22

## 2025-04-20 RX ORDER — PANTOPRAZOLE SODIUM 40 MG/10ML
40 INJECTION, POWDER, LYOPHILIZED, FOR SOLUTION INTRAVENOUS EVERY 12 HOURS SCHEDULED
Status: DISCONTINUED | OUTPATIENT
Start: 2025-04-20 | End: 2025-04-25

## 2025-04-20 RX ADMIN — SODIUM CHLORIDE 125 ML/HR: 9 INJECTION, SOLUTION INTRAVENOUS at 10:37

## 2025-04-20 RX ADMIN — Medication 10 ML: at 08:54

## 2025-04-20 RX ADMIN — ONDANSETRON 4 MG: 2 INJECTION INTRAMUSCULAR; INTRAVENOUS at 03:20

## 2025-04-20 RX ADMIN — INSULIN HUMAN 3 UNITS: 100 INJECTION, SOLUTION PARENTERAL at 05:01

## 2025-04-20 RX ADMIN — SODIUM CHLORIDE 125 ML/HR: 9 INJECTION, SOLUTION INTRAVENOUS at 17:26

## 2025-04-20 RX ADMIN — INSULIN HUMAN 3 UNITS: 100 INJECTION, SOLUTION PARENTERAL at 12:59

## 2025-04-20 RX ADMIN — LEVOTHYROXINE SODIUM 50 MCG: 0.05 TABLET ORAL at 05:01

## 2025-04-20 RX ADMIN — PANTOPRAZOLE SODIUM 40 MG: 40 INJECTION, POWDER, FOR SOLUTION INTRAVENOUS at 08:54

## 2025-04-20 RX ADMIN — INSULIN HUMAN 3 UNITS: 100 INJECTION, SOLUTION PARENTERAL at 17:26

## 2025-04-20 RX ADMIN — PANTOPRAZOLE SODIUM 40 MG: 40 INJECTION, POWDER, FOR SOLUTION INTRAVENOUS at 21:39

## 2025-04-20 RX ADMIN — INSULIN HUMAN 2 UNITS: 100 INJECTION, SOLUTION PARENTERAL at 00:35

## 2025-04-20 NOTE — PROGRESS NOTES
[facility]   Progress Note    Patient Name: Alberto Rachel  : 1960  MRN: [id number]  Primary Care Physician: Magnus Euceda DO  Date of admission: 4/15/2025    Subjective   Subjective     HPI:  Patient lethargic, diagnosed as ileus has significant nasogastric tube drainage, patient needs to be hydrated, patient is afebrile heart rate is 102 blood pressure is low 100/54, sodium still persistently low 116, white blood cell count continues to increase to 23.4, CT of the abdomen showed no evidence of significant obstruction but  patient probably has some infection, the etiology of the ileus is unclear, needs to be evaluated and follow-up with GI and surgery.    Review of Systems  Review of Systems unable to obtain    Objective   Objective     Vitals:  Temp:  [97.3 °F (36.3 °C)-98.2 °F (36.8 °C)] 98.2 °F (36.8 °C)  Heart Rate:  [] 102  Resp:  [16-20] 16  BP: (100-148)/(54-85) 100/54    Physical Exam:  Physical Exam  Constitutional:       Comments: Patient is lethargic sleepy appears not having pain   HENT:      Head: Atraumatic.      Mouth/Throat:      Mouth: Mucous membranes are dry.   Cardiovascular:      Rate and Rhythm: Tachycardia present.      Heart sounds: No murmur heard.  Pulmonary:      Breath sounds: No rales.   Abdominal:      Comments: Distended but softer with a nasogastric drainage   Musculoskeletal:      Right lower leg: No edema.      Left lower leg: No edema.   Skin:     General: Skin is warm.   Neurological:      Comments: Difficult to evaluate yesterday was okay now patient is sleepy and lethargic   Psychiatric:      Comments: Unable to evaluate         Result Review    Result Review:  I have personally reviewed the results from the time of this admission to 25 5:03 PM EDT and agree with these findings:  [x]  Laboratory  []  Microbiology  []  Radiology  [x]  EKG/Telemetry   []  Cardiology/Vascular   []  Pathology  []  Old records  []  Other:    Most notable findings  include: 64-year-old gentleman admitted to hospital because of change in mental status, has a history of ethanol abuse noted to have severe persistent hyponatremia, difficult to correct, during hospitalization patient developed ileus, significant abdominal distention but painless currently is under evaluation.  Patient has significant drainage with a nasogastric tube.  Patient is tachycardic, liver function got better but patient developed acute kidney failure.  Currently hydrated as per nephrology.    Assessment & Plan   Assessment / Plan     Active Hospital Problems:  Active Hospital Problems    Diagnosis     **Hyponatremia     Onychomycosis     Onychocryptosis     PVD (peripheral vascular disease)     Foot pain, bilateral     Type 2 diabetes mellitus with hyperglycemia, without long-term current use of insulin        Plan:   Continue evaluation with GI may be surgery and also internal medicine, patient is currently n.p.o. currently is hydrated as per nephrology.    DVT prophylaxis: As per internal medicine, unable to do any cardiac evaluation for ischemia at present time.    CODE STATUS:    Code Status and Medical Interventions: CPR (Attempt to Resuscitate); Full Support   Ordered at: 04/15/25 1554     Code Status (Patient has no pulse and is not breathing):    CPR (Attempt to Resuscitate)     Medical Interventions (Patient has pulse or is breathing):    Full Support       Disposition:  I expect patient to be discharged to be by internal medicine and GI.    Electronically signed by Jacques St MD, 04/20/25, 5:03 PM EDT.

## 2025-04-20 NOTE — PROGRESS NOTES
[facility]   Hospitalist Progress Note  Date: 2025  Patient Name: Alberto Rachel  : 1960  MRN: [id number]  Date of admission: 4/15/2025      Subjective   Subjective   Chief complaint: Altered mental status    Summary:  64-year-old male with history of anxiety, hypertension, CAD status post bypass, history of stroke, carotid artery stenosis, depression, diabetes, duodenal ulcer, hypertension, dyslipidemia, diabetes mellitus, hospitalized on 4/15/2025 chief complaint of altered mental status, multiple falls, found to have significant life-threatening hyponatremia, nephrology consulted, placed in the critical care unit with critical care consulted, given a round of hypertonic saline, placed on IV fluids several home medications held in the setting of severe hyponatremia including chlorthalidone, Bumex, Abilify, Celexa among others.  Cardiology consulted elevated troponins.  Planning nuclear stress test.  Transferred out of ICU.  Started developing distention despite passing gas and having bowel movements, abdominal distention worse, x-ray showing small bowel obstruction pattern, CT imaging obtained, showing gastric, small and large bowel distention concerning for ileus versus versus bowel spasm and peristalsis, could have [place]'s, NG tube placed to low intermittent suction, general surgery consulted    Interval follow-up: Seen and examined, no distress, further abdominal distention despite patient not complaining of abdominal pain, vomited twice overnight, NG tube placed this morning after imaging was supportive of bowel obstruction, etiology of bowel obstruction uncertain, not sure we reviewed the bowel paralysis with peristalsis, Abel's, general surgery consulted.  After NG tube was placed, had 1.2 L of output in a short period of time, follow-up imaging ordered for review to ensure bowel is not further distended while we actively have NG tube to low intermittent suction.  May need  neostigmine.  Following closely.  Sodium 116, potassium 4.7, blood sugar in the 100-200 range, discussed with nephrology restarting IV fluids in the setting of n.p.o. and ongoing hyponatremia with high bowel output. Still has intermittent confusion.    Review of systems:   Review of systems obtained, all systems reviewed and negative except for weakness, fatigue, intermittent confusion    Objective   Objective     Vitals:   Temp:  [97.3 °F (36.3 °C)-98.2 °F (36.8 °C)] 97.3 °F (36.3 °C)  Heart Rate:  [] 105  Resp:  [18-20] 20  BP: (123-153)/(73-85) 123/73  Physical Exam      Constitutional: Awake, alert, no acute distress laying in bed with vomit all over him   Eyes: Pupils equal, sclerae anicteric, no conjunctival injection   HENT: NCAT, mucous membranes moist   Neck: Supple, full range of motion   Respiratory: Diminished to auscultation bilaterally, nonlabored respirations    Cardiovascular: RRR, no murmurs, rubs, or gallops, palpable pedal pulses bilaterally   Gastrointestinal: Hyperactive bowel sounds, soft, distended   Musculoskeletal: No bilateral ankle edema, no clubbing or cyanosis to extremities   Psychiatric: Appropriate affect, cooperative   Neurologic: Oriented x 2 self and surroundings, strength symmetric in all extremities with weakness throughout, Cranial Nerves grossly intact to confrontation, speech clear   Skin: No rashes, cracked dry discolored skin lower extremities    Result Review    Result Review:  I have personally reviewed the pertinent results from the past 24 hours to 4/20/2025 13:30 EDT and agree with these findings:  [x]  Laboratory   CBC          4/18/2025    05:48 4/19/2025    05:25 4/20/2025    05:54   CBC   WBC 14.27  20.19  23.49    RBC 4.96  5.70  5.65    Hemoglobin 15.0  16.8  16.7    Hematocrit 42.3  48.3  47.9    MCV 85.3  84.7  84.8    MCH 30.2  29.5  29.6    MCHC 35.5  34.8  34.9    RDW 12.9  13.1  13.2    Platelets 280  382  418      BMP          4/18/2025    05:48  4/18/2025    11:41 4/18/2025    18:15 4/18/2025    23:43 4/19/2025    05:25 4/19/2025    12:14 4/19/2025    17:45 4/19/2025    23:49 4/20/2025    05:54   BMP   BUN 15     18     39    Creatinine 1.15     1.26     2.27    Sodium 120  119  120  119  119  118  117  118  119    Potassium 3.9  3.8  3.8  3.8  4.0  4.4  4.3  4.6  4.5     4.5    Chloride 84     80     76    CO2 22.2     20.1     19.1    Calcium 9.4     10.2     10.4          4/20/2025    12:23   BMP   BUN    Creatinine    Sodium 116    Potassium 4.7    Chloride    CO2    Calcium      LIVER FUNCTION TESTS:      Lab 04/20/25  0554 04/19/25  0525 04/18/25  0548 04/17/25  0543 04/16/25  0531 04/15/25  1235   TOTAL PROTEIN 8.2 8.2 7.1 7.2  --  7.9   ALBUMIN 4.0 4.3 4.0 4.0 4.0 4.7   GLOBULIN  --   --   --   --   --  3.2   ALT (SGPT) 31 40 42* 47*  --  39   AST (SGOT) 32 49* 65* 105*  --  104*   BILIRUBIN 1.1 0.9 0.8 1.1  --  1.8*   INDIRECT BILIRUBIN 0.6 0.4 0.3 0.6  --   --    BILIRUBIN DIRECT 0.5* 0.5* 0.5* 0.5*  --   --    ALK PHOS 73 67 52 49  --  52       [x]  Microbiology   Microbiology Results (last 10 days)       Procedure Component Value - Date/Time    Blood Culture - Blood, Hand, Right [[id number]]  (Normal) Collected: 04/15/25 1736    Lab Status: Preliminary result Specimen: Blood from Hand, Right Updated: 04/19/25 1745     Blood Culture No growth at 4 days    Narrative:      Less than seven (7) mL's of blood was collected.  Insufficient quantity may yield false negative results.    Blood Culture - Blood, Arm, Left [[id number]]  (Normal) Collected: 04/15/25 1735    Lab Status: Preliminary result Specimen: Blood from Arm, Left Updated: 04/19/25 1745     Blood Culture No growth at 4 days    Narrative:      Less than seven (7) mL's of blood was collected.  Insufficient quantity may yield false negative results.              [x]  Radiology XR Abdomen KUB  Result Date: 4/20/2025  Impression: NG/OG tube courses into the stomach with the tip in the region of  the gastric fundus. Bowel gas pattern suggestive of ileus or bowel obstruction. Electronically Signed: Abner Dixon MD  4/20/2025 10:03 AM EDT  Workstation ID: [id number]    XR Abdomen KUB  Result Date: 4/20/2025  Gas-distended bowel persists. The findings suggest a generalized adynamic ileus.   Portions of this note were completed with a voice recognition program.  4/20/2025 5:57 AM by Donta Mera MD on Workstation: [id number]      CT Abdomen Pelvis Without Contrast  Result Date: 4/19/2025  1.  Distended stomach, small bowel, and colon are identified. There is a transition zone at about the level of the splenic flexure of colon. These findings are thought most likely to represent a generalized adynamic ileus with focal spasm at the level of the splenic flexure of colon. 2.  There is residual oral contrast agent present, likely related to the recent modified barium swallow (from 4/16/2025) within the descending colon and rectosigmoid colon, which is against a complete mechanical bowel obstruction. 3.  No acute appendicitis or diverticulitis is seen. No pneumoperitoneum. No pneumatosis or portal or mesenteric venous gas. No significant stool burden is suggested. 4.  The study is limited, especially due to motion artifact. 5.  Consider close interval clinical, lab, and if necessary, imaging follow-up to ensure benign progression. 6.  No other definite significant acute findings are appreciated. 7.  Please see above comments for further detail.    Portions of this note were completed with a voice recognition program.  4/19/2025 11:08 PM by Donta Mera MD on Workstation: [id number]      XR Abdomen KUB  Result Date: 4/19/2025  Impression: Dilated small bowel loops that could relate to a small bowel obstruction. CT would be suggested to reassess. Electronically Signed: Harvey Issa MD  4/19/2025 2:56 PM EDT  Workstation ID: [id number]    FL Video Swallow With Speech Single Contrast  Result Date: 4/16/2025  Impression: 1. No  tracheal aspiration or laryngeal penetration observed Please consult speech pathology report for further details regarding the exam and for dietary recommendations. Report dictated by: Racquel Perdue  I have personally reviewed this case and agree with the findings above: Electronically Signed: Taco Kennedy MD  4/16/2025 4:20 PM EDT  Workstation ID: [id number]    CT Head Without Contrast  Result Date: 4/15/2025  Impression: 1.No acute intracranial abnormality identified by noncontrast CT. 2.Mild parenchymal volume loss and probable chronic small vessel ischemic change. Electronically Signed: Heriberto Casillas MD  4/15/2025 1:48 PM EDT  Workstation ID: [id number]    XR Chest 1 View  Result Date: 4/15/2025  Impression: Suspected mild left basilar atelectasis. Electronically Signed: Theresa Mancilla MD  4/15/2025 1:00 PM EDT  Workstation ID: [id number]        []  EKG/Telemetry   ECG 12 Lead Electrolyte Imbalance   Preliminary Result   HEART RATE=97  bpm   RR Bkgdmpoo=494  ms   NE Emupeifz=582  ms   P Horizontal Axis=-15  deg   P Front Axis=67  deg   QRSD Rithemnj=212  ms   QT Vhlnqdgy=576  ms   FGtL=927  ms   QRS Axis=-1  deg   T Wave Axis=182  deg   - ABNORMAL ECG -   Sinus rhythm   Ventricular premature complex   Probable left atrial enlargement   LVH with secondary repolarization abnormality   Date and Time of Study:2025-04-19 06:21:40      ECG 12 Lead Altered Mental Status   Final Result   HEART RATE=78  bpm   RR Hknukueq=210  ms   NE Pytnjdgj=241  ms   P Horizontal Axis=-61  deg   P Front Axis=243  deg   QRSD Rdeirfpp=487  ms   QT Agbajzco=923  ms   LYsX=289  ms   QRS Axis=-6  deg   T Wave Axis=240  deg   - ABNORMAL ECG -   Possible Sinus rhythm   Baseline artifact in multiple leads   Nonspecific  intraventricular conduction delay   Nonspecific  T abnormalities, inferior leads   Borderline  ST elevation, anterior leads   When compared with ECG of 08-Nov-2022 10:39:34,   No significant change   Electronically Signed By:  Sy Srinivasan ([facility]) 2025-04-19 16:32:33   Date and Time of Study:2025-04-15 13:22:55          []  Cardiology/Vascular   []  Pathology  [x]  Old records  []  Other:    Assessment & Plan   Assessment / Plan     Assessment/Plan:    Assessment:  Acute life-threatening hyponatremia  Bowel obstruction, etiology uncertain, bowel paralysis versus ileus versus [place]  Symptomatic hyponatremia  Acute metabolic encephalopathy  Asthma mild intermittent  Rhabdomyolysis  Anxiety  CAD with history of CABG, with elevated troponins likely type II MI secondary to hyponatremia  Hyponatremia, hypovolemia, hypochloremia  History of stroke  Carotid artery stenosis  Depression  Diabetes mellitus  History of duodenal ulcer  Essential hypertension  Dyslipidemia  ESVIN  Metabolic acidosis    Plan:  Labs and imaging reviewed  NG tube to low intermittent suction  Hold all oral medications  General Surgery consulted discussed with Dr. Cardenas; recommendations appreciated  If bowel appears further distended on imaging post NG tube placement, will need to consider transfer to the ICU for neostigmine  Insulin sliding scale coverage continued  Ultram for pain control  IV fluids per nephrology discussed with Dr. Lion  Stress test per cardiology  Diet per speech therapy  Hold home medications which could be driving hyponatremia and hypovolemia  Following blood pressures  Nephrology consultation appreciated  Fall precautions  A.m. labs  Full code  DVT prophylaxis with SCDs  Clinical course to dictate further management  Discussed with nurse at the bedside    VTE Prophylaxis:  Mechanical VTE prophylaxis orders are present.        CODE STATUS:   Code Status (Patient has no pulse and is not breathing): CPR (Attempt to Resuscitate)  Medical Interventions (Patient has pulse or is breathing): Full Support        Electronically signed by Wanda Horowitz MD, 4/20/2025, 13:30 EDT.    Portions of this documentation were transcribed electronically from a  voice recognition software.  I confirm all data accurately represents the service(s) I performed at today's visit.

## 2025-04-20 NOTE — PLAN OF CARE
Goal Outcome Evaluation:           Progress: no change  Outcome Evaluation: Patient A&Ox4. On room air, tolerating well. CT of abdomen completed, see results. No signs of retention since fisher removed. Pt had x2 episodes of coffee ground emesis, PA notified. N/V treated per MAR. No reports of pain. VSS during shift. Continue plan of care.

## 2025-04-20 NOTE — PLAN OF CARE
Goal Outcome Evaluation:              Outcome Evaluation: Patient alert and oriented, on room air, NSR- sinus tachycardia on monitor, NG tube to LWS, strict NPO, continuous fluids restarted, only voided once today, continue bladder scans.

## 2025-04-20 NOTE — PROGRESS NOTES
" LOS: 5 days   Patient Care Team:  Magnus Euceda DO as PCP - General (Family Medicine)    Chief Complaint: Hyponatremia    Subjective     Events noted. Increased abdo distension. CT with ileus. No hydro.  NGT in place. Suction.     1800cc UO.    History taken from: patient chart RN    Objective     Vital Sign Min/Max for last 24 hours  Temp  Min: 97.3 °F (36.3 °C)  Max: 98.2 °F (36.8 °C)   BP  Min: 100/54  Max: 148/75   Pulse  Min: 85  Max: 105   Resp  Min: 16  Max: 20   SpO2  Min: 94 %  Max: 99 %   No data recorded   Weight  Min: 99.5 kg (219 lb 5.7 oz)  Max: 99.5 kg (219 lb 5.7 oz)     Flowsheet Rows      Flowsheet Row First Filed Value   Admission Height 182.8 cm (71.97\") Documented at 04/15/2025 1719   Admission Weight 102 kg (223 lb 15.8 oz) Documented at 04/15/2025 1220            I/O this shift:  In: -   Out: 1200 [Emesis/NG output:1200]  I/O last 3 completed shifts:  In: 1620 [P.O.:1620]  Out: 1000 [Urine:1000]    Objective:  General Appearance:  In no acute distress, not in pain and uncomfortable (Up in the chair.).    Vital signs: (most recent): Blood pressure 100/54, pulse 102, temperature 98.2 °F (36.8 °C), temperature source Oral, resp. rate 16, height 182.8 cm (71.97\"), weight 99.5 kg (219 lb 5.7 oz), SpO2 94%.  Vital signs are normal.  No fever.    Output: Producing urine and minimal stool output.    HEENT: Normal HEENT exam.  (ngt)    Lungs:  Normal effort and normal respiratory rate.  He is not in respiratory distress.    Heart: Normal rate.  Regular rhythm.    Abdomen: Abdomen is soft and distended.  Bowel sounds are normal.   There is no abdominal tenderness.     Extremities: Normal range of motion.  There is no dependent edema.    Pulses: Distal pulses are intact.    Neurological: Patient is alert and oriented to person, place and time.    Pupils:  Pupils are equal, round, and reactive to light.    Skin:  Warm and dry.                Results Review:     I reviewed the patient's new clinical " "results.  I reviewed the patient's new imaging results and agree with the interpretation.  I reviewed the patient's other test results and agree with the interpretation    WBC WBC   Date Value Ref Range Status   04/20/2025 23.49 (H) 3.40 - 10.80 10*3/mm3 Final   04/19/2025 20.19 (H) 3.40 - 10.80 10*3/mm3 Final   04/18/2025 14.27 (H) 3.40 - 10.80 10*3/mm3 Final      HGB Hemoglobin   Date Value Ref Range Status   04/20/2025 16.7 13.0 - 17.7 g/dL Final   04/19/2025 16.8 13.0 - 17.7 g/dL Final   04/18/2025 15.0 13.0 - 17.7 g/dL Final      HCT Hematocrit   Date Value Ref Range Status   04/20/2025 47.9 37.5 - 51.0 % Final   04/19/2025 48.3 37.5 - 51.0 % Final   04/18/2025 42.3 37.5 - 51.0 % Final      Platlets No results found for: \"LABPLAT\"   MCV MCV   Date Value Ref Range Status   04/20/2025 84.8 79.0 - 97.0 fL Final   04/19/2025 84.7 79.0 - 97.0 fL Final   04/18/2025 85.3 79.0 - 97.0 fL Final          Sodium Sodium   Date Value Ref Range Status   04/20/2025 116 (C) 136 - 145 mmol/L Final   04/20/2025 119 (C) 136 - 145 mmol/L Final   04/19/2025 118 (C) 136 - 145 mmol/L Final   04/19/2025 117 (C) 136 - 145 mmol/L Final   04/19/2025 118 (C) 136 - 145 mmol/L Final   04/19/2025 119 (C) 136 - 145 mmol/L Final   04/18/2025 119 (C) 136 - 145 mmol/L Final   04/18/2025 120 (L) 136 - 145 mmol/L Final   04/18/2025 119 (C) 136 - 145 mmol/L Final   04/18/2025 120 (L) 136 - 145 mmol/L Final   04/17/2025 115 (C) 136 - 145 mmol/L Final   04/17/2025 117 (C) 136 - 145 mmol/L Final      Potassium Potassium   Date Value Ref Range Status   04/20/2025 4.7 3.5 - 5.2 mmol/L Final     Comment:     Slight hemolysis detected by analyzer. Result may be falsely elevated.   04/20/2025 4.5 3.5 - 5.2 mmol/L Final   04/20/2025 4.5 3.5 - 5.2 mmol/L Final   04/19/2025 4.6 3.5 - 5.2 mmol/L Final     Comment:     Slight hemolysis detected by analyzer. Result may be falsely elevated.   04/19/2025 4.3 3.5 - 5.2 mmol/L Final   04/19/2025 4.4 3.5 - 5.2 mmol/L " "Final     Comment:     Slight hemolysis detected by analyzer. Result may be falsely elevated.   04/19/2025 4.0 3.5 - 5.2 mmol/L Final   04/18/2025 3.8 3.5 - 5.2 mmol/L Final     Comment:     Slight hemolysis detected by analyzer. Result may be falsely elevated.   04/18/2025 3.8 3.5 - 5.2 mmol/L Final   04/18/2025 3.8 3.5 - 5.2 mmol/L Final     Comment:     Slight hemolysis detected by analyzer. Result may be falsely elevated.   04/18/2025 3.9 3.5 - 5.2 mmol/L Final   04/17/2025 3.8 3.5 - 5.2 mmol/L Final     Comment:     Slight hemolysis detected by analyzer. Result may be falsely elevated.   04/17/2025 3.1 (L) 3.5 - 5.2 mmol/L Final      Chloride Chloride   Date Value Ref Range Status   04/20/2025 76 (L) 98 - 107 mmol/L Final   04/19/2025 80 (L) 98 - 107 mmol/L Final   04/18/2025 84 (L) 98 - 107 mmol/L Final      CO2 CO2   Date Value Ref Range Status   04/20/2025 19.1 (L) 22.0 - 29.0 mmol/L Final   04/19/2025 20.1 (L) 22.0 - 29.0 mmol/L Final   04/18/2025 22.2 22.0 - 29.0 mmol/L Final      BUN BUN   Date Value Ref Range Status   04/20/2025 39 (H) 8 - 23 mg/dL Final   04/19/2025 18 8 - 23 mg/dL Final   04/18/2025 15 8 - 23 mg/dL Final      Creatinine Creatinine   Date Value Ref Range Status   04/20/2025 2.27 (H) 0.76 - 1.27 mg/dL Final   04/19/2025 1.26 0.76 - 1.27 mg/dL Final   04/18/2025 1.15 0.76 - 1.27 mg/dL Final      Calcium Calcium   Date Value Ref Range Status   04/20/2025 10.4 8.6 - 10.5 mg/dL Final   04/19/2025 10.2 8.6 - 10.5 mg/dL Final   04/18/2025 9.4 8.6 - 10.5 mg/dL Final      PO4 No results found for: \"CAPO4\"   Albumin Albumin   Date Value Ref Range Status   04/20/2025 4.0 3.5 - 5.2 g/dL Final   04/19/2025 4.3 3.5 - 5.2 g/dL Final   04/18/2025 4.0 3.5 - 5.2 g/dL Final      Magnesium Magnesium   Date Value Ref Range Status   04/20/2025 2.4 1.6 - 2.4 mg/dL Final   04/19/2025 2.1 1.6 - 2.4 mg/dL Final   04/18/2025 2.0 1.6 - 2.4 mg/dL Final      Uric Acid No results found for: \"URICACID\"   "     Medication Review:   [Held by provider] aspirin, 81 mg, Oral, Daily  [Held by provider] bumetanide, 1 mg, Oral, Daily  insulin regular, 2-7 Units, Subcutaneous, Q6H  [Held by provider] levothyroxine, 50 mcg, Oral, Q AM  mupirocin, 1 Application, Each Nare, BID  pantoprazole, 40 mg, Intravenous, Q12H  [Held by provider] senna-docusate sodium, 2 tablet, Oral, BID  sodium chloride, 10 mL, Intravenous, Q12H          Assessment & Plan       Hyponatremia    Type 2 diabetes mellitus with hyperglycemia, without long-term current use of insulin    Onychomycosis    Onychocryptosis    PVD (peripheral vascular disease)    Foot pain, bilateral      Assessment & Plan  - Hyponatremia, possibly secondary to chlorthalidone, urine osmolality 271.  TSH okay.  No signs or symptoms of adrenal insufficiency.  Sodium is unchanged from yesterday. Now NPO with NGT and suction.   Repeat Uosm: 438  Orozco is out. PVR 110cc.  Bumex on hold.   Will give NS at 125cc/hr and recheck Na and labs.     -- ESVIN, ?etiology?. Possibly volume depletion. IVF as above. Monitor voiding.     - Chronic kidney disease stage III, baseline creatinine lately around 1.4-1.5.  Has bland UA and minimal proteinuria from 2 years ago.     - Hypokalemia, replaced and improved.  Magnesium is okay.    - Rhabdomyolysis, possibly secondary to fall versus hyponatremia. Cpk improved.     - Type 2 diabetes, per primary.    - Shortness of breath, cardiology planning stress test.    - Hypothyroidism, per primary.    - Abdominal distention, ileus on CT. per primary/surgery. CT noted..      Okay to DC Orozco catheter from renal standpoint.  If so, check PVR afterward.    Discussed with nursing. Will call me with next Na.   Will follow.    Rajendra Lion MD  04/20/25  15:27 EDT

## 2025-04-20 NOTE — CONSULTS
[facility]   HISTORY AND PHYSICAL    Patient Name: Alberto Rachel  : 1960  MRN: [id number]  Primary Care Physician:  Magnus Euceda DO  Date of admission: 4/15/2025    Subjective   Subjective     Chief Complaint: Hyponatremia    HPI:    Alberto Rachel is a 64 y.o. male who was admitted to the hospital for altered mental status and hyponatremia.  Yesterday developed abdominal distention.  CT and x-ray were obtained.    This morning he reports feeling well.  No nausea or vomiting.  He denies abdominal pain.  Nursing at bedside states he had a couple bowel movements yesterday but this was after suppository and enema.    Review of Systems   Constitutional:  Positive for activity change and appetite change.   Cardiovascular: Negative.    Gastrointestinal:  Positive for abdominal distention.   Genitourinary: Negative.    Neurological:  Positive for confusion.        Personal History     Past Medical History:   Diagnosis Date    Anxiety     Arthritis     Atherosclerosis of coronary artery bypass graft of native heart without angina pectoris 10/23/2018    Coronary artery disease with previous bypass, LIMA to the LAD, SVG to ramus, and SVG to RCA in     CAD (coronary artery disease) 10/23/2018    Carotid artery stenosis with cerebral infarction 2015    Dr. Alma Rosa Rosales, extremity 9/10/2021    Depression     DM2 (diabetes mellitus, type 2) 10/23/2018    Duodenal ulcer     Essential hypertension 10/23/2018    Headache     Heart attack 2015    Heartburn     HLD (hyperlipidemia)     HTN (hypertension)     Injury of right leg 2021    Irregular heart beat     Low back pain 2019    Mild episode of recurrent depressive disorder 10/23/2018    Rash 2019    Shortness of breath     SOB (shortness of breath)     Type 2 diabetes mellitus with hyperglycemia, without long-term current use of insulin 2019    Uncontrolled diabetes mellitus 2019       Past Surgical History:   Procedure  Laterality Date    CARDIAC CATHETERIZATION  06/2015    Dr. St     CIRCUMCKAJAL      age 14 yrs old    CORONARY ARTERY BYPASS GRAFT  06/2015    5 bypasses       Family History: family history includes Heart disease in an other family member. Otherwise pertinent FHx was reviewed and not pertinent to current issue.    Social History:  reports that he has never smoked. He has been exposed to tobacco smoke. His smokeless tobacco use includes chew. He reports that he does not currently use alcohol. He reports that he does not use drugs.    Home Medications:  ARIPiprazole, Semaglutide, albuterol sulfate HFA, aspirin, atorvastatin, bumetanide, chlorthalidone, citalopram, cyanocobalamin, empagliflozin, famotidine, fenofibrate, guaifenesin-dextromethorphan 600-30 mg, levothyroxine, metFORMIN ER, nitroglycerin, ondansetron, pioglitazone, telmisartan, and traZODone      Allergies:  Allergies   Allergen Reactions    Cyclobenzaprine Unknown - Low Severity     weakness       Objective   Objective     Vitals:   Temp:  [97.5 °F (36.4 °C)-98.2 °F (36.8 °C)] 97.5 °F (36.4 °C)  Heart Rate:  [85-97] 96  Resp:  [18] 18  BP: (137-153)/(75-85) 148/75  Flow (L/min) (Oxygen Therapy):  [1] 1    Physical Exam     Result Review    Result Review:  I have personally reviewed the results from the time of this admission to 4/20/2025 08:15 EDT and agree with these findings:  [x]  Laboratory  []  Microbiology  []  Radiology  []  EKG/Telemetry   []  Cardiology/Vascular   []  Pathology  []  Old records  []  Other:    Lab Results   Component Value Date    WBC 23.49 (H) 04/20/2025    HGB 16.7 04/20/2025    HCT 47.9 04/20/2025    MCV 84.8 04/20/2025     04/20/2025      Lab Results   Component Value Date    GLUCOSE 189 (H) 04/20/2025    CALCIUM 10.4 04/20/2025     (C) 04/20/2025    K 4.5 04/20/2025    K 4.5 04/20/2025    CO2 19.1 (L) 04/20/2025    CL 76 (L) 04/20/2025    BUN 39 (H) 04/20/2025    CREATININE 2.27 (H) 04/20/2025    EGFR 31.0  (L) 04/20/2025    BCR 17.2 04/20/2025    ANIONGAP 23.9 (H) 04/20/2025        Lab Results   Component Value Date    ALT 31 04/20/2025      Most notable findings include: Leukocytosis,  IMPRESSION:  1.  Distended stomach, small bowel, and colon are identified. There is a  transition zone at about the level of the splenic flexure of colon.  These findings are thought most likely to represent a generalized  adynamic ileus with focal spasm at the level of the splenic flexure of  colon.   2.  There is residual oral contrast agent present, likely related to the  recent modified barium swallow (from 4/16/2025) within the descending  colon and rectosigmoid colon, which is against a complete mechanical  bowel obstruction.   3.  No acute appendicitis or diverticulitis is seen. No  pneumoperitoneum. No pneumatosis or portal or mesenteric venous gas. No  significant stool burden is suggested.   4.  The study is limited, especially due to motion artifact.   5.  Consider close interval clinical, lab, and if necessary, imaging  follow-up to ensure benign progression.   6.  No other definite significant acute findings are appreciated.   7.  Please see above comments for further detail.       Assessment & Plan   Assessment / Plan     Brief Patient Summary:  Alberto Rachel is a 64 y.o. male who has dilated small bowel colon and stomach    Active Hospital Problems:  Active Hospital Problems    Diagnosis     **Hyponatremia     Onychomycosis     Onychocryptosis     PVD (peripheral vascular disease)     Foot pain, bilateral     Type 2 diabetes mellitus with hyperglycemia, without long-term current use of insulin      Plan:  CT shows that this level of ileus versus spasm versus peristalsis is at the splenic flexure of the colon  No evidence of small bowel obstruction  He does have contrast which has progressed to his rectum from a study several days ago  I think this is most likely ileus, however I think Abel syndrome should be in the  differential    If there is concern for a stricture or obstruction at the splenic flexure then colonoscopy should be considered  I think it would be reasonable to repeat imaging before necessarily needing a colonoscopy as I have low suspicion that he has a stricture at the splenic flexure    The treatment of Abel's would be either colonic decompression with colonoscopy or tube or treatment with neostigmine  Neostigmine may be the more effective of the treatments  He would need cardiac monitoring with the administration of neostigmine given his medical history  I would not allow his colon to get too much larger before considering neostigmine therapy or colonic decompression with colonoscopy    At this point I do not see any obvious evidence of mechanical obstruction which would require surgery        VTE Prophylaxis:  Mechanical VTE prophylaxis orders are present.        CODE STATUS:    Code Status (Patient has no pulse and is not breathing): CPR (Attempt to Resuscitate)  Medical Interventions (Patient has pulse or is breathing): Full Support    Admission Status:  I believe this patient meets inpatient status.    Electronically signed by Dino Cardenas MD, 04/20/25, 8:15 AM EDT.

## 2025-04-21 ENCOUNTER — APPOINTMENT (OUTPATIENT)
Dept: GENERAL RADIOLOGY | Facility: HOSPITAL | Age: 65
End: 2025-04-21
Payer: MEDICARE

## 2025-04-21 LAB
ALBUMIN SERPL-MCNC: 3.4 G/DL (ref 3.5–5.2)
ALP SERPL-CCNC: 52 U/L (ref 39–117)
ALT SERPL W P-5'-P-CCNC: 23 U/L (ref 1–41)
AMORPH URATE CRY URNS QL MICRO: ABNORMAL /HPF
ANION GAP SERPL CALCULATED.3IONS-SCNC: 18.2 MMOL/L (ref 5–15)
AST SERPL-CCNC: 25 U/L (ref 1–40)
BACTERIA UR QL AUTO: ABNORMAL /HPF
BASOPHILS # BLD AUTO: 0.05 10*3/MM3 (ref 0–0.2)
BASOPHILS NFR BLD AUTO: 0.4 % (ref 0–1.5)
BILIRUB CONJ SERPL-MCNC: 0.7 MG/DL (ref 0–0.3)
BILIRUB INDIRECT SERPL-MCNC: 0.2 MG/DL
BILIRUB SERPL-MCNC: 0.9 MG/DL (ref 0–1.2)
BILIRUB UR QL STRIP: NEGATIVE
BUN SERPL-MCNC: 67 MG/DL (ref 8–23)
BUN/CREAT SERPL: 14.4 (ref 7–25)
CALCIUM SPEC-SCNC: 9 MG/DL (ref 8.6–10.5)
CHLORIDE SERPL-SCNC: 84 MMOL/L (ref 98–107)
CLARITY UR: ABNORMAL
CO2 SERPL-SCNC: 19.8 MMOL/L (ref 22–29)
COLOR UR: YELLOW
CREAT SERPL-MCNC: 4.65 MG/DL (ref 0.76–1.27)
CREAT UR-MCNC: 142.9 MG/DL
DEPRECATED RDW RBC AUTO: 42.4 FL (ref 37–54)
EGFRCR SERPLBLD CKD-EPI 2021: 13.1 ML/MIN/1.73
EOSINOPHIL # BLD AUTO: 0.12 10*3/MM3 (ref 0–0.4)
EOSINOPHIL NFR BLD AUTO: 1 % (ref 0.3–6.2)
ERYTHROCYTE [DISTWIDTH] IN BLOOD BY AUTOMATED COUNT: 13.6 % (ref 12.3–15.4)
GEN 5 1HR TROPONIN T REFLEX: 76 NG/L
GLUCOSE BLDC GLUCOMTR-MCNC: 119 MG/DL (ref 70–99)
GLUCOSE BLDC GLUCOMTR-MCNC: 120 MG/DL (ref 70–99)
GLUCOSE BLDC GLUCOMTR-MCNC: 128 MG/DL (ref 70–99)
GLUCOSE BLDC GLUCOMTR-MCNC: 137 MG/DL (ref 70–99)
GLUCOSE SERPL-MCNC: 128 MG/DL (ref 65–99)
GLUCOSE UR STRIP-MCNC: ABNORMAL MG/DL
GRAN CASTS URNS QL MICRO: ABNORMAL /LPF
HCT VFR BLD AUTO: 38.5 % (ref 37.5–51)
HGB BLD-MCNC: 13.4 G/DL (ref 13–17.7)
HGB UR QL STRIP.AUTO: ABNORMAL
HYALINE CASTS UR QL AUTO: ABNORMAL /LPF
IMM GRANULOCYTES # BLD AUTO: 0.1 10*3/MM3 (ref 0–0.05)
IMM GRANULOCYTES NFR BLD AUTO: 0.9 % (ref 0–0.5)
KETONES UR QL STRIP: ABNORMAL
LEUKOCYTE ESTERASE UR QL STRIP.AUTO: ABNORMAL
LYMPHOCYTES # BLD AUTO: 1.46 10*3/MM3 (ref 0.7–3.1)
LYMPHOCYTES NFR BLD AUTO: 12.7 % (ref 19.6–45.3)
MAGNESIUM SERPL-MCNC: 2.6 MG/DL (ref 1.6–2.4)
MCH RBC QN AUTO: 29.4 PG (ref 26.6–33)
MCHC RBC AUTO-ENTMCNC: 34.8 G/DL (ref 31.5–35.7)
MCV RBC AUTO: 84.4 FL (ref 79–97)
MONOCYTES # BLD AUTO: 1.15 10*3/MM3 (ref 0.1–0.9)
MONOCYTES NFR BLD AUTO: 10 % (ref 5–12)
NEUTROPHILS NFR BLD AUTO: 75 % (ref 42.7–76)
NEUTROPHILS NFR BLD AUTO: 8.58 10*3/MM3 (ref 1.7–7)
NITRITE UR QL STRIP: NEGATIVE
NRBC BLD AUTO-RTO: 0 /100 WBC (ref 0–0.2)
PH UR STRIP.AUTO: <=5 [PH] (ref 5–8)
PHOSPHATE SERPL-MCNC: 5.8 MG/DL (ref 2.5–4.5)
PLATELET # BLD AUTO: 308 10*3/MM3 (ref 140–450)
PMV BLD AUTO: 8.9 FL (ref 6–12)
POTASSIUM SERPL-SCNC: 4.4 MMOL/L (ref 3.5–5.2)
POTASSIUM SERPL-SCNC: 4.6 MMOL/L (ref 3.5–5.2)
PROT ?TM UR-MCNC: 43.2 MG/DL
PROT SERPL-MCNC: 6.5 G/DL (ref 6–8.5)
PROT UR QL STRIP: ABNORMAL
PROT/CREAT UR: 0.3 MG/G{CREAT}
RBC # BLD AUTO: 4.56 10*6/MM3 (ref 4.14–5.8)
RBC # UR STRIP: ABNORMAL /HPF
REF LAB TEST METHOD: ABNORMAL
SODIUM SERPL-SCNC: 122 MMOL/L (ref 136–145)
SODIUM SERPL-SCNC: 122 MMOL/L (ref 136–145)
SODIUM SERPL-SCNC: 124 MMOL/L (ref 136–145)
SODIUM SERPL-SCNC: 126 MMOL/L (ref 136–145)
SP GR UR STRIP: 1.02 (ref 1–1.03)
SQUAMOUS #/AREA URNS HPF: ABNORMAL /HPF
TROPONIN T % DELTA: -6
TROPONIN T NUMERIC DELTA: -5 NG/L
TROPONIN T SERPL HS-MCNC: 81 NG/L
UROBILINOGEN UR QL STRIP: ABNORMAL
WBC # UR STRIP: ABNORMAL /HPF
WBC NRBC COR # BLD AUTO: 11.46 10*3/MM3 (ref 3.4–10.8)

## 2025-04-21 PROCEDURE — 99222 1ST HOSP IP/OBS MODERATE 55: CPT | Performed by: INTERNAL MEDICINE

## 2025-04-21 PROCEDURE — 80048 BASIC METABOLIC PNL TOTAL CA: CPT | Performed by: FAMILY MEDICINE

## 2025-04-21 PROCEDURE — 97110 THERAPEUTIC EXERCISES: CPT

## 2025-04-21 PROCEDURE — 87205 SMEAR GRAM STAIN: CPT | Performed by: INTERNAL MEDICINE

## 2025-04-21 PROCEDURE — 74018 RADEX ABDOMEN 1 VIEW: CPT

## 2025-04-21 PROCEDURE — 81001 URINALYSIS AUTO W/SCOPE: CPT | Performed by: INTERNAL MEDICINE

## 2025-04-21 PROCEDURE — 93005 ELECTROCARDIOGRAM TRACING: CPT | Performed by: FAMILY MEDICINE

## 2025-04-21 PROCEDURE — 80076 HEPATIC FUNCTION PANEL: CPT | Performed by: FAMILY MEDICINE

## 2025-04-21 PROCEDURE — 82570 ASSAY OF URINE CREATININE: CPT | Performed by: INTERNAL MEDICINE

## 2025-04-21 PROCEDURE — 84295 ASSAY OF SERUM SODIUM: CPT | Performed by: INTERNAL MEDICINE

## 2025-04-21 PROCEDURE — 99233 SBSQ HOSP IP/OBS HIGH 50: CPT | Performed by: FAMILY MEDICINE

## 2025-04-21 PROCEDURE — 84100 ASSAY OF PHOSPHORUS: CPT | Performed by: FAMILY MEDICINE

## 2025-04-21 PROCEDURE — 36415 COLL VENOUS BLD VENIPUNCTURE: CPT | Performed by: INTERNAL MEDICINE

## 2025-04-21 PROCEDURE — 93010 ELECTROCARDIOGRAM REPORT: CPT | Performed by: INTERNAL MEDICINE

## 2025-04-21 PROCEDURE — 25810000003 SODIUM CHLORIDE 0.9 % SOLUTION: Performed by: INTERNAL MEDICINE

## 2025-04-21 PROCEDURE — 84484 ASSAY OF TROPONIN QUANT: CPT | Performed by: FAMILY MEDICINE

## 2025-04-21 PROCEDURE — 85025 COMPLETE CBC W/AUTO DIFF WBC: CPT | Performed by: FAMILY MEDICINE

## 2025-04-21 PROCEDURE — 83735 ASSAY OF MAGNESIUM: CPT | Performed by: FAMILY MEDICINE

## 2025-04-21 PROCEDURE — 82948 REAGENT STRIP/BLOOD GLUCOSE: CPT

## 2025-04-21 PROCEDURE — 99231 SBSQ HOSP IP/OBS SF/LOW 25: CPT | Performed by: SURGERY

## 2025-04-21 PROCEDURE — 84156 ASSAY OF PROTEIN URINE: CPT | Performed by: INTERNAL MEDICINE

## 2025-04-21 PROCEDURE — 84132 ASSAY OF SERUM POTASSIUM: CPT | Performed by: INTERNAL MEDICINE

## 2025-04-21 RX ORDER — AMMONIUM LACTATE 12 G/100G
LOTION TOPICAL
Status: DISCONTINUED | OUTPATIENT
Start: 2025-04-21 | End: 2025-04-30 | Stop reason: HOSPADM

## 2025-04-21 RX ADMIN — METOPROLOL TARTRATE 5 MG: 1 INJECTION, SOLUTION INTRAVENOUS at 16:54

## 2025-04-21 RX ADMIN — Medication: at 22:41

## 2025-04-21 RX ADMIN — SODIUM CHLORIDE 125 ML/HR: 9 INJECTION, SOLUTION INTRAVENOUS at 16:54

## 2025-04-21 RX ADMIN — PANTOPRAZOLE SODIUM 40 MG: 40 INJECTION, POWDER, FOR SOLUTION INTRAVENOUS at 08:28

## 2025-04-21 RX ADMIN — SODIUM CHLORIDE 125 ML/HR: 9 INJECTION, SOLUTION INTRAVENOUS at 09:03

## 2025-04-21 RX ADMIN — METOPROLOL TARTRATE 5 MG: 1 INJECTION, SOLUTION INTRAVENOUS at 13:52

## 2025-04-21 RX ADMIN — SODIUM CHLORIDE 125 ML/HR: 9 INJECTION, SOLUTION INTRAVENOUS at 01:05

## 2025-04-21 RX ADMIN — PANTOPRAZOLE SODIUM 40 MG: 40 INJECTION, POWDER, FOR SOLUTION INTRAVENOUS at 20:02

## 2025-04-21 RX ADMIN — Medication 10 ML: at 08:28

## 2025-04-21 RX ADMIN — Medication 10 ML: at 20:02

## 2025-04-21 NOTE — PROGRESS NOTES
" LOS: 6 days   Patient Care Team:  Magnus Euceda DO as PCP - General (Family Medicine)    Chief Complaint: Hyponatremia    Subjective     Events noted. Increased abdo distension. CT with ileus. No hydro.  NGT in place hooked to wall suction.       375 cc UO.  Orozco flushed.    History taken from: patient chart RN    Objective     Vital Sign Min/Max for last 24 hours  Temp  Min: 97.9 °F (36.6 °C)  Max: 98.2 °F (36.8 °C)   BP  Min: 102/54  Max: 124/51   Pulse  Min: 77  Max: 150   Resp  Min: 17  Max: 20   SpO2  Min: 89 %  Max: 97 %   Flow (L/min) (Oxygen Therapy)  Min: 1  Max: 1   Weight  Min: 100 kg (221 lb 1.9 oz)  Max: 100 kg (221 lb 1.9 oz)     Flowsheet Rows      Flowsheet Row First Filed Value   Admission Height 182.8 cm (71.97\") Documented at 04/15/2025 1719   Admission Weight 102 kg (223 lb 15.8 oz) Documented at 04/15/2025 1220            I/O this shift:  In: -   Out: 175 [Urine:175]  I/O last 3 completed shifts:  In: 1490 [P.O.:490; I.V.:1000]  Out: 1840 [Urine:240; Emesis/NG output:1600]    Objective:  General Appearance:  In no acute distress, not in pain and uncomfortable (Up in the chair.).    Vital signs: (most recent): Blood pressure 102/54, pulse (!) 150, temperature 97.9 °F (36.6 °C), temperature source Oral, resp. rate 18, height 182.8 cm (71.97\"), weight 100 kg (221 lb 1.9 oz), SpO2 97%.  Vital signs are normal.  No fever.    Output: Minimal urine output and minimal stool output.    HEENT: Normal HEENT exam.  (ngt)    Lungs:  Normal effort and normal respiratory rate.  He is not in respiratory distress.    Heart: Normal rate.  Regular rhythm.    Abdomen: Abdomen is soft and distended.  Bowel sounds are normal.   There is no abdominal tenderness.     Extremities: Normal range of motion.  There is no dependent edema.  (Orozco catheter in place.)  Pulses: Distal pulses are intact.    Neurological: Patient is alert and oriented to person, place and time.    Pupils:  Pupils are equal, round, and reactive " "to light.    Skin:  Warm and dry.                Results Review:     I reviewed the patient's new clinical results.  I reviewed the patient's new imaging results and agree with the interpretation.  I reviewed the patient's other test results and agree with the interpretation    WBC WBC   Date Value Ref Range Status   04/21/2025 11.46 (H) 3.40 - 10.80 10*3/mm3 Final   04/20/2025 23.49 (H) 3.40 - 10.80 10*3/mm3 Final   04/19/2025 20.19 (H) 3.40 - 10.80 10*3/mm3 Final      HGB Hemoglobin   Date Value Ref Range Status   04/21/2025 13.4 13.0 - 17.7 g/dL Final   04/20/2025 16.7 13.0 - 17.7 g/dL Final   04/19/2025 16.8 13.0 - 17.7 g/dL Final      HCT Hematocrit   Date Value Ref Range Status   04/21/2025 38.5 37.5 - 51.0 % Final   04/20/2025 47.9 37.5 - 51.0 % Final   04/19/2025 48.3 37.5 - 51.0 % Final      Platlets No results found for: \"LABPLAT\"   MCV MCV   Date Value Ref Range Status   04/21/2025 84.4 79.0 - 97.0 fL Final   04/20/2025 84.8 79.0 - 97.0 fL Final   04/19/2025 84.7 79.0 - 97.0 fL Final          Sodium Sodium   Date Value Ref Range Status   04/21/2025 124 (L) 136 - 145 mmol/L Final   04/21/2025 122 (L) 136 - 145 mmol/L Final   04/20/2025 122 (L) 136 - 145 mmol/L Final   04/20/2025 120 (L) 136 - 145 mmol/L Final   04/20/2025 116 (C) 136 - 145 mmol/L Final   04/20/2025 119 (C) 136 - 145 mmol/L Final   04/19/2025 118 (C) 136 - 145 mmol/L Final   04/19/2025 117 (C) 136 - 145 mmol/L Final   04/19/2025 118 (C) 136 - 145 mmol/L Final   04/19/2025 119 (C) 136 - 145 mmol/L Final   04/18/2025 119 (C) 136 - 145 mmol/L Final   04/18/2025 120 (L) 136 - 145 mmol/L Final      Potassium Potassium   Date Value Ref Range Status   04/21/2025 4.6 3.5 - 5.2 mmol/L Final     Comment:     Slight hemolysis detected by analyzer. Result may be falsely elevated.   04/21/2025 4.4 3.5 - 5.2 mmol/L Final   04/20/2025 4.6 3.5 - 5.2 mmol/L Final   04/20/2025 4.7 3.5 - 5.2 mmol/L Final   04/20/2025 4.7 3.5 - 5.2 mmol/L Final     Comment:    " " Slight hemolysis detected by analyzer. Result may be falsely elevated.   04/20/2025 4.5 3.5 - 5.2 mmol/L Final   04/20/2025 4.5 3.5 - 5.2 mmol/L Final   04/19/2025 4.6 3.5 - 5.2 mmol/L Final     Comment:     Slight hemolysis detected by analyzer. Result may be falsely elevated.   04/19/2025 4.3 3.5 - 5.2 mmol/L Final   04/19/2025 4.4 3.5 - 5.2 mmol/L Final     Comment:     Slight hemolysis detected by analyzer. Result may be falsely elevated.   04/19/2025 4.0 3.5 - 5.2 mmol/L Final   04/18/2025 3.8 3.5 - 5.2 mmol/L Final     Comment:     Slight hemolysis detected by analyzer. Result may be falsely elevated.   04/18/2025 3.8 3.5 - 5.2 mmol/L Final      Chloride Chloride   Date Value Ref Range Status   04/21/2025 84 (L) 98 - 107 mmol/L Final   04/20/2025 76 (L) 98 - 107 mmol/L Final   04/19/2025 80 (L) 98 - 107 mmol/L Final      CO2 CO2   Date Value Ref Range Status   04/21/2025 19.8 (L) 22.0 - 29.0 mmol/L Final   04/20/2025 19.1 (L) 22.0 - 29.0 mmol/L Final   04/19/2025 20.1 (L) 22.0 - 29.0 mmol/L Final      BUN BUN   Date Value Ref Range Status   04/21/2025 67 (H) 8 - 23 mg/dL Final   04/20/2025 39 (H) 8 - 23 mg/dL Final   04/19/2025 18 8 - 23 mg/dL Final      Creatinine Creatinine   Date Value Ref Range Status   04/21/2025 4.65 (H) 0.76 - 1.27 mg/dL Final   04/20/2025 2.27 (H) 0.76 - 1.27 mg/dL Final   04/19/2025 1.26 0.76 - 1.27 mg/dL Final      Calcium Calcium   Date Value Ref Range Status   04/21/2025 9.0 8.6 - 10.5 mg/dL Final   04/20/2025 10.4 8.6 - 10.5 mg/dL Final   04/19/2025 10.2 8.6 - 10.5 mg/dL Final      PO4 No results found for: \"CAPO4\"   Albumin Albumin   Date Value Ref Range Status   04/21/2025 3.4 (L) 3.5 - 5.2 g/dL Final   04/20/2025 4.0 3.5 - 5.2 g/dL Final   04/19/2025 4.3 3.5 - 5.2 g/dL Final      Magnesium Magnesium   Date Value Ref Range Status   04/21/2025 2.6 (H) 1.6 - 2.4 mg/dL Final   04/20/2025 2.4 1.6 - 2.4 mg/dL Final   04/19/2025 2.1 1.6 - 2.4 mg/dL Final      Uric Acid No results " "found for: \"URICACID\"       Medication Review:   [Held by provider] aspirin, 81 mg, Oral, Daily  [Held by provider] bumetanide, 1 mg, Oral, Daily  insulin regular, 2-7 Units, Subcutaneous, Q6H  [Held by provider] levothyroxine, 50 mcg, Oral, Q AM  pantoprazole, 40 mg, Intravenous, Q12H  [Held by provider] senna-docusate sodium, 2 tablet, Oral, BID  sodium chloride, 10 mL, Intravenous, Q12H          Assessment & Plan       Hyponatremia    Type 2 diabetes mellitus with hyperglycemia, without long-term current use of insulin    Onychomycosis    Onychocryptosis    PVD (peripheral vascular disease)    Foot pain, bilateral      Assessment & Plan  - Hyponatremia, possibly secondary to chlorthalidone, urine osmolality 271.  TSH okay.  No signs or symptoms of adrenal insufficiency.  Sodium is slowly better.    NPO with NGT and suction.   Repeat Uosm: 438  Orozco is back in.  Bumex on hold.   Continue NS at 125cc/hr and recheck Na and labs in AM.     -- ESVIN, ?etiology?. Possibly volume depletion. IVF as above.  Obtain urinalysis, quantify proteinuria, check CPK level.  Check urine eosinophils.      - Chronic kidney disease stage III, baseline creatinine lately around 1.4-1.5.  Has bland UA and minimal proteinuria from 2 years ago.     - Hypokalemia, replaced and improved.  Magnesium is okay.    - Rhabdomyolysis, possibly secondary to fall versus hyponatremia. Cpk improved.     - Type 2 diabetes, per primary.    - Shortness of breath, cardiology planning stress test.    - Hypothyroidism, per primary.    - Abdominal distention, ileus on CT. per primary/surgery. CT noted..    - A flutter with tachycardia.    Discussed with nursing. Will call me with next Na.   Will follow.    Rajendra Lion MD  04/21/25  15:29 EDT            "

## 2025-04-21 NOTE — CONSULTS
[facility]  Initial Inpatient Consult Note    Referring Provider: Hospitalist    Reason for Consultation: Bowel obstruction    Subjective     History of present illness:    64 y.o. male with a history as below who was admitted on April 15 after being found down and minimally responsive.  He has reported history of coronary artery disease and prior CABG, diabetes, asthma, and hypertension.  He was found to have significant hyponatremia on arrival.  On April 19 he had a CT scan of the abdomen pelvis showing possible ileus versus small bowel obstruction.  There was even consideration of colonic obstruction given a transition zone suspected at the splenic flexure of the colon.  Over the weekend patient had NG tube placed because of abdominal distention and he does feel slightly improved today.  He reports flatus this morning.  General surgery saw the patient over the weekend and elected to defer any surgical intervention.  Repeat abdominal x-ray today showed marked small bowel distention centrally in the abdomen suggesting high-grade small bowel obstruction.  Patient denies any abdominal surgeries previously but he is a poor historian seemingly.    Past Medical History:  Past Medical History:   Diagnosis Date    Anxiety     Arthritis     Atherosclerosis of coronary artery bypass graft of native heart without angina pectoris 10/23/2018    Coronary artery disease with previous bypass, LIMA to the LAD, SVG to ramus, and SVG to RCA in 2015    CAD (coronary artery disease) 10/23/2018    Carotid artery stenosis with cerebral infarction 06/01/2015    Dr. Alma Rosa Rosales, extremity 9/10/2021    Depression     DM2 (diabetes mellitus, type 2) 10/23/2018    Duodenal ulcer     Essential hypertension 10/23/2018    Headache     Heart attack 2015    Heartburn     HLD (hyperlipidemia)     HTN (hypertension)     Injury of right leg 8/13/2021    Irregular heart beat     Low back pain 03/18/2019    Mild episode of  recurrent depressive disorder 10/23/2018    Rash 03/18/2019    Shortness of breath     SOB (shortness of breath)     Type 2 diabetes mellitus with hyperglycemia, without long-term current use of insulin 06/11/2019    Uncontrolled diabetes mellitus 03/18/2019     Past Surgical History:  Past Surgical History:   Procedure Laterality Date    CARDIAC CATHETERIZATION  06/2015    Dr. St     CIRCUMCISION      age 14 yrs old    CORONARY ARTERY BYPASS GRAFT  06/2015    5 bypasses      Social History:   Social History     Tobacco Use    Smoking status: Never     Passive exposure: Past    Smokeless tobacco: Current     Types: Chew   Substance Use Topics    Alcohol use: Not Currently      Family History:  Family History   Problem Relation Age of Onset    Heart disease Other        Home Meds:  Medications Prior to Admission   Medication Sig Dispense Refill Last Dose/Taking    albuterol sulfate  (90 Base) MCG/ACT inhaler Inhale 2 puffs Every 4 (Four) Hours As Needed for Wheezing. 18 g 11 Unknown    ARIPiprazole (ABILIFY) 20 MG tablet TAKE 1 TABLET BY MOUTH ONCE DAILY 30 tablet 0     Aspirin Low Dose 81 MG EC tablet TAKE 1 TABLET BY MOUTH ONCE DAILY 30 tablet 0     atorvastatin (LIPITOR) 80 MG tablet TAKE 1 TABLET BY MOUTH ONCE DAILY FOR CHOLESTEROL 30 tablet 0     bumetanide (BUMEX) 1 MG tablet TAKE 1 TABLET BY MOUTH ONCE DAILY FOR FLUID (Patient taking differently: Take 1 tablet by mouth Daily.) 90 tablet 0 Unknown    chlorthalidone (HYGROTON) 25 MG tablet TAKE 1 TABLET BY MOUTH ONCE DAILY 30 tablet 4 Unknown    citalopram (CeleXA) 20 MG tablet TAKE 1 TABLET BY MOUTH ONCE DAILY (Patient taking differently: Take 1 tablet by mouth Daily.) 90 tablet 0 Unknown    famotidine (PEPCID) 40 MG tablet TAKE 1 TABLET BY MOUTH TWICE DAILY FOR STOMACH 60 tablet 0     fenofibrate (TRICOR) 145 MG tablet TAKE 1 TABLET BY MOUTH ONCE DAILY FOR CHOLESTEROL 90 tablet 0 Unknown    GNP Vitamin B-12 500 MCG tablet TAKE 1 TABLET BY MOUTH ONCE  DAILY (Patient taking differently: Take 1 tablet by mouth Daily.) 90 tablet 0 Unknown    guaifenesin-dextromethorphan 600-30 mg (MUCINEX DM)  MG tablet sustained-release 12 hour Take 1 tablet by mouth 2 (Two) Times a Day As Needed (congestion). 30 tablet 0 Unknown    Jardiance 25 MG tablet tablet TAKE 1 TABLET BY MOUTH EVERY MORNING TO LOWER BLOOD SUGAR (Patient taking differently: Take 1 tablet by mouth Daily.) 30 tablet 2 Unknown    levothyroxine (SYNTHROID, LEVOTHROID) 50 MCG tablet TAKE 1 TABLET BY MOUTH EVERY MORNING FOR STOMACH (Patient taking differently: Take 1 tablet by mouth Every Morning.) 90 tablet 0 Unknown    metFORMIN ER (GLUCOPHAGE-XR) 500 MG 24 hr tablet TAKE 2 TABLETS BY MOUTH TWICE DAILY 120 tablet 0     nitroglycerin (NITROSTAT) 0.4 MG SL tablet Place 1 tablet under the tongue Every 5 (Five) Minutes As Needed for Chest Pain. Take no more than 3 doses in 15 minutes. 15 tablet 5 Unknown    ondansetron (Zofran) 8 MG tablet Take 1 tablet by mouth Every 8 (Eight) Hours As Needed for Nausea or Vomiting. 20 tablet 0 Unknown    pioglitazone (ACTOS) 15 MG tablet TAKE 1 TABLET BY MOUTH EVERY NIGHT AT BEDTIME. 90 tablet 2 Unknown    Rybelsus 14 MG tablet TAKE 1 TABLET BY MOUTH EVERY MORNING 30 tablet 2 Unknown    telmisartan (MICARDIS) 20 MG tablet TAKE 1 TABLET BY MOUTH ONCE DAILY FOR BLOOD PRESSURE 30 tablet 2 Unknown    traZODone (DESYREL) 100 MG tablet TAKE 1 TABLET BY MOUTH AT BEDTIME FOR SLEEP 30 tablet 0      Current Meds:   [Held by provider] aspirin, 81 mg, Oral, Daily  [Held by provider] bumetanide, 1 mg, Oral, Daily  insulin regular, 2-7 Units, Subcutaneous, Q6H  [Held by provider] levothyroxine, 50 mcg, Oral, Q AM  pantoprazole, 40 mg, Intravenous, Q12H  [Held by provider] senna-docusate sodium, 2 tablet, Oral, BID  sodium chloride, 10 mL, Intravenous, Q12H      Allergies:  Allergies   Allergen Reactions    Cyclobenzaprine Unknown - Low Severity     weakness     Review of  Systems  Pertinent items are noted in HPI, all other systems reviewed and negative         Vital Signs  Temp:  [97.9 °F (36.6 °C)-98.2 °F (36.8 °C)] 97.9 °F (36.6 °C)  Heart Rate:  [] 80  Resp:  [17-20] 18  BP: (100-124)/(51-78) 100/54  Physical Exam:  General Appearance:    Alert, cooperative, in no acute distress   Head:    Normocephalic, without obvious abnormality, atraumatic   Eyes:          conjunctivae and sclerae normal, no   icterus   Throat:   no thrush, oral mucosa moist   Neck:   Supple, no adenopathy   Lungs:     Clear to auscultation bilaterally    Heart:    Regular rhythm and normal rate    Chest Wall:    No abnormalities observed   Abdomen:     Soft, n distended and tympanic, no guarding or rebound tenderness, NG tube in place   Extremities:   no edema, no redness   Skin:   No bruising or rash   Psychiatric:  normal mood and insight     Results Review:  [x]  Laboratory   [x]  Radiology  []  Pathology      I reviewed the patient's new clinical results.    Results from last 7 days   Lab Units 04/21/25  0521 04/20/25  0554 04/19/25  0525   WBC 10*3/mm3 11.46* 23.49* 20.19*   HEMOGLOBIN g/dL 13.4 16.7 16.8   HEMATOCRIT % 38.5 47.9 48.3   PLATELETS 10*3/mm3 308 418 382     Results from last 7 days   Lab Units 04/21/25  1200 04/21/25  0521 04/20/25  2351 04/20/25  1223 04/20/25  0554 04/19/25  1214 04/19/25  0525   SODIUM mmol/L 124* 122* 122*   < > 119*   < > 119*   POTASSIUM mmol/L 4.6 4.4 4.6   < > 4.5  4.5   < > 4.0   CHLORIDE mmol/L  --  84*  --   --  76*  --  80*   CO2 mmol/L  --  19.8*  --   --  19.1*  --  20.1*   BUN mg/dL  --  67*  --   --  39*  --  18   CREATININE mg/dL  --  4.65*  --   --  2.27*  --  1.26   CALCIUM mg/dL  --  9.0  --   --  10.4  --  10.2   BILIRUBIN mg/dL  --  0.9  --   --  1.1  --  0.9   ALK PHOS U/L  --  52  --   --  73  --  67   ALT (SGPT) U/L  --  23  --   --  31  --  40   AST (SGOT) U/L  --  25  --   --  32  --  49*   GLUCOSE mg/dL  --  128*  --   --  189*  --  123*     < > = values in this interval not displayed.         Lab Results   Lab Value Date/Time    LIPASE 204 (H) 11/08/2022 0926       Radiology:  XR Abdomen KUB  Result Date: 4/21/2025  Impression: High-grade partial small bowel obstruction. Electronically Signed: Dino Dailey MD  4/21/2025 7:44 AM EDT  Workstation ID: [id number]    XR Abdomen KUB  Result Date: 4/20/2025  Impression: Stable intestinal distention Electronically Signed: Gian Marcus  4/20/2025 2:30 PM EDT  Workstation ID: [id number]    XR Abdomen KUB  Result Date: 4/20/2025  Impression: NG/OG tube courses into the stomach with the tip in the region of the gastric fundus. Bowel gas pattern suggestive of ileus or bowel obstruction. Electronically Signed: Abner Dixon MD  4/20/2025 10:03 AM EDT  Workstation ID: [id number]    XR Abdomen KUB  Result Date: 4/20/2025  Gas-distended bowel persists. The findings suggest a generalized adynamic ileus.   Portions of this note were completed with a voice recognition program.  4/20/2025 5:57 AM by Donta Mera MD on Workstation: [email or web address]      CT Abdomen Pelvis Without Contrast  Result Date: 4/19/2025  1.  Distended stomach, small bowel, and colon are identified. There is a transition zone at about the level of the splenic flexure of colon. These findings are thought most likely to represent a generalized adynamic ileus with focal spasm at the level of the splenic flexure of colon. 2.  There is residual oral contrast agent present, likely related to the recent modified barium swallow (from 4/16/2025) within the descending colon and rectosigmoid colon, which is against a complete mechanical bowel obstruction. 3.  No acute appendicitis or diverticulitis is seen. No pneumoperitoneum. No pneumatosis or portal or mesenteric venous gas. No significant stool burden is suggested. 4.  The study is limited, especially due to motion artifact. 5.  Consider close interval clinical, lab, and if necessary, imaging follow-up to ensure  benign progression. 6.  No other definite significant acute findings are appreciated. 7.  Please see above comments for further detail.    Portions of this note were completed with a voice recognition program.  4/19/2025 11:08 PM by Donta Mera MD on Workstation: [email or web address]        Assessment & Plan       Hyponatremia    Type 2 diabetes mellitus with hyperglycemia, without long-term current use of insulin    Onychomycosis    Onychocryptosis    PVD (peripheral vascular disease)    Foot pain, bilateral  Small bowel obstruction    Plan:  Patient with abdominal imaging and symptoms suggestive of small bowel obstruction.  His clinical status has improved with placement of NG tube and about 2 L of gastric content removal.  He does report flatus this morning and surgery is monitoring the patient.  If his distention worsens I think neostigmine would be reasonable consideration although he clinically is improved today and therefore I would defer.  Hopefully flatus will continue and small bowel obstruction resolve medically.    I discussed the patients findings and my recommendations with patient.    Heriberto Coelho MD

## 2025-04-21 NOTE — SIGNIFICANT NOTE
04/21/25 0632   OTHER   Discipline speech language pathologist   Rehab Time/Intention   Session Not Performed unable to treat, medical status change   Recommendations   SLP - Next Appointment 04/22/25     NPO due to distended abdomen. SLP to continue to follow.

## 2025-04-21 NOTE — PROGRESS NOTES
"PROGRESS NOTE     Patient Name:  Alberto Rachel  YOB: 1960  [id number]   LOS: 6 days   * No surgery found *            Subjective     Interval History:  VSS, afebrile, small BM overnight and passing flatus, less distended today, had almost 2 liters out of NG tube since yesterday.       Review of Systems:      A complete review of systems was performed and all are negative except what is documented in the HPI.       Objective         Physical Exam:     Constitutional:  well nourished, no acute distress, appears stated age /53 (BP Location: Left arm, Patient Position: Lying)   Pulse 88   Temp 98.1 °F (36.7 °C) (Oral)   Resp 18   Ht 182.8 cm (71.97\")   Wt 100 kg (221 lb 1.9 oz)   SpO2 96%   BMI 30.02 kg/m²    Eyes:  anicteric sclerae, moist conjunctivae, no lid lag, PERRLA  ENMT:  oropharynx clear, moist mucous membranes  Neck:   full ROM, trachea midline  Cardiovascular: heart rate 88, no pedal edema  Respiratory: respirations even and unlabored  GI:  Abdomen soft, nontender, mildly distended     Skin:  warm and dry, normal turgor, no rashes  Psychiatric:  alert and oriented x 2, cooperative    Results Review:      I reviewed the patient's new clinical results including CBC, BMP.  LABS:  WBC   Date Value Ref Range Status   04/21/2025 11.46 (H) 3.40 - 10.80 10*3/mm3 Final     RBC   Date Value Ref Range Status   04/21/2025 4.56 4.14 - 5.80 10*6/mm3 Final     Hemoglobin   Date Value Ref Range Status   04/21/2025 13.4 13.0 - 17.7 g/dL Final     Hematocrit   Date Value Ref Range Status   04/21/2025 38.5 37.5 - 51.0 % Final     MCV   Date Value Ref Range Status   04/21/2025 84.4 79.0 - 97.0 fL Final     MCH   Date Value Ref Range Status   04/21/2025 29.4 26.6 - 33.0 pg Final     MCHC   Date Value Ref Range Status   04/21/2025 34.8 31.5 - 35.7 g/dL Final     RDW   Date Value Ref Range Status   04/21/2025 13.6 12.3 - 15.4 % Final     RDW-SD   Date Value Ref Range Status   04/21/2025 42.4 37.0 - " 54.0 fl Final     MPV   Date Value Ref Range Status   04/21/2025 8.9 6.0 - 12.0 fL Final     Platelets   Date Value Ref Range Status   04/21/2025 308 140 - 450 10*3/mm3 Final     Neutrophil %   Date Value Ref Range Status   04/21/2025 75.0 42.7 - 76.0 % Final     Lymphocyte %   Date Value Ref Range Status   04/21/2025 12.7 (L) 19.6 - 45.3 % Final     Monocyte %   Date Value Ref Range Status   04/21/2025 10.0 5.0 - 12.0 % Final     Eosinophil %   Date Value Ref Range Status   04/21/2025 1.0 0.3 - 6.2 % Final     Basophil %   Date Value Ref Range Status   04/21/2025 0.4 0.0 - 1.5 % Final     Immature Grans %   Date Value Ref Range Status   04/21/2025 0.9 (H) 0.0 - 0.5 % Final     Neutrophils, Absolute   Date Value Ref Range Status   04/21/2025 8.58 (H) 1.70 - 7.00 10*3/mm3 Final     Lymphocytes, Absolute   Date Value Ref Range Status   04/21/2025 1.46 0.70 - 3.10 10*3/mm3 Final     Monocytes, Absolute   Date Value Ref Range Status   04/21/2025 1.15 (H) 0.10 - 0.90 10*3/mm3 Final     Eosinophils, Absolute   Date Value Ref Range Status   04/21/2025 0.12 0.00 - 0.40 10*3/mm3 Final     Basophils, Absolute   Date Value Ref Range Status   04/21/2025 0.05 0.00 - 0.20 10*3/mm3 Final     Immature Grans, Absolute   Date Value Ref Range Status   04/21/2025 0.10 (H) 0.00 - 0.05 10*3/mm3 Final     nRBC   Date Value Ref Range Status   04/21/2025 0.0 0.0 - 0.2 /100 WBC Final         Basic Metabolic Panel    Sodium Sodium   Date Value Ref Range Status   04/21/2025 122 (L) 136 - 145 mmol/L Final   04/20/2025 122 (L) 136 - 145 mmol/L Final   04/20/2025 120 (L) 136 - 145 mmol/L Final   04/20/2025 116 (C) 136 - 145 mmol/L Final   04/20/2025 119 (C) 136 - 145 mmol/L Final   04/19/2025 118 (C) 136 - 145 mmol/L Final   04/19/2025 117 (C) 136 - 145 mmol/L Final   04/19/2025 118 (C) 136 - 145 mmol/L Final   04/19/2025 119 (C) 136 - 145 mmol/L Final   04/18/2025 119 (C) 136 - 145 mmol/L Final   04/18/2025 120 (L) 136 - 145 mmol/L Final  "  04/18/2025 119 (C) 136 - 145 mmol/L Final      Potassium Potassium   Date Value Ref Range Status   04/21/2025 4.4 3.5 - 5.2 mmol/L Final   04/20/2025 4.6 3.5 - 5.2 mmol/L Final   04/20/2025 4.7 3.5 - 5.2 mmol/L Final   04/20/2025 4.7 3.5 - 5.2 mmol/L Final     Comment:     Slight hemolysis detected by analyzer. Result may be falsely elevated.   04/20/2025 4.5 3.5 - 5.2 mmol/L Final   04/20/2025 4.5 3.5 - 5.2 mmol/L Final   04/19/2025 4.6 3.5 - 5.2 mmol/L Final     Comment:     Slight hemolysis detected by analyzer. Result may be falsely elevated.   04/19/2025 4.3 3.5 - 5.2 mmol/L Final   04/19/2025 4.4 3.5 - 5.2 mmol/L Final     Comment:     Slight hemolysis detected by analyzer. Result may be falsely elevated.   04/19/2025 4.0 3.5 - 5.2 mmol/L Final   04/18/2025 3.8 3.5 - 5.2 mmol/L Final     Comment:     Slight hemolysis detected by analyzer. Result may be falsely elevated.   04/18/2025 3.8 3.5 - 5.2 mmol/L Final   04/18/2025 3.8 3.5 - 5.2 mmol/L Final     Comment:     Slight hemolysis detected by analyzer. Result may be falsely elevated.      Chloride Chloride   Date Value Ref Range Status   04/21/2025 84 (L) 98 - 107 mmol/L Final   04/20/2025 76 (L) 98 - 107 mmol/L Final   04/19/2025 80 (L) 98 - 107 mmol/L Final      Bicarbonate No results found for: \"PLASMABICARB\"   BUN BUN   Date Value Ref Range Status   04/21/2025 67 (H) 8 - 23 mg/dL Final   04/20/2025 39 (H) 8 - 23 mg/dL Final   04/19/2025 18 8 - 23 mg/dL Final      Creatinine Creatinine   Date Value Ref Range Status   04/21/2025 4.65 (H) 0.76 - 1.27 mg/dL Final   04/20/2025 2.27 (H) 0.76 - 1.27 mg/dL Final   04/19/2025 1.26 0.76 - 1.27 mg/dL Final      Calcium Calcium   Date Value Ref Range Status   04/21/2025 9.0 8.6 - 10.5 mg/dL Final   04/20/2025 10.4 8.6 - 10.5 mg/dL Final   04/19/2025 10.2 8.6 - 10.5 mg/dL Final      Glucose      No components found for: \"GLUCOSE.*\"         IMAGING:  Imaging Results (Last 72 Hours)       Procedure Component Value " Units Date/Time    XR Abdomen KUB [[id number]] Collected: 04/21/25 0743     Updated: 04/21/25 0746    Narrative:      XR ABDOMEN KUB    Date of Exam: 4/21/2025 7:12 AM EDT    Indication: follow up bowel obstruction    Comparison: 4/20/2025    Findings:  There is continued marked small bowel distention centrally within the abdomen suggesting a high-grade partial small bowel obstruction. This has not changed significantly compared with the last study. A nasoenteric tube is in the stomach.      Impression:      Impression:  High-grade partial small bowel obstruction.        Electronically Signed: Dino Dailey MD    4/21/2025 7:44 AM EDT    Workstation ID: [id number]    XR Abdomen KUB [[id number]] Collected: 04/20/25 1427     Updated: 04/20/25 1433    Narrative:      XR ABDOMEN KUB    Date of Exam: 4/20/2025 2:01 PM EDT    Indication: ileus follow up    Comparison: 4/20/2025    Findings:  There is persistent intestinal distention that appears to involve multiple loops of small bowel and right-sided colon. No significant change is appreciated. Gas is present within the rectum.      Impression:      Impression:  Stable intestinal distention      Electronically Signed: Gian Marcus    4/20/2025 2:30 PM EDT    Workstation ID: [id number]    XR Abdomen KUB [[id number]] Collected: 04/20/25 1001     Updated: 04/20/25 1005    Narrative:      XR ABDOMEN KUB    Date of Exam: 4/20/2025 9:52 AM EDT    Indication: confirm placement of NG tube    Comparison: KUB 4/20/2025    Findings:  NG/OG tube courses into the stomach with the tip directed superiorly in the region of the gastric fundus, with the sideport in the region of the gastric body. Redemonstration of multiple gas-distended and dilated loops of bowel throughout the abdomen.      Impression:      Impression:  NG/OG tube courses into the stomach with the tip in the region of the gastric fundus.    Bowel gas pattern suggestive of ileus or bowel obstruction.      Electronically  Signed: Abner Dixon MD    4/20/2025 10:03 AM EDT    Workstation ID: [id number]    XR Abdomen KUB [[id number]] Collected: 04/20/25 0554     Updated: 04/20/25 0600    Narrative:      XR ABDOMEN KUB-     Date of exam: 4/20/2025, 5:42 a.m.     Comparisons: 4/19/2025.     INDICATIONS:  64-year-old male with history of vomiting and constipation; imaging  follow-up; e87.2-kpdb-vwbnwdowfs and hyponatremia;  m62.82-rhabdomyolysis; n17.9-acute kidney failure, unspecified;  r13.12-dysphagia, oropharyngeal phase; z78.9-other specified health  status; r26.2-difficulty in walking, not elsewhere classified.     FINDINGS:  Two (2) AP supine views (KUBs) of the abdomen and pelvis reveal  gas-distended stomach, small bowel, and cecum. The findings may  represent a generalized adynamic ileus. No mechanical bowel obstruction  cannot be excluded but is thought to be less likely. No significant  stool burden is suggested. Please see the recent prior exam reports for  further detail regarding additional findings.          Impression:      Gas-distended bowel persists. The findings suggest a generalized  adynamic ileus.        Portions of this note were completed with a voice recognition program.     4/20/2025 5:57 AM by Donta Mera MD on Workstation: [id number]       CT Abdomen Pelvis Without Contrast [[id number]] Collected: 04/19/25 2226     Updated: 04/19/25 2310    Narrative:      CT ABDOMEN PELVIS WO CONTRAST-     Date of exam: 4/19/2025, 10:08 P.M.     Comparison: 4/19/2025 (KUB).     INDICATIONS:   Abdominal distention/pain, not otherwise specified (nos); c/o  constipation; h/o diarrhea upon admission (4/15/2025);  e87.2-ufuv-udrwjzsxpy and hyponatremia; m62.82-rhabdomyolysis;  n17.9-acute kidney failure, unspecified; r13.12-dysphagia, oropharyngeal  phase; z78.9-other specified health status; r26.2-difficulty in walking,  not elsewhere classified.     TECHNIQUE:  Axial CT images were obtained of the abdomen and pelvis without  the  administration of contrast. Reconstructed 2D coronal and sagittal images  were also obtained. Automated exposure control and iterative  construction methods were used. No oral contrast agent was administered  for the study. Additionally, the radiation dose reduction device was  turned on for each scan per the ALARA (As Low as Reasonably Achievable)  protocol. Please see the electronic medical record, EMR (i.e., Epic),  for the documented radiation dose.     FINDINGS:  Dilated small bowel and colon are identified. The cecum is roughly  estimated at about 12 cm in maximum diameter. The maximum diameter of  dilated small bowel is estimated at 4.2 cm. There is a transition zone  in the caliber of the colon at about the level of the splenic flexure,  such as seen on image 108 of series 3, image 90 of series 2, image 72 of  series 4, and adjacent images. No definite mass is seen at this level.  There is suspected oral contrast agent progressing into the distal  colon, likely related to the recent modified barium swallow from  4/16/2025. It involves the rectosigmoid colon, which would be against a  mechanical bowel obstruction. The focal area of narrowing involving the  splenic flexure of colon may be related to peristalsis or colonic spasm.  It is thought less likely to be related to mechanical bowel obstruction.  The more distal colon is nondilated. No pneumoperitoneum. No  pneumatosis. No definite CT evidence of thumbprinting or mural  thickening. No portal or mesenteric venous gas.      Additionally, there is a small-to-moderate hiatal hernia. There may be  nonspecific mild distention of the partially imaged lower thoracic  esophagus. Motion artifact obscures detail. The stomach is distended  with an air-fluid level. A generalized adynamic ileus is possible. The  patient has undergone median sternotomy. There may be incidental  bilateral gynecomastia. Renal cysts are present. No hydronephrosis. No  nephrolithiasis  or ureterolithiasis. No adrenal nodule. No acute  pancreatitis. The gallbladder is thought to be contracted. No definite  gallstones or acute cholecystitis. The lung bases are obscured by motion  artifact. There may be mild subsegmental atelectasis in the lung bases.  Probably no cardiac enlargement. Coronary artery calcifications are  seen. Atherosclerotic changes are present elsewhere. No aneurysmal  dilatation of the aortoiliac arterial system. No enlarged lymph nodes  are identified. Probably no significant prostatic enlargement. An  air-fluid level involves the urinary bladder, possibly related to recent  catheterization. There are small bilateral inguinal hernias. They do not  contain bowel. A tiny umbilical hernia is seen; it does not contain  bowel. No definite ascites. No acute intraperitoneal or retroperitoneal  hemorrhage. Degenerative changes are seen throughout the imaged spine.  There may be diffuse idiopathic skeletal hyperostosis (DISH).  Degenerative changes involve the hip joints and the bilateral sacroiliac  (SI) joints. No acute fracture. No aggressive osseous lesion is  suggested.          Impression:      1.  Distended stomach, small bowel, and colon are identified. There is a  transition zone at about the level of the splenic flexure of colon.  These findings are thought most likely to represent a generalized  adynamic ileus with focal spasm at the level of the splenic flexure of  colon.   2.  There is residual oral contrast agent present, likely related to the  recent modified barium swallow (from 4/16/2025) within the descending  colon and rectosigmoid colon, which is against a complete mechanical  bowel obstruction.   3.  No acute appendicitis or diverticulitis is seen. No  pneumoperitoneum. No pneumatosis or portal or mesenteric venous gas. No  significant stool burden is suggested.   4.  The study is limited, especially due to motion artifact.   5.  Consider close interval clinical, lab,  and if necessary, imaging  follow-up to ensure benign progression.   6.  No other definite significant acute findings are appreciated.   7.  Please see above comments for further detail.           Portions of this note were completed with a voice recognition program.     4/19/2025 11:08 PM by Donta Mera MD on Workstation: [email or web address]       XR Abdomen KUB [[id number]] Collected: 04/19/25 1454     Updated: 04/19/25 1458    Narrative:      XR ABDOMEN KUB    Date of Exam: 4/19/2025 2:40 PM EDT    Indication: abdominal distention    Comparison: None available.    Findings:  There are dilated small bowel loops. This could relate to a small bowel obstruction. Catheter is noted within the bladder. There are surgical clips in the left upper quadrant.      Impression:      Impression:  Dilated small bowel loops that could relate to a small bowel obstruction. CT would be suggested to reassess.        Electronically Signed: Harvey Issa MD    4/19/2025 2:56 PM EDT    Workstation ID: [id number]            Medications:    Current Facility-Administered Medications:     [Held by provider] acetaminophen (TYLENOL) tablet 650 mg, 650 mg, Oral, Q4H PRN, 650 mg at 04/18/25 0555 **OR** [Held by provider] acetaminophen (TYLENOL) suppository 650 mg, 650 mg, Rectal, Q4H PRN, Dino Olivarez MD, 650 mg at 04/15/25 2050    albuterol (PROVENTIL) nebulizer solution 0.083% 2.5 mg/3mL, 2.5 mg, Nebulization, Q6H PRN, Kayla Jensen MD    [Held by provider] aspirin EC tablet 81 mg, 81 mg, Oral, Daily, Wanda Horowitz MD, 81 mg at 04/19/25 0843    [Held by provider] sennosides-docusate (PERICOLACE) 8.6-50 MG per tablet 2 tablet, 2 tablet, Oral, BID, 2 tablet at 04/19/25 2038 **AND** [Held by provider] polyethylene glycol (MIRALAX) packet 17 g, 17 g, Oral, Daily PRN, 17 g at 04/19/25 0041 **AND** [Held by provider] bisacodyl (DULCOLAX) EC tablet 5 mg, 5 mg, Oral, Daily PRN, 5 mg at 04/19/25 0843 **AND** [Held by provider] bisacodyl (DULCOLAX)  suppository 10 mg, 10 mg, Rectal, Daily PRN, Dino Olivarez MD, 10 mg at 04/19/25 0557    [Held by provider] bumetanide (BUMEX) tablet 1 mg, 1 mg, Oral, Daily, Rajendra Lion MD, 1 mg at 04/19/25 1610    dextrose (D50W) (25 g/50 mL) IV injection 25 g, 25 g, Intravenous, Q15 Min PRN, Dino Olivarez MD, 25 g at 04/16/25 1133    dextrose (GLUTOSE) oral gel 15 g, 15 g, Oral, Q15 Min PRN, Dino Olivarez MD    glucagon (GLUCAGEN) injection 1 mg, 1 mg, Intramuscular, Q15 Min PRN, Dino Olivarez MD    insulin regular (humuLIN R,novoLIN R) injection 2-7 Units, 2-7 Units, Subcutaneous, Q6H, Dino Olivarez MD, 3 Units at 04/20/25 1726    [Held by provider] levothyroxine (SYNTHROID, LEVOTHROID) tablet 50 mcg, 50 mcg, Oral, Q AM, Wanda Horowitz MD, 50 mcg at 04/20/25 0501    [Held by provider] nitroglycerin (NITROSTAT) SL tablet 0.4 mg, 0.4 mg, Sublingual, Q5 Min PRN, Dino Oilvarez MD    ondansetron ODT (ZOFRAN-ODT) disintegrating tablet 4 mg, 4 mg, Oral, Q6H PRN **OR** ondansetron (ZOFRAN) injection 4 mg, 4 mg, Intravenous, Q6H PRN, Dino Olivarez MD, 4 mg at 04/20/25 0320    pantoprazole (PROTONIX) injection 40 mg, 40 mg, Intravenous, Q12H, Wanda Horowitz MD, 40 mg at 04/21/25 0828    sodium chloride 0.9 % flush 10 mL, 10 mL, Intravenous, PRN, Mikey Alonso DO    sodium chloride 0.9 % flush 10 mL, 10 mL, Intravenous, Q12H, Dino Olivarez MD, 10 mL at 04/21/25 0828    sodium chloride 0.9 % flush 10 mL, 10 mL, Intravenous, PRN, Dino Olivarez MD    sodium chloride 0.9 % infusion 40 mL, 40 mL, Intravenous, PRN, Dino Olivarez MD    sodium chloride 0.9 % infusion, 125 mL/hr, Intravenous, Continuous, Rajendra Lion MD, Last Rate: 125 mL/hr at 04/21/25 0903, 125 mL/hr at 04/21/25 0903    [Held by provider] traMADol (ULTRAM) tablet 50 mg, 50 mg, Oral, Q6H PRN, Wanda Horowitz MD, 50 mg at 04/19/25 0041    Assessment & Plan       Hyponatremia    Type 2 diabetes mellitus with hyperglycemia, without  long-term current use of insulin    Onychomycosis    Onychocryptosis    PVD (peripheral vascular disease)    Foot pain, bilateral  Ileus vs. Obstruction   Cont NG tube   No surgery indicated at this time      CHEY Boss  04/21/25  10:41 EDT    Electronically signed by CHEY Boss, 04/21/25, 10:41 AM EDT.    Seen and agree  Continues to have bowel function  Hopefully this is a resolving ileus or resolving Abel's  If he fails to continue to improve may need to consider neostigmine or colonoscopy, but for now no obvious indication for intervention

## 2025-04-21 NOTE — THERAPY TREATMENT NOTE
Acute Care - Physical Therapy Treatment Note  MAINOR Parson     Patient Name: Alberto Rachel  : 1960  MRN: [id number]  Today's Date: 2025      Visit Dx:     ICD-10-CM ICD-9-CM   1. Hyponatremia  E87.1 276.1   2. Non-traumatic rhabdomyolysis  M62.82 728.88   3. ESVIN (acute kidney injury)  N17.9 584.9   4. Oropharyngeal dysphagia  R13.12 787.22   5. Decreased activities of daily living (ADL)  Z78.9 V49.89   6. Difficulty walking  R26.2 719.7     Patient Active Problem List   Diagnosis    Anxiety    Atherosclerosis of coronary artery bypass graft of native heart without angina pectoris    Essential hypertension    Depression    Mixed hyperlipidemia    Type 2 diabetes mellitus with hyperglycemia, without long-term current use of insulin    Hypothyroidism    Arthritis of knee    Illiteracy    Cramps, extremity    Tachycardia    Obesity (BMI 30-39.9)    Degenerative disc disease, lumbar    Primary insomnia    Gastroesophageal reflux disease without esophagitis    Close exposure to COVID-19 virus    Mild intermittent asthma    Candidal dermatitis    Bronchitis    Bilateral shoulder pain    Hyperkalemia    Stage 3a chronic kidney disease (CKD)    Hyponatremia    Onychomycosis    Onychocryptosis    PVD (peripheral vascular disease)    Foot pain, bilateral     Past Medical History:   Diagnosis Date    Anxiety     Arthritis     Atherosclerosis of coronary artery bypass graft of native heart without angina pectoris 10/23/2018    Coronary artery disease with previous bypass, LIMA to the LAD, SVG to ramus, and SVG to RCA in     CAD (coronary artery disease) 10/23/2018    Carotid artery stenosis with cerebral infarction 2015    Dr. St     Cramps, extremity 9/10/2021    Depression     DM2 (diabetes mellitus, type 2) 10/23/2018    Duodenal ulcer     Essential hypertension 10/23/2018    Headache     Heart attack 2015    Heartburn     HLD (hyperlipidemia)     HTN (hypertension)     Injury of right leg 2021     Irregular heart beat     Low back pain 03/18/2019    Mild episode of recurrent depressive disorder 10/23/2018    Rash 03/18/2019    Shortness of breath     SOB (shortness of breath)     Type 2 diabetes mellitus with hyperglycemia, without long-term current use of insulin 06/11/2019    Uncontrolled diabetes mellitus 03/18/2019     Past Surgical History:   Procedure Laterality Date    CARDIAC CATHETERIZATION  06/2015    Dr. St     CIRCUMCISION      age 14 yrs old    CORONARY ARTERY BYPASS GRAFT  06/2015    5 bypasses     PT Assessment (Last 12 Hours)       PT Evaluation and Treatment       Row Name 04/21/25 1500          Physical Therapy Time and Intention    Subjective Information no complaints (P)   -RA     Document Type therapy note (daily note) (P)   -RA     Mode of Treatment individual therapy;physical therapy (P)   -RA     Patient Effort adequate (P)   -RA       Row Name 04/21/25 1500          General Information    Patient Profile Reviewed yes (P)   -RA     Patient Observations lethargic;cooperative;agree to therapy (P)   -RA       Row Name 04/21/25 1500          Motor Skills    Therapeutic Exercise hip;knee;ankle (P)   -RA       Row Name 04/21/25 1500          Hip (Therapeutic Exercise)    Hip (Therapeutic Exercise) AROM (active range of motion);isometric exercises;strengthening exercise (P)   -RA     Hip AROM (Therapeutic Exercise) bilateral;aBduction;aDduction;supine;10 repetitions (P)   -RA     Hip Isometrics (Therapeutic Exercise) gluteal sets;bilateral;5 second hold;10 repetitions (P)   -RA     Hip Strengthening (Therapeutic Exercise) bilateral;flexion;supine;10 repetitions (P)   SLRs; 5 reps AROM; 5 reps AAROM  -RA       Row Name 04/21/25 1500          Knee (Therapeutic Exercise)    Knee (Therapeutic Exercise) strengthening exercise (P)   -RA     Knee Strengthening (Therapeutic Exercise) heel slides;bilateral;10 repetitions;supine (P)   -RA       Row Name 04/21/25 1500          Ankle (Therapeutic  "Exercise)    Ankle (Therapeutic Exercise) AROM (active range of motion) (P)   -RA     Ankle AROM (Therapeutic Exercise) bilateral;dorsiflexion;plantarflexion;supine;20 repititions (P)   -RA       Row Name             Wound 04/18/25 2312 Left posterior elbow    Wound - Properties Group Placement Date: 04/18/25  -HB Placement Time: 2312  -HB Side: Left  -HB Orientation: posterior  -HB Location: elbow  -HB Additional Comments: optifoam placed on elbow, unknown when placed. Patient states, \"I did it when I fell at home\"  -HB    Retired Wound - Properties Group Placement Date: 04/18/25  -HB Placement Time: 2312  -HB Side: Left  -HB Orientation: posterior  -HB Location: elbow  -HB Additional Comments: optifoam placed on elbow, unknown when placed. Patient states, \"I did it when I fell at home\"  -HB    Retired Wound - Properties Group Placement Date: 04/18/25  -HB Placement Time: 2312  -HB Side: Left  -HB Orientation: posterior  -HB Location: elbow  -HB Additional Comments: optifoam placed on elbow, unknown when placed. Patient states, \"I did it when I fell at home\"  -HB    Retired Wound - Properties Group Date first assessed: 04/18/25  -HB Time first assessed: 2312  -HB Side: Left  -HB Location: elbow  -HB Additional Comments: optifoam placed on elbow, unknown when placed. Patient states, \"I did it when I fell at home\"  -HB      Row Name             Wound 04/18/25 2004 Left posterior hand    Wound - Properties Group Placement Date: 04/18/25  -HB Placement Time: 2004  -HB Present on Original Admission: --  -HB, unknown  Side: Left  -HB Orientation: posterior  -HB Location: hand  -HB Additional Comments: optifoam placed on hand, unknown when placed. Patient states, \"I did it when I fell at home\"  -HB    Retired Wound - Properties Group Placement Date: 04/18/25  -HB Placement Time: 2004  -HB Present on Original Admission: --  -HB, unknown  Side: Left  -HB Orientation: posterior  -HB Location: hand  -HB Additional Comments: " "optifoam placed on hand, unknown when placed. Patient states, \"I did it when I fell at home\"  -HB    Retired Wound - Properties Group Placement Date: 04/18/25  -HB Placement Time: 2004  -HB Present on Original Admission: --  -HB, unknown  Side: Left  -HB Orientation: posterior  -HB Location: hand  -HB Additional Comments: optifoam placed on hand, unknown when placed. Patient states, \"I did it when I fell at home\"  -HB    Retired Wound - Properties Group Date first assessed: 04/18/25  -HB Time first assessed: 2004  -HB Present on Original Admission: --  -HB, unknown  Side: Left  -HB Location: hand  -HB Additional Comments: optifoam placed on hand, unknown when placed. Patient states, \"I did it when I fell at home\"  -HB      Row Name             Wound 04/18/25 2004 Right proximal arm    Wound - Properties Group Placement Date: 04/18/25  -HB Placement Time: 2004 -HB Present on Original Admission: --  -HB, unknown  Side: Right  -HB Orientation: proximal  -HB Location: arm  -HB Additional Comments: optifoam placed, unknown when placed. Patient states, \"I did it when I fell at home\"  -HB    Retired Wound - Properties Group Placement Date: 04/18/25  -HB Placement Time: 2004  -HB Present on Original Admission: --  -HB, unknown  Side: Right  -HB Orientation: proximal  -HB Location: arm  -HB Additional Comments: optifoam placed, unknown when placed. Patient states, \"I did it when I fell at home\"  -HB    Retired Wound - Properties Group Placement Date: 04/18/25  -HB Placement Time: 2004  -HB Present on Original Admission: --  -HB, unknown  Side: Right  -HB Orientation: proximal  -HB Location: arm  -HB Additional Comments: optifoam placed, unknown when placed. Patient states, \"I did it when I fell at home\"  -HB    Retired Wound - Properties Group Date first assessed: 04/18/25  -HB Time first assessed: 2004 -HB Present on Original Admission: --  -HB, unknown  Side: Right  -HB Location: arm  -HB Additional Comments: optifoam " "placed, unknown when placed. Patient states, \"I did it when I fell at home\"  -HB      Row Name             Wound 04/18/25 2004 Left anterior knee    Wound - Properties Group Placement Date: 04/18/25  -HB, unknown  Placement Time: 2004 -HB Present on Original Admission: --  -HB, unknown  Side: Left  -HB Orientation: anterior  -HB Location: knee  -HB Additional Comments: optifoam placed, unknown when placed. Patient states, \"I did it when I fell at home\"  -HB    Retired Wound - Properties Group Placement Date: 04/18/25  -HB, unknown  Placement Time: 2004  -HB Present on Original Admission: --  -HB, unknown  Side: Left  -HB Orientation: anterior  -HB Location: knee  -HB Additional Comments: optifoam placed, unknown when placed. Patient states, \"I did it when I fell at home\"  -HB    Retired Wound - Properties Group Placement Date: 04/18/25  -HB, unknown  Placement Time: 2004  -HB Present on Original Admission: --  -HB, unknown  Side: Left  -HB Orientation: anterior  -HB Location: knee  -HB Additional Comments: optifoam placed, unknown when placed. Patient states, \"I did it when I fell at home\"  -HB    Retired Wound - Properties Group Date first assessed: 04/18/25  -HB, unknown  Time first assessed: 2004  -HB Present on Original Admission: --  -HB, unknown  Side: Left  -HB Location: knee  -HB Additional Comments: optifoam placed, unknown when placed. Patient states, \"I did it when I fell at home\"  -HB      Row Name             Wound 04/18/25 2004 Right anterior knee    Wound - Properties Group Placement Date: 04/18/25  -HB Placement Time: 2004 -HB Present on Original Admission: --  -HB, unknown  Side: Right  -HB Orientation: anterior  -HB Location: knee  -HB Additional Comments: optifoam placed, unknown when. Patient states, \"I did it when I fell at home\"  -HB    Retired Wound - Properties Group Placement Date: 04/18/25  -HB Placement Time: 2004  -HB Present on Original Admission: --  -HB, unknown  Side: Right  -HB " "Orientation: anterior  -HB Location: knee  -HB Additional Comments: optifoam placed, unknown when. Patient states, \"I did it when I fell at home\"  -HB    Retired Wound - Properties Group Placement Date: 04/18/25  -HB Placement Time: 2004 -HB Present on Original Admission: --  -HB, unknown  Side: Right  -HB Orientation: anterior  -HB Location: knee  -HB Additional Comments: optifoam placed, unknown when. Patient states, \"I did it when I fell at home\"  -HB    Retired Wound - Properties Group Date first assessed: 04/18/25  -HB Time first assessed: 2004  -HB Present on Original Admission: --  -HB, unknown  Side: Right  -HB Location: knee  -HB Additional Comments: optifoam placed, unknown when. Patient states, \"I did it when I fell at home\"  -HB      Row Name 04/21/25 1500          Progress Summary (PT)    Progress Toward Functional Goals (PT) unable to show any progress toward functional goals (P)   -RA     Daily Progress Summary (PT) Patient tolerated supine therapeutic exercise but was not willing to come to EOB or show progress towards his listed goals. He demonstrated fair muscle activation and ROM during session and should continue the POC. (P)   -RA               User Key  (r) = Recorded By, (t) = Taken By, (c) = Cosigned By      Initials Name Provider Type    Sarah Jones LPN Licensed Nurse    Leah Silverio, PT Student PT Student                    Physical Therapy Education       Title: PT OT SLP Therapies (In Progress)       Topic: Physical Therapy (In Progress)       Point: Mobility training (Done)       Learning Progress Summary            Patient Acceptance, E,TB, VU by AV at 4/17/2025 1516                      Point: Home exercise program (Not Started)       Learner Progress:  Not documented in this visit.              Point: Body mechanics (Done)       Learning Progress Summary            Patient Acceptance, E,TB, VU by AV at 4/17/2025 1516                      Point: Precautions (Done)       " Learning Progress Summary            Patient Acceptance, E,TB, VU by AV at 4/17/2025 1516                                      User Key       Initials Effective Dates Name Provider Type Discipline    GISELLE 06/11/21 -  Reji Lux, PT Physical Therapist PT                  PT Recommendation and Plan     Progress Summary (PT)  Progress Toward Functional Goals (PT): (P) unable to show any progress toward functional goals  Daily Progress Summary (PT): (P) Patient tolerated supine therapeutic exercise but was not willing to come to EOB or show progress towards his listed goals. He demonstrated fair muscle activation and ROM during session and should continue the POC.   Outcome Measures       Row Name 04/21/25 1500             How much help from another person do you currently need...    Turning from your back to your side while in flat bed without using bedrails? 3 (P)   -RA      Moving from lying on back to sitting on the side of a flat bed without bedrails? 3 (P)   -RA      Moving to and from a bed to a chair (including a wheelchair)? 2 (P)   -RA      Standing up from a chair using your arms (e.g., wheelchair, bedside chair)? 2 (P)   -RA      Climbing 3-5 steps with a railing? 1 (P)   -RA      To walk in hospital room? 2 (P)   -RA      AM-PAC 6 Clicks Score (PT) 13 (P)   -RA                User Key  (r) = Recorded By, (t) = Taken By, (c) = Cosigned By      Initials Name Provider Type    Leah Silverio, PT Student PT Student                     Time Calculation:    PT Charges       Row Name 04/21/25 1509             Time Calculation    PT Received On 04/21/25 (P)   -RA         Timed Charges    90299 - PT Therapeutic Exercise Minutes 15 (P)   -RA         Total Minutes    Timed Charges Total Minutes 15 (P)   -RA       Total Minutes 15 (P)   -RA                User Key  (r) = Recorded By, (t) = Taken By, (c) = Cosigned By      Initials Name Provider Type    Leah Silverio, PT Student PT Student                   Therapy Charges for Today       Code Description Service Date Service Provider Modifiers Qty    92441397308 HC PT THER PROC EA 15 MIN 4/21/2025 Leah Turner, PT Student GP 1            PT G-Codes  Outcome Measure Options: AM-PAC 6 Clicks Basic Mobility (PT)  AM-PAC 6 Clicks Score (PT): (P) 13  AM-PAC 6 Clicks Score (OT): 15    Leah Turner, PT Student  4/21/2025

## 2025-04-21 NOTE — SIGNIFICANT NOTE
Wound Eval / Progress Noted    [facility]ANOR Parson     Patient Name: Alberto Rachel  : 1960  MRN: [id number]  Today's Date: 2025                 Admit Date: 4/15/2025    Visit Dx:    ICD-10-CM ICD-9-CM   1. Hyponatremia  E87.1 276.1   2. Non-traumatic rhabdomyolysis  M62.82 728.88   3. ESVIN (acute kidney injury)  N17.9 584.9   4. Oropharyngeal dysphagia  R13.12 787.22   5. Decreased activities of daily living (ADL)  Z78.9 V49.89   6. Difficulty walking  R26.2 719.7         Hyponatremia    Type 2 diabetes mellitus with hyperglycemia, without long-term current use of insulin    Onychomycosis    Onychocryptosis    PVD (peripheral vascular disease)    Foot pain, bilateral        Past Medical History:   Diagnosis Date    Anxiety     Arthritis     Atherosclerosis of coronary artery bypass graft of native heart without angina pectoris 10/23/2018    Coronary artery disease with previous bypass, LIMA to the LAD, SVG to ramus, and SVG to RCA in     CAD (coronary artery disease) 10/23/2018    Carotid artery stenosis with cerebral infarction 2015    Dr. St     Cramps, extremity 9/10/2021    Depression     DM2 (diabetes mellitus, type 2) 10/23/2018    Duodenal ulcer     Essential hypertension 10/23/2018    Headache     Heart attack 2015    Heartburn     HLD (hyperlipidemia)     HTN (hypertension)     Injury of right leg 2021    Irregular heart beat     Low back pain 2019    Mild episode of recurrent depressive disorder 10/23/2018    Rash 2019    Shortness of breath     SOB (shortness of breath)     Type 2 diabetes mellitus with hyperglycemia, without long-term current use of insulin 2019    Uncontrolled diabetes mellitus 2019        Past Surgical History:   Procedure Laterality Date    CARDIAC CATHETERIZATION  2015    Dr. St     CIRCUMCISION      age 14 yrs old    CORONARY ARTERY BYPASS GRAFT  2015    5 bypasses         Physical Assessment:  Wound 25 2312 Left posterior  elbow (Active)   Wound Image   04/21/25 1035   Dressing Appearance dry;intact 04/21/25 1035   Closure None 04/21/25 1035   Base moist;red;yellow;dry;tan 04/21/25 1035   Periwound dry;redness;pink 04/21/25 1035   Periwound Temperature warm 04/21/25 1035   Periwound Skin Turgor soft 04/21/25 1035   Edges open 04/21/25 1035   Wound Length (cm) 1.4 cm 04/21/25 1035   Wound Width (cm) 1.6 cm 04/21/25 1035   Wound Depth (cm) 0.1 cm 04/21/25 1035   Wound Surface Area (cm^2) 1.76 cm^2 04/21/25 1035   Wound Volume (cm^3) 0.117 cm^3 04/21/25 1035   Drainage Characteristics/Odor serosanguineous 04/21/25 1035   Drainage Amount scant 04/21/25 1035   Care, Wound cleansed with;sterile normal saline 04/21/25 1035   Dressing Care dressing removed;dressing applied;silver impregnated;hydrofiber;silicone border foam 04/21/25 1035   Periwound Care absorptive dressing applied 04/21/25 1035       Wound 04/18/25 2004 Left posterior hand (Active)   Wound Image   04/21/25 1035   Dressing Appearance dry;intact 04/21/25 1035   Closure None 04/21/25 1035   Base moist;red 04/21/25 1035   Periwound dry;ecchymotic 04/21/25 1035   Periwound Temperature warm 04/21/25 1035   Periwound Skin Turgor soft 04/21/25 1035   Edges open 04/21/25 1035   Drainage Characteristics/Odor serosanguineous 04/21/25 1035   Drainage Amount scant 04/21/25 1035   Care, Wound cleansed with;sterile normal saline 04/21/25 1035   Dressing Care dressing removed;dressing applied;silver impregnated;hydrofiber;silicone border foam 04/21/25 1035   Periwound Care absorptive dressing applied 04/21/25 1035       Wound 04/18/25 2004 Right proximal arm (Active)   Wound Image    04/21/25 1035   Dressing Appearance dry;intact 04/21/25 1035   Closure None 04/21/25 1035   Base moist;red;dry;tan 04/21/25 1035   Periwound dry;pink 04/21/25 1035   Periwound Temperature warm 04/21/25 1035   Periwound Skin Turgor soft 04/21/25 1035   Edges open 04/21/25 1035   Drainage Characteristics/Odor  serosanguineous 04/21/25 1035   Drainage Amount scant 04/21/25 1035   Care, Wound cleansed with;sterile normal saline 04/21/25 1035   Dressing Care dressing removed;dressing applied;silver impregnated;hydrofiber;silicone border foam 04/21/25 1035   Periwound Care absorptive dressing applied 04/21/25 1035       Wound 04/18/25 2004 Left anterior knee (Active)   Wound Image   04/21/25 1035   Dressing Appearance dry;intact 04/21/25 1035   Closure None 04/21/25 1035   Base moist;red;yellow;dry;tan 04/21/25 1035   Periwound dry;redness 04/21/25 1035   Periwound Temperature warm 04/21/25 1035   Periwound Skin Turgor soft 04/21/25 1035   Edges open 04/21/25 1035   Wound Length (cm) 5.8 cm 04/21/25 1035   Wound Width (cm) 2 cm 04/21/25 1035   Wound Depth (cm) 0.1 cm 04/21/25 1035   Wound Surface Area (cm^2) 9.11 cm^2 04/21/25 1035   Wound Volume (cm^3) 0.607 cm^3 04/21/25 1035   Drainage Characteristics/Odor serosanguineous 04/21/25 1035   Drainage Amount scant 04/21/25 1035   Care, Wound cleansed with;sterile normal saline 04/21/25 1035   Dressing Care dressing removed;dressing applied;silver impregnated;hydrofiber;silicone border foam 04/21/25 1035   Periwound Care absorptive dressing applied 04/21/25 1035       Wound 04/18/25 2004 Right anterior knee (Active)   Wound Image   04/21/25 1035   Dressing Appearance dry;intact 04/21/25 1035   Closure None 04/21/25 1035   Base moist;red 04/21/25 1035   Periwound dry;pink;redness 04/21/25 1035   Periwound Temperature warm 04/21/25 1035   Periwound Skin Turgor soft 04/21/25 1035   Edges open 04/21/25 1035   Wound Length (cm) 2.5 cm 04/21/25 1035   Wound Width (cm) 1.2 cm 04/21/25 1035   Wound Depth (cm) 0.1 cm 04/21/25 1035   Wound Surface Area (cm^2) 2.36 cm^2 04/21/25 1035   Wound Volume (cm^3) 0.157 cm^3 04/21/25 1035   Drainage Characteristics/Odor serosanguineous 04/21/25 1035   Drainage Amount scant 04/21/25 1035   Care, Wound cleansed with;sterile normal saline 04/21/25  1035   Dressing Care dressing removed;dressing applied;silver impregnated;hydrofiber;silicone border foam 04/21/25 1035   Periwound Care absorptive dressing applied 04/21/25 1035       Wound 04/21/25 1035 Left anterior thigh (Active)   Wound Image   04/21/25 1035   Dressing Appearance open to air 04/21/25 1035   Closure None 04/21/25 1035   Base dry;scab;tan;red 04/21/25 1035   Periwound dry;redness 04/21/25 1035   Periwound Temperature warm 04/21/25 1035   Periwound Skin Turgor soft 04/21/25 1035   Edges open 04/21/25 1035   Drainage Amount none 04/21/25 1035   Care, Wound cleansed with;sterile normal saline 04/21/25 1035   Dressing Care open to air 04/21/25 1035   Periwound Care moisturizer applied 04/21/25 1035     Abbreviated Note     Wound Check / Follow-up:  Patient seen today for wound consult. Patient is awake, alert, and oriented. Patient reports he lives at home alone and does not have any assistance, as he is normally able to care for self. Patient reports he fell multiple times at home prior to his admission. Patient was reported to have been found face-down prior to admission. Patient reports he plans to return home on discharge but is agreeable to have home health assistance. Patient is currently NPO, unable to initiate nutritional supplements at this time. Patient reports scattered injuries were caused by his falls. Patient was noted to have had a smear sized incontinent bowel movement prior to assessment. Incontinence care provided and incontinence pad changed with assistance per nursing student. Patient with indwelling urinary catheter in place.    Please see wound assessments above:    Reported traumatic injuries to left elbow, left posterior hand, left anterior knee, right anterior knee, and right medial elbow, cannot rule out pressure as a contributing factor as patient was found face-down prior to admission. A thin layer of yellow biofilm is noted to wound bases to left knee and left elbow.  Recommending every other day dressing changes with silver impregnated hydrofiber, moistened with 1 to 3 drops of normal saline, and silicone border dressing securement.     Suspected traumatic injury to left proximal thigh. Linear arrangement of tightly adhered tan to red, thin crusting. Cleansed with normal saline and gauze, blotted dry. Recommending quality skin care and hygiene.    Bilateral legs and feet present with scattered scaly and dry, tan to brown skin. Cleansed with bathing wipes, blotted dry. Recommending quality skin care and hygiene with application of Ammonium Lactate to bilateral legs and feet twice a day. Discussed findings with Attending MD and orders obtained.    Intact, blanchable pink skin coloration to gluteal aspects. Recommending quality skin care and hygiene with application of blue top moisture barrier four times a day, and as needed for incontinence.  Implement every 2 hour turns, and offload at all times.  Keep patient clean, dry, and free from all moisture.    Impression: Reported traumatic injuries to left elbow, left hand, left knee, right knee, and right elbow, cannot rule out pressure as a contributing factor as patient was found face-down prior to admission. Suspected traumatic injury to left proximal thigh. Scattered scaly/dry skin to bilateral legs and feet.     Short term goals:  Regain skin integrity, skin protection, moisture prevention, pressure reduction, quality skin care and hygiene, topical treatment, every other day dressing changes.    Irene Avery RN    4/21/2025    16:24 EDT

## 2025-04-21 NOTE — PROGRESS NOTES
[facility]   Progress Note    Patient Name: Alberto Rachel  : 1960  MRN: [id number]  Primary Care Physician: Magnus Euceda DO  Date of admission: 4/15/2025    Subjective   Subjective     HPI:  Patient lethargic, in company with his son, currently Amisha did not have any abdominal surgery in the past, patient renal function has significantly deteriorated, currently is followed with nephrology, patient was evaluated by surgery and also GI Dr. Coelho today, GI evaluation indicated small bowel obstruction, there is also some abnormality in the colon in the splenic flexure, surgery feeling that is another syndrome and patient is not surgical, patient had a bowel movement today.  Patient also developed today at noon atrial flutter.  Patient appears not to be in painful or respiratory distress.     Review of Systems  Review of Systems difficult to obtain    Objective   Objective     Vitals:  Temp:  [97.9 °F (36.6 °C)-98.2 °F (36.8 °C)] 97.9 °F (36.6 °C)  Heart Rate:  [] 155  Resp:  [17-20] 18  BP: (100-124)/(51-78) 103/69  Flow (L/min) (Oxygen Therapy):  [1] 1    Physical Exam:  Physical Exam  Constitutional:       Appearance: He is obese.   HENT:      Head: Normocephalic.      Nose: No congestion.   Cardiovascular:      Rate and Rhythm: Tachycardia present. Rhythm irregular.      Heart sounds: No murmur heard.  Pulmonary:      Breath sounds: No rales.   Abdominal:      General: There is distension.   Musculoskeletal:      Right lower leg: No edema.      Left lower leg: No edema.   Skin:     General: Skin is warm.   Neurological:      Comments: Difficult to evaluate   Psychiatric:      Comments: Difficult to evaluate         Result Review    Result Review:  I have personally reviewed the results from the time of this admission to 25 5:33 PM EDT and agree with these findings:  []  Laboratory  []  Microbiology  []  Radiology  []  EKG/Telemetry   []  Cardiology/Vascular   []  Pathology  []  Old  records  []  Other:    Most notable findings include: 64-year-old gentleman admitted to hospital because of change in mental status and also electrolyte imbalance, his hospital course was complicated with ileus patient also developed atrial flutter today and acute renal insufficiency, patient is followed with multiple specialists, nephrology and GI, surgery, is difficult to pinpoint the reason of his abdominal pathology at present, his kidney failure could be related to dehydration, his white blood cell count has significantly improved, the case was discussed with his son.    Assessment & Plan   Assessment / Plan     Active Hospital Problems:  Active Hospital Problems    Diagnosis     **Hyponatremia     Onychomycosis     Onychocryptosis     PVD (peripheral vascular disease)     Foot pain, bilateral     Type 2 diabetes mellitus with hyperglycemia, without long-term current use of insulin        Plan:   Continue close observation for now. will use metoprolol IV as needed for rate control, probably is not wise to anticoagulate him at least until he gets better, will recommend hydration with normal saline, it has helped him to increase the sodium, and maintain his blood.  Patient has no anemia.    DVT prophylaxis: As per internal medicine    CODE STATUS:    Code Status and Medical Interventions: CPR (Attempt to Resuscitate); Full Support   Ordered at: 04/15/25 1554     Code Status (Patient has no pulse and is not breathing):    CPR (Attempt to Resuscitate)     Medical Interventions (Patient has pulse or is breathing):    Full Support       Disposition:  I expect patient to be discharged patient is severely ill.    Electronically signed by Jacques St MD, 04/21/25, 5:33 PM EDT.

## 2025-04-21 NOTE — PLAN OF CARE
Goal Outcome Evaluation:           Progress: no change  Outcome Evaluation: Patient a&ox4, able to make needs known. On 1LNC, no signs of respiratory distress. NG tube to LWS. Irrigated fisher, it is now flowing freely. Continuous fluids per MAR infusing. No complaints of pain during shift. Continue Q2 turns. SCDs placed. Had x1 medium BM. Continue strict NPO. Continue plan of care.

## 2025-04-21 NOTE — PLAN OF CARE
Goal Outcome Evaluation:  Plan of Care Reviewed With: patient        Progress: no change  Outcome Evaluation: Patient AOx4. NG tube to LWS. Fluids infusing. Output from fisher monitored. Strict NPO. Patient went in and out of aflutter multiple times today, MD notified, PRN lopressor given, patient was asymptomatic with each occurence. Continuing plan of care.

## 2025-04-21 NOTE — PROGRESS NOTES
[facility]   Hospitalist Progress Note  Date: 2025  Patient Name: Alberto Rachel  : 1960  MRN: [id number]  Date of admission: 4/15/2025      Subjective   Subjective     Chief complaint: Altered mental status    Summary:  64-year-old male with history of anxiety, hypertension, CAD status post bypass, history of stroke, carotid artery stenosis, depression, diabetes, duodenal ulcer, hypertension, dyslipidemia, diabetes mellitus, hospitalized on 4/15/2025 chief complaint of altered mental status, multiple falls, found to have significant life-threatening hyponatremia, nephrology consulted, placed in the critical care unit with critical care consulted, given a round of hypertonic saline, placed on IV fluids several home medications held in the setting of severe hyponatremia including chlorthalidone, Bumex, Abilify, Celexa among others.  Cardiology consulted elevated troponins.  Planning nuclear stress test.  Transferred out of ICU.  Started developing distention despite passing gas and having bowel movements, abdominal distention worse, x-ray showing small bowel obstruction pattern, CT imaging obtained, showing gastric, small and large bowel distention concerning for ileus versus versus bowel spasm and peristalsis, could have Abel's, NG tube placed to low intermittent suction, general surgery consulted    Interval follow-up: Seen and examined, no distress, less interactive today, NG tube remains in place, 1.6 L of output from NG tube over the past 24 hours, creatinine continues to rise up to 4.65, BUN 67, bicarbonate 19, potassium 4.4, sodium up to 122.  Nephrology following.  Blood pressures have been trending low, patient indicates his abdomen is softer than previous day, he is passing gas and had a very small bowel movement yesterday.  Orozco catheter in place.  Denies chest pain.  Consulted GI for further recommendations regarding ongoing GI issue.  No vomiting since NG tube placement.    Review  of systems:   Review of systems obtained, all systems reviewed and negative except for weakness, fatigue, intermittent confusion, abdominal distention  Objective   Objective     Vitals:   Temp:  [97.9 °F (36.6 °C)-98.2 °F (36.8 °C)] 98.1 °F (36.7 °C)  Heart Rate:  [] 88  Resp:  [16-20] 18  BP: (100-114)/(54-78) 105/55  Flow (L/min) (Oxygen Therapy):  [1] 1  Physical Exam    Constitutional: Awake, alert, no acute distress laying in bed with NG tube secured eyes: Pupils equal, sclerae anicteric, no conjunctival injection   HENT: NCAT, mucous membranes moist   Neck: Supple, full range of motion   Respiratory: Diminished to auscultation bilaterally, nonlabored respirations    Cardiovascular: RRR, no murmurs, rubs, or gallops, palpable pedal pulses bilaterally   Gastrointestinal: Hypoactive bowel sounds, soft, distended   Musculoskeletal: No bilateral ankle edema, no clubbing or cyanosis to extremities   Psychiatric: Appropriate affect, cooperative   Neurologic: Oriented x 2 self and surroundings, strength symmetric in all extremities with weakness throughout, Cranial Nerves grossly intact to confrontation, speech clear   Skin: No rashes, cracked dry discolored skin lower extremities      Result Review    Result Review:  I have personally reviewed the pertinent results from the past 24 hours to 4/21/2025 10:29 EDT and agree with these findings:  [x]  Laboratory   CBC          4/19/2025    05:25 4/20/2025    05:54 4/21/2025    05:21   CBC   WBC 20.19  23.49  11.46    RBC 5.70  5.65  4.56    Hemoglobin 16.8  16.7  13.4    Hematocrit 48.3  47.9  38.5    MCV 84.7  84.8  84.4    MCH 29.5  29.6  29.4    MCHC 34.8  34.9  34.8    RDW 13.1  13.2  13.6    Platelets 382  418  308      BMP          4/19/2025    05:25 4/19/2025    12:14 4/19/2025    17:45 4/19/2025    23:49 4/20/2025    05:54 4/20/2025    12:23 4/20/2025    18:08 4/20/2025    23:51 4/21/2025    05:21   BMP   BUN 18     39     67    Creatinine 1.26     2.27      4.65    Sodium 119  118  117  118  119  116  120  122  122    Potassium 4.0  4.4  4.3  4.6  4.5     4.5  4.7  4.7  4.6  4.4    Chloride 80     76     84    CO2 20.1     19.1     19.8    Calcium 10.2     10.4     9.0      LIVER FUNCTION TESTS:      Lab 04/21/25  0521 04/20/25  0554 04/19/25  0525 04/18/25  0548 04/17/25  0543 04/16/25  0531 04/15/25  1235   TOTAL PROTEIN 6.5 8.2 8.2 7.1 7.2  --  7.9   ALBUMIN 3.4* 4.0 4.3 4.0 4.0   < > 4.7   GLOBULIN  --   --   --   --   --   --  3.2   ALT (SGPT) 23 31 40 42* 47*  --  39   AST (SGOT) 25 32 49* 65* 105*  --  104*   BILIRUBIN 0.9 1.1 0.9 0.8 1.1  --  1.8*   INDIRECT BILIRUBIN 0.2 0.6 0.4 0.3 0.6  --   --    BILIRUBIN DIRECT 0.7* 0.5* 0.5* 0.5* 0.5*  --   --    ALK PHOS 52 73 67 52 49  --  52    < > = values in this interval not displayed.       [x]  Microbiology   Microbiology Results (last 10 days)       Procedure Component Value - Date/Time    Blood Culture - Blood, Hand, Right [[id number]]  (Normal) Collected: 04/15/25 1736    Lab Status: Final result Specimen: Blood from Hand, Right Updated: 04/20/25 1745     Blood Culture No growth at 5 days    Narrative:      Less than seven (7) mL's of blood was collected.  Insufficient quantity may yield false negative results.    Blood Culture - Blood, Arm, Left [[id number]]  (Normal) Collected: 04/15/25 1735    Lab Status: Final result Specimen: Blood from Arm, Left Updated: 04/20/25 1745     Blood Culture No growth at 5 days    Narrative:      Less than seven (7) mL's of blood was collected.  Insufficient quantity may yield false negative results.              [x]  Radiology XR Abdomen KUB  Result Date: 4/21/2025  Impression: High-grade partial small bowel obstruction. Electronically Signed: Dino Dailey MD  4/21/2025 7:44 AM EDT  Workstation ID: [id number]    XR Abdomen KUB  Result Date: 4/20/2025  Impression: Stable intestinal distention Electronically Signed: Gian Marcus  4/20/2025 2:30 PM EDT  Workstation ID:  [id number]    XR Abdomen KUB  Result Date: 4/20/2025  Impression: NG/OG tube courses into the stomach with the tip in the region of the gastric fundus. Bowel gas pattern suggestive of ileus or bowel obstruction. Electronically Signed: Abner Dixon MD  4/20/2025 10:03 AM EDT  Workstation ID: [id number]    XR Abdomen KUB  Result Date: 4/20/2025  Gas-distended bowel persists. The findings suggest a generalized adynamic ileus.   Portions of this note were completed with a voice recognition program.  4/20/2025 5:57 AM by Donta Mera MD on Workstation: [employer]      CT Abdomen Pelvis Without Contrast  Result Date: 4/19/2025  1.  Distended stomach, small bowel, and colon are identified. There is a transition zone at about the level of the splenic flexure of colon. These findings are thought most likely to represent a generalized adynamic ileus with focal spasm at the level of the splenic flexure of colon. 2.  There is residual oral contrast agent present, likely related to the recent modified barium swallow (from 4/16/2025) within the descending colon and rectosigmoid colon, which is against a complete mechanical bowel obstruction. 3.  No acute appendicitis or diverticulitis is seen. No pneumoperitoneum. No pneumatosis or portal or mesenteric venous gas. No significant stool burden is suggested. 4.  The study is limited, especially due to motion artifact. 5.  Consider close interval clinical, lab, and if necessary, imaging follow-up to ensure benign progression. 6.  No other definite significant acute findings are appreciated. 7.  Please see above comments for further detail.    Portions of this note were completed with a voice recognition program.  4/19/2025 11:08 PM by Donta Mera MD on Workstation: [id number]      XR Abdomen KUB  Result Date: 4/19/2025  Impression: Dilated small bowel loops that could relate to a small bowel obstruction. CT would be suggested to reassess. Electronically Signed: Harvey Issa MD  4/19/2025  2:56 PM EDT  Workstation ID: [id number]    FL Video Swallow With Speech Single Contrast  Result Date: 4/16/2025  Impression: 1. No tracheal aspiration or laryngeal penetration observed Please consult speech pathology report for further details regarding the exam and for dietary recommendations. Report dictated by: Racquel Perdue  I have personally reviewed this case and agree with the findings above: Electronically Signed: Taco Kennedy MD  4/16/2025 4:20 PM EDT  Workstation ID: [id number]    CT Head Without Contrast  Result Date: 4/15/2025  Impression: 1.No acute intracranial abnormality identified by noncontrast CT. 2.Mild parenchymal volume loss and probable chronic small vessel ischemic change. Electronically Signed: Heriberto Casillas MD  4/15/2025 1:48 PM EDT  Workstation ID: [id number]    XR Chest 1 View  Result Date: 4/15/2025  Impression: Suspected mild left basilar atelectasis. Electronically Signed: Theresa Mancilla MD  4/15/2025 1:00 PM EDT  Workstation ID: [id number]        []  EKG/Telemetry   ECG 12 Lead Electrolyte Imbalance   Preliminary Result   HEART RATE=97  bpm   RR Oftslvqx=559  ms   DC Tsagqtkr=370  ms   P Horizontal Axis=-15  deg   P Front Axis=67  deg   QRSD Gvxhfqdr=837  ms   QT Qapcxtjz=227  ms   OElK=377  ms   QRS Axis=-1  deg   T Wave Axis=182  deg   - ABNORMAL ECG -   Sinus rhythm   Ventricular premature complex   Probable left atrial enlargement   LVH with secondary repolarization abnormality   Date and Time of Study:2025-04-19 06:21:40      ECG 12 Lead Altered Mental Status   Final Result   HEART RATE=78  bpm   RR Tmlkycbw=505  ms   DC Srwjzpvg=668  ms   P Horizontal Axis=-61  deg   P Front Axis=243  deg   QRSD Wbrbwtfn=766  ms   QT Hpjhpjrz=754  ms   DDeS=535  ms   QRS Axis=-6  deg   T Wave Axis=240  deg   - ABNORMAL ECG -   Possible Sinus rhythm   Baseline artifact in multiple leads   Nonspecific  intraventricular conduction delay   Nonspecific  T abnormalities, inferior leads   Borderline  ST  elevation, anterior leads   When compared with ECG of 08-Nov-2022 10:39:34,   No significant change   Electronically Signed By: Sy Srinivasan ([facility]) 2025-04-19 16:32:33   Date and Time of Study:2025-04-15 13:22:55          []  Cardiology/Vascular   []  Pathology  [x]  Old records  []  Other:    Assessment & Plan   Assessment / Plan     Assessment/Plan:  Assessment:  Acute life-threatening hyponatremia  High-grade small bowel obstruction  Bowel obstruction, etiology uncertain, bowel paralysis versus ileus versus Crosby  Symptomatic hyponatremia  Acute metabolic encephalopathy  Asthma mild intermittent  Rhabdomyolysis  Anxiety  CAD with history of CABG, with elevated troponins likely type II MI secondary to hyponatremia  Hyponatremia, hypovolemia, hypochloremia  History of stroke  Carotid artery stenosis  Depression  Diabetes mellitus  History of duodenal ulcer  Essential hypertension  Dyslipidemia  ESVIN  Metabolic acidosis    Plan:  Labs and imaging reviewed  Worsening renal failure in the setting of small bowel obstruction with no significant improvements, low threshold to transfer to the ICU  NG tube to low intermittent suction continued  Consulted GI discussed with Dr. Coelho, recommendations appreciated; will evaluate the patient determine whether no we need further intervention.  KUB in the morning  Hold all oral medications  General Surgery consulted discussed with Dr. Cardenas; recommendations appreciated  Consideration given to transfer to the ICU for neostigmine  Insulin sliding scale coverage continued  Ultram for pain control  IV fluids per nephrology discussed with Dr. Lion  Stress test per cardiology  Diet per speech therapy  Hold home medications which could be driving hyponatremia and hypovolemia  Following blood pressures  Nephrology consultation appreciated  Fall precautions  A.m. labs  Full code  DVT prophylaxis with SCDs  Clinical course to dictate further management of this critically ill  patient with significant bowel obstruction and worsening ESVIN requiring high degree of medical decision making  Discussed with nurse at the bedside      VTE Prophylaxis:  Mechanical VTE prophylaxis orders are present.        CODE STATUS:   Code Status (Patient has no pulse and is not breathing): CPR (Attempt to Resuscitate)  Medical Interventions (Patient has pulse or is breathing): Full Support        Electronically signed by Wanda Horowitz MD, 4/21/2025, 10:29 EDT.    Portions of this documentation were transcribed electronically from a voice recognition software.  I confirm all data accurately represents the service(s) I performed at today's visit.

## 2025-04-22 LAB
ALBUMIN SERPL-MCNC: 2.8 G/DL (ref 3.5–5.2)
ALP SERPL-CCNC: 55 U/L (ref 39–117)
ALT SERPL W P-5'-P-CCNC: 23 U/L (ref 1–41)
ANION GAP SERPL CALCULATED.3IONS-SCNC: 18.5 MMOL/L (ref 5–15)
AST SERPL-CCNC: 26 U/L (ref 1–40)
BACTERIA UR QL AUTO: ABNORMAL /HPF
BASOPHILS # BLD AUTO: 0.07 10*3/MM3 (ref 0–0.2)
BASOPHILS NFR BLD AUTO: 0.6 % (ref 0–1.5)
BILIRUB CONJ SERPL-MCNC: 0.7 MG/DL (ref 0–0.3)
BILIRUB INDIRECT SERPL-MCNC: 0.4 MG/DL
BILIRUB SERPL-MCNC: 1.1 MG/DL (ref 0–1.2)
BILIRUB UR QL STRIP: NEGATIVE
BUN SERPL-MCNC: 75 MG/DL (ref 8–23)
BUN/CREAT SERPL: 22.5 (ref 7–25)
BURR CELLS BLD QL SMEAR: NORMAL
CALCIUM SPEC-SCNC: 8.8 MG/DL (ref 8.6–10.5)
CHLORIDE SERPL-SCNC: 92 MMOL/L (ref 98–107)
CK SERPL-CCNC: 120 U/L (ref 20–200)
CLARITY UR: CLEAR
CO2 SERPL-SCNC: 17.5 MMOL/L (ref 22–29)
COLOR UR: YELLOW
CREAT SERPL-MCNC: 3.33 MG/DL (ref 0.76–1.27)
D-LACTATE SERPL-SCNC: 1.1 MMOL/L (ref 0.5–2)
DEPRECATED RDW RBC AUTO: 45.5 FL (ref 37–54)
EGFRCR SERPLBLD CKD-EPI 2021: 19.6 ML/MIN/1.73
EOSINOPHIL # BLD AUTO: 0.1 10*3/MM3 (ref 0–0.4)
EOSINOPHIL NFR BLD AUTO: 0.9 % (ref 0.3–6.2)
EOSINOPHIL SPEC QL MICRO: 0 % EOS/100 CELLS (ref 0–0)
ERYTHROCYTE [DISTWIDTH] IN BLOOD BY AUTOMATED COUNT: 13.8 % (ref 12.3–15.4)
GLUCOSE BLDC GLUCOMTR-MCNC: 109 MG/DL (ref 70–99)
GLUCOSE BLDC GLUCOMTR-MCNC: 88 MG/DL (ref 70–99)
GLUCOSE BLDC GLUCOMTR-MCNC: 89 MG/DL (ref 70–99)
GLUCOSE BLDC GLUCOMTR-MCNC: 94 MG/DL (ref 70–99)
GLUCOSE SERPL-MCNC: 108 MG/DL (ref 65–99)
GLUCOSE UR STRIP-MCNC: ABNORMAL MG/DL
HCT VFR BLD AUTO: 40 % (ref 37.5–51)
HGB BLD-MCNC: 13.5 G/DL (ref 13–17.7)
HGB UR QL STRIP.AUTO: ABNORMAL
HYALINE CASTS UR QL AUTO: ABNORMAL /LPF
IMM GRANULOCYTES # BLD AUTO: 0.09 10*3/MM3 (ref 0–0.05)
IMM GRANULOCYTES NFR BLD AUTO: 0.8 % (ref 0–0.5)
KETONES UR QL STRIP: ABNORMAL
LEUKOCYTE ESTERASE UR QL STRIP.AUTO: ABNORMAL
LYMPHOCYTES # BLD AUTO: 1.37 10*3/MM3 (ref 0.7–3.1)
LYMPHOCYTES NFR BLD AUTO: 12.2 % (ref 19.6–45.3)
MAGNESIUM SERPL-MCNC: 2.8 MG/DL (ref 1.6–2.4)
MCH RBC QN AUTO: 30.1 PG (ref 26.6–33)
MCHC RBC AUTO-ENTMCNC: 33.8 G/DL (ref 31.5–35.7)
MCV RBC AUTO: 89.1 FL (ref 79–97)
MONOCYTES # BLD AUTO: 1.15 10*3/MM3 (ref 0.1–0.9)
MONOCYTES NFR BLD AUTO: 10.2 % (ref 5–12)
NEUTROPHILS NFR BLD AUTO: 75.3 % (ref 42.7–76)
NEUTROPHILS NFR BLD AUTO: 8.44 10*3/MM3 (ref 1.7–7)
NITRITE UR QL STRIP: NEGATIVE
NRBC BLD AUTO-RTO: 0 /100 WBC (ref 0–0.2)
PH UR STRIP.AUTO: <=5 [PH] (ref 5–8)
PHOSPHATE SERPL-MCNC: 5.2 MG/DL (ref 2.5–4.5)
PLAT MORPH BLD: NORMAL
PLATELET # BLD AUTO: 301 10*3/MM3 (ref 140–450)
PMV BLD AUTO: 9 FL (ref 6–12)
POTASSIUM SERPL-SCNC: 4.3 MMOL/L (ref 3.5–5.2)
POTASSIUM SERPL-SCNC: 4.4 MMOL/L (ref 3.5–5.2)
POTASSIUM SERPL-SCNC: 4.5 MMOL/L (ref 3.5–5.2)
PROT SERPL-MCNC: 6.6 G/DL (ref 6–8.5)
PROT UR QL STRIP: ABNORMAL
RBC # BLD AUTO: 4.49 10*6/MM3 (ref 4.14–5.8)
RBC # UR STRIP: ABNORMAL /HPF
REF LAB TEST METHOD: ABNORMAL
SODIUM SERPL-SCNC: 126 MMOL/L (ref 136–145)
SODIUM SERPL-SCNC: 126 MMOL/L (ref 136–145)
SODIUM SERPL-SCNC: 128 MMOL/L (ref 136–145)
SODIUM SERPL-SCNC: 128 MMOL/L (ref 136–145)
SODIUM SERPL-SCNC: 129 MMOL/L (ref 136–145)
SP GR UR STRIP: 1.02 (ref 1–1.03)
SQUAMOUS #/AREA URNS HPF: ABNORMAL /HPF
TOXIC GRANULATION: NORMAL
UROBILINOGEN UR QL STRIP: ABNORMAL
WBC # UR STRIP: ABNORMAL /HPF
WBC NRBC COR # BLD AUTO: 11.22 10*3/MM3 (ref 3.4–10.8)

## 2025-04-22 PROCEDURE — 82550 ASSAY OF CK (CPK): CPT | Performed by: INTERNAL MEDICINE

## 2025-04-22 PROCEDURE — 85007 BL SMEAR W/DIFF WBC COUNT: CPT | Performed by: FAMILY MEDICINE

## 2025-04-22 PROCEDURE — 83605 ASSAY OF LACTIC ACID: CPT | Performed by: INTERNAL MEDICINE

## 2025-04-22 PROCEDURE — 84295 ASSAY OF SERUM SODIUM: CPT | Performed by: INTERNAL MEDICINE

## 2025-04-22 PROCEDURE — 82948 REAGENT STRIP/BLOOD GLUCOSE: CPT | Performed by: STUDENT IN AN ORGANIZED HEALTH CARE EDUCATION/TRAINING PROGRAM

## 2025-04-22 PROCEDURE — 25810000003 SODIUM CHLORIDE 0.9 % SOLUTION: Performed by: INTERNAL MEDICINE

## 2025-04-22 PROCEDURE — 99233 SBSQ HOSP IP/OBS HIGH 50: CPT | Performed by: INTERNAL MEDICINE

## 2025-04-22 PROCEDURE — 84132 ASSAY OF SERUM POTASSIUM: CPT | Performed by: INTERNAL MEDICINE

## 2025-04-22 PROCEDURE — 80048 BASIC METABOLIC PNL TOTAL CA: CPT | Performed by: FAMILY MEDICINE

## 2025-04-22 PROCEDURE — 83735 ASSAY OF MAGNESIUM: CPT | Performed by: FAMILY MEDICINE

## 2025-04-22 PROCEDURE — 99231 SBSQ HOSP IP/OBS SF/LOW 25: CPT | Performed by: SURGERY

## 2025-04-22 PROCEDURE — 80076 HEPATIC FUNCTION PANEL: CPT | Performed by: FAMILY MEDICINE

## 2025-04-22 PROCEDURE — 97110 THERAPEUTIC EXERCISES: CPT

## 2025-04-22 PROCEDURE — 82948 REAGENT STRIP/BLOOD GLUCOSE: CPT

## 2025-04-22 PROCEDURE — 25010000002 HEPARIN (PORCINE) PER 1000 UNITS: Performed by: INTERNAL MEDICINE

## 2025-04-22 PROCEDURE — 85025 COMPLETE CBC W/AUTO DIFF WBC: CPT | Performed by: FAMILY MEDICINE

## 2025-04-22 PROCEDURE — 36415 COLL VENOUS BLD VENIPUNCTURE: CPT | Performed by: INTERNAL MEDICINE

## 2025-04-22 PROCEDURE — 84100 ASSAY OF PHOSPHORUS: CPT | Performed by: FAMILY MEDICINE

## 2025-04-22 PROCEDURE — 81001 URINALYSIS AUTO W/SCOPE: CPT | Performed by: INTERNAL MEDICINE

## 2025-04-22 RX ORDER — HEPARIN SODIUM 5000 [USP'U]/ML
5000 INJECTION, SOLUTION INTRAVENOUS; SUBCUTANEOUS EVERY 8 HOURS SCHEDULED
Status: DISCONTINUED | OUTPATIENT
Start: 2025-04-22 | End: 2025-04-23

## 2025-04-22 RX ORDER — TRAZODONE HYDROCHLORIDE 100 MG/1
100 TABLET ORAL NIGHTLY
Status: DISCONTINUED | OUTPATIENT
Start: 2025-04-22 | End: 2025-04-30 | Stop reason: HOSPADM

## 2025-04-22 RX ORDER — SODIUM CHLORIDE 9 MG/ML
100 INJECTION, SOLUTION INTRAVENOUS CONTINUOUS
Status: DISCONTINUED | OUTPATIENT
Start: 2025-04-22 | End: 2025-04-23

## 2025-04-22 RX ORDER — CITALOPRAM HYDROBROMIDE 20 MG/1
20 TABLET ORAL DAILY
Status: DISCONTINUED | OUTPATIENT
Start: 2025-04-22 | End: 2025-04-30 | Stop reason: HOSPADM

## 2025-04-22 RX ORDER — ATORVASTATIN CALCIUM 40 MG/1
80 TABLET, FILM COATED ORAL DAILY
Status: DISCONTINUED | OUTPATIENT
Start: 2025-04-22 | End: 2025-04-24

## 2025-04-22 RX ADMIN — SODIUM CHLORIDE 100 ML/HR: 9 INJECTION, SOLUTION INTRAVENOUS at 15:48

## 2025-04-22 RX ADMIN — Medication: at 20:47

## 2025-04-22 RX ADMIN — HEPARIN SODIUM 5000 UNITS: 5000 INJECTION INTRAVENOUS; SUBCUTANEOUS at 20:47

## 2025-04-22 RX ADMIN — Medication 10 ML: at 20:47

## 2025-04-22 RX ADMIN — SODIUM CHLORIDE 125 ML/HR: 9 INJECTION, SOLUTION INTRAVENOUS at 08:28

## 2025-04-22 RX ADMIN — HEPARIN SODIUM 5000 UNITS: 5000 INJECTION INTRAVENOUS; SUBCUTANEOUS at 15:19

## 2025-04-22 RX ADMIN — PANTOPRAZOLE SODIUM 40 MG: 40 INJECTION, POWDER, FOR SOLUTION INTRAVENOUS at 20:47

## 2025-04-22 RX ADMIN — PANTOPRAZOLE SODIUM 40 MG: 40 INJECTION, POWDER, FOR SOLUTION INTRAVENOUS at 08:33

## 2025-04-22 RX ADMIN — Medication 10 ML: at 08:28

## 2025-04-22 RX ADMIN — SODIUM CHLORIDE 125 ML/HR: 9 INJECTION, SOLUTION INTRAVENOUS at 00:08

## 2025-04-22 RX ADMIN — Medication: at 11:16

## 2025-04-22 NOTE — PROGRESS NOTES
" LOS: 7 days   Patient Care Team:  Magnus Euceda DO as PCP - General (Family Medicine)    Chief Complaint: Hyponatremia    Subjective     Pt without new events.  NGT in place hooked to wall suction.         History taken from: patient chart RN    Objective     Vital Sign Min/Max for last 24 hours  Temp  Min: 97.5 °F (36.4 °C)  Max: 98.4 °F (36.9 °C)   BP  Min: 100/54  Max: 139/59   Pulse  Min: 80  Max: 155   Resp  Min: 18  Max: 18   SpO2  Min: 91 %  Max: 97 %   Flow (L/min) (Oxygen Therapy)  Min: 1  Max: 2   Weight  Min: 107 kg (234 lb 12.6 oz)  Max: 107 kg (234 lb 12.6 oz)     Flowsheet Rows      Flowsheet Row First Filed Value   Admission Height 182.8 cm (71.97\") Documented at 04/15/2025 1719   Admission Weight 102 kg (223 lb 15.8 oz) Documented at 04/15/2025 1220            No intake/output data recorded.  I/O last 3 completed shifts:  In: 1500 [I.V.:1500]  Out: 1890 [Urine:1565; Emesis/NG output:325]    Objective:  General Appearance:  In no acute distress, not in pain and uncomfortable (Up in the chair.).    Vital signs: (most recent): Blood pressure 114/60, pulse 80, temperature 98.4 °F (36.9 °C), temperature source Oral, resp. rate 18, height 182.8 cm (71.97\"), weight 107 kg (234 lb 12.6 oz), SpO2 96%.  Vital signs are normal.  No fever.    Output: Minimal urine output and minimal stool output.    HEENT: Normal HEENT exam.  (ngt)    Lungs:  Normal effort and normal respiratory rate.  He is not in respiratory distress.    Heart: Normal rate.  Regular rhythm.    Abdomen: Abdomen is soft and distended.  Bowel sounds are normal.   There is no abdominal tenderness.     Extremities: Normal range of motion.  There is no dependent edema.  (Orozco catheter in place.)  Pulses: Distal pulses are intact.    Neurological: Patient is alert and oriented to person, place and time.    Pupils:  Pupils are equal, round, and reactive to light.    Skin:  Warm and dry.                Results Review:     I reviewed the patient's " "new clinical results.  I reviewed the patient's new imaging results and agree with the interpretation.  I reviewed the patient's other test results and agree with the interpretation    WBC WBC   Date Value Ref Range Status   04/22/2025 11.22 (H) 3.40 - 10.80 10*3/mm3 Final   04/21/2025 11.46 (H) 3.40 - 10.80 10*3/mm3 Final   04/20/2025 23.49 (H) 3.40 - 10.80 10*3/mm3 Final      HGB Hemoglobin   Date Value Ref Range Status   04/22/2025 13.5 13.0 - 17.7 g/dL Final   04/21/2025 13.4 13.0 - 17.7 g/dL Final   04/20/2025 16.7 13.0 - 17.7 g/dL Final      HCT Hematocrit   Date Value Ref Range Status   04/22/2025 40.0 37.5 - 51.0 % Final   04/21/2025 38.5 37.5 - 51.0 % Final   04/20/2025 47.9 37.5 - 51.0 % Final      Platlets No results found for: \"LABPLAT\"   MCV MCV   Date Value Ref Range Status   04/22/2025 89.1 79.0 - 97.0 fL Final   04/21/2025 84.4 79.0 - 97.0 fL Final   04/20/2025 84.8 79.0 - 97.0 fL Final          Sodium Sodium   Date Value Ref Range Status   04/22/2025 128 (L) 136 - 145 mmol/L Final   04/22/2025 128 (L) 136 - 145 mmol/L Final   04/22/2025 126 (L) 136 - 145 mmol/L Final   04/21/2025 126 (L) 136 - 145 mmol/L Final   04/21/2025 124 (L) 136 - 145 mmol/L Final   04/21/2025 122 (L) 136 - 145 mmol/L Final   04/20/2025 122 (L) 136 - 145 mmol/L Final   04/20/2025 120 (L) 136 - 145 mmol/L Final   04/20/2025 116 (C) 136 - 145 mmol/L Final   04/20/2025 119 (C) 136 - 145 mmol/L Final   04/19/2025 118 (C) 136 - 145 mmol/L Final   04/19/2025 117 (C) 136 - 145 mmol/L Final   04/19/2025 118 (C) 136 - 145 mmol/L Final      Potassium Potassium   Date Value Ref Range Status   04/22/2025 4.5 3.5 - 5.2 mmol/L Final   04/22/2025 4.5 3.5 - 5.2 mmol/L Final   04/22/2025 4.5 3.5 - 5.2 mmol/L Final   04/21/2025 4.6 3.5 - 5.2 mmol/L Final   04/21/2025 4.6 3.5 - 5.2 mmol/L Final     Comment:     Slight hemolysis detected by analyzer. Result may be falsely elevated.   04/21/2025 4.4 3.5 - 5.2 mmol/L Final   04/20/2025 4.6 3.5 - " "5.2 mmol/L Final   04/20/2025 4.7 3.5 - 5.2 mmol/L Final   04/20/2025 4.7 3.5 - 5.2 mmol/L Final     Comment:     Slight hemolysis detected by analyzer. Result may be falsely elevated.   04/20/2025 4.5 3.5 - 5.2 mmol/L Final   04/20/2025 4.5 3.5 - 5.2 mmol/L Final   04/19/2025 4.6 3.5 - 5.2 mmol/L Final     Comment:     Slight hemolysis detected by analyzer. Result may be falsely elevated.   04/19/2025 4.3 3.5 - 5.2 mmol/L Final   04/19/2025 4.4 3.5 - 5.2 mmol/L Final     Comment:     Slight hemolysis detected by analyzer. Result may be falsely elevated.      Chloride Chloride   Date Value Ref Range Status   04/22/2025 92 (L) 98 - 107 mmol/L Final   04/21/2025 84 (L) 98 - 107 mmol/L Final   04/20/2025 76 (L) 98 - 107 mmol/L Final      CO2 CO2   Date Value Ref Range Status   04/22/2025 17.5 (L) 22.0 - 29.0 mmol/L Final   04/21/2025 19.8 (L) 22.0 - 29.0 mmol/L Final   04/20/2025 19.1 (L) 22.0 - 29.0 mmol/L Final      BUN BUN   Date Value Ref Range Status   04/22/2025 75 (H) 8 - 23 mg/dL Final   04/21/2025 67 (H) 8 - 23 mg/dL Final   04/20/2025 39 (H) 8 - 23 mg/dL Final      Creatinine Creatinine   Date Value Ref Range Status   04/22/2025 3.33 (H) 0.76 - 1.27 mg/dL Final   04/21/2025 4.65 (H) 0.76 - 1.27 mg/dL Final   04/20/2025 2.27 (H) 0.76 - 1.27 mg/dL Final      Calcium Calcium   Date Value Ref Range Status   04/22/2025 8.8 8.6 - 10.5 mg/dL Final   04/21/2025 9.0 8.6 - 10.5 mg/dL Final   04/20/2025 10.4 8.6 - 10.5 mg/dL Final      PO4 No results found for: \"CAPO4\"   Albumin Albumin   Date Value Ref Range Status   04/22/2025 2.8 (L) 3.5 - 5.2 g/dL Final   04/21/2025 3.4 (L) 3.5 - 5.2 g/dL Final   04/20/2025 4.0 3.5 - 5.2 g/dL Final      Magnesium Magnesium   Date Value Ref Range Status   04/22/2025 2.8 (H) 1.6 - 2.4 mg/dL Final   04/21/2025 2.6 (H) 1.6 - 2.4 mg/dL Final   04/20/2025 2.4 1.6 - 2.4 mg/dL Final      Uric Acid No results found for: \"URICACID\"       Medication Review:   ammonium lactate, , Topical, 2 " times per day  [Held by provider] aspirin, 81 mg, Oral, Daily  [Held by provider] bumetanide, 1 mg, Oral, Daily  insulin regular, 2-7 Units, Subcutaneous, Q6H  [Held by provider] levothyroxine, 50 mcg, Oral, Q AM  pantoprazole, 40 mg, Intravenous, Q12H  [Held by provider] senna-docusate sodium, 2 tablet, Oral, BID  sodium chloride, 10 mL, Intravenous, Q12H          Assessment & Plan       Hyponatremia    Type 2 diabetes mellitus with hyperglycemia, without long-term current use of insulin    Onychomycosis    Onychocryptosis    PVD (peripheral vascular disease)    Foot pain, bilateral      Assessment & Plan  - Hyponatremia, possibly secondary to chlorthalidone, urine osmolality 271.  TSH okay.  No signs or symptoms of adrenal insufficiency.  Sodium is slowly better.    NPO with NGT and suction.   Repeat Uosm: 438  Orozco is back in.  Bumex on hold.   Continue NS at 125cc/hr and recheck Na and labs in AM.     -- ESVIN, better with IVF's.  Holding bumex.     - Chronic kidney disease stage III, baseline creatinine lately around 1.4-1.5.  Has bland UA and minimal proteinuria from 2 years ago.     - Hypokalemia, replaced and improved.  Magnesium is okay.    - Type 2 diabetes, per primary.    - Shortness of breath, cardiology planning stress test.    - Hypothyroidism, per primary.    - Abdominal distention, ileus on CT. per primary/surgery. CT noted.    - A flutter with tachycardia.    Abner Flores MD  04/22/25  07:39 EDT

## 2025-04-22 NOTE — THERAPY TREATMENT NOTE
Patient Name: Alberto Rachel  : 1960    MRN: [id number]                              Today's Date: 2025       Admit Date: 4/15/2025    Visit Dx:     ICD-10-CM ICD-9-CM   1. Hyponatremia  E87.1 276.1   2. Non-traumatic rhabdomyolysis  M62.82 728.88   3. ESVIN (acute kidney injury)  N17.9 584.9   4. Oropharyngeal dysphagia  R13.12 787.22   5. Decreased activities of daily living (ADL)  Z78.9 V49.89   6. Difficulty walking  R26.2 719.7     Patient Active Problem List   Diagnosis    Anxiety    Atherosclerosis of coronary artery bypass graft of native heart without angina pectoris    Essential hypertension    Depression    Mixed hyperlipidemia    Type 2 diabetes mellitus with hyperglycemia, without long-term current use of insulin    Hypothyroidism    Arthritis of knee    Illiteracy    Cramps, extremity    Tachycardia    Obesity (BMI 30-39.9)    Degenerative disc disease, lumbar    Primary insomnia    Gastroesophageal reflux disease without esophagitis    Close exposure to COVID-19 virus    Mild intermittent asthma    Candidal dermatitis    Bronchitis    Bilateral shoulder pain    Hyperkalemia    Stage 3a chronic kidney disease (CKD)    Hyponatremia    Onychomycosis    Onychocryptosis    PVD (peripheral vascular disease)    Foot pain, bilateral     Past Medical History:   Diagnosis Date    Anxiety     Arthritis     Atherosclerosis of coronary artery bypass graft of native heart without angina pectoris 10/23/2018    Coronary artery disease with previous bypass, LIMA to the LAD, SVG to ramus, and SVG to RCA in     CAD (coronary artery disease) 10/23/2018    Carotid artery stenosis with cerebral infarction 2015    Dr. St     Cramps, extremity 9/10/2021    Depression     DM2 (diabetes mellitus, type 2) 10/23/2018    Duodenal ulcer     Essential hypertension 10/23/2018    Headache     Heart attack 2015    Heartburn     HLD (hyperlipidemia)     HTN (hypertension)     Injury of right leg 2021     Irregular heart beat     Low back pain 03/18/2019    Mild episode of recurrent depressive disorder 10/23/2018    Rash 03/18/2019    Shortness of breath     SOB (shortness of breath)     Type 2 diabetes mellitus with hyperglycemia, without long-term current use of insulin 06/11/2019    Uncontrolled diabetes mellitus 03/18/2019     Past Surgical History:   Procedure Laterality Date    CARDIAC CATHETERIZATION  06/2015    Dr. St     CIRCUMCISION      age 14 yrs old    CORONARY ARTERY BYPASS GRAFT  06/2015    5 bypasses      General Information       Row Name 04/22/25 1056          OT Time and Intention    Document Type therapy note (daily note)  -AV     Mode of Treatment individual therapy;occupational therapy  -AV       Row Name 04/22/25 1056          General Information    Existing Precautions/Restrictions fall;NPO  -AV       Row Name 04/22/25 1056          Cognition    Orientation Status (Cognition) --  alert, pleasant and cooperative. able to follow commands.  -AV       Row Name 04/22/25 1056          Safety Issues/Impairments Affecting Functional Mobility    Impairments Affecting Function (Mobility) balance;endurance/activity tolerance;strength  -AV               User Key  (r) = Recorded By, (t) = Taken By, (c) = Cosigned By      Initials Name Provider Type    AV Rigo Chambers OT Occupational Therapist                     Mobility/ADL's    No documentation.                  Obj/Interventions       Row Name 04/22/25 1057          Shoulder (Therapeutic Exercise)    Shoulder (Therapeutic Exercise) AAROM (active assistive range of motion)  -AV     Shoulder AAROM (Therapeutic Exercise) bilateral;flexion;aBduction;aDduction;10 repetitions  -AV       Row Name 04/22/25 1057          Elbow/Forearm (Therapeutic Exercise)    Elbow/Forearm (Therapeutic Exercise) AAROM (active assistive range of motion)  -AV     Elbow/Forearm AAROM (Therapeutic Exercise) bilateral;flexion;extension;supination;pronation;10 repetitions  -AV        Row Name 04/22/25 1057          Motor Skills    Therapeutic Exercise shoulder;elbow/forearm  -AV               User Key  (r) = Recorded By, (t) = Taken By, (c) = Cosigned By      Initials Name Provider Type    Rigo Rush OT Occupational Therapist                   Goals/Plan    No documentation.                  Clinical Impression       [facility] Name 04/22/25 1058          Pain Scale: FACES Pre/Post-Treatment    Pain: FACES Scale, Pretreatment 0-->no hurt  -AV     Posttreatment Pain Rating 0-->no hurt  -AV       [facility] Name 04/22/25 1058          Plan of Care Review    Progress no change  -AV     Outcome Evaluation Patient performed upper extremity AAROM with good active participation. Continued OT is indicated to remediate/ compensate for deficits to maximize independence with ADL and functional transfers.  -AV       Row Name 04/22/25 1058          Vital Signs    O2 Delivery Pre Treatment nasal cannula  -AV     O2 Delivery Intra Treatment nasal cannula  -AV     O2 Delivery Post Treatment nasal cannula  -AV       [facility] Name 04/22/25 1058          Positioning and Restraints    Pre-Treatment Position in bed  -AV     Post Treatment Position bed  -AV     In Bed call light within reach;encouraged to call for assist  -AV               User Key  (r) = Recorded By, (t) = Taken By, (c) = Cosigned By      Initials Name Provider Type    Rigo Rush OT Occupational Therapist                   Outcome Measures       Row Name 04/22/25 1101          How much help from another is currently needed...    Putting on and taking off regular lower body clothing? 2  -AV     Bathing (including washing, rinsing, and drying) 2  -AV     Toileting (which includes using toilet bed pan or urinal) 1  -AV     Putting on and taking off regular upper body clothing 3  -AV     Taking care of personal grooming (such as brushing teeth) 3  -AV     Eating meals 4  -AV     AM-PAC 6 Clicks Score (OT) 15  -AV       Row Name 04/22/25 2494           How much help from another person do you currently need...    Turning from your back to your side while in flat bed without using bedrails? 3  -AP     Moving from lying on back to sitting on the side of a flat bed without bedrails? 3  -AP     Moving to and from a bed to a chair (including a wheelchair)? 3  -AP     Standing up from a chair using your arms (e.g., wheelchair, bedside chair)? 3  -AP     Climbing 3-5 steps with a railing? 2  -AP     To walk in hospital room? 3  -AP     AM-PAC 6 Clicks Score (PT) 17  -AP     Highest Level of Mobility Goal 5 --> Static standing  -AP       Row Name 04/22/25 1101          Optimal Instrument    Bending/Stooping 4  -AV     Standing 3  -AV     Reaching 1  -AV               User Key  (r) = Recorded By, (t) = Taken By, (c) = Cosigned By      Initials Name Provider Type    Rigo Rush OT Occupational Therapist    Kayce Bowers RN Registered Nurse                      OT Recommendation and Plan     Plan of Care Review  Progress: no change  Outcome Evaluation: Patient performed upper extremity AAROM with good active participation. Continued OT is indicated to remediate/ compensate for deficits to maximize independence with ADL and functional transfers.     Time Calculation:         Time Calculation- OT       Row Name 04/22/25 1103             Time Calculation- OT    OT Received On 04/22/25  -AV      OT Goal Re-Cert Due Date 04/26/25  -AV         Timed Charges    08710 - OT Therapeutic Exercise Minutes 10  -AV         Total Minutes    Timed Charges Total Minutes 10  -AV       Total Minutes 10  -AV                User Key  (r) = Recorded By, (t) = Taken By, (c) = Cosigned By      Initials Name Provider Type    Rigo Rush OT Occupational Therapist                  Therapy Charges for Today       Code Description Service Date Service Provider Modifiers Qty    [id number] HC OT THER PROC EA 15 MIN 4/22/2025 Rigo Chambers OT GO 1                 Rigo Chambers  OT  4/22/2025

## 2025-04-22 NOTE — PLAN OF CARE
Goal Outcome Evaluation:  Plan of Care Reviewed With: patient, child        Progress: improving  Outcome Evaluation: Patient AOx4. Got up in chair today with standby assist and walker. NG still at LWS. Surgery stated patient was okay to have ice chips. I's and O's monitored. Continuing plan of care.

## 2025-04-22 NOTE — PROGRESS NOTES
[facility]   Hospitalist Progress Note    Date of admission: 4/15/2025  Patient Name: Alberto Rachel  1960  Date: 4/22/2025      Subjective     Chief Complaint   Patient presents with    Altered Mental Status       Interval Followup: Passing flatus no bowel movement, denies nausea or vomiting.  Tolerating NG still      Objective     Vitals:   Temp:  [97.5 °F (36.4 °C)-98.4 °F (36.9 °C)] 97.5 °F (36.4 °C)  Heart Rate:  [] 80  Resp:  [18] 18  BP: (100-139)/(51-69) 110/55  Flow (L/min) (Oxygen Therapy):  [1-2] 2    Physical Exam  Awake, conversant, poor health literacy  CTAB slightly diminished in bases however on nasal cannula partially in his nose  Currently sinus and regular at time of my eval  Obese abdomen distended but soft not acutely tender hypoactive bowel sounds    Result Review:  Vital signs, labs and recent relevant imaging reviewed.      CBC          4/20/2025    05:54 4/21/2025    05:21 4/22/2025    05:54   CBC   WBC 23.49  11.46  11.22    RBC 5.65  4.56  4.49    Hemoglobin 16.7  13.4  13.5    Hematocrit 47.9  38.5  40.0    MCV 84.8  84.4  89.1    MCH 29.6  29.4  30.1    MCHC 34.9  34.8  33.8    RDW 13.2  13.6  13.8    Platelets 418  308  301      CMP          4/20/2025    05:54 4/20/2025    12:23 4/20/2025    18:08 4/20/2025    23:51 4/21/2025    05:21 4/21/2025    12:00 4/21/2025    17:55 4/22/2025    00:08 4/22/2025    05:54   CMP   Glucose 189     128     108    BUN 39     67     75    Creatinine 2.27     4.65     3.33    EGFR 31.0     13.1     19.6    Sodium 119  116  120  122  122  124  126  126  128     128    Potassium 4.5     4.5  4.7  4.7  4.6  4.4  4.6  4.6  4.5  4.5     4.5    Chloride 76     84     92    Calcium 10.4     9.0     8.8    Total Protein 8.2     6.5     6.6    Albumin 4.0     3.4     2.8    Total Bilirubin 1.1     0.9     1.1    Alkaline Phosphatase 73     52     55    AST (SGOT) 32     25     26    ALT (SGPT) 31     23     23    BUN/Creatinine Ratio 17.2      14.4     22.5    Anion Gap 23.9     18.2     18.5          [Held by provider] acetaminophen **OR** [Held by provider] acetaminophen    albuterol    [Held by provider] senna-docusate sodium **AND** [Held by provider] polyethylene glycol **AND** [Held by provider] bisacodyl **AND** [Held by provider] bisacodyl    dextrose    dextrose    glucagon (human recombinant)    metoprolol tartrate    [Held by provider] nitroglycerin    ondansetron ODT **OR** ondansetron    sodium chloride    sodium chloride    sodium chloride    [Held by provider] traMADol    ammonium lactate, , Topical, 2 times per day  [Held by provider] aspirin, 81 mg, Oral, Daily  [Held by provider] atorvastatin, 80 mg, Oral, Daily  [Held by provider] bumetanide, 1 mg, Oral, Daily  [Held by provider] citalopram, 20 mg, Oral, Daily  heparin (porcine), 5,000 Units, Subcutaneous, Q8H  insulin regular, 2-7 Units, Subcutaneous, Q6H  [Held by provider] levothyroxine, 50 mcg, Oral, Q AM  pantoprazole, 40 mg, Intravenous, Q12H  [Held by provider] senna-docusate sodium, 2 tablet, Oral, BID  sodium chloride, 10 mL, Intravenous, Q12H  [Held by provider] traZODone, 100 mg, Oral, Nightly        XR Abdomen KUB  Result Date: 4/21/2025  Impression: 1.Esophagogastric tube with tip in the proximal to mid stomach. 2.Diffuse gaseous distention and dilatation of small bowel loops suggesting small bowel obstruction. Electronically Signed: Heriberto Casillas MD  4/21/2025 4:23 PM EDT  Workstation ID: [id number]    XR Abdomen KUB  Result Date: 4/21/2025  Impression: High-grade partial small bowel obstruction. Electronically Signed: Dino Dailey MD  4/21/2025 7:44 AM EDT  Workstation ID: [id number]    XR Abdomen KUB  Result Date: 4/20/2025  Impression: Stable intestinal distention Electronically Signed: Gian Marcus  4/20/2025 2:30 PM EDT  Workstation ID: [id number]    XR Abdomen KUB  Result Date: 4/20/2025  Impression: NG/OG tube courses into the stomach with the tip in the region of the  gastric fundus. Bowel gas pattern suggestive of ileus or bowel obstruction. Electronically Signed: Abner Dixon MD  4/20/2025 10:03 AM EDT  Workstation ID: [id number]    XR Abdomen KUB  Result Date: 4/20/2025  Gas-distended bowel persists. The findings suggest a generalized adynamic ileus.   Portions of this note were completed with a voice recognition program.  4/20/2025 5:57 AM by Donta Mera MD on Workstation: [employer]      CT Abdomen Pelvis Without Contrast  Result Date: 4/19/2025  1.  Distended stomach, small bowel, and colon are identified. There is a transition zone at about the level of the splenic flexure of colon. These findings are thought most likely to represent a generalized adynamic ileus with focal spasm at the level of the splenic flexure of colon. 2.  There is residual oral contrast agent present, likely related to the recent modified barium swallow (from 4/16/2025) within the descending colon and rectosigmoid colon, which is against a complete mechanical bowel obstruction. 3.  No acute appendicitis or diverticulitis is seen. No pneumoperitoneum. No pneumatosis or portal or mesenteric venous gas. No significant stool burden is suggested. 4.  The study is limited, especially due to motion artifact. 5.  Consider close interval clinical, lab, and if necessary, imaging follow-up to ensure benign progression. 6.  No other definite significant acute findings are appreciated. 7.  Please see above comments for further detail.    Portions of this note were completed with a voice recognition program.  4/19/2025 11:08 PM by Donta Mera MD on Workstation: [employer]      XR Abdomen KUB  Result Date: 4/19/2025  Impression: Dilated small bowel loops that could relate to a small bowel obstruction. CT would be suggested to reassess. Electronically Signed: Harvey Issa MD  4/19/2025 2:56 PM EDT  Workstation ID: [id number]    FL Video Swallow With Speech Single Contrast  Result Date: 4/16/2025  Impression: 1. No  tracheal aspiration or laryngeal penetration observed Please consult speech pathology report for further details regarding the exam and for dietary recommendations. Report dictated by: Racquel Nette  I have personally reviewed this case and agree with the findings above: Electronically Signed: Taco Kennedy MD  4/16/2025 4:20 PM EDT  Workstation ID: [id number]    CT Head Without Contrast  Result Date: 4/15/2025  Impression: 1.No acute intracranial abnormality identified by noncontrast CT. 2.Mild parenchymal volume loss and probable chronic small vessel ischemic change. Electronically Signed: Heriberto Casillas MD  4/15/2025 1:48 PM EDT  Workstation ID: [id number]    XR Chest 1 View  Result Date: 4/15/2025  Impression: Suspected mild left basilar atelectasis. Electronically Signed: Theresa Mancilla MD  4/15/2025 1:00 PM EDT  Workstation ID: [id number]      Assessment / Plan     Summary: 64 y.o. male with history of anxiety, hypertension, CAD status post bypass, history of stroke, carotid artery stenosis, depression, diabetes, duodenal ulcer, hypertension, dyslipidemia, diabetes mellitus, hospitalized on 4/15/2025 chief complaint of altered mental status, multiple falls, found to have significant life-threatening hyponatremia, nephrology consulted, placed in the critical care unit with critical care consulted, given a round of hypertonic saline, placed on IV fluids several home medications held in the setting of severe hyponatremia including chlorthalidone, Bumex, Abilify, Celexa among others. Cardiology consulted elevated troponins. Planning nuclear stress test. Transferred out of ICU. Started developing distention despite passing gas and having bowel movements, abdominal distention worse, x-ray showing small bowel obstruction pattern, CT imaging obtained, showing gastric, small and large bowel distention concerning for ileus versus versus bowel spasm and peristalsis, could have Abel's, NG tube placed to low intermittent  suction, general surgery consulted     Assessment/Plan (clinically significant if listed here)  Acute life-threatening hyponatremia, hypoosmolar  High-grade small bowel obstruction with question ileus vs Abel  Acute metabolic encephalopathy  ESVIN, question prerenal/dehydration related, on CKD3 b/l near ~1.4 to 1.5 most recently  Rhabdomyolysis  AGMA   New paroxysmal Aflutter  CAD with history of CABG, with elevated troponins likely type II MI secondary to hyponatremia  Asthma mild intermittent  DM2  Depression/Anxiety  Hx of HTN with low normal BP here  Hypothyroidism  History of stroke and Carotid artery stenosis  History of duodenal ulcer    Cont NG, sips of ice chips allowed today per surgery, contrast passing to distal colon on prior imaging, no acute surgical plan currently based on current symptoms  Continue with additional ivf with ns, sodium improving, cont to trend monitor volume status closely. Monitor creatinine  Appreciate nephro assistance  will have to see about partially resuming reduced home psych regimen if able to tolerate/monitor for withdrawal symptoms - has been off meds since admission at this point   check lactic acid within normal limits  Iv metoprolol prn for tachycardia / paflutter, as per cards  UA yesterday does have trace ketones and glucosuria suspect mild starvation component,   Repeat UA w/ reflex as catheter was only just placed.  Jardiance been on hold for several days at this point/since admission  Chlorthalidone on hold in setting of hyponatremia, multiple psych meds on hold as well-Abilify, trazodone, Celexa  paroxysmal aflutter, AC on hold in case of surgical need but none currently noted per surgery.  Will place on heparin dvt ppx for now while monitoring. Seemed largely transient on tele from 4/21, cont to monitor   Cards recs reviewed   Monitor blood pressure, telmisartan and chlorthalidone Home meds on hold.  Bumex on hold as well receiving fluids as above  Continue SSI.   Home metformin, pioglitazone on hold  Continue Synthroid once stable, TSH within normal limits  cont aspirin/statin once able  PT/OT  Check a.m. CBC, BMP, magnesium, phosphorus    Dispo: possibly back with HH/family.  Declined rehab placement previously once medically stable.   D/w sw     VTE Prophylaxis:  Pharmacologic & mechanical VTE prophylaxis orders are present.      Code Status (Patient has no pulse and is not breathing): CPR (Attempt to Resuscitate)  Medical Interventions (Patient has pulse or is breathing): Full Support

## 2025-04-22 NOTE — PROGRESS NOTES
[facility]     Progress Note    Patient Name: Alberto Rachel  : 1960  MRN: [id number]  Primary Care Physician:  Magnus Euceda DO  Date of admission: 4/15/2025    Subjective   Subjective     Chief Complaint: Abdominal distention    HPI:  Patient sleeping at time of rounds      Objective   Objective     Vitals:   Temp:  [97.5 °F (36.4 °C)-98.4 °F (36.9 °C)] 97.5 °F (36.4 °C)  Heart Rate:  [] 80  Resp:  [18] 18  BP: (100-139)/(51-69) 110/55  Flow (L/min) (Oxygen Therapy):  [1-2] 2    Physical Exam  Constitutional:       Appearance: Normal appearance.   Cardiovascular:      Rate and Rhythm: Normal rate.   Pulmonary:      Effort: Pulmonary effort is normal.   Abdominal:      General: There is distension.         Result Review    Result Review:  I have personally reviewed the results from the time of this admission to 2025 09:15 EDT and agree with these findings:  []  Laboratory  []  Microbiology  []  Radiology  []  EKG/Telemetry   []  Cardiology/Vascular   []  Pathology  []  Old records  []  Other:      Assessment & Plan   Assessment / Plan     Brief Patient Summary:  Alberto Rachel is a 64 y.o. male who has possible colonic stricture versus obstruction or Abel's    Active Hospital Problems:  Active Hospital Problems    Diagnosis     **Hyponatremia     Onychomycosis     Onychocryptosis     PVD (peripheral vascular disease)     Foot pain, bilateral     Type 2 diabetes mellitus with hyperglycemia, without long-term current use of insulin      Plan:  X-ray from yesterday shows distended small bowel  However, CT shows no evidence of small bowel obstruction and adynamic ileus with a question of obstruction at the splenic flexure of the sigmoid colon  However, there is contrast in the distal colon which is against complete obstruction  He has been having bowel function  Any sign of any mechanical issue is in the colon  However, given his continued bowel function I do not think there is any  obvious surgical or procedural intervention necessary at this time       VTE Prophylaxis:  Mechanical VTE prophylaxis orders are present.        CODE STATUS:   Code Status (Patient has no pulse and is not breathing): CPR (Attempt to Resuscitate)  Medical Interventions (Patient has pulse or is breathing): Full Support    Disposition:  I expect patient to be discharged unsure.    Electronically signed by Dino Cardenas MD, 04/22/25, 9:15 AM EDT.

## 2025-04-22 NOTE — SIGNIFICANT NOTE
04/22/25 0643   OTHER   Discipline speech language pathologist   Rehab Time/Intention   Session Not Performed patient unavailable for treatment   Recommendations   SLP - Next Appointment 04/23/25     NPO due to distended abdomen. SLP to continue to follow.

## 2025-04-22 NOTE — PLAN OF CARE
Goal Outcome Evaluation:  Plan of Care Reviewed With: patient        Progress: improving  Outcome Evaluation: Pt NG tube hooked up to low wall suction. 200cc yellow/brown fluid out. VSS. 2L oxygen per NC. HOB at 35 degrees. Abdomen distended. Denies pain or shortness of breath. NS @ 125ml'hr continuous. Call light in reach. Pt remains NPO.

## 2025-04-23 ENCOUNTER — APPOINTMENT (OUTPATIENT)
Dept: GENERAL RADIOLOGY | Facility: HOSPITAL | Age: 65
End: 2025-04-23
Payer: MEDICARE

## 2025-04-23 LAB
ANION GAP SERPL CALCULATED.3IONS-SCNC: 14.6 MMOL/L (ref 5–15)
ANISOCYTOSIS BLD QL: ABNORMAL
BUN SERPL-MCNC: 54 MG/DL (ref 8–23)
BUN/CREAT SERPL: 34.2 (ref 7–25)
BURR CELLS BLD QL SMEAR: ABNORMAL
CALCIUM SPEC-SCNC: 8.8 MG/DL (ref 8.6–10.5)
CHLORIDE SERPL-SCNC: 101 MMOL/L (ref 98–107)
CO2 SERPL-SCNC: 18.4 MMOL/L (ref 22–29)
CREAT SERPL-MCNC: 1.58 MG/DL (ref 0.76–1.27)
DEPRECATED RDW RBC AUTO: 44.6 FL (ref 37–54)
DOHLE BOD BLD QL SMEAR: PRESENT
EGFRCR SERPLBLD CKD-EPI 2021: 47.9 ML/MIN/1.73
EOSINOPHIL # BLD MANUAL: 0.21 10*3/MM3 (ref 0–0.4)
EOSINOPHIL NFR BLD MANUAL: 2 % (ref 0.3–6.2)
ERYTHROCYTE [DISTWIDTH] IN BLOOD BY AUTOMATED COUNT: 14 % (ref 12.3–15.4)
GLUCOSE BLDC GLUCOMTR-MCNC: 73 MG/DL (ref 70–99)
GLUCOSE BLDC GLUCOMTR-MCNC: 76 MG/DL (ref 70–99)
GLUCOSE SERPL-MCNC: 83 MG/DL (ref 65–99)
HCT VFR BLD AUTO: 35.8 % (ref 37.5–51)
HGB BLD-MCNC: 12.3 G/DL (ref 13–17.7)
LARGE PLATELETS: ABNORMAL
LYMPHOCYTES # BLD MANUAL: 0.85 10*3/MM3 (ref 0.7–3.1)
LYMPHOCYTES NFR BLD MANUAL: 9 % (ref 5–12)
MAGNESIUM SERPL-MCNC: 2.4 MG/DL (ref 1.6–2.4)
MCH RBC QN AUTO: 29.9 PG (ref 26.6–33)
MCHC RBC AUTO-ENTMCNC: 34.4 G/DL (ref 31.5–35.7)
MCV RBC AUTO: 86.9 FL (ref 79–97)
MONOCYTES # BLD: 0.96 10*3/MM3 (ref 0.1–0.9)
MYELOCYTES NFR BLD MANUAL: 1 % (ref 0–0)
NEUTROPHILS # BLD AUTO: 8.5 10*3/MM3 (ref 1.7–7)
NEUTROPHILS NFR BLD MANUAL: 80 % (ref 42.7–76)
NT-PROBNP SERPL-MCNC: 1058 PG/ML (ref 0–900)
PHOSPHATE SERPL-MCNC: 2.6 MG/DL (ref 2.5–4.5)
PLATELET # BLD AUTO: 301 10*3/MM3 (ref 140–450)
PMV BLD AUTO: 8.5 FL (ref 6–12)
POTASSIUM SERPL-SCNC: 4.3 MMOL/L (ref 3.5–5.2)
POTASSIUM SERPL-SCNC: 4.3 MMOL/L (ref 3.5–5.2)
POTASSIUM SERPL-SCNC: 4.6 MMOL/L (ref 3.5–5.2)
POTASSIUM SERPL-SCNC: 4.8 MMOL/L (ref 3.5–5.2)
RBC # BLD AUTO: 4.12 10*6/MM3 (ref 4.14–5.8)
SCAN SLIDE: NORMAL
SMALL PLATELETS BLD QL SMEAR: ADEQUATE
SODIUM SERPL-SCNC: 131 MMOL/L (ref 136–145)
SODIUM SERPL-SCNC: 134 MMOL/L (ref 136–145)
SODIUM SERPL-SCNC: 134 MMOL/L (ref 136–145)
VARIANT LYMPHS NFR BLD MANUAL: 8 % (ref 19.6–45.3)
WBC NRBC COR # BLD AUTO: 10.63 10*3/MM3 (ref 3.4–10.8)

## 2025-04-23 PROCEDURE — 25010000002 HEPARIN (PORCINE) PER 1000 UNITS: Performed by: INTERNAL MEDICINE

## 2025-04-23 PROCEDURE — 82948 REAGENT STRIP/BLOOD GLUCOSE: CPT

## 2025-04-23 PROCEDURE — 74018 RADEX ABDOMEN 1 VIEW: CPT

## 2025-04-23 PROCEDURE — 85007 BL SMEAR W/DIFF WBC COUNT: CPT | Performed by: INTERNAL MEDICINE

## 2025-04-23 PROCEDURE — 84132 ASSAY OF SERUM POTASSIUM: CPT | Performed by: INTERNAL MEDICINE

## 2025-04-23 PROCEDURE — 97110 THERAPEUTIC EXERCISES: CPT

## 2025-04-23 PROCEDURE — 99233 SBSQ HOSP IP/OBS HIGH 50: CPT | Performed by: INTERNAL MEDICINE

## 2025-04-23 PROCEDURE — 99231 SBSQ HOSP IP/OBS SF/LOW 25: CPT | Performed by: SURGERY

## 2025-04-23 PROCEDURE — 83880 ASSAY OF NATRIURETIC PEPTIDE: CPT | Performed by: INTERNAL MEDICINE

## 2025-04-23 PROCEDURE — 84295 ASSAY OF SERUM SODIUM: CPT | Performed by: INTERNAL MEDICINE

## 2025-04-23 PROCEDURE — 97530 THERAPEUTIC ACTIVITIES: CPT

## 2025-04-23 PROCEDURE — 80048 BASIC METABOLIC PNL TOTAL CA: CPT | Performed by: INTERNAL MEDICINE

## 2025-04-23 PROCEDURE — 71045 X-RAY EXAM CHEST 1 VIEW: CPT

## 2025-04-23 PROCEDURE — 25810000003 SODIUM CHLORIDE 0.9 % SOLUTION: Performed by: INTERNAL MEDICINE

## 2025-04-23 PROCEDURE — 85025 COMPLETE CBC W/AUTO DIFF WBC: CPT | Performed by: INTERNAL MEDICINE

## 2025-04-23 PROCEDURE — 25810000003 LACTATED RINGERS PER 1000 ML: Performed by: INTERNAL MEDICINE

## 2025-04-23 PROCEDURE — 83735 ASSAY OF MAGNESIUM: CPT | Performed by: INTERNAL MEDICINE

## 2025-04-23 PROCEDURE — 84100 ASSAY OF PHOSPHORUS: CPT | Performed by: INTERNAL MEDICINE

## 2025-04-23 RX ORDER — ARIPIPRAZOLE 10 MG/1
10 TABLET ORAL DAILY
Status: DISCONTINUED | OUTPATIENT
Start: 2025-04-23 | End: 2025-04-30 | Stop reason: HOSPADM

## 2025-04-23 RX ORDER — SODIUM CHLORIDE, SODIUM LACTATE, POTASSIUM CHLORIDE, CALCIUM CHLORIDE 600; 310; 30; 20 MG/100ML; MG/100ML; MG/100ML; MG/100ML
100 INJECTION, SOLUTION INTRAVENOUS CONTINUOUS
Status: DISCONTINUED | OUTPATIENT
Start: 2025-04-23 | End: 2025-04-24

## 2025-04-23 RX ORDER — METOPROLOL TARTRATE 25 MG/1
12.5 TABLET, FILM COATED ORAL EVERY 12 HOURS SCHEDULED
Status: COMPLETED | OUTPATIENT
Start: 2025-04-23 | End: 2025-04-25

## 2025-04-23 RX ADMIN — Medication 10 ML: at 10:09

## 2025-04-23 RX ADMIN — PANTOPRAZOLE SODIUM 40 MG: 40 INJECTION, POWDER, FOR SOLUTION INTRAVENOUS at 20:35

## 2025-04-23 RX ADMIN — METOPROLOL TARTRATE 12.5 MG: 25 TABLET, FILM COATED ORAL at 20:16

## 2025-04-23 RX ADMIN — HEPARIN SODIUM 5000 UNITS: 5000 INJECTION INTRAVENOUS; SUBCUTANEOUS at 15:32

## 2025-04-23 RX ADMIN — SODIUM CHLORIDE 100 ML/HR: 9 INJECTION, SOLUTION INTRAVENOUS at 01:17

## 2025-04-23 RX ADMIN — SODIUM CHLORIDE, POTASSIUM CHLORIDE, SODIUM LACTATE AND CALCIUM CHLORIDE 100 ML/HR: 600; 310; 30; 20 INJECTION, SOLUTION INTRAVENOUS at 10:11

## 2025-04-23 RX ADMIN — PANTOPRAZOLE SODIUM 40 MG: 40 INJECTION, POWDER, FOR SOLUTION INTRAVENOUS at 10:09

## 2025-04-23 RX ADMIN — METOPROLOL TARTRATE 2.5 MG: 1 INJECTION, SOLUTION INTRAVENOUS at 12:35

## 2025-04-23 RX ADMIN — HEPARIN SODIUM 5000 UNITS: 5000 INJECTION INTRAVENOUS; SUBCUTANEOUS at 05:25

## 2025-04-23 RX ADMIN — Medication: at 20:19

## 2025-04-23 RX ADMIN — Medication: at 10:12

## 2025-04-23 RX ADMIN — ARIPIPRAZOLE 10 MG: 10 TABLET ORAL at 18:04

## 2025-04-23 NOTE — PROGRESS NOTES
[facility]   Progress Note    Patient Name: Alberto Rachel  : 1960  MRN: [id number]  Primary Care Physician: Magnus Euceda DO  Date of admission: 4/15/2025    Subjective   Subjective     HPI:  Patient feels better reports no abdominal pain he indicated he passed some gas today, yesterday he had a bowel movement, at present time is having some ice chips    Review of Systems  Review of Systems   Constitutional: Negative.    HENT: Negative.     Eyes: Negative.    Respiratory: Negative.     Cardiovascular: Negative.    Gastrointestinal:         Abdominal distention is better and the abdomen is much softer   Genitourinary: Negative.    Musculoskeletal: Negative.    Skin: Negative.    Neurological: Negative.    Psychiatric/Behavioral:          Difficult to evaluate       Objective   Objective     Vitals:  Temp:  [97.5 °F (36.4 °C)-98.8 °F (37.1 °C)] 98.8 °F (37.1 °C)  Heart Rate:  [] 80  Resp:  [18] 18  BP: (110-139)/(55-60) 113/55  Flow (L/min) (Oxygen Therapy):  [2] 2    Physical Exam:  Physical Exam  Constitutional:       Appearance: He is obese.   HENT:      Head: Normocephalic.      Nose: Nose normal.   Eyes:      Extraocular Movements: Extraocular movements intact.   Cardiovascular:      Rate and Rhythm: Normal rate and regular rhythm.      Heart sounds: No murmur heard.  Pulmonary:      Breath sounds: No rales.   Abdominal:      General: There is distension.      Palpations: Abdomen is soft.   Musculoskeletal:      Right lower leg: No edema.      Left lower leg: No edema.   Skin:     General: Skin is warm.   Neurological:      General: No focal deficit present.      Mental Status: He is alert.   Psychiatric:      Comments: Difficult to evaluate         Result Review    Result Review:  I have personally reviewed the results from the time of this admission to 25 9:22 PM EDT and agree with these findings:  [x]  Laboratory  []  Microbiology  []  Radiology  [x]  EKG/Telemetry   []   Cardiology/Vascular   []  Pathology  []  Old records  []  Other:    Most notable findings include: Patient with history of coronary artery disease status post coronary artery bypass surgery about 15 years ago, not followed regularly, was admitted to the hospital because of confusion having falls at home with electrolyte imbalance during the hospital course patient developed ESVIN, ileus that required drainage, currently clinically appears better patient also developed atrial flutter today he was noted to be on sinus, patient was evaluated by GI and surgery appears to be clinically improving, having some ice chips.  Sodium still 126 BUN and creatinine appear to be slowly improving.  H&H is normal, CK normalized.  Liver enzymes normalized patient has history of ethanol abuse.        Assessment & Plan   Assessment / Plan     Active Hospital Problems:  Active Hospital Problems    Diagnosis     **Hyponatremia     Onychomycosis     Onychocryptosis     PVD (peripheral vascular disease)     Foot pain, bilateral     Type 2 diabetes mellitus with hyperglycemia, without long-term current use of insulin        Plan:   Continue observation as per GI, internal medicine    DVT prophylaxis: As per internal medicine    CODE STATUS:    Code Status and Medical Interventions: CPR (Attempt to Resuscitate); Full Support   Ordered at: 04/15/25 1554     Code Status (Patient has no pulse and is not breathing):    CPR (Attempt to Resuscitate)     Medical Interventions (Patient has pulse or is breathing):    Full Support       Disposition:  I expect patient to be discharged when patient gets better.    Electronically signed by Jacques St MD, 04/22/25, 9:22 PM EDT.

## 2025-04-23 NOTE — THERAPY TREATMENT NOTE
Acute Care - Physical Therapy Treatment Note  [facility]     Patient Name: Alberto Rachel  : 1960  MRN: [id number]  Today's Date: 2025      Visit Dx:     ICD-10-CM ICD-9-CM   1. Hyponatremia  E87.1 276.1   2. Non-traumatic rhabdomyolysis  M62.82 728.88   3. ESVIN (acute kidney injury)  N17.9 584.9   4. Oropharyngeal dysphagia  R13.12 787.22   5. Decreased activities of daily living (ADL)  Z78.9 V49.89   6. Difficulty walking  R26.2 719.7     Patient Active Problem List   Diagnosis    Anxiety    Atherosclerosis of coronary artery bypass graft of native heart without angina pectoris    Essential hypertension    Depression    Mixed hyperlipidemia    Type 2 diabetes mellitus with hyperglycemia, without long-term current use of insulin    Hypothyroidism    Arthritis of knee    Illiteracy    Cramps, extremity    Tachycardia    Obesity (BMI 30-39.9)    Degenerative disc disease, lumbar    Primary insomnia    Gastroesophageal reflux disease without esophagitis    Close exposure to COVID-19 virus    Mild intermittent asthma    Candidal dermatitis    Bronchitis    Bilateral shoulder pain    Hyperkalemia    Stage 3a chronic kidney disease (CKD)    Hyponatremia    Onychomycosis    Onychocryptosis    PVD (peripheral vascular disease)    Foot pain, bilateral     Past Medical History:   Diagnosis Date    Anxiety     Arthritis     Atherosclerosis of coronary artery bypass graft of native heart without angina pectoris 10/23/2018    Coronary artery disease with previous bypass, LIMA to the LAD, SVG to ramus, and SVG to RCA in     CAD (coronary artery disease) 10/23/2018    Carotid artery stenosis with cerebral infarction 2015    Dr. St     Cramps, extremity 9/10/2021    Depression     DM2 (diabetes mellitus, type 2) 10/23/2018    Duodenal ulcer     Essential hypertension 10/23/2018    Headache     Heart attack 2015    Heartburn     HLD (hyperlipidemia)     HTN (hypertension)     Injury of right leg 2021     Irregular heart beat     Low back pain 03/18/2019    Mild episode of recurrent depressive disorder 10/23/2018    Rash 03/18/2019    Shortness of breath     SOB (shortness of breath)     Type 2 diabetes mellitus with hyperglycemia, without long-term current use of insulin 06/11/2019    Uncontrolled diabetes mellitus 03/18/2019     Past Surgical History:   Procedure Laterality Date    CARDIAC CATHETERIZATION  06/2015    Dr. St     CIRCUMCISION      age 14 yrs old    CORONARY ARTERY BYPASS GRAFT  06/2015    5 bypasses     PT Assessment (Last 12 Hours)       PT Evaluation and Treatment       Row Name 04/23/25 1017          Physical Therapy Time and Intention    Subjective Information no complaints  -SM     Document Type therapy note (daily note)  -SM     Mode of Treatment individual therapy;physical therapy  -SM     Patient Effort adequate  -SM     Symptoms Noted During/After Treatment fatigue  -SM     Comment Pt declined OOB tf's, tolerated EOB TE  -       Row Name 04/23/25 1017          Bed Mobility    Bed Mobility supine-sit;sit-supine;scooting/bridging;rolling left;rolling right  -SM     Rolling Left [place] (Bed Mobility) minimum assist (75% patient effort);2 person assist  -SM     Rolling Right [place] (Bed Mobility) minimum assist (75% patient effort);2 person assist  -SM     Scooting/Bridging [place] (Bed Mobility) moderate assist (50% patient effort);2 person assist  -SM     Supine-Sit [place] (Bed Mobility) moderate assist (50% patient effort)  -SM     Sit-Supine [place] (Bed Mobility) moderate assist (50% patient effort)  -SM     Bed Mobility, Safety Issues decreased use of arms for pushing/pulling;decreased use of legs for bridging/pushing  -SM     Assistive Device (Bed Mobility) bed rails;head of bed elevated;repositioning sheet  -       Row Name 04/23/25 1017          Safety Issues/Impairments Affecting Functional Mobility    Impairments Affecting Function (Mobility)  "balance;endurance/activity tolerance;strength  -       Row Name 04/23/25 1017          Balance    Dynamic Sitting Balance standby assist  -     Position, Sitting Balance sitting edge of bed  -       Row Name 04/23/25 1017          Motor Skills    Therapeutic Exercise hip;knee;ankle  10x BLE ankle pumps, LAQ's, hip abd/add, seated marches.  -       Row Name             Wound 04/18/25 2312 Left posterior elbow    Wound - Properties Group Placement Date: 04/18/25  -HB Placement Time: 2312  -HB Side: Left  -HB Orientation: posterior  -HB Location: elbow  -HB Additional Comments: optifoam placed on elbow, unknown when placed. Patient states, \"I did it when I fell at home\"  -HB    Retired Wound - Properties Group Placement Date: 04/18/25  -HB Placement Time: 2312  -HB Side: Left  -HB Orientation: posterior  -HB Location: elbow  -HB Additional Comments: optifoam placed on elbow, unknown when placed. Patient states, \"I did it when I fell at home\"  -HB    Retired Wound - Properties Group Placement Date: 04/18/25  -HB Placement Time: 2312  -HB Side: Left  -HB Orientation: posterior  -HB Location: elbow  -HB Additional Comments: optifoam placed on elbow, unknown when placed. Patient states, \"I did it when I fell at home\"  -HB    Retired Wound - Properties Group Date first assessed: 04/18/25  -HB Time first assessed: 2312  -HB Side: Left  -HB Location: elbow  -HB Additional Comments: optifoam placed on elbow, unknown when placed. Patient states, \"I did it when I fell at home\"  -      Row Name             Wound 04/18/25 2004 Left posterior hand    Wound - Properties Group Placement Date: 04/18/25  -HB Placement Time: 2004  -HB Present on Original Admission: --  -HB, unknown  Side: Left  -HB Orientation: posterior  -HB Location: hand  -HB Additional Comments: optifoam placed on hand, unknown when placed. Patient states, \"I did it when I fell at home\"  -HB    Retired Wound - Properties Group Placement Date: 04/18/25  -HB " "Placement Time: 2004  -HB Present on Original Admission: --  -HB, unknown  Side: Left  -HB Orientation: posterior  -HB Location: hand  -HB Additional Comments: optifoam placed on hand, unknown when placed. Patient states, \"I did it when I fell at home\"  -HB    Retired Wound - Properties Group Placement Date: 04/18/25  -HB Placement Time: 2004 -HB Present on Original Admission: --  -HB, unknown  Side: Left  -HB Orientation: posterior  -HB Location: hand  -HB Additional Comments: optifoam placed on hand, unknown when placed. Patient states, \"I did it when I fell at home\"  -HB    Retired Wound - Properties Group Date first assessed: 04/18/25  -HB Time first assessed: 2004 -HB Present on Original Admission: --  -HB, unknown  Side: Left  -HB Location: hand  -HB Additional Comments: optifoam placed on hand, unknown when placed. Patient states, \"I did it when I fell at home\"  -HB      Row Name             Wound 04/18/25 2004 Right proximal arm    Wound - Properties Group Placement Date: 04/18/25  -HB Placement Time: 2004  -HB Present on Original Admission: --  -HB, unknown  Side: Right  -HB Orientation: proximal  -HB Location: arm  -HB Additional Comments: optifoam placed, unknown when placed. Patient states, \"I did it when I fell at home\"  -HB    Retired Wound - Properties Group Placement Date: 04/18/25  -HB Placement Time: 2004  -HB Present on Original Admission: --  -HB, unknown  Side: Right  -HB Orientation: proximal  -HB Location: arm  -HB Additional Comments: optifoam placed, unknown when placed. Patient states, \"I did it when I fell at home\"  -HB    Retired Wound - Properties Group Placement Date: 04/18/25  -HB Placement Time: 2004  -HB Present on Original Admission: --  -HB, unknown  Side: Right  -HB Orientation: proximal  -HB Location: arm  -HB Additional Comments: optifoam placed, unknown when placed. Patient states, \"I did it when I fell at home\"  -HB    Retired Wound - Properties Group Date first assessed: " "04/18/25  -HB Time first assessed: 2004  -HB Present on Original Admission: --  -HB, unknown  Side: Right  -HB Location: arm  -HB Additional Comments: optifoam placed, unknown when placed. Patient states, \"I did it when I fell at home\"  -HB      Row Name             Wound 04/18/25 2004 Left anterior knee    Wound - Properties Group Placement Date: 04/18/25  -HB, unknown  Placement Time: 2004 -HB Present on Original Admission: --  -HB, unknown  Side: Left  -HB Orientation: anterior  -HB Location: knee  -HB Additional Comments: optifoam placed, unknown when placed. Patient states, \"I did it when I fell at home\"  -HB    Retired Wound - Properties Group Placement Date: 04/18/25  -HB, unknown  Placement Time: 2004  -HB Present on Original Admission: --  -HB, unknown  Side: Left  -HB Orientation: anterior  -HB Location: knee  -HB Additional Comments: optifoam placed, unknown when placed. Patient states, \"I did it when I fell at home\"  -HB    Retired Wound - Properties Group Placement Date: 04/18/25  -HB, unknown  Placement Time: 2004  -HB Present on Original Admission: --  -HB, unknown  Side: Left  -HB Orientation: anterior  -HB Location: knee  -HB Additional Comments: optifoam placed, unknown when placed. Patient states, \"I did it when I fell at home\"  -HB    Retired Wound - Properties Group Date first assessed: 04/18/25  -HB, unknown  Time first assessed: 2004  -HB Present on Original Admission: --  -HB, unknown  Side: Left  -HB Location: knee  -HB Additional Comments: optifoam placed, unknown when placed. Patient states, \"I did it when I fell at home\"  -HB      Row Name             Wound 04/18/25 2004 Right anterior knee    Wound - Properties Group Placement Date: 04/18/25  -HB Placement Time: 2004  -HB Present on Original Admission: --  -HB, unknown  Side: Right  -HB Orientation: anterior  -HB Location: knee  -HB Additional Comments: optifoam placed, unknown when. Patient states, \"I did it when I fell at home\"  -HB " "   Retired Wound - Properties Group Placement Date: 04/18/25  -HB Placement Time: 2004 -HB Present on Original Admission: --  -HB, unknown  Side: Right  -HB Orientation: anterior  -HB Location: knee  -HB Additional Comments: optifoam placed, unknown when. Patient states, \"I did it when I fell at home\"  -HB    Retired Wound - Properties Group Placement Date: 04/18/25  -HB Placement Time: 2004 -HB Present on Original Admission: --  -HB, unknown  Side: Right  -HB Orientation: anterior  -HB Location: knee  -HB Additional Comments: optifoam placed, unknown when. Patient states, \"I did it when I fell at home\"  -HB    Retired Wound - Properties Group Date first assessed: 04/18/25  -HB Time first assessed: 2004 -HB Present on Original Admission: --  -HB, unknown  Side: Right  -HB Location: knee  -HB Additional Comments: optifoam placed, unknown when. Patient states, \"I did it when I fell at home\"  -HB      Row Name             Wound 04/21/25 1035 Left anterior thigh    Wound - Properties Group Placement Date: 04/21/25  -KE Placement Time: 1035  -KE Side: Left  -KE Orientation: anterior  -KE Location: thigh  -KE    Retired Wound - Properties Group Placement Date: 04/21/25  -KE Placement Time: 1035  -KE Side: Left  -KE Orientation: anterior  -KE Location: thigh  -KE    Retired Wound - Properties Group Placement Date: 04/21/25  -KE Placement Time: 1035  -KE Side: Left  -KE Orientation: anterior  -KE Location: thigh  -KE    Retired Wound - Properties Group Date first assessed: 04/21/25  -KE Time first assessed: 1035  -KE Side: Left  -KE Location: thigh  -KE      Row Name 04/23/25 1017          Positioning and Restraints    Pre-Treatment Position in bed  -SM     Post Treatment Position bed  -SM     In Bed side lying right;call light within reach;encouraged to call for assist;exit alarm on;fowlers  -       Row Name 04/23/25 1017          Progress Summary (PT)    Progress Toward Functional Goals (PT) progress toward " functional goals is fair  -SM               User Key  (r) = Recorded By, (t) = Taken By, (c) = Cosigned By      Initials Name Provider Type    Irene Girard, RN Registered Nurse    Teri Mix PTA Physical Therapist Assistant    Sarah Jones LPN Licensed Nurse                    Physical Therapy Education       Title: PT OT SLP Therapies (In Progress)       Topic: Physical Therapy (In Progress)       Point: Mobility training (Done)       Learning Progress Summary            Patient Acceptance, E,TB, VU by AV at 4/17/2025 1516                      Point: Home exercise program (Not Started)       Learner Progress:  Not documented in this visit.              Point: Body mechanics (Done)       Learning Progress Summary            Patient Acceptance, E,TB, VU by AV at 4/17/2025 1516                      Point: Precautions (Done)       Learning Progress Summary            Patient Acceptance, E,TB, VU by AV at 4/17/2025 1516                                      User Key       Initials Effective Dates Name Provider Type Discipline    AV 06/11/21 -  Reji Lux, PT Physical Therapist PT                  PT Recommendation and Plan     Progress Summary (PT)  Progress Toward Functional Goals (PT): progress toward functional goals is fair   Outcome Measures       Row Name 04/23/25 1020 04/21/25 1500          How much help from another person do you currently need...    Turning from your back to your side while in flat bed without using bedrails? 3  -SM 3  -ANDREA (r) RA (t) ANDREA (c)     Moving from lying on back to sitting on the side of a flat bed without bedrails? 2  -SM 3  -ANDREA (r) RA (t) ANDREA (c)     Moving to and from a bed to a chair (including a wheelchair)? 2  -SM 2  -ANDREA (r) RA (t) ANDREA (c)     Standing up from a chair using your arms (e.g., wheelchair, bedside chair)? 2  -SM 2  -ANDREA (r) RA (t) ANDREA (c)     Climbing 3-5 steps with a railing? 2  -SM 1  -ANDREA (r) RA (t) ANDREA (c)     To walk in hospital room? 2  -SM 2   -ANDREA (r) RA (t) ANDREA (c)     AM-PAC 6 Clicks Score (PT) 13  -SM 13  -ANDREA (r) RA (t)               User Key  (r) = Recorded By, (t) = Taken By, (c) = Cosigned By      Initials Name Provider Type    ANDREAPreston Chen, PT Physical Therapist    Teri Mix, YUVAL Physical Therapist Assistant    Leah Silverio, PT Student PT Student                     Time Calculation:    PT Charges       Row Name 04/23/25 1012             Time Calculation    PT Received On 04/23/25  -SM         Timed Charges    42166 - PT Therapeutic Exercise Minutes 15  -SM      03776 - PT Therapeutic Activity Minutes 8  -SM         Total Minutes    Timed Charges Total Minutes 23  -SM       Total Minutes 23  -SM                User Key  (r) = Recorded By, (t) = Taken By, (c) = Cosigned By      Initials Name Provider Type    Teri Mix, YUVAL Physical Therapist Assistant                      PT G-Codes  Outcome Measure Options: AM-PAC 6 Clicks Basic Mobility (PT)  AM-PAC 6 Clicks Score (PT): 13  AM-PAC 6 Clicks Score (OT): 15    Teri Corral PTA  4/23/2025

## 2025-04-23 NOTE — PROGRESS NOTES
[facility]   Hospitalist Progress Note    Date of admission: 4/15/2025  Patient Name: Alberto Rachel  1960  Date: 4/23/2025      Subjective     Chief Complaint   Patient presents with    Altered Mental Status       Interval Followup: Passing flatus did have 1 bowel movement earlier, still with NG output seems to be decreasing overall.  Denies nausea or vomiting.  No abdominal pain.  Shortness of tremors intermittently in feet and legs, does not seem to bother him sometimes happens when cold.        Objective     Vitals:   Temp:  [98.2 °F (36.8 °C)-98.8 °F (37.1 °C)] 98.4 °F (36.9 °C)  Heart Rate:  [77-91] 91  Resp:  [14-18] 16  BP: (113-139)/(49-69) 125/60  Flow (L/min) (Oxygen Therapy):  [2] 2    Physical Exam  Awake, conversant, poor health literacy family at bedside  Mild rhonchi slightly diminished in bases, no acute respiratory distress  Currently sinus and regular at time of my eval  Obese abdomen distended but soft not acutely tender hypoactive bowel sounds  NG with some dark green output    Result Review:  Vital signs, labs and recent relevant imaging reviewed.      CBC          4/21/2025    05:21 4/22/2025    05:54 4/23/2025    05:36   CBC   WBC 11.46  11.22  10.63    RBC 4.56  4.49  4.12    Hemoglobin 13.4  13.5  12.3    Hematocrit 38.5  40.0  35.8    MCV 84.4  89.1  86.9    MCH 29.4  30.1  29.9    MCHC 34.8  33.8  34.4    RDW 13.6  13.8  14.0    Platelets 308  301  301      CMP          4/21/2025    05:21 4/21/2025    12:00 4/21/2025    17:55 4/22/2025    00:08 4/22/2025    05:54 4/22/2025    12:09 4/22/2025    18:37 4/22/2025    23:53 4/23/2025    05:36   CMP   Glucose 128     108     83    BUN 67     75     54    Creatinine 4.65     3.33     1.58    EGFR 13.1     19.6     47.9    Sodium 122  124  126  126  128     128  129  126  131  134     134    Potassium 4.4  4.6  4.6  4.5  4.5     4.5  4.4  4.3  4.8  4.3     4.3    Chloride 84     92     101    Calcium 9.0     8.8     8.8    Total  Protein 6.5     6.6        Albumin 3.4     2.8        Total Bilirubin 0.9     1.1        Alkaline Phosphatase 52     55        AST (SGOT) 25     26        ALT (SGPT) 23     23        BUN/Creatinine Ratio 14.4     22.5     34.2    Anion Gap 18.2     18.5     14.6          4/23/2025    11:47   CMP   Glucose    BUN    Creatinine    EGFR    Sodium    Potassium 4.6    Chloride    Calcium    Total Protein    Albumin    Total Bilirubin    Alkaline Phosphatase    AST (SGOT)    ALT (SGPT)    BUN/Creatinine Ratio    Anion Gap          [Held by provider] acetaminophen **OR** [Held by provider] acetaminophen    albuterol    [Held by provider] senna-docusate sodium **AND** [Held by provider] polyethylene glycol **AND** [Held by provider] bisacodyl **AND** [Held by provider] bisacodyl    dextrose    dextrose    glucagon (human recombinant)    metoprolol tartrate    [Held by provider] nitroglycerin    ondansetron ODT **OR** ondansetron    sodium chloride    sodium chloride    sodium chloride    ammonium lactate, , Topical, 2 times per day  [Held by provider] aspirin, 81 mg, Oral, Daily  [Held by provider] atorvastatin, 80 mg, Oral, Daily  [Held by provider] bumetanide, 1 mg, Oral, Daily  [Held by provider] citalopram, 20 mg, Oral, Daily  heparin (porcine), 5,000 Units, Subcutaneous, Q8H  insulin regular, 2-7 Units, Subcutaneous, Q6H  [Held by provider] levothyroxine, 50 mcg, Oral, Q AM  pantoprazole, 40 mg, Intravenous, Q12H  [Held by provider] senna-docusate sodium, 2 tablet, Oral, BID  sodium chloride, 10 mL, Intravenous, Q12H  [Held by provider] traZODone, 100 mg, Oral, Nightly        XR Abdomen KUB  Result Date: 4/23/2025  Impression: Persistent dilated loops of small bowel in the right abdomen suggesting partial small bowel obstruction versus adynamic ileus. Electronically Signed: Heriberto Casillas MD  4/23/2025 11:42 AM EDT  Workstation ID: [id number]    XR Chest 1 View  Result Date: 4/23/2025  Impression: 1.Mild improvement in  aeration in the left lung base. Mild residual atelectasis versus infiltrate is seen. There may be a small residual left pleural effusion. 2.The nasogastric tube is positioned in the stomach. 3.Mild atelectasis versus infiltrate at the lateral aspect of the right midlung. Electronically Signed: Emory Quintana MD  4/23/2025 11:32 AM EDT  Workstation ID: [id number]    XR Abdomen KUB  Result Date: 4/21/2025  Impression: 1.Esophagogastric tube with tip in the proximal to mid stomach. 2.Diffuse gaseous distention and dilatation of small bowel loops suggesting small bowel obstruction. Electronically Signed: Heriberto Casillas MD  4/21/2025 4:23 PM EDT  Workstation ID: [id number]    XR Abdomen KUB  Result Date: 4/21/2025  Impression: High-grade partial small bowel obstruction. Electronically Signed: Dino Dailey MD  4/21/2025 7:44 AM EDT  Workstation ID: [id number]    XR Abdomen KUB  Result Date: 4/20/2025  Impression: Stable intestinal distention Electronically Signed: Gian Marcus  4/20/2025 2:30 PM EDT  Workstation ID: [id number]    XR Abdomen KUB  Result Date: 4/20/2025  Impression: NG/OG tube courses into the stomach with the tip in the region of the gastric fundus. Bowel gas pattern suggestive of ileus or bowel obstruction. Electronically Signed: Abner Dixon MD  4/20/2025 10:03 AM EDT  Workstation ID: [id number]    XR Abdomen KUB  Result Date: 4/20/2025  Gas-distended bowel persists. The findings suggest a generalized adynamic ileus.   Portions of this note were completed with a voice recognition program.  4/20/2025 5:57 AM by Donta Mera MD on Workstation: [employer]      CT Abdomen Pelvis Without Contrast  Result Date: 4/19/2025  1.  Distended stomach, small bowel, and colon are identified. There is a transition zone at about the level of the splenic flexure of colon. These findings are thought most likely to represent a generalized adynamic ileus with focal spasm at the level of the splenic flexure of colon. 2.  There is  residual oral contrast agent present, likely related to the recent modified barium swallow (from 4/16/2025) within the descending colon and rectosigmoid colon, which is against a complete mechanical bowel obstruction. 3.  No acute appendicitis or diverticulitis is seen. No pneumoperitoneum. No pneumatosis or portal or mesenteric venous gas. No significant stool burden is suggested. 4.  The study is limited, especially due to motion artifact. 5.  Consider close interval clinical, lab, and if necessary, imaging follow-up to ensure benign progression. 6.  No other definite significant acute findings are appreciated. 7.  Please see above comments for further detail.    Portions of this note were completed with a voice recognition program.  4/19/2025 11:08 PM by Donta Mera MD on Workstation: [email or web address]      XR Abdomen KUB  Result Date: 4/19/2025  Impression: Dilated small bowel loops that could relate to a small bowel obstruction. CT would be suggested to reassess. Electronically Signed: Harvey Issa MD  4/19/2025 2:56 PM EDT  Workstation ID: [id number]    FL Video Swallow With Speech Single Contrast  Result Date: 4/16/2025  Impression: 1. No tracheal aspiration or laryngeal penetration observed Please consult speech pathology report for further details regarding the exam and for dietary recommendations. Report dictated by: Racquel Perdue  I have personally reviewed this case and agree with the findings above: Electronically Signed: Taco Kennedy MD  4/16/2025 4:20 PM EDT  Workstation ID: [id number]      Assessment / Plan     Summary: 64 y.o. male with history of anxiety, hypertension, CAD status post bypass, history of stroke, carotid artery stenosis, depression, diabetes, duodenal ulcer, hypertension, dyslipidemia, diabetes mellitus, hospitalized on 4/15/2025 chief complaint of altered mental status, multiple falls, found to have significant life-threatening hyponatremia, nephrology consulted, placed in the critical care  unit with critical care consulted, given a round of hypertonic saline, placed on IV fluids several home medications held in the setting of severe hyponatremia including chlorthalidone, Bumex, Abilify, Celexa among others. Cardiology consulted elevated troponins. Planning nuclear stress test. Transferred out of ICU. Started developing distention despite passing gas and having bowel movements, abdominal distention worse, x-ray showing small bowel obstruction pattern, CT imaging obtained, showing gastric, small and large bowel distention concerning for ileus versus versus bowel spasm and peristalsis, could have Abel's, NG tube placed to low intermittent suction, general surgery consulted     Assessment/Plan (clinically significant if listed here)  Acute life-threatening hyponatremia, hypoosmolar  High-grade small bowel obstruction with question ileus vs [place]  Acute metabolic encephalopathy  ESVIN, question prerenal/dehydration related, on CKD3 b/l near ~1.4 to 1.5 most recently  Rhabdomyolysis  AGMA   New paroxysmal Aflutter  CAD with history of CABG, with elevated troponins likely type II MI secondary to hyponatremia  Asthma mild intermittent  DM2  Depression/Anxiety  Hx of HTN with low normal BP here  Hypothyroidism  History of stroke and Carotid artery stenosis  History of duodenal ulcer  Poor health literacy/unable to read    Cont NG, and diet as per surgery, trying trial of NG today possible removal depending on how he does  Repeat imaging this morning still with concerning for partial SBO adynamic ileus, having bowel movements on exam and symptomatically otherwise seems to be improving  Chest x-ray personally reviewed atelectasis with questionable infiltrate and small pleural effusion on my review  Check Pro-Timmy monitor leukocytosis, white count has been downtrending with supportive care.  Suspect may ultimately need diuresis, BNP checked in elevated, sodium is almost normalized, creatinine downtrending to  1.6  Incentive spirometry, resp hygiene, hopefully can increase activity once additional lines removed as able.  Notable debility still contributing to above issues  Appreciate nephro assistance. Bumex still on hold.  IVF   Will consider possible reduced home regimen of psychiatric medications if able to tolerate.  Suspect chlorthalidone primary trigger of hyponatremia but also has Abilify citalopram and trazodone that could have been contributing as well.  May be having some tremors from withdrawals from his multiple medications, will resume or a reduced dose of Abilify for now at 10 mg instead of 20 and monitor  Started on metoprolol p.o. additional IV as needed for pafib, still having some episodes of rvr briefly on tele.  start apixaban tomorrow if NG out   Cards recs reviewed   Continue SSI.  Home metformin, pioglitazone on hold  Continue Synthroid once stable, TSH within normal limits  cont aspirin/statin once able  PT/OT  Check a.m. CBC, BMP, magnesium, phosphorus    Dispo: possibly back with HH/family.  Declined rehab placement previously once medically stable.   D/w sw     VTE Prophylaxis:  Pharmacologic & mechanical VTE prophylaxis orders are present.      Code Status (Patient has no pulse and is not breathing): CPR (Attempt to Resuscitate)  Medical Interventions (Patient has pulse or is breathing): Full Support    Greater than 50 minutes on this encounter including review of labs, imaging, documentation, orders, vitals, monitoring and adjusting treatment and orders as indicated, discussion with the patient, family at bedside at length, rn, sw, and documenting.

## 2025-04-23 NOTE — PLAN OF CARE
Goal Outcome Evaluation:  Plan of Care Reviewed With: patient           Outcome Evaluation: Patient alert and oriented x4 and able to make needs known. Patient is weaned to room air, tolerating well. NGT removed per orders, patient is tolerating a clear liquid diet. BM on shift. Encouraged to be up to chair during the day. IS at bedside. Continuing with plan of care

## 2025-04-23 NOTE — SIGNIFICANT NOTE
04/23/25 0638   OTHER   Discipline speech language pathologist   Rehab Time/Intention   Session Not Performed patient unavailable for treatment   Recommendations   SLP - Next Appointment 04/24/25       NPO due to distended abdomen. SLP to continue to follow.

## 2025-04-23 NOTE — PROGRESS NOTES
[facility]     Progress Note    Patient Name: Alberto Rachel  : 1960  MRN: [id number]  Primary Care Physician:  Magnus Euceda DO  Date of admission: 4/15/2025    Subjective   Subjective     Chief Complaint: Abdominal distention    HPI:  Patient Reports no abdominal pain.  No nausea vomiting.  Passing flatus and having bowel movements.      Objective   Objective     Vitals:   Temp:  [98.2 °F (36.8 °C)-98.8 °F (37.1 °C)] 98.2 °F (36.8 °C)  Heart Rate:  [77-91] 88  Resp:  [14-18] 14  BP: (113-139)/(49-69) 135/69  Flow (L/min) (Oxygen Therapy):  [2] 2    Physical Exam  Constitutional:       Appearance: Normal appearance.   Cardiovascular:      Rate and Rhythm: Normal rate.   Pulmonary:      Effort: Pulmonary effort is normal.   Abdominal:      Palpations: Abdomen is soft.     Abdomen is less tympanic    Result Review    Result Review:  I have personally reviewed the results from the time of this admission to 2025 13:38 EDT and agree with these findings:  []  Laboratory  []  Microbiology  []  Radiology  []  EKG/Telemetry   []  Cardiology/Vascular   []  Pathology  []  Old records  []  Other:      Assessment & Plan   Assessment / Plan     Brief Patient Summary:  Alberto Rachel is a 64 y.o. male who has obstruction versus [place]'s    Active Hospital Problems:  Active Hospital Problems    Diagnosis     **Hyponatremia     Onychomycosis     Onychocryptosis     PVD (peripheral vascular disease)     Foot pain, bilateral     Type 2 diabetes mellitus with hyperglycemia, without long-term current use of insulin      Plan:  Clamp NG tube  Allow clear liquids  If tolerates clear liquids can DC NG later this evening  No plans for surgical intervention       VTE Prophylaxis:  Pharmacologic & mechanical VTE prophylaxis orders are present.        CODE STATUS:   Code Status (Patient has no pulse and is not breathing): CPR (Attempt to Resuscitate)  Medical Interventions (Patient has pulse or is breathing): Full  Support    Disposition:  I expect patient to be discharged per primary.    Electronically signed by Dino Cardenas MD, 04/23/25, 1:38 PM EDT.

## 2025-04-23 NOTE — PLAN OF CARE
Goal Outcome Evaluation:  Plan of Care Reviewed With: patient        Progress: no change  Outcome Evaluation: PT is AAO, able to make needs known, VSS, FC patent with adequate OP, NGT in place with brown OP, IVF of NS @ 100ml/hr, remains NPO, no c/o pain, SOA, NVD, q2h turns, wound/skin care completed as ordered, no acute events this shift, appears to have slept well this shift, bed in low/locked position, alarm audible, call light within reach, continue with current POC at this time.

## 2025-04-23 NOTE — PROGRESS NOTES
" LOS: 8 days   Patient Care Team:  Magnus Euceda DO as PCP - General (Family Medicine)    Chief Complaint: Hyponatremia    Subjective     Pt without new events.  NGT in place hooked to wall suction.     Asking about going home.  3400 cc urine output recorded.      History taken from: patient chart RN    Objective     Vital Sign Min/Max for last 24 hours  Temp  Min: 98.2 °F (36.8 °C)  Max: 98.8 °F (37.1 °C)   BP  Min: 113/55  Max: 139/64   Pulse  Min: 77  Max: 91   Resp  Min: 14  Max: 18   SpO2  Min: 92 %  Max: 99 %   Flow (L/min) (Oxygen Therapy)  Min: 2  Max: 2   Weight  Min: 103 kg (227 lb 15.3 oz)  Max: 103 kg (227 lb 15.3 oz)     Flowsheet Rows      Flowsheet Row First Filed Value   Admission Height 182.8 cm (71.97\") Documented at 04/15/2025 1719   Admission Weight 102 kg (223 lb 15.8 oz) Documented at 04/15/2025 1220            I/O this shift:  In: -   Out: 1250 [Urine:900; Emesis/NG output:350]  I/O last 3 completed shifts:  In: 3131.7 [P.O.:260; I.V.:2871.7]  Out: 4925 [Urine:4250; Emesis/NG output:675]    Objective:  General Appearance:  In no acute distress, not in pain and comfortable (Up in the chair.).    Vital signs: (most recent): Blood pressure 135/69, pulse 88, temperature 98.2 °F (36.8 °C), temperature source Oral, resp. rate 14, height 182.8 cm (71.97\"), weight 103 kg (227 lb 15.3 oz), SpO2 98%.  Vital signs are normal.  No fever.    Output: Producing urine and minimal stool output.    HEENT: Normal HEENT exam.  (ngt)    Lungs:  Normal effort and normal respiratory rate.  He is not in respiratory distress.    Heart: Normal rate.  Regular rhythm.    Abdomen: Abdomen is soft and distended.  Bowel sounds are normal.   There is no abdominal tenderness.     Extremities: Normal range of motion.  There is no dependent edema.  (Orozco catheter in place.)  Pulses: Distal pulses are intact.    Neurological: Patient is alert and oriented to person, place and time.    Pupils:  Pupils are equal, round, and " "reactive to light.    Skin:  Warm and dry.                Results Review:     I reviewed the patient's new clinical results.  I reviewed the patient's new imaging results and agree with the interpretation.  I reviewed the patient's other test results and agree with the interpretation    WBC WBC   Date Value Ref Range Status   04/23/2025 10.63 3.40 - 10.80 10*3/mm3 Final   04/22/2025 11.22 (H) 3.40 - 10.80 10*3/mm3 Final   04/21/2025 11.46 (H) 3.40 - 10.80 10*3/mm3 Final      HGB Hemoglobin   Date Value Ref Range Status   04/23/2025 12.3 (L) 13.0 - 17.7 g/dL Final   04/22/2025 13.5 13.0 - 17.7 g/dL Final   04/21/2025 13.4 13.0 - 17.7 g/dL Final      HCT Hematocrit   Date Value Ref Range Status   04/23/2025 35.8 (L) 37.5 - 51.0 % Final   04/22/2025 40.0 37.5 - 51.0 % Final   04/21/2025 38.5 37.5 - 51.0 % Final      Platlets No results found for: \"LABPLAT\"   MCV MCV   Date Value Ref Range Status   04/23/2025 86.9 79.0 - 97.0 fL Final   04/22/2025 89.1 79.0 - 97.0 fL Final   04/21/2025 84.4 79.0 - 97.0 fL Final          Sodium Sodium   Date Value Ref Range Status   04/23/2025 134 (L) 136 - 145 mmol/L Final   04/23/2025 134 (L) 136 - 145 mmol/L Final   04/22/2025 131 (L) 136 - 145 mmol/L Final   04/22/2025 126 (L) 136 - 145 mmol/L Final   04/22/2025 129 (L) 136 - 145 mmol/L Final   04/22/2025 128 (L) 136 - 145 mmol/L Final   04/22/2025 128 (L) 136 - 145 mmol/L Final   04/22/2025 126 (L) 136 - 145 mmol/L Final   04/21/2025 126 (L) 136 - 145 mmol/L Final   04/21/2025 124 (L) 136 - 145 mmol/L Final   04/21/2025 122 (L) 136 - 145 mmol/L Final   04/20/2025 122 (L) 136 - 145 mmol/L Final   04/20/2025 120 (L) 136 - 145 mmol/L Final      Potassium Potassium   Date Value Ref Range Status   04/23/2025 4.6 3.5 - 5.2 mmol/L Final   04/23/2025 4.3 3.5 - 5.2 mmol/L Final   04/23/2025 4.3 3.5 - 5.2 mmol/L Final   04/22/2025 4.8 3.5 - 5.2 mmol/L Final   04/22/2025 4.3 3.5 - 5.2 mmol/L Final   04/22/2025 4.4 3.5 - 5.2 mmol/L Final " "  04/22/2025 4.5 3.5 - 5.2 mmol/L Final   04/22/2025 4.5 3.5 - 5.2 mmol/L Final   04/22/2025 4.5 3.5 - 5.2 mmol/L Final   04/21/2025 4.6 3.5 - 5.2 mmol/L Final   04/21/2025 4.6 3.5 - 5.2 mmol/L Final     Comment:     Slight hemolysis detected by analyzer. Result may be falsely elevated.   04/21/2025 4.4 3.5 - 5.2 mmol/L Final   04/20/2025 4.6 3.5 - 5.2 mmol/L Final   04/20/2025 4.7 3.5 - 5.2 mmol/L Final      Chloride Chloride   Date Value Ref Range Status   04/23/2025 101 98 - 107 mmol/L Final   04/22/2025 92 (L) 98 - 107 mmol/L Final   04/21/2025 84 (L) 98 - 107 mmol/L Final      CO2 CO2   Date Value Ref Range Status   04/23/2025 18.4 (L) 22.0 - 29.0 mmol/L Final   04/22/2025 17.5 (L) 22.0 - 29.0 mmol/L Final   04/21/2025 19.8 (L) 22.0 - 29.0 mmol/L Final      BUN BUN   Date Value Ref Range Status   04/23/2025 54 (H) 8 - 23 mg/dL Final   04/22/2025 75 (H) 8 - 23 mg/dL Final   04/21/2025 67 (H) 8 - 23 mg/dL Final      Creatinine Creatinine   Date Value Ref Range Status   04/23/2025 1.58 (H) 0.76 - 1.27 mg/dL Final   04/22/2025 3.33 (H) 0.76 - 1.27 mg/dL Final   04/21/2025 4.65 (H) 0.76 - 1.27 mg/dL Final      Calcium Calcium   Date Value Ref Range Status   04/23/2025 8.8 8.6 - 10.5 mg/dL Final   04/22/2025 8.8 8.6 - 10.5 mg/dL Final   04/21/2025 9.0 8.6 - 10.5 mg/dL Final      PO4 No results found for: \"CAPO4\"   Albumin Albumin   Date Value Ref Range Status   04/22/2025 2.8 (L) 3.5 - 5.2 g/dL Final   04/21/2025 3.4 (L) 3.5 - 5.2 g/dL Final      Magnesium Magnesium   Date Value Ref Range Status   04/23/2025 2.4 1.6 - 2.4 mg/dL Final   04/22/2025 2.8 (H) 1.6 - 2.4 mg/dL Final   04/21/2025 2.6 (H) 1.6 - 2.4 mg/dL Final      Uric Acid No results found for: \"URICACID\"       Medication Review:   ammonium lactate, , Topical, 2 times per day  [Held by provider] aspirin, 81 mg, Oral, Daily  [Held by provider] atorvastatin, 80 mg, Oral, Daily  [Held by provider] bumetanide, 1 mg, Oral, Daily  [Held by provider] citalopram, " 20 mg, Oral, Daily  heparin (porcine), 5,000 Units, Subcutaneous, Q8H  insulin regular, 2-7 Units, Subcutaneous, Q6H  [Held by provider] levothyroxine, 50 mcg, Oral, Q AM  pantoprazole, 40 mg, Intravenous, Q12H  [Held by provider] senna-docusate sodium, 2 tablet, Oral, BID  sodium chloride, 10 mL, Intravenous, Q12H  [Held by provider] traZODone, 100 mg, Oral, Nightly          Assessment & Plan       Hyponatremia    Type 2 diabetes mellitus with hyperglycemia, without long-term current use of insulin    Onychomycosis    Onychocryptosis    PVD (peripheral vascular disease)    Foot pain, bilateral      Assessment & Plan  - Hyponatremia, possibly secondary to chlorthalidone, urine osmolality 271.  TSH okay.  No signs or symptoms of adrenal insufficiency.  Sodium is back to normal.      NPO with NGT and suction.   Repeat Uosm: 438  Orozco is back in.  Bumex on hold.   Status post IV fluid.  See below.      -- ESVIN, better with IVF's.  Holding bumex.  Change IV fluid to LR at 100 cc an hour for 2 L and reevaluate in AM.    - Chronic kidney disease stage III, baseline creatinine lately around 1.4-1.5.  Has bland UA and minimal proteinuria from 2 years ago.     - Hypokalemia, replaced and improved.  Magnesium is okay.    - Type 2 diabetes, per primary.    - Shortness of breath, cardiology planning stress test.    - Hypothyroidism, per primary.    - Abdominal distention, ileus on CT. per primary/surgery. CT noted.    - A flutter with tachycardia.    Will follow.    Rajendra Lion MD  04/23/25  14:38 EDT

## 2025-04-24 LAB
ANION GAP SERPL CALCULATED.3IONS-SCNC: 11 MMOL/L (ref 5–15)
BASOPHILS # BLD AUTO: 0.06 10*3/MM3 (ref 0–0.2)
BASOPHILS NFR BLD AUTO: 0.6 % (ref 0–1.5)
BUN SERPL-MCNC: 35 MG/DL (ref 8–23)
BUN/CREAT SERPL: 29.9 (ref 7–25)
CALCIUM SPEC-SCNC: 8.7 MG/DL (ref 8.6–10.5)
CHLORIDE SERPL-SCNC: 99 MMOL/L (ref 98–107)
CO2 SERPL-SCNC: 20 MMOL/L (ref 22–29)
CREAT SERPL-MCNC: 1.17 MG/DL (ref 0.76–1.27)
DEPRECATED RDW RBC AUTO: 45.1 FL (ref 37–54)
EGFRCR SERPLBLD CKD-EPI 2021: 68.8 ML/MIN/1.73
EOSINOPHIL # BLD AUTO: 0.15 10*3/MM3 (ref 0–0.4)
EOSINOPHIL NFR BLD AUTO: 1.6 % (ref 0.3–6.2)
ERYTHROCYTE [DISTWIDTH] IN BLOOD BY AUTOMATED COUNT: 14 % (ref 12.3–15.4)
GLUCOSE BLDC GLUCOMTR-MCNC: 120 MG/DL (ref 70–99)
GLUCOSE BLDC GLUCOMTR-MCNC: 136 MG/DL (ref 70–99)
GLUCOSE BLDC GLUCOMTR-MCNC: 136 MG/DL (ref 70–99)
GLUCOSE BLDC GLUCOMTR-MCNC: 64 MG/DL (ref 70–99)
GLUCOSE BLDC GLUCOMTR-MCNC: 72 MG/DL (ref 70–99)
GLUCOSE BLDC GLUCOMTR-MCNC: 73 MG/DL (ref 70–99)
GLUCOSE BLDC GLUCOMTR-MCNC: 91 MG/DL (ref 70–99)
GLUCOSE SERPL-MCNC: 79 MG/DL (ref 65–99)
HCT VFR BLD AUTO: 34.8 % (ref 37.5–51)
HGB BLD-MCNC: 11.7 G/DL (ref 13–17.7)
IMM GRANULOCYTES # BLD AUTO: 0.27 10*3/MM3 (ref 0–0.05)
IMM GRANULOCYTES NFR BLD AUTO: 2.8 % (ref 0–0.5)
LYMPHOCYTES # BLD AUTO: 1.48 10*3/MM3 (ref 0.7–3.1)
LYMPHOCYTES NFR BLD AUTO: 15.3 % (ref 19.6–45.3)
MAGNESIUM SERPL-MCNC: 1.9 MG/DL (ref 1.6–2.4)
MCH RBC QN AUTO: 29.4 PG (ref 26.6–33)
MCHC RBC AUTO-ENTMCNC: 33.6 G/DL (ref 31.5–35.7)
MCV RBC AUTO: 87.4 FL (ref 79–97)
MONOCYTES # BLD AUTO: 1.3 10*3/MM3 (ref 0.1–0.9)
MONOCYTES NFR BLD AUTO: 13.5 % (ref 5–12)
NEUTROPHILS NFR BLD AUTO: 6.4 10*3/MM3 (ref 1.7–7)
NEUTROPHILS NFR BLD AUTO: 66.2 % (ref 42.7–76)
NRBC BLD AUTO-RTO: 0 /100 WBC (ref 0–0.2)
PHOSPHATE SERPL-MCNC: 2.4 MG/DL (ref 2.5–4.5)
PLATELET # BLD AUTO: 295 10*3/MM3 (ref 140–450)
PMV BLD AUTO: 8.5 FL (ref 6–12)
POTASSIUM SERPL-SCNC: 3.7 MMOL/L (ref 3.5–5.2)
QT INTERVAL: 356 MS
QT INTERVAL: 362 MS
QTC INTERVAL: 396 MS
QTC INTERVAL: 451 MS
RBC # BLD AUTO: 3.98 10*6/MM3 (ref 4.14–5.8)
SODIUM SERPL-SCNC: 130 MMOL/L (ref 136–145)
WBC NRBC COR # BLD AUTO: 9.66 10*3/MM3 (ref 3.4–10.8)

## 2025-04-24 PROCEDURE — 92526 ORAL FUNCTION THERAPY: CPT

## 2025-04-24 PROCEDURE — 80048 BASIC METABOLIC PNL TOTAL CA: CPT | Performed by: INTERNAL MEDICINE

## 2025-04-24 PROCEDURE — 82948 REAGENT STRIP/BLOOD GLUCOSE: CPT

## 2025-04-24 PROCEDURE — 85025 COMPLETE CBC W/AUTO DIFF WBC: CPT | Performed by: INTERNAL MEDICINE

## 2025-04-24 PROCEDURE — 99231 SBSQ HOSP IP/OBS SF/LOW 25: CPT | Performed by: SURGERY

## 2025-04-24 PROCEDURE — 97110 THERAPEUTIC EXERCISES: CPT

## 2025-04-24 PROCEDURE — 82948 REAGENT STRIP/BLOOD GLUCOSE: CPT | Performed by: INTERNAL MEDICINE

## 2025-04-24 PROCEDURE — 83735 ASSAY OF MAGNESIUM: CPT | Performed by: INTERNAL MEDICINE

## 2025-04-24 PROCEDURE — 84100 ASSAY OF PHOSPHORUS: CPT | Performed by: INTERNAL MEDICINE

## 2025-04-24 PROCEDURE — 99232 SBSQ HOSP IP/OBS MODERATE 35: CPT | Performed by: INTERNAL MEDICINE

## 2025-04-24 RX ORDER — ATORVASTATIN CALCIUM 40 MG/1
80 TABLET, FILM COATED ORAL NIGHTLY
Status: DISCONTINUED | OUTPATIENT
Start: 2025-04-24 | End: 2025-04-30 | Stop reason: HOSPADM

## 2025-04-24 RX ADMIN — LEVOTHYROXINE SODIUM 50 MCG: 0.05 TABLET ORAL at 05:07

## 2025-04-24 RX ADMIN — METOPROLOL TARTRATE 12.5 MG: 25 TABLET, FILM COATED ORAL at 09:03

## 2025-04-24 RX ADMIN — Medication 10 ML: at 09:04

## 2025-04-24 RX ADMIN — ASPIRIN 81 MG: 81 TABLET, COATED ORAL at 09:03

## 2025-04-24 RX ADMIN — PANTOPRAZOLE SODIUM 40 MG: 40 INJECTION, POWDER, FOR SOLUTION INTRAVENOUS at 21:39

## 2025-04-24 RX ADMIN — POTASSIUM & SODIUM PHOSPHATES POWDER PACK 280-160-250 MG 1 PACKET: 280-160-250 PACK at 20:14

## 2025-04-24 RX ADMIN — APIXABAN 5 MG: 5 TABLET, FILM COATED ORAL at 09:03

## 2025-04-24 RX ADMIN — APIXABAN 5 MG: 5 TABLET, FILM COATED ORAL at 20:14

## 2025-04-24 RX ADMIN — ATORVASTATIN CALCIUM 80 MG: 40 TABLET, FILM COATED ORAL at 21:39

## 2025-04-24 RX ADMIN — POTASSIUM & SODIUM PHOSPHATES POWDER PACK 280-160-250 MG 1 PACKET: 280-160-250 PACK at 18:40

## 2025-04-24 RX ADMIN — ARIPIPRAZOLE 10 MG: 10 TABLET ORAL at 09:08

## 2025-04-24 RX ADMIN — Medication 400 MG: at 11:44

## 2025-04-24 RX ADMIN — POTASSIUM & SODIUM PHOSPHATES POWDER PACK 280-160-250 MG 1 PACKET: 280-160-250 PACK at 11:44

## 2025-04-24 RX ADMIN — METOPROLOL TARTRATE 12.5 MG: 25 TABLET, FILM COATED ORAL at 20:14

## 2025-04-24 RX ADMIN — Medication: at 09:04

## 2025-04-24 RX ADMIN — Medication: at 20:14

## 2025-04-24 RX ADMIN — PANTOPRAZOLE SODIUM 40 MG: 40 INJECTION, POWDER, FOR SOLUTION INTRAVENOUS at 09:04

## 2025-04-24 NOTE — THERAPY TREATMENT NOTE
Acute Care - Speech Language Pathology   Swallow Treatment Note [facility]OR [facility]     Patient Name: Alberto Rachel  : 1960  MRN: [id number]  Today's Date: 2025               Admit Date: 4/15/2025    Visit Dx:     ICD-10-CM ICD-9-CM   1. Hyponatremia  E87.1 276.1   2. Non-traumatic rhabdomyolysis  M62.82 728.88   3. ESVIN (acute kidney injury)  N17.9 584.9   4. Oropharyngeal dysphagia  R13.12 787.22   5. Decreased activities of daily living (ADL)  Z78.9 V49.89   6. Difficulty walking  R26.2 719.7     Patient Active Problem List   Diagnosis    Anxiety    Atherosclerosis of coronary artery bypass graft of native heart without angina pectoris    Essential hypertension    Depression    Mixed hyperlipidemia    Type 2 diabetes mellitus with hyperglycemia, without long-term current use of insulin    Hypothyroidism    Arthritis of knee    Illiteracy    Cramps, extremity    Tachycardia    Obesity (BMI 30-39.9)    Degenerative disc disease, lumbar    Primary insomnia    Gastroesophageal reflux disease without esophagitis    Close exposure to COVID-19 virus    Mild intermittent asthma    Candidal dermatitis    Bronchitis    Bilateral shoulder pain    Hyperkalemia    Stage 3a chronic kidney disease (CKD)    Hyponatremia    Onychomycosis    Onychocryptosis    PVD (peripheral vascular disease)    Foot pain, bilateral     Past Medical History:   Diagnosis Date    Anxiety     Arthritis     Atherosclerosis of coronary artery bypass graft of native heart without angina pectoris 10/23/2018    Coronary artery disease with previous bypass, LIMA to the LAD, SVG to ramus, and SVG to RCA in     CAD (coronary artery disease) 10/23/2018    Carotid artery stenosis with cerebral infarction 2015    Dr. St     Cramps, extremity 9/10/2021    Depression     DM2 (diabetes mellitus, type 2) 10/23/2018    Duodenal ulcer     Essential hypertension 10/23/2018    Headache     Heart attack 2015    Heartburn     HLD (hyperlipidemia)      HTN (hypertension)     Injury of right leg 8/13/2021    Irregular heart beat     Low back pain 03/18/2019    Mild episode of recurrent depressive disorder 10/23/2018    Rash 03/18/2019    Shortness of breath     SOB (shortness of breath)     Type 2 diabetes mellitus with hyperglycemia, without long-term current use of insulin 06/11/2019    Uncontrolled diabetes mellitus 03/18/2019     Past Surgical History:   Procedure Laterality Date    CARDIAC CATHETERIZATION  06/2015    Dr. St     CIRCUMCISION      age 14 yrs old    CORONARY ARTERY BYPASS GRAFT  06/2015    5 bypasses       SLP Recommendation and Plan      SPEECH PATHOLOGY DYSPHAGIA TREATMENT    Subjective/Behavioral Observations: Patient was pleasant and cooperative.  Patient recently placed on p.o. diet after many days of being n.p.o. due to GI issues.  Patient reported that he has been doing okay now that he is transition to thin liquid diet.  Patient denies any increased difficulty with swallow.        Day/time of Treatment: 04/20/2025        Current Diet: Level 0 thin liquid diet.        Current Strategies: Patient should eat at a slow rate and take small bites and sips. Patient should double swallow with all solids to provide clearance of residue.         Treatment received: Patient participated clinical swallow evaluation of level 0 thin liquids via cup and straw and level 4 puréed solids to ensure continued tolerance of recommended diet without signs or symptoms of aspiration.  Especially as patient has been unable to consume anything p.o. for the last few days.        Results of treatment: Patient did not demonstrate any overt signs or symptoms of aspiration with any other consistencies trialed.  Patient did well with liquids and puréed solids and denied any globus sensation.  Patient continues to have some mild weakness of the swallow which would likely benefit from swallowing exercises.        Progress toward goals: Progressing.        Barriers to  Achieving goals: Medical status.        Plan of care:/changes in plan: No change to plan of care. Patient would benefit from continued speech services to address dysphagia.                                                                                                 EDUCATION  The patient has been educated in the following areas:   Dysphagia (Swallowing Impairment).                Time Calculation:    Time Calculation- SLP       Row Name 04/24/25 1230             Time Calculation- SLP    SLP Start Time 1230  -AW      SLP Stop Time 1300  -AW      SLP Time Calculation (min) 30 min  -AW         Untimed Charges    92606-GO Treatment Swallow Minutes 30  -AW         Total Minutes    Untimed Charges Total Minutes 30  -AW       Total Minutes 30  -AW                User Key  (r) = Recorded By, (t) = Taken By, (c) = Cosigned By      Initials Name Provider Type    AW Roslyn Barnes SLP Speech and Language Pathologist                    Therapy Charges for Today       Code Description Service Date Service Provider Modifiers Qty    [id number]  ST TREATMENT SWALLOW 2 4/24/2025 Roslyn Barnes SLP GN 1                 IAIN Sanchez  4/24/2025

## 2025-04-24 NOTE — PROGRESS NOTES
" LOS: 9 days   Patient Care Team:  Magnus Euceda DO as PCP - General (Family Medicine)    Chief Complaint: Hyponatremia    Subjective     Pt without new events.  NGT in place hooked to wall suction.         History taken from: patient chart RN    Objective     Vital Sign Min/Max for last 24 hours  Temp  Min: 98.2 °F (36.8 °C)  Max: 98.6 °F (37 °C)   BP  Min: 107/53  Max: 135/69   Pulse  Min: 71  Max: 91   Resp  Min: 14  Max: 18   SpO2  Min: 96 %  Max: 98 %   No data recorded   Weight  Min: 102 kg (224 lb 10.4 oz)  Max: 102 kg (224 lb 10.4 oz)     Flowsheet Rows      Flowsheet Row First Filed Value   Admission Height 182.8 cm (71.97\") Documented at 04/15/2025 1719   Admission Weight 102 kg (223 lb 15.8 oz) Documented at 04/15/2025 1220            No intake/output data recorded.  I/O last 3 completed shifts:  In: 4162.3 [P.O.:360; I.V.:3802.3]  Out: 4450 [Urine:3750; Emesis/NG output:700]    Objective:  General Appearance:  In no acute distress, not in pain and comfortable (Up in the chair.).    Vital signs: (most recent): Blood pressure 127/57, pulse 71, temperature 98.4 °F (36.9 °C), temperature source Oral, resp. rate 18, height 182.8 cm (71.97\"), weight 102 kg (224 lb 10.4 oz), SpO2 97%.  Vital signs are normal.  No fever.    Output: Producing urine and minimal stool output.    HEENT: Normal HEENT exam.  (ngt)    Lungs:  Normal effort and normal respiratory rate.  He is not in respiratory distress.    Heart: Normal rate.  Regular rhythm.    Abdomen: Abdomen is soft and distended.  Bowel sounds are normal.   There is no abdominal tenderness.     Extremities: Normal range of motion.  There is no dependent edema.  (Orozco catheter in place.)  Pulses: Distal pulses are intact.    Neurological: Patient is alert and oriented to person, place and time.    Pupils:  Pupils are equal, round, and reactive to light.    Skin:  Warm and dry.                Results Review:     I reviewed the patient's new clinical results.  I " "reviewed the patient's new imaging results and agree with the interpretation.  I reviewed the patient's other test results and agree with the interpretation    WBC WBC   Date Value Ref Range Status   04/24/2025 9.66 3.40 - 10.80 10*3/mm3 Final   04/23/2025 10.63 3.40 - 10.80 10*3/mm3 Final   04/22/2025 11.22 (H) 3.40 - 10.80 10*3/mm3 Final      HGB Hemoglobin   Date Value Ref Range Status   04/24/2025 11.7 (L) 13.0 - 17.7 g/dL Final   04/23/2025 12.3 (L) 13.0 - 17.7 g/dL Final   04/22/2025 13.5 13.0 - 17.7 g/dL Final      HCT Hematocrit   Date Value Ref Range Status   04/24/2025 34.8 (L) 37.5 - 51.0 % Final   04/23/2025 35.8 (L) 37.5 - 51.0 % Final   04/22/2025 40.0 37.5 - 51.0 % Final      Platlets No results found for: \"LABPLAT\"   MCV MCV   Date Value Ref Range Status   04/24/2025 87.4 79.0 - 97.0 fL Final   04/23/2025 86.9 79.0 - 97.0 fL Final   04/22/2025 89.1 79.0 - 97.0 fL Final          Sodium Sodium   Date Value Ref Range Status   04/24/2025 130 (L) 136 - 145 mmol/L Final   04/23/2025 134 (L) 136 - 145 mmol/L Final   04/23/2025 134 (L) 136 - 145 mmol/L Final   04/22/2025 131 (L) 136 - 145 mmol/L Final   04/22/2025 126 (L) 136 - 145 mmol/L Final   04/22/2025 129 (L) 136 - 145 mmol/L Final   04/22/2025 128 (L) 136 - 145 mmol/L Final   04/22/2025 128 (L) 136 - 145 mmol/L Final   04/22/2025 126 (L) 136 - 145 mmol/L Final   04/21/2025 126 (L) 136 - 145 mmol/L Final   04/21/2025 124 (L) 136 - 145 mmol/L Final      Potassium Potassium   Date Value Ref Range Status   04/24/2025 3.7 3.5 - 5.2 mmol/L Final   04/23/2025 4.6 3.5 - 5.2 mmol/L Final   04/23/2025 4.3 3.5 - 5.2 mmol/L Final   04/23/2025 4.3 3.5 - 5.2 mmol/L Final   04/22/2025 4.8 3.5 - 5.2 mmol/L Final   04/22/2025 4.3 3.5 - 5.2 mmol/L Final   04/22/2025 4.4 3.5 - 5.2 mmol/L Final   04/22/2025 4.5 3.5 - 5.2 mmol/L Final   04/22/2025 4.5 3.5 - 5.2 mmol/L Final   04/22/2025 4.5 3.5 - 5.2 mmol/L Final   04/21/2025 4.6 3.5 - 5.2 mmol/L Final   04/21/2025 4.6 " "3.5 - 5.2 mmol/L Final     Comment:     Slight hemolysis detected by analyzer. Result may be falsely elevated.      Chloride Chloride   Date Value Ref Range Status   04/24/2025 99 98 - 107 mmol/L Final   04/23/2025 101 98 - 107 mmol/L Final   04/22/2025 92 (L) 98 - 107 mmol/L Final      CO2 CO2   Date Value Ref Range Status   04/24/2025 20.0 (L) 22.0 - 29.0 mmol/L Final   04/23/2025 18.4 (L) 22.0 - 29.0 mmol/L Final   04/22/2025 17.5 (L) 22.0 - 29.0 mmol/L Final      BUN BUN   Date Value Ref Range Status   04/24/2025 35 (H) 8 - 23 mg/dL Final   04/23/2025 54 (H) 8 - 23 mg/dL Final   04/22/2025 75 (H) 8 - 23 mg/dL Final      Creatinine Creatinine   Date Value Ref Range Status   04/24/2025 1.17 0.76 - 1.27 mg/dL Final   04/23/2025 1.58 (H) 0.76 - 1.27 mg/dL Final   04/22/2025 3.33 (H) 0.76 - 1.27 mg/dL Final      Calcium Calcium   Date Value Ref Range Status   04/24/2025 8.7 8.6 - 10.5 mg/dL Final   04/23/2025 8.8 8.6 - 10.5 mg/dL Final   04/22/2025 8.8 8.6 - 10.5 mg/dL Final      PO4 No results found for: \"CAPO4\"   Albumin Albumin   Date Value Ref Range Status   04/22/2025 2.8 (L) 3.5 - 5.2 g/dL Final      Magnesium Magnesium   Date Value Ref Range Status   04/24/2025 1.9 1.6 - 2.4 mg/dL Final   04/23/2025 2.4 1.6 - 2.4 mg/dL Final   04/22/2025 2.8 (H) 1.6 - 2.4 mg/dL Final      Uric Acid No results found for: \"URICACID\"       Medication Review:   ammonium lactate, , Topical, 2 times per day  apixaban, 5 mg, Oral, Q12H  ARIPiprazole, 10 mg, Oral, Daily  aspirin, 81 mg, Oral, Daily  [Held by provider] atorvastatin, 80 mg, Oral, Daily  [Held by provider] bumetanide, 1 mg, Oral, Daily  [Held by provider] citalopram, 20 mg, Oral, Daily  insulin regular, 2-7 Units, Subcutaneous, Q6H  levothyroxine, 50 mcg, Oral, Q AM  metoprolol tartrate, 12.5 mg, Oral, Q12H  pantoprazole, 40 mg, Intravenous, Q12H  [Held by provider] senna-docusate sodium, 2 tablet, Oral, BID  sodium chloride, 10 mL, Intravenous, Q12H  [Held by provider] " traZODone, 100 mg, Oral, Nightly          Assessment & Plan       Hyponatremia    Type 2 diabetes mellitus with hyperglycemia, without long-term current use of insulin    Onychomycosis    Onychocryptosis    PVD (peripheral vascular disease)    Foot pain, bilateral      Assessment & Plan  - Hyponatremia, possibly secondary to chlorthalidone, urine osmolality 271.  TSH okay.  No signs or symptoms of adrenal insufficiency.  Sodium is back to normal range     NPO with NGT and suction.   Repeat Uosm: 438  Orozco is back in.  Bumex on hold. Avoid thiazides.    -- ESVIN, better with IVF's.  Holding bumex.  Hold ivf's now.      - Chronic kidney disease stage III, baseline creatinine lately around 1.4-1.5.  Has bland UA and minimal proteinuria from 2 years ago.     - Hypokalemia, replaced and improved.  Magnesium is okay.    - Type 2 diabetes, per primary.    - Shortness of breath, cardiology planning stress test.    - Hypothyroidism, per primary.    - Abdominal distention, ileus on CT. per primary/surgery. CT noted.    - A flutter with tachycardia.    Abner Flores MD  04/24/25  10:17 EDT

## 2025-04-24 NOTE — PROGRESS NOTES
[facility]   Progress Note    Patient Name: Alberto Rachel  : 1960  MRN: [id number]  Primary Care Physician: Magnus Euceda DO  Date of admission: 4/15/2025    Subjective   Subjective     HPI:  Patient tolerating her diet, no nausea vomiting he feels better he wants to go home.  Sodium is 130, patient has prerenal azotemia.    Review of Systems  Review of Systems   Constitutional: Negative.    HENT: Negative.     Respiratory: Negative.     Cardiovascular: Negative.    Gastrointestinal: Negative.    Genitourinary: Negative.    Musculoskeletal: Negative.    Skin: Negative.    Neurological: Negative.    Psychiatric/Behavioral:          Difficult to evaluate       Objective   Objective     Vitals:  Temp:  [98.2 °F (36.8 °C)-98.6 °F (37 °C)] 98.6 °F (37 °C)  Heart Rate:  [66-80] 74  Resp:  [18] 18  BP: (107-130)/(51-61) 123/61    Physical Exam:  Physical Exam  Constitutional:       Appearance: He is obese.   HENT:      Head: Normocephalic.      Nose: Nose normal.   Eyes:      Extraocular Movements: Extraocular movements intact.   Cardiovascular:      Rate and Rhythm: Normal rate and regular rhythm.      Heart sounds: No murmur heard.  Pulmonary:      Breath sounds: No rales.   Abdominal:      General: Bowel sounds are normal.   Musculoskeletal:      Right lower leg: No edema.      Left lower leg: No edema.   Skin:     General: Skin is warm.   Neurological:      Mental Status: He is alert.         Result Review    Result Review:  I have personally reviewed the results from the time of this admission to 25 7:10 PM EDT and agree with these findings:  []  Laboratory  []  Microbiology  []  Radiology  []  EKG/Telemetry   []  Cardiology/Vascular   []  Pathology  []  Old records  []  Other:    Most notable findings include: Patient is tolerating his diet, passing bowel movement, reported no chest pain or shortness of breath, he is willing to go home, his sodium is improving, not having abdominal pain, no  nausea vomiting or diarrhea.    Assessment & Plan   Assessment / Plan     Active Hospital Problems:  Active Hospital Problems    Diagnosis     **Hyponatremia     Onychomycosis     Onychocryptosis     PVD (peripheral vascular disease)     Foot pain, bilateral     Type 2 diabetes mellitus with hyperglycemia, without long-term current use of insulin        Plan:   Patient is doing clinically much better tolerating clear liquid diet, probably will be  be advanced as per GI his sodium is better.  If patient agree for his discharge home he can be followed in my office.  Patient was told not to drink alcohol, may need social service assistance when he goes home.    DVT prophylaxis: As per internal medicine    CODE STATUS:    Code Status and Medical Interventions: CPR (Attempt to Resuscitate); Full Support   Ordered at: 04/15/25 1554     Code Status (Patient has no pulse and is not breathing):    CPR (Attempt to Resuscitate)     Medical Interventions (Patient has pulse or is breathing):    Full Support       Disposition:  I expect patient to be discharged as per internal medicine nephrology and GI.    Electronically signed by Jacques St MD, 04/24/25, 7:10 PM EDT.

## 2025-04-24 NOTE — PROGRESS NOTES
[facility]   Hospitalist Progress Note    Date of admission: 4/15/2025  Patient Name: Alberto Rachel  1960  Date: 4/24/2025      Subjective     Chief Complaint   Patient presents with    Altered Mental Status       Interval Followup: Having several bowel meds no nausea or vomiting.  Tolerating advancing diet thus far.    Objective     Vitals:   Temp:  [98.1 °F (36.7 °C)-98.6 °F (37 °C)] 98.1 °F (36.7 °C)  Heart Rate:  [66-80] 66  Resp:  [18] 18  BP: (107-130)/(51-61) 114/52    Physical Exam  Awake, conversant, poor health literacy family at bedside  Breathing comfortably on room air mild rhonchi no wheezing  Currently regular rate rhythm  Obese abdomen less full, nontender positive bowel sounds    Result Review:  Vital signs, labs and recent relevant imaging reviewed.      CBC          4/22/2025    05:54 4/23/2025    05:36 4/24/2025    02:55   CBC   WBC 11.22  10.63  9.66    RBC 4.49  4.12  3.98    Hemoglobin 13.5  12.3  11.7    Hematocrit 40.0  35.8  34.8    MCV 89.1  86.9  87.4    MCH 30.1  29.9  29.4    MCHC 33.8  34.4  33.6    RDW 13.8  14.0  14.0    Platelets 301  301  295      CMP          4/22/2025    00:08 4/22/2025    05:54 4/22/2025    12:09 4/22/2025    18:37 4/22/2025    23:53 4/23/2025    05:36 4/23/2025    11:47 4/24/2025    02:55   CMP   Glucose  108     83   79    BUN  75     54   35    Creatinine  3.33     1.58   1.17    EGFR  19.6     47.9   68.8    Sodium 126  128     128  129  126  131  134     134   130    Potassium 4.5  4.5     4.5  4.4  4.3  4.8  4.3     4.3  4.6  3.7    Chloride  92     101   99    Calcium  8.8     8.8   8.7    Total Protein  6.6          Albumin  2.8          Total Bilirubin  1.1          Alkaline Phosphatase  55          AST (SGOT)  26          ALT (SGPT)  23          BUN/Creatinine Ratio  22.5     34.2   29.9    Anion Gap  18.5     14.6   11.0          acetaminophen **OR** acetaminophen    albuterol    [Held by provider] senna-docusate sodium **AND** [Held by  provider] polyethylene glycol **AND** [Held by provider] bisacodyl **AND** [Held by provider] bisacodyl    dextrose    dextrose    glucagon (human recombinant)    metoprolol tartrate    nitroglycerin    ondansetron ODT **OR** ondansetron    sodium chloride    sodium chloride    sodium chloride    ammonium lactate, , Topical, 2 times per day  apixaban, 5 mg, Oral, Q12H  ARIPiprazole, 10 mg, Oral, Daily  aspirin, 81 mg, Oral, Daily  atorvastatin, 80 mg, Oral, Nightly  [Held by provider] bumetanide, 1 mg, Oral, Daily  [Held by provider] citalopram, 20 mg, Oral, Daily  insulin regular, 2-7 Units, Subcutaneous, 4x Daily AC & at Bedtime  levothyroxine, 50 mcg, Oral, Q AM  magnesium oxide, 400 mg, Oral, Daily  metoprolol tartrate, 12.5 mg, Oral, Q12H  pantoprazole, 40 mg, Intravenous, Q12H  potassium & sodium phosphates, 1 packet, Oral, 4x Daily With Meals & Nightly  [Held by provider] senna-docusate sodium, 2 tablet, Oral, BID  sodium chloride, 10 mL, Intravenous, Q12H  [Held by provider] traZODone, 100 mg, Oral, Nightly        XR Abdomen KUB  Result Date: 4/23/2025  Impression: Persistent dilated loops of small bowel in the right abdomen suggesting partial small bowel obstruction versus adynamic ileus. Electronically Signed: Heriberto Casillas MD  4/23/2025 11:42 AM EDT  Workstation ID: [id number]    XR Chest 1 View  Result Date: 4/23/2025  Impression: 1.Mild improvement in aeration in the left lung base. Mild residual atelectasis versus infiltrate is seen. There may be a small residual left pleural effusion. 2.The nasogastric tube is positioned in the stomach. 3.Mild atelectasis versus infiltrate at the lateral aspect of the right midlung. Electronically Signed: Emory Quintana MD  4/23/2025 11:32 AM EDT  Workstation ID: [id number]    XR Abdomen KUB  Result Date: 4/21/2025  Impression: 1.Esophagogastric tube with tip in the proximal to mid stomach. 2.Diffuse gaseous distention and dilatation of small bowel loops suggesting small  bowel obstruction. Electronically Signed: Heriberto Casillas MD  4/21/2025 4:23 PM EDT  Workstation ID: [id number]    XR Abdomen KUB  Result Date: 4/21/2025  Impression: High-grade partial small bowel obstruction. Electronically Signed: Dino Dailey MD  4/21/2025 7:44 AM EDT  Workstation ID: [id number]    XR Abdomen KUB  Result Date: 4/20/2025  Impression: Stable intestinal distention Electronically Signed: Gian Marcus  4/20/2025 2:30 PM EDT  Workstation ID: [id number]    XR Abdomen KUB  Result Date: 4/20/2025  Impression: NG/OG tube courses into the stomach with the tip in the region of the gastric fundus. Bowel gas pattern suggestive of ileus or bowel obstruction. Electronically Signed: Abner Dixon MD  4/20/2025 10:03 AM EDT  Workstation ID: [id number]    XR Abdomen KUB  Result Date: 4/20/2025  Gas-distended bowel persists. The findings suggest a generalized adynamic ileus.   Portions of this note were completed with a voice recognition program.  4/20/2025 5:57 AM by Donta Mera MD on Workstation: [employer]      CT Abdomen Pelvis Without Contrast  Result Date: 4/19/2025  1.  Distended stomach, small bowel, and colon are identified. There is a transition zone at about the level of the splenic flexure of colon. These findings are thought most likely to represent a generalized adynamic ileus with focal spasm at the level of the splenic flexure of colon. 2.  There is residual oral contrast agent present, likely related to the recent modified barium swallow (from 4/16/2025) within the descending colon and rectosigmoid colon, which is against a complete mechanical bowel obstruction. 3.  No acute appendicitis or diverticulitis is seen. No pneumoperitoneum. No pneumatosis or portal or mesenteric venous gas. No significant stool burden is suggested. 4.  The study is limited, especially due to motion artifact. 5.  Consider close interval clinical, lab, and if necessary, imaging follow-up to ensure benign progression. 6.  No other  definite significant acute findings are appreciated. 7.  Please see above comments for further detail.    Portions of this note were completed with a voice recognition program.  4/19/2025 11:08 PM by Donta Mera MD on Workstation: [employer]      XR Abdomen KUB  Result Date: 4/19/2025  Impression: Dilated small bowel loops that could relate to a small bowel obstruction. CT would be suggested to reassess. Electronically Signed: Harvey Issa MD  4/19/2025 2:56 PM EDT  Workstation ID: [id number]      Assessment / Plan     Summary: 64 y.o. male with history of anxiety, hypertension, CAD status post bypass, history of stroke, carotid artery stenosis, depression, diabetes, duodenal ulcer, hypertension, dyslipidemia, diabetes mellitus, hospitalized on 4/15/2025 chief complaint of altered mental status, multiple falls, found to have significant life-threatening hyponatremia, nephrology consulted, placed in the critical care unit with critical care consulted, given a round of hypertonic saline, placed on IV fluids several home medications held in the setting of severe hyponatremia including chlorthalidone, Bumex, Abilify, Celexa among others. Cardiology consulted elevated troponins. Planning nuclear stress test. Transferred out of ICU. Started developing distention despite passing gas and having bowel movements, abdominal distention worse, x-ray showing small bowel obstruction pattern, CT imaging obtained, showing gastric, small and large bowel distention concerning for ileus versus versus bowel spasm and peristalsis, could have Abel's, NG tube placed to low intermittent suction, general surgery consulted     Assessment/Plan (clinically significant if listed here)  Acute life-threatening hyponatremia, hypoosmolar  High-grade small bowel obstruction with question ileus vs Howell  Acute metabolic encephalopathy  ESVIN, question prerenal/dehydration related, on CKD3 b/l near ~1.4 to 1.5 most recently  Rhabdomyolysis  AGMA   New  paroxysmal Aflutter  CAD with history of CABG, with elevated troponins likely type II MI secondary to hyponatremia  Asthma mild intermittent  DM2  Depression/Anxiety  Hx of HTN with low normal BP here  Hypothyroidism  History of stroke and Carotid artery stenosis  History of duodenal ulcer  Poor health literacy/unable to read    NG out advancing diet as per surgery appreciate assistance.  Replace magnesium and phosphorus monitor electrolytes  Voiding trial today at [facility] placed largely for mobility and close. PVR was negative previously.    Hold Bumex, hold additional IV fluids today, sodium slightly decreased at 130.  Did have LR yesterday.  Continue with Abilify alone avoiding other home psychiatric medications in case contributing.  Needs to avoid thiazide/chlorthalidone at discharge  Leukocytosis normalized, has not required antibiotics.  Monitor need increase activity and symptoms monitoring respiratory hygiene  Consult recs reviewed no surgical intervention needed  Continue with Eliquis (new medication needed at discharge) for paroxysmal A-fib, metoprolol, monitor tele.    Continue SSI.  Home metformin, pioglitazone on hold  Continue Synthroid. TSH within normal limits  cont aspirin/statin   PT/OT  Check a.m. BMP, magnesium, phosphorus    Dispo: possibly back with HH/family.  Declined rehab placement previously once medically stable.   D/w sw     VTE Prophylaxis:  Pharmacologic & mechanical VTE prophylaxis orders are present.      Code Status (Patient has no pulse and is not breathing): CPR (Attempt to Resuscitate)  Medical Interventions (Patient has pulse or is breathing): Full Support

## 2025-04-24 NOTE — PLAN OF CARE
Goal Outcome Evaluation:           Progress: improving  Outcome Evaluation: Pt had BM this shift. Pt tolerating up to full liquids this shift with no nausea/vomiting and no complaints of pain. Will continue to monitor.     Kriss Loo RN

## 2025-04-24 NOTE — CONSULTS
"Nutrition Services    Patient Name: Alberto Rachel  YOB: 1960  MRN: [id number]  Admission date: 4/15/2025      CLINICAL NUTRITION ASSESSMENT      Reason for Assessment  Identified at Risk by Screening Criteria      H&P:  Past Medical History:   Diagnosis Date    Anxiety     Arthritis     Atherosclerosis of coronary artery bypass graft of native heart without angina pectoris 10/23/2018    Coronary artery disease with previous bypass, LIMA to the LAD, SVG to ramus, and SVG to RCA in 2015    CAD (coronary artery disease) 10/23/2018    Carotid artery stenosis with cerebral infarction 06/01/2015    Dr. Alma Rosa Rosales, extremity 9/10/2021    Depression     DM2 (diabetes mellitus, type 2) 10/23/2018    Duodenal ulcer     Essential hypertension 10/23/2018    Headache     Heart attack 2015    Heartburn     HLD (hyperlipidemia)     HTN (hypertension)     Injury of right leg 8/13/2021    Irregular heart beat     Low back pain 03/18/2019    Mild episode of recurrent depressive disorder 10/23/2018    Rash 03/18/2019    Shortness of breath     SOB (shortness of breath)     Type 2 diabetes mellitus with hyperglycemia, without long-term current use of insulin 06/11/2019    Uncontrolled diabetes mellitus 03/18/2019        Current Problems:   Active Hospital Problems    Diagnosis     **Hyponatremia     Onychomycosis     Onychocryptosis     PVD (peripheral vascular disease)     Foot pain, bilateral     Type 2 diabetes mellitus with hyperglycemia, without long-term current use of insulin         Nutrition/Diet History         Narrative   NGT previously placed, with output noted. Now removed and full liquid diet is in place. Tolerating well, with no complaints offered. Advance diet as feasible.     Anthropometrics        Current Height, Weight Height: 182.8 cm (71.97\")  Weight: 102 kg (224 lb 10.4 oz)   Current BMI Body mass index is 30.49 kg/m².   BMI Classification Obese Class I   % % (77.6 kg)    Adjusted " Body Weight (ABW)    Weight Hx  Wt Readings from Last 30 Encounters:   04/24/25 0453 102 kg (224 lb 10.4 oz)   04/23/25 0527 103 kg (227 lb 15.3 oz)   04/22/25 0539 107 kg (234 lb 12.6 oz)   04/21/25 0443 100 kg (221 lb 1.9 oz)   04/20/25 0549 99.5 kg (219 lb 5.7 oz)   04/19/25 0638 100 kg (221 lb 5.5 oz)   04/18/25 0555 99.9 kg (220 lb 3.8 oz)   04/17/25 0520 100 kg (220 lb 10.9 oz)   04/16/25 0410 100 kg (220 lb 10.9 oz)   04/15/25 1719 101 kg (223 lb 1.7 oz)   04/15/25 1220 102 kg (223 lb 15.8 oz)   10/29/24 1308 103 kg (226 lb)   08/08/24 1248 101 kg (223 lb 9.6 oz)   07/18/24 1342 98.9 kg (218 lb)   07/17/24 1007 98.9 kg (218 lb)   06/19/24 0939 101 kg (223 lb)   06/12/24 0936 98.4 kg (217 lb)   06/05/24 1308 98.4 kg (217 lb)   05/22/24 1458 93.4 kg (205 lb 12.8 oz)   09/06/23 1119 93.4 kg (205 lb 12.8 oz)   05/15/23 1251 87.7 kg (193 lb 6.4 oz)   02/15/23 1205 89.5 kg (197 lb 4.8 oz)   12/16/22 0941 88.4 kg (194 lb 14.4 oz)   11/15/22 0947 93.6 kg (206 lb 6.4 oz)   11/08/22 0909 91.4 kg (201 lb 8 oz)   11/04/22 0939 96.2 kg (212 lb)   10/26/22 1522 93.9 kg (207 lb)   08/23/22 1255 95.8 kg (211 lb 4.8 oz)   05/16/22 1442 97.1 kg (214 lb)   03/15/22 1347 99.2 kg (218 lb 9.6 oz)   03/02/22 1532 101 kg (222 lb 1.6 oz)   02/14/22 1346 100 kg (220 lb 6.4 oz)   01/11/22 1209 98.5 kg (217 lb 3.2 oz)   11/12/21 1348 98.5 kg (217 lb 3.2 oz)   11/05/21 1445 98.2 kg (216 lb 9.6 oz)   10/15/21 1443 96.3 kg (212 lb 3.2 oz)   09/27/21 1434 102 kg (223 lb 12.8 oz)   09/17/21 1455 101 kg (223 lb)   09/10/21 1434 98.5 kg (217 lb 3.2 oz)   08/27/21 1443 101 kg (223 lb)          Wt Change Observation UBW of 220-230 lbs     Estimated/Assessed Needs  Estimated Needs based on: Ideal Body Weight       Energy Requirements 25-30 kcal/kg   EST Needs (kcal/day) 1584-7699 kcal        Protein Requirements 1.0-1.2 g/kg   EST Daily Needs (g/day) 78-93 g       Fluid Requirements 1 ml/kcal    Estimated Needs (mL/day) 6512-4558 ml   "    Labs/Medications         Pertinent Labs Reviewed.   Results from last 7 days   Lab Units 04/24/25 0255 04/23/25  1147 04/23/25  0536 04/22/25  2353 04/22/25  1209 04/22/25  0554 04/21/25  1200 04/21/25  0521 04/20/25  1223 04/20/25  0554   SODIUM mmol/L 130*  --  134*  134* 131*   < > 128*  128*   < > 122*   < > 119*   POTASSIUM mmol/L 3.7 4.6 4.3  4.3 4.8   < > 4.5  4.5   < > 4.4   < > 4.5  4.5   CHLORIDE mmol/L 99  --  101  --   --  92*  --  84*  --  76*   CO2 mmol/L 20.0*  --  18.4*  --   --  17.5*  --  19.8*  --  19.1*   BUN mg/dL 35*  --  54*  --   --  75*  --  67*  --  39*   CREATININE mg/dL 1.17  --  1.58*  --   --  3.33*  --  4.65*  --  2.27*   CALCIUM mg/dL 8.7  --  8.8  --   --  8.8  --  9.0  --  10.4   BILIRUBIN mg/dL  --   --   --   --   --  1.1  --  0.9  --  1.1   ALK PHOS U/L  --   --   --   --   --  55  --  52  --  73   ALT (SGPT) U/L  --   --   --   --   --  23  --  23  --  31   AST (SGOT) U/L  --   --   --   --   --  26  --  25  --  32   GLUCOSE mg/dL 79  --  83  --   --  108*  --  128*  --  189*    < > = values in this interval not displayed.     Results from last 7 days   Lab Units 04/24/25 0255 04/23/25 0536 04/22/25  0554   MAGNESIUM mg/dL 1.9 2.4 2.8*   PHOSPHORUS mg/dL 2.4* 2.6 5.2*   HEMOGLOBIN g/dL 11.7* 12.3* 13.5   HEMATOCRIT % 34.8* 35.8* 40.0     No results found for: \"COVID19\"  Lab Results   Component Value Date    HGBA1C 6.3 (A) 05/22/2024         Pertinent Medications Reviewed.     Malnutrition Severity Assessment              Nutrition Diagnosis         Nutrition Dx Problem 1 Inadequate oral Intake related to decreased ability to consume sufficient energy as evidenced by clear liquid diet.     Nutrition Intervention           Current Nutrition Orders & Evaluation of Intake       Current PO Diet Diet: Liquid; Full Liquid; Fluid Consistency: Thin (IDDSI 0)   Supplement Orders Placed This Encounter      DIET MESSAGE Per , pt to have gatorade with meals       "     Nutrition Intervention/Prescription        Advance diet as feasible (Full Liquids)         Medical Nutrition Therapy/Nutrition Education          Learner     Readiness Patient  Acceptance     Method     Response Explanation  Verbalizes understanding     Monitor/Evaluation        Monitor Per protocol, I&O, PO intake, GI status     Nutrition Discharge Plan         To be determined     Electronically signed by:  Daniel Humphrey RD  04/24/25 15:12 EDT

## 2025-04-24 NOTE — PROGRESS NOTES
[facility]     Progress Note    Patient Name: Alberto Rachel  : 1960  MRN: [id number]  Primary Care Physician:  Magnus Euceda DO  Date of admission: 4/15/2025    Subjective   Subjective     Chief Complaint: Abdominal distention    HPI:  Patient Reports feeling pretty well.  No nausea vomiting.  Having bowel movements      Objective   Objective     Vitals:   Temp:  [98.2 °F (36.8 °C)-98.6 °F (37 °C)] 98.2 °F (36.8 °C)  Heart Rate:  [66-91] 66  Resp:  [16-18] 18  BP: (107-134)/(51-63) 130/57    Physical Exam  Constitutional:       Appearance: Normal appearance.   Cardiovascular:      Rate and Rhythm: Normal rate.   Pulmonary:      Effort: Pulmonary effort is normal.   Abdominal:      General: There is distension.         Result Review    Result Review:  I have personally reviewed the results from the time of this admission to 2025 13:50 EDT and agree with these findings:  []  Laboratory  []  Microbiology  []  Radiology  []  EKG/Telemetry   []  Cardiology/Vascular   []  Pathology  []  Old records  []  Other:      Assessment & Plan   Assessment / Plan     Brief Patient Summary:  Alberto Rachel is a 64 y.o. male who has abdominal distention    Active Hospital Problems:  Active Hospital Problems    Diagnosis     **Hyponatremia     Onychomycosis     Onychocryptosis     PVD (peripheral vascular disease)     Foot pain, bilateral     Type 2 diabetes mellitus with hyperglycemia, without long-term current use of insulin      Plan:  Diet as tolerated  No obvious signs for any surgical intervention as he is tolerating diet and having bowel function  Will be available as needed       VTE Prophylaxis:  Pharmacologic & mechanical VTE prophylaxis orders are present.        CODE STATUS:   Code Status (Patient has no pulse and is not breathing): CPR (Attempt to Resuscitate)  Medical Interventions (Patient has pulse or is breathing): Full Support    Disposition:  I expect patient to be discharged per  primary.    Electronically signed by Dino Cardenas MD, 04/24/25, 1:50 PM EDT.

## 2025-04-24 NOTE — THERAPY TREATMENT NOTE
Patient Name: Alberto Rachel  : 1960    MRN: [id number]                              Today's Date: 2025       Admit Date: 4/15/2025    Visit Dx:     ICD-10-CM ICD-9-CM   1. Hyponatremia  E87.1 276.1   2. Non-traumatic rhabdomyolysis  M62.82 728.88   3. ESVIN (acute kidney injury)  N17.9 584.9   4. Oropharyngeal dysphagia  R13.12 787.22   5. Decreased activities of daily living (ADL)  Z78.9 V49.89   6. Difficulty walking  R26.2 719.7     Patient Active Problem List   Diagnosis    Anxiety    Atherosclerosis of coronary artery bypass graft of native heart without angina pectoris    Essential hypertension    Depression    Mixed hyperlipidemia    Type 2 diabetes mellitus with hyperglycemia, without long-term current use of insulin    Hypothyroidism    Arthritis of knee    Illiteracy    Cramps, extremity    Tachycardia    Obesity (BMI 30-39.9)    Degenerative disc disease, lumbar    Primary insomnia    Gastroesophageal reflux disease without esophagitis    Close exposure to COVID-19 virus    Mild intermittent asthma    Candidal dermatitis    Bronchitis    Bilateral shoulder pain    Hyperkalemia    Stage 3a chronic kidney disease (CKD)    Hyponatremia    Onychomycosis    Onychocryptosis    PVD (peripheral vascular disease)    Foot pain, bilateral     Past Medical History:   Diagnosis Date    Anxiety     Arthritis     Atherosclerosis of coronary artery bypass graft of native heart without angina pectoris 10/23/2018    Coronary artery disease with previous bypass, LIMA to the LAD, SVG to ramus, and SVG to RCA in     CAD (coronary artery disease) 10/23/2018    Carotid artery stenosis with cerebral infarction 2015    Dr. St     Cramps, extremity 9/10/2021    Depression     DM2 (diabetes mellitus, type 2) 10/23/2018    Duodenal ulcer     Essential hypertension 10/23/2018    Headache     Heart attack 2015    Heartburn     HLD (hyperlipidemia)     HTN (hypertension)     Injury of right leg 2021     Irregular heart beat     Low back pain 03/18/2019    Mild episode of recurrent depressive disorder 10/23/2018    Rash 03/18/2019    Shortness of breath     SOB (shortness of breath)     Type 2 diabetes mellitus with hyperglycemia, without long-term current use of insulin 06/11/2019    Uncontrolled diabetes mellitus 03/18/2019     Past Surgical History:   Procedure Laterality Date    CARDIAC CATHETERIZATION  06/2015    Dr. St     CIRCUMCISION      age 14 yrs old    CORONARY ARTERY BYPASS GRAFT  06/2015    5 bypasses      General Information       Row Name 04/24/25 1117          OT Time and Intention    Document Type therapy note (daily note)  -AV     Mode of Treatment individual therapy;occupational therapy  -AV     Patient Effort good  -AV       Row Name 04/24/25 1117          General Information    Existing Precautions/Restrictions fall  -AV       Row Name 04/24/25 1117          Cognition    Orientation Status (Cognition) --  alert, pleasant and cooperative. able to perform therapeutic exercises with moderate cues/ demonstration.  -AV       Row Name 04/24/25 1117          Safety Issues/Impairments Affecting Functional Mobility    Impairments Affecting Function (Mobility) balance;endurance/activity tolerance;strength  -AV               User Key  (r) = Recorded By, (t) = Taken By, (c) = Cosigned By      Initials Name Provider Type    AV Rigo Chambers OT Occupational Therapist                     Mobility/ADL's    No documentation.                  Obj/Interventions       Row Name 04/24/25 1118          Shoulder (Therapeutic Exercise)    Shoulder (Therapeutic Exercise) AROM (active range of motion)  -AV     Shoulder AROM (Therapeutic Exercise) bilateral;horizontal aBduction/aDduction;10 repetitions  -AV       Row Name 04/24/25 1118          Elbow/Forearm (Therapeutic Exercise)    Elbow/Forearm (Therapeutic Exercise) AROM (active range of motion)  -AV     Elbow/Forearm AROM (Therapeutic Exercise)  bilateral;flexion;supination;pronation;10 repetitions  -AV       Row Name 04/24/25 1118          Hand (Therapeutic Exercise)    Hand (Therapeutic Exercise) AROM (active range of motion)  -AV     Hand AROM/AAROM (Therapeutic Exercise) bilateral;AROM (active range of motion);finger flexion;finger extension;10 repetitions  -AV       Row Name 04/24/25 1118          Motor Skills    Therapeutic Exercise shoulder;elbow/forearm;hand  performed in semi-Myers's/ on room air. slow, deliberate UE movements.  -AV               User Key  (r) = Recorded By, (t) = Taken By, (c) = Cosigned By      Initials Name Provider Type    Rigo Rush OT Occupational Therapist                   Goals/Plan    No documentation.                  Clinical Impression       Row Name 04/24/25 1119          Pain Scale: FACES Pre/Post-Treatment    Pain: FACES Scale, Pretreatment 0-->no hurt  -AV     Posttreatment Pain Rating 0-->no hurt  -AV       Row Name 04/24/25 1119          Plan of Care Review    Progress improving  AROM exerises instead of AAROM this session  -AV     Outcome Evaluation Patient performed upper extremity AROM exercises to improve strength and endurance/ activity tolerance needed to support ADLs. Continued OT is indciated to remediate/ compensate for deficits to maximize independence and safety with functional ADLs.  -AV       Row Name 04/24/25 1119          Vital Signs    O2 Delivery Pre Treatment room air  -AV     O2 Delivery Intra Treatment room air  -AV     O2 Delivery Post Treatment room air  -AV       Row Name 04/24/25 1119          Positioning and Restraints    Pre-Treatment Position in bed  -AV     Post Treatment Position bed  -AV     In Bed call light within reach;encouraged to call for assist  -AV               User Key  (r) = Recorded By, (t) = Taken By, (c) = Cosigned By      Initials Name Provider Type    Rigo Rush OT Occupational Therapist                   Outcome Measures       Row Name 04/24/25 1120           How much help from another is currently needed...    Putting on and taking off regular lower body clothing? 2  -AV     Bathing (including washing, rinsing, and drying) 2  -AV     Toileting (which includes using toilet bed pan or urinal) 1  -AV     Putting on and taking off regular upper body clothing 3  -AV     Taking care of personal grooming (such as brushing teeth) 3  -AV     Eating meals 4  -AV     AM-PAC 6 Clicks Score (OT) 15  -AV       Row Name 04/24/25 0905          How much help from another person do you currently need...    Turning from your back to your side while in flat bed without using bedrails? 3  -AH     Moving from lying on back to sitting on the side of a flat bed without bedrails? 2  -AH     Moving to and from a bed to a chair (including a wheelchair)? 2  -AH     Standing up from a chair using your arms (e.g., wheelchair, bedside chair)? 2  -AH     Climbing 3-5 steps with a railing? 2  -AH     To walk in hospital room? 2  -AH     AM-PAC 6 Clicks Score (PT) 13  -AH     Highest Level of Mobility Goal 4 --> Transfer to chair/commode  -AH       Row Name 04/24/25 1120          Optimal Instrument    Bending/Stooping 4  -AV     Standing 3  -AV     Reaching 1  -AV               User Key  (r) = Recorded By, (t) = Taken By, (c) = Cosigned By      Initials Name Provider Type     Kriss Loo, RN Registered Nurse    Rigo Rush OT Occupational Therapist                      OT Recommendation and Plan     Plan of Care Review  Progress: improving (AROM exerises instead of AAROM this session)  Outcome Evaluation: Patient performed upper extremity AROM exercises to improve strength and endurance/ activity tolerance needed to support ADLs. Continued OT is indciated to remediate/ compensate for deficits to maximize independence and safety with functional ADLs.     Time Calculation:         Time Calculation- OT       Row Name 04/24/25 1121             Time Calculation- OT    OT Received On  04/24/25  -AV      OT Goal Re-Cert Due Date 04/26/25  -AV         Timed Charges    36879 - OT Therapeutic Exercise Minutes 10  -AV         Total Minutes    Timed Charges Total Minutes 10  -AV       Total Minutes 10  -AV                User Key  (r) = Recorded By, (t) = Taken By, (c) = Cosigned By      Initials Name Provider Type    Rigo Rush OT Occupational Therapist                  Therapy Charges for Today       Code Description Service Date Service Provider Modifiers Qty    [id number]  OT THER PROC EA 15 MIN 4/24/2025 Rigo Chambers OT GO 1                 Rigo Chambers OT  4/24/2025

## 2025-04-24 NOTE — PLAN OF CARE
Goal Outcome Evaluation:           Progress: improving  Outcome Evaluation: Pt A&O, able to make needs known. On room air, tolerating well. VSS. X2 BM this shift. FC with appropriate OP. Blood sugars monitored. Tolerating clear liquid diet well. LR infusing at 100ml/hr. Continue q2 turns. No acute changes during shift. Continue plan of care.

## 2025-04-25 LAB
ANION GAP SERPL CALCULATED.3IONS-SCNC: 12.9 MMOL/L (ref 5–15)
BUN SERPL-MCNC: 21 MG/DL (ref 8–23)
BUN/CREAT SERPL: 21.4 (ref 7–25)
CALCIUM SPEC-SCNC: 9.1 MG/DL (ref 8.6–10.5)
CHLORIDE SERPL-SCNC: 97 MMOL/L (ref 98–107)
CO2 SERPL-SCNC: 24.1 MMOL/L (ref 22–29)
CREAT SERPL-MCNC: 0.98 MG/DL (ref 0.76–1.27)
EGFRCR SERPLBLD CKD-EPI 2021: 85 ML/MIN/1.73
GLUCOSE BLDC GLUCOMTR-MCNC: 115 MG/DL (ref 70–99)
GLUCOSE BLDC GLUCOMTR-MCNC: 124 MG/DL (ref 70–99)
GLUCOSE BLDC GLUCOMTR-MCNC: 131 MG/DL (ref 70–99)
GLUCOSE BLDC GLUCOMTR-MCNC: 144 MG/DL (ref 70–99)
GLUCOSE SERPL-MCNC: 102 MG/DL (ref 65–99)
MAGNESIUM SERPL-MCNC: 1.8 MG/DL (ref 1.6–2.4)
PHOSPHATE SERPL-MCNC: 2.9 MG/DL (ref 2.5–4.5)
POTASSIUM SERPL-SCNC: 3.9 MMOL/L (ref 3.5–5.2)
SODIUM SERPL-SCNC: 134 MMOL/L (ref 136–145)
WHOLE BLOOD HOLD SPECIMEN: NORMAL

## 2025-04-25 PROCEDURE — 80048 BASIC METABOLIC PNL TOTAL CA: CPT | Performed by: INTERNAL MEDICINE

## 2025-04-25 PROCEDURE — 83735 ASSAY OF MAGNESIUM: CPT | Performed by: INTERNAL MEDICINE

## 2025-04-25 PROCEDURE — 82948 REAGENT STRIP/BLOOD GLUCOSE: CPT | Performed by: INTERNAL MEDICINE

## 2025-04-25 PROCEDURE — 84100 ASSAY OF PHOSPHORUS: CPT | Performed by: INTERNAL MEDICINE

## 2025-04-25 PROCEDURE — 82948 REAGENT STRIP/BLOOD GLUCOSE: CPT

## 2025-04-25 PROCEDURE — 99232 SBSQ HOSP IP/OBS MODERATE 35: CPT | Performed by: INTERNAL MEDICINE

## 2025-04-25 RX ORDER — METOPROLOL SUCCINATE 25 MG/1
25 TABLET, EXTENDED RELEASE ORAL
Status: DISCONTINUED | OUTPATIENT
Start: 2025-04-26 | End: 2025-04-30 | Stop reason: HOSPADM

## 2025-04-25 RX ORDER — FAMOTIDINE 20 MG/1
20 TABLET, FILM COATED ORAL
Status: DISCONTINUED | OUTPATIENT
Start: 2025-04-26 | End: 2025-04-30 | Stop reason: HOSPADM

## 2025-04-25 RX ORDER — METOPROLOL SUCCINATE 25 MG/1
25 TABLET, EXTENDED RELEASE ORAL DAILY
Qty: 30 TABLET | Refills: 0 | Status: SHIPPED | OUTPATIENT
Start: 2025-04-25 | End: 2025-05-25

## 2025-04-25 RX ORDER — ARIPIPRAZOLE 10 MG/1
10 TABLET ORAL DAILY
Qty: 30 TABLET | Refills: 0 | Status: SHIPPED | OUTPATIENT
Start: 2025-04-26 | End: 2025-05-26

## 2025-04-25 RX ORDER — METFORMIN HYDROCHLORIDE 500 MG/1
500 TABLET, EXTENDED RELEASE ORAL
Start: 2025-04-25

## 2025-04-25 RX ADMIN — Medication 10 ML: at 08:34

## 2025-04-25 RX ADMIN — Medication 10 ML: at 22:38

## 2025-04-25 RX ADMIN — ARIPIPRAZOLE 10 MG: 10 TABLET ORAL at 08:33

## 2025-04-25 RX ADMIN — ASPIRIN 81 MG: 81 TABLET, COATED ORAL at 08:33

## 2025-04-25 RX ADMIN — Medication: at 22:30

## 2025-04-25 RX ADMIN — LEVOTHYROXINE SODIUM 50 MCG: 0.05 TABLET ORAL at 05:03

## 2025-04-25 RX ADMIN — APIXABAN 5 MG: 5 TABLET, FILM COATED ORAL at 08:33

## 2025-04-25 RX ADMIN — Medication 400 MG: at 08:33

## 2025-04-25 RX ADMIN — METOPROLOL TARTRATE 12.5 MG: 25 TABLET, FILM COATED ORAL at 08:33

## 2025-04-25 RX ADMIN — ATORVASTATIN CALCIUM 80 MG: 40 TABLET, FILM COATED ORAL at 22:38

## 2025-04-25 RX ADMIN — PANTOPRAZOLE SODIUM 40 MG: 40 INJECTION, POWDER, FOR SOLUTION INTRAVENOUS at 08:33

## 2025-04-25 RX ADMIN — METOPROLOL TARTRATE 12.5 MG: 25 TABLET, FILM COATED ORAL at 22:38

## 2025-04-25 RX ADMIN — Medication: at 11:00

## 2025-04-25 RX ADMIN — APIXABAN 5 MG: 5 TABLET, FILM COATED ORAL at 22:38

## 2025-04-25 NOTE — SIGNIFICANT NOTE
Wound Eval / Progress Noted    MAINOR Parson     Patient Name: Alberto Rachel  : 1960  MRN: [id number]  Today's Date: 2025                 Admit Date: 4/15/2025    Visit Dx:    ICD-10-CM ICD-9-CM   1. Hyponatremia  E87.1 276.1   2. Non-traumatic rhabdomyolysis  M62.82 728.88   3. ESVIN (acute kidney injury)  N17.9 584.9   4. Oropharyngeal dysphagia  R13.12 787.22   5. Decreased activities of daily living (ADL)  Z78.9 V49.89   6. Difficulty walking  R26.2 719.7   7. Stage 3a chronic kidney disease (CKD)  N18.31 585.3   8. Paroxysmal atrial fibrillation  I48.0 427.31         Hyponatremia    Type 2 diabetes mellitus with hyperglycemia, without long-term current use of insulin    Onychomycosis    Onychocryptosis    PVD (peripheral vascular disease)    Foot pain, bilateral        Past Medical History:   Diagnosis Date    Anxiety     Arthritis     Atherosclerosis of coronary artery bypass graft of native heart without angina pectoris 10/23/2018    Coronary artery disease with previous bypass, LIMA to the LAD, SVG to ramus, and SVG to RCA in     CAD (coronary artery disease) 10/23/2018    Carotid artery stenosis with cerebral infarction 2015    Dr. St     Cramps, extremity 9/10/2021    Depression     DM2 (diabetes mellitus, type 2) 10/23/2018    Duodenal ulcer     Essential hypertension 10/23/2018    Headache     Heart attack 2015    Heartburn     HLD (hyperlipidemia)     HTN (hypertension)     Injury of right leg 2021    Irregular heart beat     Low back pain 2019    Mild episode of recurrent depressive disorder 10/23/2018    Rash 2019    Shortness of breath     SOB (shortness of breath)     Type 2 diabetes mellitus with hyperglycemia, without long-term current use of insulin 2019    Uncontrolled diabetes mellitus 2019        Past Surgical History:   Procedure Laterality Date    CARDIAC CATHETERIZATION  2015    Dr. St     CIRCUMCISION      age 14 yrs old    CORONARY  ARTERY BYPASS GRAFT  06/2015    5 bypasses         Assessment:     Linear area noted to buttocks cleft , scattered redness and moisture present  4cmLx0.3cmW - suggesting barrier cream. Patient is currently well over on left side with area offloading.         Dorsal left hand : ecchymosis present , partial thickness area present, red moist base present. Slightly macerated border.   Cleansed with NS moist gauze, blotted dry, applied silver Hydrofiber and silicone border.   1.2cmLx1.1cmWx0.2cmD , scant serous drainage.           Left elbow : patient would barely lift from bed.   Red moist open area noted to posterior elbow lies on side of jennifer area -unable to rule out pressure vs trauma.   No odor, scant serous drainage present.   1.1cmLx0.6cmWx0.1cmD     Purple area just distal to this area firmness present, no drainage , does have red moist open edge present 0.6cmLx0.6cmLx0.1cmD.     Cleansed both with NS moist gauze, blotted dry, applied silver Hydrofiber and silicone border.        Right elbow : red moist base present does lie over jennifer area, unable to rule out pressure vs trauma. 2.1cmLx1.4cmWx0.2cmD, scant serous drainage present.  Cleansed with NS moist gauze, blotted dry, applied silver Hydrofiber and silicone border.      Left knee : trauma vs pressure :   Yellow base present with pink beads scattered throughout wound bases, appears to be improving.   Pink periwound noted.   Serous drainage present scant amount.   Cleansed both with NS moist gauze, blotted dry, applied silver Hydrofiber and silicone border.  Upper area : 1.1cmLx1.6cmWx0.2cmD  Lower area: 2.2cmLx0.6cmWx0.2cmD         Right knee : trauma vs Pressure red moist healing wound base present.1.8cmLx1.2cmWx0.1cmD   Scant serosanguineous drainage.   Cleansed with NS moist gauze, blotted dry, applied silver Hydrofiber and silicone border.      Wound Check / Follow-up:  wound nurse follow up for multiple wounds. Patient alert and agreeable to  assessment. In bed on left side on 4MTU. Per chart review had multiple falls prior to admission and was found face down unable to rule out pressure as contributing factor to knee and elbow injuries.     Buttocks cleft checked and linear redness is noted     Please see above for assessments.     Heels intact, thick dry skin noted down both legs and feet/toes.     Impression: healing wounds appear from trauma but unable to rule out pressure due to locations and fall while being found down.     Short term goals:  wound care toward regaining skin integrity.       Noemí Byrnes RN    4/25/2025    18:38 EDT

## 2025-04-25 NOTE — PLAN OF CARE
Goal Outcome Evaluation:           Progress: improving  Outcome Evaluation: Pt A&Ox4. No complaints of pain. VSS. Pt diet advanced to low GI irritant. No N/V. Patient had 3 episodes of incontinence of urine. No acute changes. Continue POC.

## 2025-04-25 NOTE — PROGRESS NOTES
[facility]   Hospitalist Progress Note    Date of admission: 4/15/2025  Patient Name: Alberto Rachel  1960  Date: 4/25/2025      Subjective     Chief Complaint   Patient presents with    Altered Mental Status       Interval Followup: tolerating po.  No n/v.  Still very weak.  Encouraged rehab.  Declines / wants to go home.  Encouraged him to talk w/ family further as well since he norm lives alone    Objective     Vitals:   Temp:  [97.9 °F (36.6 °C)-98.4 °F (36.9 °C)] 98.4 °F (36.9 °C)  Heart Rate:  [66-84] 84  Resp:  [18] 18  BP: (111-138)/(52-70) 127/62    Physical Exam  Awake, conversant, poor health literacy, gen weakness  Breathing comfortably on room air mild rhonchi no wheezing  Currently regular rate rhythm  Obese abdomen less full, nontender positive bowel sounds    Result Review:  Vital signs, labs and recent relevant imaging reviewed.      CBC          4/22/2025    05:54 4/23/2025    05:36 4/24/2025    02:55   CBC   WBC 11.22  10.63  9.66    RBC 4.49  4.12  3.98    Hemoglobin 13.5  12.3  11.7    Hematocrit 40.0  35.8  34.8    MCV 89.1  86.9  87.4    MCH 30.1  29.9  29.4    MCHC 33.8  34.4  33.6    RDW 13.8  14.0  14.0    Platelets 301  301  295      CMP          4/23/2025    05:36 4/23/2025    11:47 4/24/2025    02:55 4/25/2025    05:24   CMP   Glucose 83   79  102    BUN 54   35  21    Creatinine 1.58   1.17  0.98    EGFR 47.9   68.8  85.0    Sodium 134     134   130  134    Potassium 4.3     4.3  4.6  3.7  3.9    Chloride 101   99  97    Calcium 8.8   8.7  9.1    BUN/Creatinine Ratio 34.2   29.9  21.4    Anion Gap 14.6   11.0  12.9          acetaminophen **OR** acetaminophen    albuterol    [Held by provider] senna-docusate sodium **AND** [Held by provider] polyethylene glycol **AND** [Held by provider] bisacodyl **AND** [Held by provider] bisacodyl    dextrose    dextrose    glucagon (human recombinant)    metoprolol tartrate    nitroglycerin    ondansetron ODT **OR** ondansetron    sodium  chloride    sodium chloride    sodium chloride    ammonium lactate, , Topical, 2 times per day  apixaban, 5 mg, Oral, Q12H  ARIPiprazole, 10 mg, Oral, Daily  aspirin, 81 mg, Oral, Daily  atorvastatin, 80 mg, Oral, Nightly  [Held by provider] bumetanide, 1 mg, Oral, Daily  [Held by provider] citalopram, 20 mg, Oral, Daily  [START ON 4/26/2025] famotidine, 20 mg, Oral, BID AC  insulin regular, 2-7 Units, Subcutaneous, 4x Daily AC & at Bedtime  levothyroxine, 50 mcg, Oral, Q AM  [Held by provider] magnesium oxide, 400 mg, Oral, Daily  metoprolol tartrate, 12.5 mg, Oral, Q12H  [Held by provider] senna-docusate sodium, 2 tablet, Oral, BID  sodium chloride, 10 mL, Intravenous, Q12H  [Held by provider] traZODone, 100 mg, Oral, Nightly        XR Abdomen KUB  Result Date: 4/23/2025  Impression: Persistent dilated loops of small bowel in the right abdomen suggesting partial small bowel obstruction versus adynamic ileus. Electronically Signed: Heriberto Casillas MD  4/23/2025 11:42 AM EDT  Workstation ID: [id number]    XR Chest 1 View  Result Date: 4/23/2025  Impression: 1.Mild improvement in aeration in the left lung base. Mild residual atelectasis versus infiltrate is seen. There may be a small residual left pleural effusion. 2.The nasogastric tube is positioned in the stomach. 3.Mild atelectasis versus infiltrate at the lateral aspect of the right midlung. Electronically Signed: Emory Quintana MD  4/23/2025 11:32 AM EDT  Workstation ID: [id number]    XR Abdomen KUB  Result Date: 4/21/2025  Impression: 1.Esophagogastric tube with tip in the proximal to mid stomach. 2.Diffuse gaseous distention and dilatation of small bowel loops suggesting small bowel obstruction. Electronically Signed: Heriberto Casillas MD  4/21/2025 4:23 PM EDT  Workstation ID: [id number]    XR Abdomen KUB  Result Date: 4/21/2025  Impression: High-grade partial small bowel obstruction. Electronically Signed: Dino Dailey MD  4/21/2025 7:44 AM EDT  Workstation ID:  [id number]    XR Abdomen KUB  Result Date: 4/20/2025  Impression: Stable intestinal distention Electronically Signed: Gian Marcus  4/20/2025 2:30 PM EDT  Workstation ID: [id number]    XR Abdomen KUB  Result Date: 4/20/2025  Impression: NG/OG tube courses into the stomach with the tip in the region of the gastric fundus. Bowel gas pattern suggestive of ileus or bowel obstruction. Electronically Signed: Abner Dixon MD  4/20/2025 10:03 AM EDT  Workstation ID: [id number]    XR Abdomen KUB  Result Date: 4/20/2025  Gas-distended bowel persists. The findings suggest a generalized adynamic ileus.   Portions of this note were completed with a voice recognition program.  4/20/2025 5:57 AM by Donta Mera MD on Workstation: [email or web address]      CT Abdomen Pelvis Without Contrast  Result Date: 4/19/2025  1.  Distended stomach, small bowel, and colon are identified. There is a transition zone at about the level of the splenic flexure of colon. These findings are thought most likely to represent a generalized adynamic ileus with focal spasm at the level of the splenic flexure of colon. 2.  There is residual oral contrast agent present, likely related to the recent modified barium swallow (from 4/16/2025) within the descending colon and rectosigmoid colon, which is against a complete mechanical bowel obstruction. 3.  No acute appendicitis or diverticulitis is seen. No pneumoperitoneum. No pneumatosis or portal or mesenteric venous gas. No significant stool burden is suggested. 4.  The study is limited, especially due to motion artifact. 5.  Consider close interval clinical, lab, and if necessary, imaging follow-up to ensure benign progression. 6.  No other definite significant acute findings are appreciated. 7.  Please see above comments for further detail.    Portions of this note were completed with a voice recognition program.  4/19/2025 11:08 PM by Donta Mera MD on Workstation: [email or web address]      XR Abdomen KUB  Result Date:  4/19/2025  Impression: Dilated small bowel loops that could relate to a small bowel obstruction. CT would be suggested to reassess. Electronically Signed: Harvey Issa MD  4/19/2025 2:56 PM EDT  Workstation ID: [id number]      Assessment / Plan     Summary: 64 y.o. male with history of anxiety, hypertension, CAD status post bypass, history of stroke, carotid artery stenosis, depression, diabetes, duodenal ulcer, hypertension, dyslipidemia, diabetes mellitus, hospitalized on 4/15/2025 chief complaint of altered mental status, multiple falls, found to have significant life-threatening hyponatremia, nephrology consulted, placed in the critical care unit with critical care consulted, given a round of hypertonic saline, placed on IV fluids several home medications held in the setting of severe hyponatremia including chlorthalidone, Bumex, Abilify, Celexa among others. Cardiology consulted elevated troponins. Planning nuclear stress test. Transferred out of ICU. Started developing distention despite passing gas and having bowel movements, abdominal distention worse, x-ray showing small bowel obstruction pattern, CT imaging obtained, showing gastric, small and large bowel distention concerning for ileus versus versus bowel spasm and peristalsis, could have [place]'s, NG tube placed to low intermittent suction, general surgery consulted     Assessment/Plan (clinically significant if listed here)  Acute life-threatening hyponatremia, hypoosmolar  High-grade small bowel obstruction with question ileus vs Abel  Acute metabolic encephalopathy  ESVIN, question prerenal/dehydration related, on CKD3 b/l near ~1.4 to 1.5 most recently  Rhabdomyolysis  AGMA   New paroxysmal Aflutter  CAD with history of CABG, with elevated troponins likely type II MI secondary to hyponatremia  Asthma mild intermittent  DM2  Depression/Anxiety  Hx of HTN with low normal BP here  Hypothyroidism  History of stroke and Carotid artery stenosis  History of  duodenal ulcer  Poor health literacy/unable to read    Cont po as tolerated, doing better  Cont to hold bumex, will monitor/spot dose prn  Transfer to reg bed  Initial dc cancelled, pt now interested in rehab.    Cont abilify/reduced psych regimen; spot check labs for hyponatremia  Pvr negative, fisher removed  D/w nephrology regarding med changes.  Needs close outpt f/u after dc  Continue with Eliquis (new medication needed at discharge) for paroxysmal A-fib, metoprolol (change to succinate tomorrow  Continue SSI.  Minimal req's  Continue Synthroid. TSH within normal limits  cont aspirin/statin   PT/OT    Dispo: rehab  D/w      VTE Prophylaxis:  Pharmacologic & mechanical VTE prophylaxis orders are present.      Code Status (Patient has no pulse and is not breathing): CPR (Attempt to Resuscitate)  Medical Interventions (Patient has pulse or is breathing): Full Support

## 2025-04-25 NOTE — PLAN OF CARE
Goal Outcome Evaluation:              Outcome Evaluation: Patient a/ox4. VSS. Did have long discussion with patient and son on need for rehab. At this time patient is still having incontient spells. Refusing to even attempt to sit on side of bed. And son does not feel comfortable taking patient home. Dr. Olivares aware.

## 2025-04-25 NOTE — DISCHARGE SUMMARY
[facility]         HOSPITALIST  DISCHARGE SUMMARY    Patient Name: Alberto Rachel    : 1960    MRN: [id number]    Date of Admission: 4/15/2025  Date of Discharge:  25  Primary Care Physician: Magnus Euceda DO    Consults       Date and Time Order Name Status Description    2025  6:59 AM Inpatient Gastroenterology Consult Completed     2025  7:29 AM Inpatient General Surgery Consult Completed     2025  9:29 AM Inpatient Podiatry Consult      2025 12:06 PM Inpatient Cardiology Consult      4/15/2025  4:03 PM Inpatient Nephrology Consult Completed     4/15/2025  3:59 PM Nephrology  (on-call MD unless specified)      4/15/2025  3:51 PM Pulmonology (on-call MD unless specified)      4/15/2025  3:22 PM Hospitalist (on-call MD unless specified)              Final Diagnosis:  Acute life-threatening hyponatremia, hypoosmolar  High-grade small bowel obstruction with question ileus vs Milan  Acute metabolic encephalopathy  ESVIN, question prerenal/dehydration related, on CKD3 b/l near ~1.4 to 1.5 most recently  Rhabdomyolysis  AGMA   New paroxysmal Aflutter  CAD with history of CABG, with elevated troponins likely type II MI secondary to hyponatremia  Asthma mild intermittent  DM2  Depression/Anxiety  Hx of HTN with low normal BP here  Hypothyroidism  History of stroke and Carotid artery stenosis  History of duodenal ulcer  Poor health literacy/unable to read    Hospital Course     Hospital Course:  64 y.o. male with history of anxiety, hypertension, CAD status post bypass, history of stroke, carotid artery stenosis, depression, diabetes, duodenal ulcer, hypertension, dyslipidemia, diabetes mellitus, hospitalized on 4/15/2025 chief complaint of altered mental status, multiple falls, found to have significant life-threatening hyponatremia, nephrology consulted, placed in the critical care unit with critical care consulted, given a round of hypertonic saline, placed on IV  fluids several home medications held in the setting of severe hyponatremia including chlorthalidone, Bumex, Abilify, Celexa among others. Cardiology consulted elevated troponins. Planning nuclear stress test. Transferred out of ICU. Started developing distention despite passing gas and having bowel movements, abdominal distention worse, x-ray showing small bowel obstruction pattern, CT imaging obtained, showing gastric, small and large bowel distention concerning for ileus versus versus bowel spasm and peristalsis, could have Abel's, NG tube placed to low intermittent suction, general surgery consulted     Patient was also unable to have NG removed no surgery was required and tolerated resumption of diet.  Discontinue semaglutide needs to avoid going forward given SBO/ileus.  Tolerating po and having bm without issue by time of discharge.      Discharged with follow-up with PCP, cardiology for A-fib and monitoring volume/CHF, nephrology.  Needs to avoid thiazides in the future.  Discharging with markedly reduced psychiatric medication regimen could consider additional low-dose in the future but will need to monitor closely for hyponatremia if additional meds are introduced in the future.  Patient seen to be given well on reduced regimen however so may be able to limit total regimen.    Notable debility given prolonged hospitalization, rehab recommended patient declined is going home with home health/family support.    Patient discharged in stable condition with close outpatient follow up. Return precautions and follow up discussed and patient voiced agreement and relative understanding of treatment plan.     DISCHARGE Follow Up Recommendations for labs and diagnostics:   As above    CODE STATUS:  Code Status and Medical Interventions: CPR (Attempt to Resuscitate); Full Support   Ordered at: 04/15/25 1554     Code Status (Patient has no pulse and is not breathing):    CPR (Attempt to Resuscitate)     Medical  Interventions (Patient has pulse or is breathing):    Full Support           Day of Discharge     Vital Signs:  Temp:  [97.9 °F (36.6 °C)-98.4 °F (36.9 °C)] 98.4 °F (36.9 °C)  Heart Rate:  [66-84] 84  Resp:  [18] 18  BP: (111-138)/(52-70) 127/62    Physical Exam    Gen: awake, resting in bed, conversant, poor health literacy/low IQ  Resp: breathing comfortably on RA, no wrr  CV: RRR, currently sinus, no LE pitting edema  Abd soft nd         Discharge Details        Discharge Medications        PAUSE taking these medications        Instructions Start Date   bumetanide 1 MG tablet  Wait to take this until your doctor or other care provider tells you to start again.  Commonly known as: BUMEX   TAKE 1 TABLET BY MOUTH ONCE DAILY FOR FLUID      Jardiance 25 MG tablet tablet  Wait to take this until your doctor or other care provider tells you to start again.  Generic drug: empagliflozin   TAKE 1 TABLET BY MOUTH EVERY MORNING TO LOWER BLOOD SUGAR             New Medications        Instructions Start Date   apixaban 5 MG tablet tablet  Commonly known as: ELIQUIS   5 mg, Oral, Every 12 Hours Scheduled      ARIPiprazole 10 MG tablet  Commonly known as: ABILIFY   10 mg, Oral, Daily   Start Date: April 26, 2025     metoprolol succinate XL 25 MG 24 hr tablet  Commonly known as: Toprol XL   25 mg, Oral, Daily             Changes to Medications        Instructions Start Date   atorvastatin 80 MG tablet  Commonly known as: LIPITOR  What changed:   when to take this  additional instructions   80 mg, Oral, Daily, for cholesterol      famotidine 40 MG tablet  Commonly known as: PEPCID  What changed: See the new instructions.   TAKE 1 TABLET BY MOUTH TWICE DAILY FOR STOMACH      metFORMIN  MG 24 hr tablet  Commonly known as: GLUCOPHAGE-XR  What changed:   how much to take  when to take this   500 mg, Oral, Daily With Breakfast             Continue These Medications        Instructions Start Date   albuterol sulfate  (90  Base) MCG/ACT inhaler  Commonly known as: PROVENTIL HFA;VENTOLIN HFA;PROAIR HFA   2 puffs, Inhalation, Every 4 Hours PRN      Aspirin Low Dose 81 MG EC tablet  Generic drug: aspirin   81 mg, Oral, Daily      fenofibrate 145 MG tablet  Commonly known as: TRICOR   145 mg, Oral, Daily, for cholesterol      GNP Vitamin B-12 500 MCG tablet  Generic drug: cyanocobalamin   TAKE 1 TABLET BY MOUTH ONCE DAILY      levothyroxine 50 MCG tablet  Commonly known as: SYNTHROID, LEVOTHROID   TAKE 1 TABLET BY MOUTH EVERY MORNING FOR STOMACH      nitroglycerin 0.4 MG SL tablet  Commonly known as: NITROSTAT   0.4 mg, Sublingual, Every 5 Minutes PRN, Take no more than 3 doses in 15 minutes.      ondansetron 8 MG tablet  Commonly known as: Zofran   8 mg, Oral, Every 8 Hours PRN             Stop These Medications      chlorthalidone 25 MG tablet  Commonly known as: HYGROTON     citalopram 20 MG tablet  Commonly known as: CeleXA     guaifenesin-dextromethorphan 600-30 mg  MG tablet sustained-release 12 hour     pioglitazone 15 MG tablet  Commonly known as: ACTOS     Rybelsus 14 MG tablet  Generic drug: Semaglutide     telmisartan 20 MG tablet  Commonly known as: MICARDIS     traZODone 100 MG tablet  Commonly known as: DESYREL                [place] [facility] [place]    Diet: continue on diet / dietary restrictions from hospitalization   Dietary Orders (From admission, onward)       Start     Ordered    04/24/25 1957  Diet: Gastrointestinal; Low Irritant; Texture: Mechanical Ground (NDD 2); Fluid Consistency: Thin (IDDSI 0)  Diet Effective Now        References:    Diet Order Definitions   Question Answer Comment   Diets: Gastrointestinal    Gastrointestinal Diet: Low Irritant    Texture: Mechanical Ground (NDD 2)    Fluid Consistency: Thin (IDDSI 0)        04/24/25 1956 04/17/25 1200  DIET MESSAGE Per , pt to have gatorade with meals  Daily With Breakfast, Lunch & Dinner      Comments: Per , pt to  have gatorade with meals    04/17/25 0937                    Discharge Activity: advance as tolerated      Additional Instructions for the Follow-ups that You Need to Schedule       Discharge Follow-up with PCP   As directed       Currently Documented PCP:    Magnus Euceda DO    PCP Phone Number:    [phone]     Follow Up Details: 3-5 days hospital f/u                Pertinent  and/or Most Recent Results       LAB RESULTS:      Lab 04/24/25  0255 04/23/25  0536 04/22/25  1209 04/22/25  0554 04/21/25  0521 04/20/25  0554 04/19/25  0525   WBC 9.66 10.63  --  11.22* 11.46* 23.49* 20.19*   HEMOGLOBIN 11.7* 12.3*  --  13.5 13.4 16.7 16.8   HEMATOCRIT 34.8* 35.8*  --  40.0 38.5 47.9 48.3   PLATELETS 295 301  --  301 308 418 382   NEUTROS ABS 6.40 8.50*  --  8.44* 8.58* 19.85* 17.02*   IMMATURE GRANS (ABS) 0.27*  --   --  0.09* 0.10* 0.16* 0.18*   LYMPHS ABS 1.48  --   --  1.37 1.46 1.58 1.45   MONOS ABS 1.30*  --   --  1.15* 1.15* 1.81* 1.42*   EOS ABS 0.15 0.21  --  0.10 0.12 0.01 0.06   MCV 87.4 86.9  --  89.1 84.4 84.8 84.7   LACTATE  --   --  1.1  --   --   --   --          Lab 04/25/25  0524 04/24/25  0255 04/23/25  1147 04/23/25  0536 04/22/25  2353 04/22/25  1837 04/22/25  1209 04/22/25  0554 04/21/25  1200 04/21/25  0521   SODIUM 134* 130*  --  134*  134* 131* 126*   < > 128*  128*   < > 122*   POTASSIUM 3.9 3.7 4.6 4.3  4.3 4.8 4.3   < > 4.5  4.5   < > 4.4   CHLORIDE 97* 99  --  101  --   --   --  92*  --  84*   CO2 24.1 20.0*  --  18.4*  --   --   --  17.5*  --  19.8*   ANION GAP 12.9 11.0  --  14.6  --   --   --  18.5*  --  18.2*   BUN 21 35*  --  54*  --   --   --  75*  --  67*   CREATININE 0.98 1.17  --  1.58*  --   --   --  3.33*  --  4.65*   EGFR 85.0 68.8  --  47.9*  --   --   --  19.6*  --  13.1*   GLUCOSE 102* 79  --  83  --   --   --  108*  --  128*   CALCIUM 9.1 8.7  --  8.8  --   --   --  8.8  --  9.0   MAGNESIUM 1.8 1.9  --  2.4  --   --   --  2.8*  --  2.6*   PHOSPHORUS 2.9 2.4*  --  2.6   --   --   --  5.2*  --  5.8*    < > = values in this interval not displayed.         Lab 04/22/25  0554 04/21/25  0521 04/20/25  0554 04/19/25  0525   TOTAL PROTEIN 6.6 6.5 8.2 8.2   ALBUMIN 2.8* 3.4* 4.0 4.3   ALT (SGPT) 23 23 31 40   AST (SGOT) 26 25 32 49*   BILIRUBIN 1.1 0.9 1.1 0.9   INDIRECT BILIRUBIN 0.4 0.2 0.6 0.4   BILIRUBIN DIRECT 0.7* 0.7* 0.5* 0.5*   ALK PHOS 55 52 73 67         Lab 04/23/25  0536 04/21/25  1345 04/21/25  1200   PROBNP 1,058.0*  --   --    HSTROP T  --  76* 81*                 Brief Urine Lab Results  (Last result in the past 365 days)        Color   Clarity   Blood   Leuk Est   Nitrite   Protein   CREAT   Urine HCG        04/22/25 1826 Yellow   Clear   Small (1+)   Trace   Negative   Trace                 Microbiology Results (last 10 days)       Procedure Component Value - Date/Time    Eosinophil Smear - Urine, Urine, Clean Catch [[id number]]  (Normal) Collected: 04/21/25 1543    Lab Status: Final result Specimen: Urine, Clean Catch Updated: 04/22/25 0028     Eosinophil Smear 0 % EOS/100 Cells     Blood Culture - Blood, Hand, Right [[id number]]  (Normal) Collected: 04/15/25 1736    Lab Status: Final result Specimen: Blood from Hand, Right Updated: 04/20/25 1745     Blood Culture No growth at 5 days    Narrative:      Less than seven (7) mL's of blood was collected.  Insufficient quantity may yield false negative results.    Blood Culture - Blood, Arm, Left [[id number]]  (Normal) Collected: 04/15/25 1735    Lab Status: Final result Specimen: Blood from Arm, Left Updated: 04/20/25 1745     Blood Culture No growth at 5 days    Narrative:      Less than seven (7) mL's of blood was collected.  Insufficient quantity may yield false negative results.            RADIOLOGY:    XR Abdomen KUB  Result Date: 4/23/2025  Impression: Impression: Persistent dilated loops of small bowel in the right abdomen suggesting partial small bowel obstruction versus adynamic ileus. Electronically Signed: Heriberto  MD Sonja  4/23/2025 11:42 AM EDT  Workstation ID: [id number]    XR Chest 1 View  Result Date: 4/23/2025  Impression: Impression: 1.Mild improvement in aeration in the left lung base. Mild residual atelectasis versus infiltrate is seen. There may be a small residual left pleural effusion. 2.The nasogastric tube is positioned in the stomach. 3.Mild atelectasis versus infiltrate at the lateral aspect of the right midlung. Electronically Signed: Emory Quintana MD  4/23/2025 11:32 AM EDT  Workstation ID: [id number]    XR Abdomen KUB  Result Date: 4/21/2025  Impression: Impression: 1.Esophagogastric tube with tip in the proximal to mid stomach. 2.Diffuse gaseous distention and dilatation of small bowel loops suggesting small bowel obstruction. Electronically Signed: Heriberto Casillas MD  4/21/2025 4:23 PM EDT  Workstation ID: [id number]    XR Abdomen KUB  Result Date: 4/21/2025  Impression: Impression: High-grade partial small bowel obstruction. Electronically Signed: Dino Dailey MD  4/21/2025 7:44 AM EDT  Workstation ID: [id number]    XR Abdomen KUB  Result Date: 4/20/2025  Impression: Impression: Stable intestinal distention Electronically Signed: Gian Marcus  4/20/2025 2:30 PM EDT  Workstation ID: [id number]    XR Abdomen KUB  Result Date: 4/20/2025  Impression: Impression: NG/OG tube courses into the stomach with the tip in the region of the gastric fundus. Bowel gas pattern suggestive of ileus or bowel obstruction. Electronically Signed: Abner Dixon MD  4/20/2025 10:03 AM EDT  Workstation ID: [id number]    XR Abdomen KUB  Result Date: 4/20/2025  Impression: Gas-distended bowel persists. The findings suggest a generalized adynamic ileus.   Portions of this note were completed with a voice recognition program.  4/20/2025 5:57 AM by Donta Mera MD on Workstation: [id number]      CT Abdomen Pelvis Without Contrast  Result Date: 4/19/2025  Impression: 1.  Distended stomach, small bowel, and colon are identified. There is a  transition zone at about the level of the splenic flexure of colon. These findings are thought most likely to represent a generalized adynamic ileus with focal spasm at the level of the splenic flexure of colon. 2.  There is residual oral contrast agent present, likely related to the recent modified barium swallow (from 4/16/2025) within the descending colon and rectosigmoid colon, which is against a complete mechanical bowel obstruction. 3.  No acute appendicitis or diverticulitis is seen. No pneumoperitoneum. No pneumatosis or portal or mesenteric venous gas. No significant stool burden is suggested. 4.  The study is limited, especially due to motion artifact. 5.  Consider close interval clinical, lab, and if necessary, imaging follow-up to ensure benign progression. 6.  No other definite significant acute findings are appreciated. 7.  Please see above comments for further detail.    Portions of this note were completed with a voice recognition program.  4/19/2025 11:08 PM by Donta Mera MD on Workstation: [email or web address]      XR Abdomen KUB  Result Date: 4/19/2025  Impression: Impression: Dilated small bowel loops that could relate to a small bowel obstruction. CT would be suggested to reassess. Electronically Signed: Harvey Issa MD  4/19/2025 2:56 PM EDT  Workstation ID: [id number]    FL Video Swallow With Speech Single Contrast  Result Date: 4/16/2025  Impression: Impression: 1. No tracheal aspiration or laryngeal penetration observed Please consult speech pathology report for further details regarding the exam and for dietary recommendations. Report dictated by: Racquel Perdue  I have personally reviewed this case and agree with the findings above: Electronically Signed: Taco Kennedy MD  4/16/2025 4:20 PM EDT  Workstation ID: [id number]    CT Head Without Contrast  Result Date: 4/15/2025  Impression: Impression: 1.No acute intracranial abnormality identified by noncontrast CT. 2.Mild parenchymal volume loss and  probable chronic small vessel ischemic change. Electronically Signed: Heriberto Casillas MD  4/15/2025 1:48 PM EDT  Workstation ID: [id number]    XR Chest 1 View  Result Date: 4/15/2025  Impression: Impression: Suspected mild left basilar atelectasis. Electronically Signed: Theresa Mancilla MD  4/15/2025 1:00 PM EDT  Workstation ID: [id number]              Results for orders placed during the hospital encounter of 04/15/25    Adult Transthoracic Echo Complete W/ Cont if Necessary Per Protocol    Interpretation Summary    Left ventricular systolic function is normal. Calculated left ventricular EF = 57.9%    Left ventricular diastolic function is consistent with (grade I) impaired relaxation.    There is mild calcification of the aortic valve.      Labs Pending at Discharge:        Time spent on Discharge including face to face service:  >35 minutes

## 2025-04-25 NOTE — PROGRESS NOTES
[facility]   Progress Note    Patient Name: Alberto Rachel  : 1960  MRN: [id number]  Primary Care Physician: Magnus Eucdea DO  Date of admission: 4/15/2025    Subjective   Subjective     HPI:  Patient without complaints he had his breakfast patient also had no significant arrhythmias only ventricular premature contractions no chest pain or shortness of breath    Review of Systems  Review of Systems   Constitutional: Negative.    HENT: Negative.     Eyes: Negative.    Respiratory: Negative.     Cardiovascular: Negative.    Gastrointestinal:         Better tolerating fluids   Genitourinary: Negative.    Musculoskeletal: Negative.    Skin: Negative.    Neurological: Negative.    Hematological: Negative.    Psychiatric/Behavioral:          Difficult to evaluate       Objective   Objective     Vitals:  Temp:  [97.9 °F (36.6 °C)-98.6 °F (37 °C)] 97.9 °F (36.6 °C)  Heart Rate:  [66-80] 73  Resp:  [18] 18  BP: (111-138)/(52-70) 134/70    Physical Exam:  Physical Exam    Result Review    Result Review:  I have personally reviewed the results from the time of this admission to 25 2:17 PM EDT and agree with these findings:  [x]  Laboratory  []  Microbiology  []  Radiology  [x]  EKG/Telemetry   []  Cardiology/Vascular   []  Pathology  []  Old records  []  Other:    Most notable findings include: Patient clinically doing well he is on sinus rhythm he has no have chest pain, currently followed by nephrology and GI, also internal medicine sodium is much better is 134, I told the patient that he cannot drink alcohol at home    Assessment & Plan   Assessment / Plan     Active Hospital Problems:  Active Hospital Problems    Diagnosis     **Hyponatremia     Onychomycosis     Onychocryptosis     PVD (peripheral vascular disease)     Foot pain, bilateral     Type 2 diabetes mellitus with hyperglycemia, without long-term current use of insulin        Plan:   I will follow him as an outpatient if he is discharged  during the weekend.    DVT prophylaxis: As per internal medicine    CODE STATUS:    Code Status and Medical Interventions: CPR (Attempt to Resuscitate); Full Support   Ordered at: 04/15/25 1554     Code Status (Patient has no pulse and is not breathing):    CPR (Attempt to Resuscitate)     Medical Interventions (Patient has pulse or is breathing):    Full Support       Disposition:  I expect patient to be discharged as per internal medicine GI and nephrology.    Electronically signed by Jacques St MD, 04/25/25, 2:17 PM EDT.

## 2025-04-25 NOTE — PROGRESS NOTES
" LOS: 10 days   Patient Care Team:  Magnus Euceda DO as PCP - General (Family Medicine)    Chief Complaint: Hyponatremia    Subjective     Pt without new events.  Feeling better.  In bed, eating breakfast.  2500 cc urine output recorded.      History taken from: patient chart RN    Objective     Vital Sign Min/Max for last 24 hours  Temp  Min: 97.9 °F (36.6 °C)  Max: 98.6 °F (37 °C)   BP  Min: 111/59  Max: 138/64   Pulse  Min: 66  Max: 80   Resp  Min: 18  Max: 18   SpO2  Min: 93 %  Max: 97 %   No data recorded   Weight  Min: 98.6 kg (217 lb 6 oz)  Max: 98.6 kg (217 lb 6 oz)     Flowsheet Rows      Flowsheet Row First Filed Value   Admission Height 182.8 cm (71.97\") Documented at 04/15/2025 1719   Admission Weight 102 kg (223 lb 15.8 oz) Documented at 04/15/2025 1220            I/O this shift:  In: -   Out: 600 [Urine:600]  I/O last 3 completed shifts:  In: 2930.6 [P.O.:500; I.V.:2430.6]  Out: 3750 [Urine:3750]    Objective:  General Appearance:  In no acute distress, not in pain and comfortable.    Vital signs: (most recent): Blood pressure 134/70, pulse 73, temperature 97.9 °F (36.6 °C), temperature source Oral, resp. rate 18, height 182.8 cm (71.97\"), weight 98.6 kg (217 lb 6 oz), SpO2 95%.  Vital signs are normal.  No fever.    Output: Producing urine and minimal stool output.    HEENT: Normal HEENT exam.    Lungs:  Normal effort and normal respiratory rate.  He is not in respiratory distress.    Heart: Normal rate.  Regular rhythm.    Abdomen: Abdomen is soft and non-distended.  Bowel sounds are normal.   There is no abdominal tenderness.     Extremities: Normal range of motion.  There is no dependent edema.    Pulses: Distal pulses are intact.    Neurological: Patient is alert and oriented to person, place and time.    Pupils:  Pupils are equal, round, and reactive to light.    Skin:  Warm and dry.                Results Review:     I reviewed the patient's new clinical results.  I reviewed the patient's new " CHIEF COMPLAINT:  Annual well male exam  Chief Complaint   Patient presents with   • Physical     58 yr old male      HISTORY OF PRESENT ILLNESS:  This is a 58 year old male who is here today for an annual well male  exam.   He has several medical concerns:    Patient presents to office for physical exam.  He is also here to follow-up on asthma, coronary artery disease, erectile dysfunction, hypercoagulable state with past pulmonary embolism.  He is a chronically anticoagulated on warfarin.  Patient reports that he has been doing generally pretty well.  He is compliant with medication reports no side effects.  He has not been overly compliant with his INR checks.  He will do 1 today.  Does need refill on sildenafil.  Outpatient Medications Marked as Taking for the 11/24/20 encounter (Office Visit) with KENNA Boyd   Medication Sig Dispense Refill   • sildenafil (REVATIO) 20 MG tablet TAKE 3 TABLETS BY MOUTH ONE HOUR PRIOIR TO SEXUAL ACTIVITY. MAY DECREASE TO 1 OR 2 TABLETS OR INCREASE TO MAXIMUM OF 5 TABLETS IN 24 HOURS 30 tablet 3   • warfarin (COUMADIN) 2 MG tablet TAKE 1 TABLET BY MOUTH EVERY OTHER DAY WITH 10MG TABLET FOR TOTAL OF 12MG 30 tablet 0   • warfarin (COUMADIN) 10 MG tablet Take 1 tablet by mouth daily. OVERDUE FOR INR 90 tablet 0   • warfarin (COUMADIN) 5 MG tablet Take 1 tablet by mouth daily. 90 tablet 0   • metoPROLOL succinate (TOPROL-XL) 50 MG 24 hr tablet TAKE 1 TABLET BY MOUTH DAILY 90 tablet 3   • atorvastatin (LIPITOR) 80 MG tablet TAKE 1 TABLET BY MOUTH EVERY NIGHT 90 tablet 3   • albuterol (PROAIR HFA) 108 (90 BASE) MCG/ACT inhaler Inhale 2 puffs into the lungs every 4 hours as needed for Wheezing. 1 Inhaler 6   • aspirin 81 MG EC tablet Take 1 tablet by mouth daily. 30 tablet 11       ALLERGIES:   Allergen Reactions   • Dust    • Mold   (Environmental)    • Pollen        Past Medical History:   Diagnosis Date   • Blood clot associated with vein wall inflammation     PE   • Coronary  "imaging results and agree with the interpretation.  I reviewed the patient's other test results and agree with the interpretation    WBC WBC   Date Value Ref Range Status   04/24/2025 9.66 3.40 - 10.80 10*3/mm3 Final   04/23/2025 10.63 3.40 - 10.80 10*3/mm3 Final      HGB Hemoglobin   Date Value Ref Range Status   04/24/2025 11.7 (L) 13.0 - 17.7 g/dL Final   04/23/2025 12.3 (L) 13.0 - 17.7 g/dL Final      HCT Hematocrit   Date Value Ref Range Status   04/24/2025 34.8 (L) 37.5 - 51.0 % Final   04/23/2025 35.8 (L) 37.5 - 51.0 % Final      Platlets No results found for: \"LABPLAT\"   MCV MCV   Date Value Ref Range Status   04/24/2025 87.4 79.0 - 97.0 fL Final   04/23/2025 86.9 79.0 - 97.0 fL Final          Sodium Sodium   Date Value Ref Range Status   04/25/2025 134 (L) 136 - 145 mmol/L Final   04/24/2025 130 (L) 136 - 145 mmol/L Final   04/23/2025 134 (L) 136 - 145 mmol/L Final   04/23/2025 134 (L) 136 - 145 mmol/L Final   04/22/2025 131 (L) 136 - 145 mmol/L Final   04/22/2025 126 (L) 136 - 145 mmol/L Final      Potassium Potassium   Date Value Ref Range Status   04/25/2025 3.9 3.5 - 5.2 mmol/L Final   04/24/2025 3.7 3.5 - 5.2 mmol/L Final   04/23/2025 4.6 3.5 - 5.2 mmol/L Final   04/23/2025 4.3 3.5 - 5.2 mmol/L Final   04/23/2025 4.3 3.5 - 5.2 mmol/L Final   04/22/2025 4.8 3.5 - 5.2 mmol/L Final   04/22/2025 4.3 3.5 - 5.2 mmol/L Final      Chloride Chloride   Date Value Ref Range Status   04/25/2025 97 (L) 98 - 107 mmol/L Final   04/24/2025 99 98 - 107 mmol/L Final   04/23/2025 101 98 - 107 mmol/L Final      CO2 CO2   Date Value Ref Range Status   04/25/2025 24.1 22.0 - 29.0 mmol/L Final   04/24/2025 20.0 (L) 22.0 - 29.0 mmol/L Final   04/23/2025 18.4 (L) 22.0 - 29.0 mmol/L Final      BUN BUN   Date Value Ref Range Status   04/25/2025 21 8 - 23 mg/dL Final   04/24/2025 35 (H) 8 - 23 mg/dL Final   04/23/2025 54 (H) 8 - 23 mg/dL Final      Creatinine Creatinine   Date Value Ref Range Status   04/25/2025 0.98 0.76 - 1.27 " artery disease    • ED (erectile dysfunction) 3/4/2013   • H/O sinus surgery    • History of external ear surgery    • Hyperlipidemia 3/4/2013   • Inguinal hernia    • MI (myocardial infarction) (CMS/HCC) 14   • RAD (reactive airway disease)         Past Surgical History:   Procedure Laterality Date   • Colonoscopy  2017   • Insert intracoronary stent  14    Stent, Single   • Nasal septum surgery     • Revision of horseshoe kidney          History reviewed. No pertinent family history.  Family Status   Relation Name Status   • Mo  Alive        cabg and hip replacement   • Fa  Alive   • Sis  Alive   • Bro  Alive   • Bro  Alive   • Bro  Alive   • Bro  Alive   • Bro  Alive   • Son  Alive        clotting post surgical    • Son  Alive   • Son  Alive     Social History     Tobacco Use   • Smoking status: Former Smoker     Quit date: 2014     Years since quittin.5   • Smokeless tobacco: Never Used   Substance Use Topics   • Alcohol use: Yes     Alcohol/week: 10.0 standard drinks     Types: 12 Standard drinks or equivalent per week     Comment: Occasionally   • Drug use: No       REVIEW OF SYMPTOMS:    CONSTITUTIONAL:  No fever, weight loss, change in appetite.  HEENT:  No visual changes or eye pain.  No hearing loss, ear pain, sore throat, nasal congestion, or hoarseness.   HEART:  Patient denies any exertional chest pain, diaphoresis, or  dyspnea, no palpitations, syncope, or edema.  PULMONARY:  Patient denies any cough, wheezing, dyspnea, or hemoptysis.  GASTROINTESTINAL:  No nausea, vomiting, abdominal pain, change in bowel habits, chronic constipation, diarrhea, blood in stools or black stools.  URINARY:  The patient denies any dysuria, frequency, hematuria or incontinence.  SKIN:  Denies any rashes, unusual or changing skin lesions.  NEUROLOGICAL:  Denies any unusual headaches, numbness, tingling,  weakness in extremities or dizziness.  PSYCHIATRIC:  No depression, anxiety, insomnia or mood  "mg/dL Final   04/24/2025 1.17 0.76 - 1.27 mg/dL Final   04/23/2025 1.58 (H) 0.76 - 1.27 mg/dL Final      Calcium Calcium   Date Value Ref Range Status   04/25/2025 9.1 8.6 - 10.5 mg/dL Final   04/24/2025 8.7 8.6 - 10.5 mg/dL Final   04/23/2025 8.8 8.6 - 10.5 mg/dL Final      PO4 No results found for: \"CAPO4\"   Albumin No results found for: \"ALBUMIN\"     Magnesium Magnesium   Date Value Ref Range Status   04/25/2025 1.8 1.6 - 2.4 mg/dL Final   04/24/2025 1.9 1.6 - 2.4 mg/dL Final   04/23/2025 2.4 1.6 - 2.4 mg/dL Final      Uric Acid No results found for: \"URICACID\"       Medication Review:   ammonium lactate, , Topical, 2 times per day  apixaban, 5 mg, Oral, Q12H  ARIPiprazole, 10 mg, Oral, Daily  aspirin, 81 mg, Oral, Daily  atorvastatin, 80 mg, Oral, Nightly  [Held by provider] bumetanide, 1 mg, Oral, Daily  [Held by provider] citalopram, 20 mg, Oral, Daily  [START ON 4/26/2025] famotidine, 20 mg, Oral, BID AC  insulin regular, 2-7 Units, Subcutaneous, 4x Daily AC & at Bedtime  levothyroxine, 50 mcg, Oral, Q AM  [Held by provider] magnesium oxide, 400 mg, Oral, Daily  metoprolol tartrate, 12.5 mg, Oral, Q12H  [Held by provider] senna-docusate sodium, 2 tablet, Oral, BID  sodium chloride, 10 mL, Intravenous, Q12H  [Held by provider] traZODone, 100 mg, Oral, Nightly          Assessment & Plan       Hyponatremia    Type 2 diabetes mellitus with hyperglycemia, without long-term current use of insulin    Onychomycosis    Onychocryptosis    PVD (peripheral vascular disease)    Foot pain, bilateral      Assessment & Plan  - Hyponatremia, possibly secondary to chlorthalidone, urine osmolality 271.  TSH okay.  No signs or symptoms of adrenal insufficiency.  Sodium is back to normal.  Now eating.  Monitor periodically.  Repeat Uosm: 438  Avoid thiazides moving forward.    -- ESVIN, better with IVF's.  S/p Bumex and IV fluid.  Stable now with good urine output.  Monitor.    - Chronic kidney disease stage III, baseline " changes.   ENDOCRINE:  No polyuria, polydipsia, heat or cold intolerance.  HEMATOLOGICAL:  No easy bruising or unusual bleeding.    MUSCULOSKELETAL:  No joint pain, redness or swelling.       PHYSICAL EXAMINATION:  GENERAL:  This is an alert male in no acute distress.  Visit Vitals  /78 (BP Location: RUE - Right upper extremity, Patient Position: Sitting, Cuff Size: Large Adult)   Pulse 70   Temp 97.7 °F (36.5 °C) (Tympanic)   Resp 16   Ht 5' 6\" (1.676 m)   Wt 78.5 kg   SpO2 98%   BMI 27.94 kg/m²      HEENT: Normocephalic.  Normal conjunctivae and lids.  Pupils are equally round and reactive to light.  Extraocular eye movements are intact.  Tympanic membranes and canals are clear. Nares patent, no drainage.  Throat  clear, mucous membranes are moist.    NECK:  Supple, trachea midline, no thyromegaly, no lymphadenopathy.  No carotid bruits.  LUNGS:  Clear to auscultation, no wheezes, rales or rhonchi. Normal respiratory effort.   HEART: Regular rate and rhythm.  No murmurs.  ABDOMEN:   Abdomen is soft, without tenderness, guarding, rebound, mass or hepatosplenomegaly.  Bowel sounds are normal.  No CVA (costovertebral angle) tenderness or groin adenopathy is noted.  MUSCULOSKELETAL:  Normal gait.  Moves all four extremities with normal strength. No joint redness or swelling. No edema.  SKIN:  Warm and dry.  Nail beds pink and capillary refill below 3 seconds.  No suspicious lesions or rashes.    NEUROLOGICAL:  Alert and oriented to time, place and person.  Answers all questions appropriately and follows all commands correctly.  Demonstrates good short and long term memory through answers to health history interview.  Normal affect. CN (Cranial Nerves) 2-12 intact.  GENITOURINARY: Normal male anatomy with descended testicles bilaterally.  No inguinal hernias are appreciated.  Palpation of the testicles and scrotum reveal no masses or tenderness.  Penis is normal without lesions or discharge.    Javier was seen  creatinine lately around 1.4-1.5.  Has bland UA and minimal proteinuria from 2 years ago.  Follow-up in the office eventually.    - Hypokalemia, replaced and improved.  Magnesium is okay.    - Type 2 diabetes, per primary.    - Shortness of breath, cardiology planning stress test.    - Hypothyroidism, per primary.    - Abdominal distention, ileus on CT. per primary/surgery. CT noted.  Much better clinically.    - A flutter with tachycardia.    Discussed with nursing staff.    Rajendra Lion MD  04/25/25  14:09 EDT             today for physical.    Diagnoses and all orders for this visit:    Annual physical exam  -     CBC WITH DIFFERENTIAL  -     COMPREHENSIVE METABOLIC PANEL  -     LIPID PANEL WITH REFLEX  -     PSA  -     URINALYSIS & REFLEX MICROSCOPY WITH CULTURE IF INDICATED  -     CBC WITH AUTOMATED DIFFERENTIAL (PERFORMABLE ONLY)    Long term (current) use of anticoagulants  -     PROTHROMBIN TIME; Future  -     PROTHROMBIN TIME  -     Cancel: PROTHROMBIN TIME    Uncomplicated asthma, unspecified asthma severity, unspecified whether persistent    Elevated glucose  -     GLYCOHEMOGLOBIN    Pure hypercholesterolemia    Need for vaccination  -     TETANUS DIPHTHERIA ACELLULAR PERTUSSIS VACC, 10+ YRS (BOOSTRIX); Standing  -     INFLUENZA QUADRIVALENT SPLIT PRES FREE 0.5 ML VACC, IM (FLULAVAL,FLUARIX,FLUZONE); Standing  -     INFLUENZA QUADRIVALENT SPLIT PRES FREE 0.5 ML VACC, IM (FLULAVAL,FLUARIX,FLUZONE)  -     TETANUS DIPHTHERIA ACELLULAR PERTUSSIS VACC, 10+ YRS (BOOSTRIX)    Combined arterial insufficiency and corporo-venous occlusive erectile dysfunction  -     sildenafil (REVATIO) 20 MG tablet; TAKE 3 TABLETS BY MOUTH ONE HOUR PRIOIR TO SEXUAL ACTIVITY. MAY DECREASE TO 1 OR 2 TABLETS OR INCREASE TO MAXIMUM OF 5 TABLETS IN 24 HOURS      EDUCATION: I discussed the diagnoses and recommendations with the patient. The patient expressed understanding and is in agreement with the plan.    Continue current medication, I discussed being more compliant with INR checkups.  Follow up in 6 months, sooner if worse. To ER (Emergency Room) if symptoms worsen after Clinic hours.    KENNA Boyd, DO

## 2025-04-25 NOTE — DISCHARGE INSTR - LAB
Follow up with Dr. Magnus Euceda on Tuesday, May 6th at 9:00 am.    Dr. Lion's office ([facility]) will call you to schedule a follow up appointment.    Follow up with Dr. St (Cardiology) on  Wednesday, May 14th at 11:30 am.

## 2025-04-26 LAB
GLUCOSE BLDC GLUCOMTR-MCNC: 112 MG/DL (ref 70–99)
GLUCOSE BLDC GLUCOMTR-MCNC: 113 MG/DL (ref 70–99)
GLUCOSE BLDC GLUCOMTR-MCNC: 132 MG/DL (ref 70–99)
GLUCOSE BLDC GLUCOMTR-MCNC: 138 MG/DL (ref 70–99)

## 2025-04-26 PROCEDURE — 82948 REAGENT STRIP/BLOOD GLUCOSE: CPT | Performed by: INTERNAL MEDICINE

## 2025-04-26 PROCEDURE — 82948 REAGENT STRIP/BLOOD GLUCOSE: CPT

## 2025-04-26 PROCEDURE — 99232 SBSQ HOSP IP/OBS MODERATE 35: CPT | Performed by: INTERNAL MEDICINE

## 2025-04-26 RX ADMIN — METOPROLOL SUCCINATE 25 MG: 25 TABLET, EXTENDED RELEASE ORAL at 09:50

## 2025-04-26 RX ADMIN — Medication: at 21:49

## 2025-04-26 RX ADMIN — FAMOTIDINE 20 MG: 20 TABLET, FILM COATED ORAL at 17:10

## 2025-04-26 RX ADMIN — Medication: at 09:51

## 2025-04-26 RX ADMIN — ARIPIPRAZOLE 10 MG: 10 TABLET ORAL at 09:53

## 2025-04-26 RX ADMIN — ASPIRIN 81 MG: 81 TABLET, COATED ORAL at 09:50

## 2025-04-26 RX ADMIN — ATORVASTATIN CALCIUM 80 MG: 40 TABLET, FILM COATED ORAL at 21:49

## 2025-04-26 RX ADMIN — FAMOTIDINE 20 MG: 20 TABLET, FILM COATED ORAL at 06:39

## 2025-04-26 RX ADMIN — APIXABAN 5 MG: 5 TABLET, FILM COATED ORAL at 21:49

## 2025-04-26 RX ADMIN — Medication 10 ML: at 09:51

## 2025-04-26 RX ADMIN — Medication 10 ML: at 21:49

## 2025-04-26 RX ADMIN — LEVOTHYROXINE SODIUM 50 MCG: 0.05 TABLET ORAL at 06:39

## 2025-04-26 RX ADMIN — APIXABAN 5 MG: 5 TABLET, FILM COATED ORAL at 09:50

## 2025-04-26 NOTE — PLAN OF CARE
Goal Outcome Evaluation:           Progress: no change  Outcome Evaluation: Pt aox4, VSS. Pt transfered to floor last night. Acclimated to room. Pt able to express the need to void and use urinal with help. Pt seems to be resting comfortably throughout the night. Will continue with POC.

## 2025-04-26 NOTE — PROGRESS NOTES
[facility]   Hospitalist Progress Note    Date of admission: 4/15/2025  Patient Name: Alberto Rachel  1960  Date: 4/26/2025      Subjective     Chief Complaint   Patient presents with    Altered Mental Status       Interval Followup: tolerating po.  No n/v.  Still weak.  Is asking about when will be able to go to rehab    Objective     Vitals:   Temp:  [97.3 °F (36.3 °C)-98.4 °F (36.9 °C)] 98.4 °F (36.9 °C)  Heart Rate:  [71-85] 85  Resp:  [18] 18  BP: (118-139)/(48-74) 118/54    Physical Exam  Awake, conversant, poor health literacy, gen weakness, eating lunch still notably weak  Breathing comfortably on room air mild rhonchi no wheezing  Obese abdomen less full, nontender positive bowel sounds    Result Review:  Vital signs, labs and recent relevant imaging reviewed.      CBC          4/22/2025    05:54 4/23/2025    05:36 4/24/2025    02:55   CBC   WBC 11.22  10.63  9.66    RBC 4.49  4.12  3.98    Hemoglobin 13.5  12.3  11.7    Hematocrit 40.0  35.8  34.8    MCV 89.1  86.9  87.4    MCH 30.1  29.9  29.4    MCHC 33.8  34.4  33.6    RDW 13.8  14.0  14.0    Platelets 301  301  295      CMP          4/23/2025    05:36 4/23/2025    11:47 4/24/2025    02:55 4/25/2025    05:24   CMP   Glucose 83   79  102    BUN 54   35  21    Creatinine 1.58   1.17  0.98    EGFR 47.9   68.8  85.0    Sodium 134     134   130  134    Potassium 4.3     4.3  4.6  3.7  3.9    Chloride 101   99  97    Calcium 8.8   8.7  9.1    BUN/Creatinine Ratio 34.2   29.9  21.4    Anion Gap 14.6   11.0  12.9          acetaminophen **OR** acetaminophen    albuterol    [DISCONTINUED] senna-docusate sodium **AND** polyethylene glycol **AND** bisacodyl **AND** bisacodyl    dextrose    dextrose    glucagon (human recombinant)    metoprolol tartrate    ondansetron ODT **OR** ondansetron    sodium chloride    sodium chloride    sodium chloride    ammonium lactate, , Topical, 2 times per day  apixaban, 5 mg, Oral, Q12H  ARIPiprazole, 10 mg, Oral,  Daily  aspirin, 81 mg, Oral, Daily  atorvastatin, 80 mg, Oral, Nightly  [Held by provider] bumetanide, 1 mg, Oral, Daily  [Held by provider] citalopram, 20 mg, Oral, Daily  famotidine, 20 mg, Oral, BID AC  insulin regular, 2-7 Units, Subcutaneous, 4x Daily AC & at Bedtime  levothyroxine, 50 mcg, Oral, Q AM  [Held by provider] magnesium oxide, 400 mg, Oral, Daily  metoprolol succinate XL, 25 mg, Oral, Q24H  sodium chloride, 10 mL, Intravenous, Q12H  [Held by provider] traZODone, 100 mg, Oral, Nightly        XR Abdomen KUB  Result Date: 4/23/2025  Impression: Persistent dilated loops of small bowel in the right abdomen suggesting partial small bowel obstruction versus adynamic ileus. Electronically Signed: Heriberto Casillas MD  4/23/2025 11:42 AM EDT  Workstation ID: [id number]    XR Chest 1 View  Result Date: 4/23/2025  Impression: 1.Mild improvement in aeration in the left lung base. Mild residual atelectasis versus infiltrate is seen. There may be a small residual left pleural effusion. 2.The nasogastric tube is positioned in the stomach. 3.Mild atelectasis versus infiltrate at the lateral aspect of the right midlung. Electronically Signed: Emory Quintana MD  4/23/2025 11:32 AM EDT  Workstation ID: [id number]    XR Abdomen KUB  Result Date: 4/21/2025  Impression: 1.Esophagogastric tube with tip in the proximal to mid stomach. 2.Diffuse gaseous distention and dilatation of small bowel loops suggesting small bowel obstruction. Electronically Signed: Heriberto Casillas MD  4/21/2025 4:23 PM EDT  Workstation ID: [id number]    XR Abdomen KUB  Result Date: 4/21/2025  Impression: High-grade partial small bowel obstruction. Electronically Signed: Dino Dailey MD  4/21/2025 7:44 AM EDT  Workstation ID: [id number]    XR Abdomen KUB  Result Date: 4/20/2025  Impression: Stable intestinal distention Electronically Signed: Gian Marcus  4/20/2025 2:30 PM EDT  Workstation ID: [id number]    XR Abdomen KUB  Result Date: 4/20/2025  Impression: NG/OG  tube courses into the stomach with the tip in the region of the gastric fundus. Bowel gas pattern suggestive of ileus or bowel obstruction. Electronically Signed: Abner Dixon MD  4/20/2025 10:03 AM EDT  Workstation ID: [id number]    XR Abdomen KUB  Result Date: 4/20/2025  Gas-distended bowel persists. The findings suggest a generalized adynamic ileus.   Portions of this note were completed with a voice recognition program.  4/20/2025 5:57 AM by Donta Mera MD on Workstation: [id number]      CT Abdomen Pelvis Without Contrast  Result Date: 4/19/2025  1.  Distended stomach, small bowel, and colon are identified. There is a transition zone at about the level of the splenic flexure of colon. These findings are thought most likely to represent a generalized adynamic ileus with focal spasm at the level of the splenic flexure of colon. 2.  There is residual oral contrast agent present, likely related to the recent modified barium swallow (from 4/16/2025) within the descending colon and rectosigmoid colon, which is against a complete mechanical bowel obstruction. 3.  No acute appendicitis or diverticulitis is seen. No pneumoperitoneum. No pneumatosis or portal or mesenteric venous gas. No significant stool burden is suggested. 4.  The study is limited, especially due to motion artifact. 5.  Consider close interval clinical, lab, and if necessary, imaging follow-up to ensure benign progression. 6.  No other definite significant acute findings are appreciated. 7.  Please see above comments for further detail.    Portions of this note were completed with a voice recognition program.  4/19/2025 11:08 PM by Donta Mera MD on Workstation: [email or web address]      XR Abdomen KUB  Result Date: 4/19/2025  Impression: Dilated small bowel loops that could relate to a small bowel obstruction. CT would be suggested to reassess. Electronically Signed: Harvey Issa MD  4/19/2025 2:56 PM EDT  Workstation ID: [id number]      Assessment / Plan      Summary: 64 y.o. male with history of anxiety, hypertension, CAD status post bypass, history of stroke, carotid artery stenosis, depression, diabetes, duodenal ulcer, hypertension, dyslipidemia, diabetes mellitus, hospitalized on 4/15/2025 chief complaint of altered mental status, multiple falls, found to have significant life-threatening hyponatremia, nephrology consulted, placed in the critical care unit with critical care consulted, given a round of hypertonic saline, placed on IV fluids several home medications held in the setting of severe hyponatremia including chlorthalidone, Bumex, Abilify, Celexa among others. Cardiology consulted elevated troponins. Planning nuclear stress test. Transferred out of ICU. Started developing distention despite passing gas and having bowel movements, abdominal distention worse, x-ray showing small bowel obstruction pattern, CT imaging obtained, showing gastric, small and large bowel distention concerning for ileus versus versus bowel spasm and peristalsis, could have Abel's, NG tube placed to low intermittent suction, general surgery consulted     Assessment/Plan (clinically significant if listed here)  Acute life-threatening hyponatremia, hypoosmolar  High-grade small bowel obstruction with question ileus vs Abel  Acute metabolic encephalopathy  ESVIN, question prerenal/dehydration related, on CKD3 b/l near ~1.4 to 1.5 most recently  Rhabdomyolysis  AGMA   New paroxysmal Aflutter  CAD with history of CABG, with elevated troponins likely type II MI secondary to hyponatremia  Asthma mild intermittent  DM2  Depression/Anxiety  Hx of HTN with low normal BP here  Hypothyroidism  History of stroke and Carotid artery stenosis  History of duodenal ulcer  Poor health literacy/unable to read    Cont to hold bumex, will monitor/spot dose prn if developing edema  Cont abilify/reduced psych regimen; spot check labs for hyponatremia  Pvr negative, fisher removed  Will check bmp  tomorrow to monitor hyponatremia/tolerance of medications  Apprec nephro assistance  Continue with Eliquis (new medication needed at discharge) for paroxysmal A-fib, metoprolol succinate  Continue SSI.  Minimal req's  Continue Synthroid. TSH within normal limits  cont aspirin/statin   PT/OT    Dispo: rehab  D/w sw     VTE Prophylaxis:  Pharmacologic & mechanical VTE prophylaxis orders are present.      Code Status (Patient has no pulse and is not breathing): CPR (Attempt to Resuscitate)  Medical Interventions (Patient has pulse or is breathing): Full Support

## 2025-04-26 NOTE — PROGRESS NOTES
LOS: 11 days   Patient Care Team:  Magnus Euceda DO as PCP - General (Family Medicine)    Chief Complaint:  Renal issues    Subjective     Interval History:     Patient Complaints: Chart reviewed.  No new issues overnight  O>I by 1550 cc  No SOA  No N/V/D    ammonium lactate, , Topical, 2 times per day  apixaban, 5 mg, Oral, Q12H  ARIPiprazole, 10 mg, Oral, Daily  aspirin, 81 mg, Oral, Daily  atorvastatin, 80 mg, Oral, Nightly  [Held by provider] bumetanide, 1 mg, Oral, Daily  [Held by provider] citalopram, 20 mg, Oral, Daily  famotidine, 20 mg, Oral, BID AC  insulin regular, 2-7 Units, Subcutaneous, 4x Daily AC & at Bedtime  levothyroxine, 50 mcg, Oral, Q AM  [Held by provider] magnesium oxide, 400 mg, Oral, Daily  metoprolol succinate XL, 25 mg, Oral, Q24H  sodium chloride, 10 mL, Intravenous, Q12H  [Held by provider] traZODone, 100 mg, Oral, Nightly           Review of Systems:   General : No pain, no chills  HEENT: No headache, no nosebleed, no sore throat, no blurry vision  Chest : no chest pain, no palpitations  Respiratory: No cough, no SOA, no hemoptysis  GI:  No N/V/D, no abdominal pain  Skin : no rashes, no pruritus  Musculoskeletal : no flank pain, no joint effusions  Neuro : No weakness, no dizziness, no focal deficits  Endocrine: no heat/cold intolerance  Genitourinary: no dysuria or hematuria    Objective     Vital Signs  Temp:  [97.3 °F (36.3 °C)-98.4 °F (36.9 °C)] 98.4 °F (36.9 °C)  Heart Rate:  [71-85] 85  Resp:  [18] 18  BP: (118-139)/(48-74) 118/54    Intake/Output Summary (Last 24 hours) at 4/26/2025 1433  Last data filed at 4/26/2025 1234  Gross per 24 hour   Intake 180 ml   Output 950 ml   Net -770 ml           Physical Exam:     General Appearance:    Alert,  in no acute distress   Head:    Normocephalic, without obvious abnormality, atraumatic   Throat:    no thrush, oral mucosa moist   Neck:   No adenopathy, supple, no JVD   Musc:     No CVA tenderness to palpation   Lungs:     Clear to  "auscultation,respirations unlabored, no wheezes or rhonchi    Heart:    Regular rate and rhythm , normal S1 and S2, no            murmur, no gallop, no rub   Abdomen:     Normal bowel sounds, no masses, soft non-tender   Extremities:    No lower extremity edema, no cyanosis   :     No hematuria    Skin:   Dry, No rash                  Results Review:    Results from last 7 days   Lab Units 04/25/25  0524 04/24/25  0255 04/23/25  1147 04/23/25  0536   SODIUM mmol/L 134* 130*  --  134*  134*   POTASSIUM mmol/L 3.9 3.7 4.6 4.3  4.3   CHLORIDE mmol/L 97* 99  --  101   CO2 mmol/L 24.1 20.0*  --  18.4*   BUN mg/dL 21 35*  --  54*   CREATININE mg/dL 0.98 1.17  --  1.58*   GLUCOSE mg/dL 102* 79  --  83   CALCIUM mg/dL 9.1 8.7  --  8.8     Results from last 7 days   Lab Units 04/24/25  0255 04/23/25  0536 04/22/25  0554   WBC 10*3/mm3 9.66 10.63 11.22*   HEMOGLOBIN g/dL 11.7* 12.3* 13.5   HEMATOCRIT % 34.8* 35.8* 40.0   PLATELETS 10*3/mm3 295 301 301     Magnesium   Date Value Ref Range Status   04/25/2025 1.8 1.6 - 2.4 mg/dL Final   04/24/2025 1.9 1.6 - 2.4 mg/dL Final     Phosphorus   Date Value Ref Range Status   04/25/2025 2.9 2.5 - 4.5 mg/dL Final   04/24/2025 2.4 (L) 2.5 - 4.5 mg/dL Final     No results found for: \"BNP\"  Lab Results   Component Value Date    ALBUMIN 2.8 (L) 04/22/2025      Brief Urine Lab Results  (Last result in the past 365 days)        Color   Clarity   Blood   Leuk Est   Nitrite   Protein   CREAT   Urine HCG        04/22/25 1826 Yellow   Clear   Small (1+)   Trace   Negative   Trace                    No radiology results from the last 24 hrs         Assessment & Plan       Hyponatremia    Type 2 diabetes mellitus with hyperglycemia, without long-term current use of insulin    Onychomycosis    Onychocryptosis    PVD (peripheral vascular disease)    Foot pain, bilateral    - Hyponatremia, possibly secondary to chlorthalidone, urine osmolality 271.  TSH okay.  No signs or symptoms of adrenal " insufficiency.  Sodium is better as of yday  Now eating.  Monitor periodically.  Repeat Uosm: 438  Avoid thiazides moving forward.     -- ESVIN, better with IVF's.   Stable now with good urine output.  Monitor.     - Chronic kidney disease stage III, baseline creatinine lately around 1.4-1.5.  Has bland UA and minimal proteinuria from 2 years ago.  Follow-up in the office eventually.     - Hypokalemia, replaced and improved.  Magnesium is okay.     - Type 2 diabetes, per primary.     - Shortness of breath, cardiology planning stress test.     - Hypothyroidism, per primary.     - Abdominal distention, ileus on CT. per primary/surgery. CT noted.  Much better clinically.     - A flutter with tachycardia.     Discussed with nursing staff.      Plan:    Am labs      Please call if any questions  [phone]

## 2025-04-27 LAB
ANION GAP SERPL CALCULATED.3IONS-SCNC: 12.6 MMOL/L (ref 5–15)
BUN SERPL-MCNC: 15 MG/DL (ref 8–23)
BUN/CREAT SERPL: 14 (ref 7–25)
CALCIUM SPEC-SCNC: 8.9 MG/DL (ref 8.6–10.5)
CHLORIDE SERPL-SCNC: 96 MMOL/L (ref 98–107)
CO2 SERPL-SCNC: 21.4 MMOL/L (ref 22–29)
CREAT SERPL-MCNC: 1.07 MG/DL (ref 0.76–1.27)
EGFRCR SERPLBLD CKD-EPI 2021: 76.5 ML/MIN/1.73
GLUCOSE BLDC GLUCOMTR-MCNC: 122 MG/DL (ref 70–99)
GLUCOSE BLDC GLUCOMTR-MCNC: 125 MG/DL (ref 70–99)
GLUCOSE BLDC GLUCOMTR-MCNC: 133 MG/DL (ref 70–99)
GLUCOSE BLDC GLUCOMTR-MCNC: 162 MG/DL (ref 70–99)
GLUCOSE SERPL-MCNC: 135 MG/DL (ref 65–99)
POTASSIUM SERPL-SCNC: 3.9 MMOL/L (ref 3.5–5.2)
SODIUM SERPL-SCNC: 130 MMOL/L (ref 136–145)
WHOLE BLOOD HOLD SPECIMEN: NORMAL

## 2025-04-27 PROCEDURE — 80048 BASIC METABOLIC PNL TOTAL CA: CPT | Performed by: INTERNAL MEDICINE

## 2025-04-27 PROCEDURE — 63710000001 INSULIN REGULAR HUMAN PER 5 UNITS: Performed by: INTERNAL MEDICINE

## 2025-04-27 PROCEDURE — 99232 SBSQ HOSP IP/OBS MODERATE 35: CPT | Performed by: INTERNAL MEDICINE

## 2025-04-27 PROCEDURE — 82948 REAGENT STRIP/BLOOD GLUCOSE: CPT

## 2025-04-27 RX ORDER — POLYETHYLENE GLYCOL 3350 17 G/17G
17 POWDER, FOR SOLUTION ORAL DAILY
Status: DISCONTINUED | OUTPATIENT
Start: 2025-04-27 | End: 2025-04-30 | Stop reason: HOSPADM

## 2025-04-27 RX ADMIN — FAMOTIDINE 20 MG: 20 TABLET, FILM COATED ORAL at 16:44

## 2025-04-27 RX ADMIN — POLYETHYLENE GLYCOL (3350) 17 G: 17 POWDER, FOR SOLUTION ORAL at 16:44

## 2025-04-27 RX ADMIN — FAMOTIDINE 20 MG: 20 TABLET, FILM COATED ORAL at 08:12

## 2025-04-27 RX ADMIN — APIXABAN 5 MG: 5 TABLET, FILM COATED ORAL at 08:12

## 2025-04-27 RX ADMIN — INSULIN HUMAN 2 UNITS: 100 INJECTION, SOLUTION PARENTERAL at 11:58

## 2025-04-27 RX ADMIN — Medication 10 ML: at 08:13

## 2025-04-27 RX ADMIN — ATORVASTATIN CALCIUM 80 MG: 40 TABLET, FILM COATED ORAL at 21:29

## 2025-04-27 RX ADMIN — Medication 10 ML: at 21:29

## 2025-04-27 RX ADMIN — Medication: at 21:29

## 2025-04-27 RX ADMIN — Medication: at 11:58

## 2025-04-27 RX ADMIN — ASPIRIN 81 MG: 81 TABLET, COATED ORAL at 08:12

## 2025-04-27 RX ADMIN — APIXABAN 5 MG: 5 TABLET, FILM COATED ORAL at 21:29

## 2025-04-27 RX ADMIN — ARIPIPRAZOLE 10 MG: 10 TABLET ORAL at 08:12

## 2025-04-27 RX ADMIN — METOPROLOL SUCCINATE 25 MG: 25 TABLET, EXTENDED RELEASE ORAL at 08:12

## 2025-04-27 RX ADMIN — LEVOTHYROXINE SODIUM 50 MCG: 0.05 TABLET ORAL at 06:35

## 2025-04-27 NOTE — PLAN OF CARE
Goal Outcome Evaluation:  Plan of Care Reviewed With: patient        Progress: improving  Outcome Evaluation: Patient is A&Ox4. VSS. Patient denies any pain or discomfort this shift. Patient ambulated in room this shift with assist x1 and walker, patient sat in recliner for approximately 25 minutes this shift. Patient is also using bedside commode this shift and is tolerating well. No acute changes noted this shift. Plan of care reviewed with charge nurse, GARY Kuhn.

## 2025-04-27 NOTE — PROGRESS NOTES
[facility]   Hospitalist Progress Note    Date of admission: 4/15/2025  Patient Name: Alberto Rachel  1960  Date: 4/27/2025      Subjective     Chief Complaint   Patient presents with    Altered Mental Status       Interval Followup: tolerating po.  No n/v.  Still weak.  Is asking about when will be able to go to rehab    Objective     Vitals:   Temp:  [97.7 °F (36.5 °C)-98.2 °F (36.8 °C)] 97.7 °F (36.5 °C)  Heart Rate:  [76-93] 84  Resp:  [18] 18  BP: (104-130)/(51-77) 113/70    Physical Exam  Awake, conversant, poor health literacy, gen weakness, in chair  Breathing comfortably on room air mild rhonchi no wheezing  Obese abdomen, +BS somewhat full, no guarding    Result Review:  Vital signs, labs and recent relevant imaging reviewed.      CBC          4/22/2025    05:54 4/23/2025    05:36 4/24/2025    02:55   CBC   WBC 11.22  10.63  9.66    RBC 4.49  4.12  3.98    Hemoglobin 13.5  12.3  11.7    Hematocrit 40.0  35.8  34.8    MCV 89.1  86.9  87.4    MCH 30.1  29.9  29.4    MCHC 33.8  34.4  33.6    RDW 13.8  14.0  14.0    Platelets 301  301  295      CMP          4/24/2025    02:55 4/25/2025    05:24 4/27/2025    05:47   CMP   Glucose 79  102  135    BUN 35  21  15    Creatinine 1.17  0.98  1.07    EGFR 68.8  85.0  76.5    Sodium 130  134  130    Potassium 3.7  3.9  3.9    Chloride 99  97  96    Calcium 8.7  9.1  8.9    BUN/Creatinine Ratio 29.9  21.4  14.0    Anion Gap 11.0  12.9  12.6          acetaminophen **OR** acetaminophen    albuterol    [DISCONTINUED] senna-docusate sodium **AND** polyethylene glycol **AND** bisacodyl **AND** bisacodyl    dextrose    dextrose    glucagon (human recombinant)    metoprolol tartrate    ondansetron ODT **OR** ondansetron    sodium chloride    sodium chloride    sodium chloride    ammonium lactate, , Topical, 2 times per day  apixaban, 5 mg, Oral, Q12H  ARIPiprazole, 10 mg, Oral, Daily  aspirin, 81 mg, Oral, Daily  atorvastatin, 80 mg, Oral, Nightly  [Held by  provider] bumetanide, 1 mg, Oral, Daily  [Held by provider] citalopram, 20 mg, Oral, Daily  famotidine, 20 mg, Oral, BID AC  insulin regular, 2-7 Units, Subcutaneous, 4x Daily AC & at Bedtime  levothyroxine, 50 mcg, Oral, Q AM  metoprolol succinate XL, 25 mg, Oral, Q24H  sodium chloride, 10 mL, Intravenous, Q12H  [Held by provider] traZODone, 100 mg, Oral, Nightly        XR Abdomen KUB  Result Date: 4/23/2025  Impression: Persistent dilated loops of small bowel in the right abdomen suggesting partial small bowel obstruction versus adynamic ileus. Electronically Signed: Heriberto Casillas MD  4/23/2025 11:42 AM EDT  Workstation ID: [id number]    XR Chest 1 View  Result Date: 4/23/2025  Impression: 1.Mild improvement in aeration in the left lung base. Mild residual atelectasis versus infiltrate is seen. There may be a small residual left pleural effusion. 2.The nasogastric tube is positioned in the stomach. 3.Mild atelectasis versus infiltrate at the lateral aspect of the right midlung. Electronically Signed: Emory Quintana MD  4/23/2025 11:32 AM EDT  Workstation ID: [id number]    XR Abdomen KUB  Result Date: 4/21/2025  Impression: 1.Esophagogastric tube with tip in the proximal to mid stomach. 2.Diffuse gaseous distention and dilatation of small bowel loops suggesting small bowel obstruction. Electronically Signed: Heriberto Casillas MD  4/21/2025 4:23 PM EDT  Workstation ID: [id number]    XR Abdomen KUB  Result Date: 4/21/2025  Impression: High-grade partial small bowel obstruction. Electronically Signed: Dino Dailey MD  4/21/2025 7:44 AM EDT  Workstation ID: [id number]    XR Abdomen KUB  Result Date: 4/20/2025  Impression: Stable intestinal distention Electronically Signed: Gian Marcus  4/20/2025 2:30 PM EDT  Workstation ID: [id number]    XR Abdomen KUB  Result Date: 4/20/2025  Impression: NG/OG tube courses into the stomach with the tip in the region of the gastric fundus. Bowel gas pattern suggestive of ileus or bowel  obstruction. Electronically Signed: Abner Dixon MD  4/20/2025 10:03 AM EDT  Workstation ID: [id number]    XR Abdomen KUB  Result Date: 4/20/2025  Gas-distended bowel persists. The findings suggest a generalized adynamic ileus.   Portions of this note were completed with a voice recognition program.  4/20/2025 5:57 AM by Donta Mera MD on Workstation: [email or web address]      CT Abdomen Pelvis Without Contrast  Result Date: 4/19/2025  1.  Distended stomach, small bowel, and colon are identified. There is a transition zone at about the level of the splenic flexure of colon. These findings are thought most likely to represent a generalized adynamic ileus with focal spasm at the level of the splenic flexure of colon. 2.  There is residual oral contrast agent present, likely related to the recent modified barium swallow (from 4/16/2025) within the descending colon and rectosigmoid colon, which is against a complete mechanical bowel obstruction. 3.  No acute appendicitis or diverticulitis is seen. No pneumoperitoneum. No pneumatosis or portal or mesenteric venous gas. No significant stool burden is suggested. 4.  The study is limited, especially due to motion artifact. 5.  Consider close interval clinical, lab, and if necessary, imaging follow-up to ensure benign progression. 6.  No other definite significant acute findings are appreciated. 7.  Please see above comments for further detail.    Portions of this note were completed with a voice recognition program.  4/19/2025 11:08 PM by Donta Mera MD on Workstation: [employer]      XR Abdomen KUB  Result Date: 4/19/2025  Impression: Dilated small bowel loops that could relate to a small bowel obstruction. CT would be suggested to reassess. Electronically Signed: Harvey Issa MD  4/19/2025 2:56 PM EDT  Workstation ID: [id number]      Assessment / Plan     Summary: 64 y.o. male with history of anxiety, hypertension, CAD status post bypass, history of stroke, carotid artery stenosis,  depression, diabetes, duodenal ulcer, hypertension, dyslipidemia, diabetes mellitus, hospitalized on 4/15/2025 chief complaint of altered mental status, multiple falls, found to have significant life-threatening hyponatremia, nephrology consulted, placed in the critical care unit with critical care consulted, given a round of hypertonic saline, placed on IV fluids several home medications held in the setting of severe hyponatremia including chlorthalidone, Bumex, Abilify, Celexa among others. Cardiology consulted elevated troponins. Planning nuclear stress test. Transferred out of ICU. Started developing distention despite passing gas and having bowel movements, abdominal distention worse, x-ray showing small bowel obstruction pattern, CT imaging obtained, showing gastric, small and large bowel distention concerning for ileus versus versus bowel spasm and peristalsis, could have [place]'s, NG tube placed to low intermittent suction, general surgery consulted     Assessment/Plan (clinically significant if listed here)  Acute life-threatening hyponatremia, hypoosmolar  High-grade small bowel obstruction with question ileus vs [place]  Acute metabolic encephalopathy  ESVIN, question prerenal/dehydration related, on CKD3 b/l near ~1.4 to 1.5 most recently  Rhabdomyolysis  AGMA   New paroxysmal Aflutter  CAD with history of CABG, with elevated troponins likely type II MI secondary to hyponatremia  Asthma mild intermittent  DM2  Depression/Anxiety  Hx of HTN with low normal BP here  Hypothyroidism  History of stroke and Carotid artery stenosis  History of duodenal ulcer  Poor health literacy/unable to read    Cont to hold bumex, will monitor/spot dose prn if developing edema  Sodium 130, slight decrease, suspect variable, repeat in am   Add miralax, titrate further prn  Cont abilify/reduced psych regimen; spot check labs for hyponatremia  Apprec nephro assistance  Continue with Eliquis (new medication needed at discharge)  for paroxysmal A-fib, metoprolol succinate  Continue SSI.  Minimal req's  Continue Synthroid. TSH within normal limits  cont aspirin/statin   PT/OT, pt remains very weak, was needing assistance to reposition in bed.  Needs rehab/pending.  Add wafflepad/cushion when up in chair/bed prn for comfort.      Dispo: rehab    VTE Prophylaxis:  Pharmacologic & mechanical VTE prophylaxis orders are present.      Code Status (Patient has no pulse and is not breathing): CPR (Attempt to Resuscitate)  Medical Interventions (Patient has pulse or is breathing): Full Support

## 2025-04-27 NOTE — PROGRESS NOTES
LOS: 12 days   Patient Care Team:  Magnus Euceda DO as PCP - General (Family Medicine)    Chief Complaint:  Renal issues    Subjective     Interval History:     Patient Complaints: Chart reviewed.  No new issues overnight  O>I  No SOA  No N/V/D  No complaints    ammonium lactate, , Topical, 2 times per day  apixaban, 5 mg, Oral, Q12H  ARIPiprazole, 10 mg, Oral, Daily  aspirin, 81 mg, Oral, Daily  atorvastatin, 80 mg, Oral, Nightly  [Held by provider] bumetanide, 1 mg, Oral, Daily  [Held by provider] citalopram, 20 mg, Oral, Daily  famotidine, 20 mg, Oral, BID AC  insulin regular, 2-7 Units, Subcutaneous, 4x Daily AC & at Bedtime  levothyroxine, 50 mcg, Oral, Q AM  metoprolol succinate XL, 25 mg, Oral, Q24H  polyethylene glycol, 17 g, Oral, Daily  sodium chloride, 10 mL, Intravenous, Q12H  [Held by provider] traZODone, 100 mg, Oral, Nightly           Review of Systems:   General : No pain, no chills  HEENT: No headache, no nosebleed, no sore throat, no blurry vision  Chest : no chest pain, no palpitations  Respiratory: No cough, no SOA, no hemoptysis  GI:  No N/V/D, no abdominal pain  Skin : no rashes, no pruritus  Musculoskeletal : no flank pain, no joint effusions  Neuro :  weakness, no dizziness, no focal deficits  Endocrine: no heat/cold intolerance  Genitourinary: no dysuria or hematuria    Objective     Vital Signs  Temp:  [97.3 °F (36.3 °C)-98.2 °F (36.8 °C)] 97.3 °F (36.3 °C)  Heart Rate:  [78-93] 85  Resp:  [18] 18  BP: (104-130)/(51-77) 108/56    Intake/Output Summary (Last 24 hours) at 4/27/2025 1619  Last data filed at 4/27/2025 1606  Gross per 24 hour   Intake 180 ml   Output 1000 ml   Net -820 ml           Physical Exam:     General Appearance:    Alert,  in no acute distress   Head:    Normocephalic, without obvious abnormality, atraumatic   Throat:    no thrush, oral mucosa moist   Neck:   No adenopathy, supple, no JVD   Musc:     No CVA tenderness to palpation   Lungs:     Clear to  "auscultation,respirations unlabored, no wheezes or rhonchi    Heart:    Regular rate and rhythm , normal S1 and S2, no            murmur, no gallop, no rub   Abdomen:     Normal bowel sounds, no masses, soft non-tender   Extremities:    No lower extremity edema, no cyanosis   :     No hematuria    Skin:   Dry, No rash                  Results Review:    Results from last 7 days   Lab Units 04/27/25  0547 04/25/25  0524 04/24/25  0255   SODIUM mmol/L 130* 134* 130*   POTASSIUM mmol/L 3.9 3.9 3.7   CHLORIDE mmol/L 96* 97* 99   CO2 mmol/L 21.4* 24.1 20.0*   BUN mg/dL 15 21 35*   CREATININE mg/dL 1.07 0.98 1.17   GLUCOSE mg/dL 135* 102* 79   CALCIUM mg/dL 8.9 9.1 8.7     Results from last 7 days   Lab Units 04/24/25  0255 04/23/25  0536 04/22/25  0554   WBC 10*3/mm3 9.66 10.63 11.22*   HEMOGLOBIN g/dL 11.7* 12.3* 13.5   HEMATOCRIT % 34.8* 35.8* 40.0   PLATELETS 10*3/mm3 295 301 301     Magnesium   Date Value Ref Range Status   04/25/2025 1.8 1.6 - 2.4 mg/dL Final     Phosphorus   Date Value Ref Range Status   04/25/2025 2.9 2.5 - 4.5 mg/dL Final     No results found for: \"BNP\"  Lab Results   Component Value Date    ALBUMIN 2.8 (L) 04/22/2025      Brief Urine Lab Results  (Last result in the past 365 days)        Color   Clarity   Blood   Leuk Est   Nitrite   Protein   CREAT   Urine HCG        04/22/25 1826 Yellow   Clear   Small (1+)   Trace   Negative   Trace                    No radiology results from the last 24 hrs         Assessment & Plan       Hyponatremia    Type 2 diabetes mellitus with hyperglycemia, without long-term current use of insulin    Onychomycosis    Onychocryptosis    PVD (peripheral vascular disease)    Foot pain, bilateral    - Hyponatremia, possibly secondary to chlorthalidone, urine osmolality 271.  TSH okay.  No signs or symptoms of adrenal insufficiency.  Sodium in stable range, off trazodone  Now eating.  Monitor periodically.  Repeat Uosm: 438  Avoid thiazides moving forward.     -- ESVIN, " better after IVF's.   Stable now with good urine output.  Monitor.     - Chronic kidney disease stage III, baseline creatinine lately around 1.4-1.5.  Has bland UA and minimal proteinuria from 2 years ago.  Follow-up in the office eventually.     - Hypokalemia, replaced and improved.  Magnesium is okay.     - Type 2 diabetes, per primary.     - Shortness of breath, cardiology planning stress test.     - Hypothyroidism, per primary.     - Abdominal distention, ileus on CT. per primary/surgery. CT noted.  Much better clinically.     - A flutter with tachycardia.           Plan:    Am labs, OK for D/C      Please call if any questions  [phone]

## 2025-04-28 LAB
ANION GAP SERPL CALCULATED.3IONS-SCNC: 10.5 MMOL/L (ref 5–15)
BUN SERPL-MCNC: 15 MG/DL (ref 8–23)
BUN/CREAT SERPL: 14 (ref 7–25)
CALCIUM SPEC-SCNC: 8.7 MG/DL (ref 8.6–10.5)
CHLORIDE SERPL-SCNC: 98 MMOL/L (ref 98–107)
CO2 SERPL-SCNC: 24.5 MMOL/L (ref 22–29)
CREAT SERPL-MCNC: 1.07 MG/DL (ref 0.76–1.27)
EGFRCR SERPLBLD CKD-EPI 2021: 76.5 ML/MIN/1.73
GLUCOSE BLDC GLUCOMTR-MCNC: 112 MG/DL (ref 70–99)
GLUCOSE BLDC GLUCOMTR-MCNC: 116 MG/DL (ref 70–99)
GLUCOSE BLDC GLUCOMTR-MCNC: 132 MG/DL (ref 70–99)
GLUCOSE BLDC GLUCOMTR-MCNC: 179 MG/DL (ref 70–99)
GLUCOSE SERPL-MCNC: 140 MG/DL (ref 65–99)
NT-PROBNP SERPL-MCNC: 438.3 PG/ML (ref 0–900)
POTASSIUM SERPL-SCNC: 3.8 MMOL/L (ref 3.5–5.2)
SODIUM SERPL-SCNC: 133 MMOL/L (ref 136–145)
WHOLE BLOOD HOLD SPECIMEN: NORMAL

## 2025-04-28 PROCEDURE — 97530 THERAPEUTIC ACTIVITIES: CPT

## 2025-04-28 PROCEDURE — 63710000001 INSULIN REGULAR HUMAN PER 5 UNITS: Performed by: INTERNAL MEDICINE

## 2025-04-28 PROCEDURE — 82948 REAGENT STRIP/BLOOD GLUCOSE: CPT

## 2025-04-28 PROCEDURE — 83880 ASSAY OF NATRIURETIC PEPTIDE: CPT | Performed by: INTERNAL MEDICINE

## 2025-04-28 PROCEDURE — 82948 REAGENT STRIP/BLOOD GLUCOSE: CPT | Performed by: INTERNAL MEDICINE

## 2025-04-28 PROCEDURE — 80048 BASIC METABOLIC PNL TOTAL CA: CPT | Performed by: INTERNAL MEDICINE

## 2025-04-28 PROCEDURE — 99232 SBSQ HOSP IP/OBS MODERATE 35: CPT | Performed by: INTERNAL MEDICINE

## 2025-04-28 RX ORDER — AMOXICILLIN 250 MG
1 CAPSULE ORAL 2 TIMES DAILY
Status: DISCONTINUED | OUTPATIENT
Start: 2025-04-28 | End: 2025-04-30 | Stop reason: HOSPADM

## 2025-04-28 RX ADMIN — INSULIN HUMAN 2 UNITS: 100 INJECTION, SOLUTION PARENTERAL at 12:11

## 2025-04-28 RX ADMIN — SENNOSIDES AND DOCUSATE SODIUM 1 TABLET: 50; 8.6 TABLET ORAL at 08:15

## 2025-04-28 RX ADMIN — Medication 10 ML: at 08:16

## 2025-04-28 RX ADMIN — LEVOTHYROXINE SODIUM 50 MCG: 0.05 TABLET ORAL at 05:50

## 2025-04-28 RX ADMIN — SENNOSIDES AND DOCUSATE SODIUM 1 TABLET: 50; 8.6 TABLET ORAL at 21:21

## 2025-04-28 RX ADMIN — POLYETHYLENE GLYCOL (3350) 17 G: 17 POWDER, FOR SOLUTION ORAL at 08:15

## 2025-04-28 RX ADMIN — APIXABAN 5 MG: 5 TABLET, FILM COATED ORAL at 21:21

## 2025-04-28 RX ADMIN — ASPIRIN 81 MG: 81 TABLET, COATED ORAL at 08:15

## 2025-04-28 RX ADMIN — FAMOTIDINE 20 MG: 20 TABLET, FILM COATED ORAL at 16:49

## 2025-04-28 RX ADMIN — ARIPIPRAZOLE 10 MG: 10 TABLET ORAL at 08:15

## 2025-04-28 RX ADMIN — Medication: at 23:00

## 2025-04-28 RX ADMIN — APIXABAN 5 MG: 5 TABLET, FILM COATED ORAL at 08:15

## 2025-04-28 RX ADMIN — Medication: at 11:19

## 2025-04-28 RX ADMIN — METOPROLOL SUCCINATE 25 MG: 25 TABLET, EXTENDED RELEASE ORAL at 08:15

## 2025-04-28 RX ADMIN — FAMOTIDINE 20 MG: 20 TABLET, FILM COATED ORAL at 08:16

## 2025-04-28 RX ADMIN — ATORVASTATIN CALCIUM 80 MG: 40 TABLET, FILM COATED ORAL at 21:21

## 2025-04-28 NOTE — PROGRESS NOTES
[facility] Progress Note      Patient Name: Alberto Rachel  : 1960  MRN: [id number]  Primary Care Physician:  Magnus Euceda DO  Date of admission: 4/15/2025    Subjective     Interval History:     Patient offers no complaint  Tired and fatigued lying in bed  Decreased appetite, eating about 50% of her tray   No chest pain or palpitations      Review of Systems:   As noted above    Objective     Vitals:   Temp:  [97.9 °F (36.6 °C)-99.7 °F (37.6 °C)] 99.1 °F (37.3 °C)  Heart Rate:  [72-89] 72  Resp:  [18] 18  BP: (106-141)/(56-64) 128/64    Intake/Output Summary (Last 24 hours) at 2025 1755  Last data filed at 2025 1608  Gross per 24 hour   Intake 600 ml   Output 1750 ml   Net -1150 ml       Physical Exam:    General Appearance: alert, oriented x 3, no acute distress   Skin: warm and dry  HEENT: oral mucosa normal, nonicteric sclera  Neck: supple, no JVD  Lungs: CTA  Heart: RRR, normal S1 and S2  Abdomen: soft, nontender, nondistended  : no palpable bladder  Extremities: no edema, cyanosis or clubbing  Neuro:  alert, no focalization    Scheduled Meds:     ammonium lactate, , Topical, 2 times per day  apixaban, 5 mg, Oral, Q12H  ARIPiprazole, 10 mg, Oral, Daily  aspirin, 81 mg, Oral, Daily  atorvastatin, 80 mg, Oral, Nightly  [Held by provider] bumetanide, 1 mg, Oral, Daily  [Held by provider] citalopram, 20 mg, Oral, Daily  famotidine, 20 mg, Oral, BID AC  insulin regular, 2-7 Units, Subcutaneous, 4x Daily AC & at Bedtime  levothyroxine, 50 mcg, Oral, Q AM  metoprolol succinate XL, 25 mg, Oral, Q24H  polyethylene glycol, 17 g, Oral, Daily  senna-docusate sodium, 1 tablet, Oral, BID  sodium chloride, 10 mL, Intravenous, Q12H  [Held by provider] traZODone, 100 mg, Oral, Nightly      IV Meds:        Results Reviewed:   I have personally reviewed the results from the time of this admission to 2025 17:55 EDT     Results from last 7 days   Lab Units 25  0534  04/27/25  0547 04/25/25  0524 04/22/25  1209 04/22/25  0554   SODIUM mmol/L 133* 130* 134*   < > 128*  128*   POTASSIUM mmol/L 3.8 3.9 3.9   < > 4.5  4.5   CHLORIDE mmol/L 98 96* 97*   < > 92*   CO2 mmol/L 24.5 21.4* 24.1   < > 17.5*   BUN mg/dL 15 15 21   < > 75*   CREATININE mg/dL 1.07 1.07 0.98   < > 3.33*   CALCIUM mg/dL 8.7 8.9 9.1   < > 8.8   BILIRUBIN mg/dL  --   --   --   --  1.1   ALK PHOS U/L  --   --   --   --  55   ALT (SGPT) U/L  --   --   --   --  23   AST (SGOT) U/L  --   --   --   --  26   GLUCOSE mg/dL 140* 135* 102*   < > 108*    < > = values in this interval not displayed.       Estimated Creatinine Clearance: 81.8 mL/min (by C-G formula based on SCr of 1.07 mg/dL).    Results from last 7 days   Lab Units 04/25/25  0524 04/24/25  0255 04/23/25  0536   MAGNESIUM mg/dL 1.8 1.9 2.4   PHOSPHORUS mg/dL 2.9 2.4* 2.6             Results from last 7 days   Lab Units 04/24/25  0255 04/23/25  0536 04/22/25  0554   WBC 10*3/mm3 9.66 10.63 11.22*   HEMOGLOBIN g/dL 11.7* 12.3* 13.5   PLATELETS 10*3/mm3 295 301 301             Assessment / Plan     ASSESSMENT:    - Hypovolemic hyponatremia, possibly secondary to chlorthalidone, urine osmolality 271.  TSH okay.  No signs or symptoms of adrenal insufficiency.  Sodium in stable range, off trazodone Avoid thiazides moving forward.     -- ESVIN, prerenal secondary to volume depletion on the setting of abdominal pain and decreased oral intake improved with IV fluid challenge Stable now with good urine output.  Monitor.     - Chronic kidney disease stage III, baseline creatinine lately around 1.4-1.5.  Has bland UA and minimal proteinuria from 2 years ago.  Follow-up in the office eventually.     -Diabetes mellitus type 2, as per primary team management     - High-grade small bowel obstruction CT abdomen followed closely by general surgery on medical management    - A flutter with tachycardia.    -- Coronary disease status post coronary bypass grafting follow closely  with primary team       PLAN:  Sodium trend continues to improve currently up to 133  Encourage patient to increase solid oral  intake, and minimize fluid intake   From renal point of view patient can be discharged closely as an outpatient    Thank you for involving us in the care of Alberto Rachel.  Please feel free to call with any questions.    Tam Astorga MD  04/28/25  17:55 EDT    [facility]  [phone]    Please note that portions of this note were completed with a voice recognition program.

## 2025-04-28 NOTE — SIGNIFICANT NOTE
04/28/25 1336   OTHER   Discipline speech language pathologist   Rehab Time/Intention   Session Not Performed patient/family declined treatment   Recommendations   SLP - Next Appointment 04/29/25     Patient sleeping when SLP arrived and requested to continue resting. SLP to continue to follow.

## 2025-04-28 NOTE — THERAPY RE-EVALUATION
Patient Name: Alberto Rachel  : 1960    MRN: [id number]                              Today's Date: 2025       Admit Date: 4/15/2025    Visit Dx:     ICD-10-CM ICD-9-CM   1. Hyponatremia  E87.1 276.1   2. Non-traumatic rhabdomyolysis  M62.82 728.88   3. ESVIN (acute kidney injury)  N17.9 584.9   4. Oropharyngeal dysphagia  R13.12 787.22   5. Decreased activities of daily living (ADL)  Z78.9 V49.89   6. Difficulty walking  R26.2 719.7   7. Stage 3a chronic kidney disease (CKD)  N18.31 585.3   8. Paroxysmal atrial fibrillation  I48.0 427.31     Patient Active Problem List   Diagnosis    Anxiety    Atherosclerosis of coronary artery bypass graft of native heart without angina pectoris    Essential hypertension    Depression    Mixed hyperlipidemia    Type 2 diabetes mellitus with hyperglycemia, without long-term current use of insulin    Hypothyroidism    Arthritis of knee    Illiteracy    Cramps, extremity    Tachycardia    Obesity (BMI 30-39.9)    Degenerative disc disease, lumbar    Primary insomnia    Gastroesophageal reflux disease without esophagitis    Close exposure to COVID-19 virus    Mild intermittent asthma    Candidal dermatitis    Bronchitis    Bilateral shoulder pain    Hyperkalemia    Stage 3a chronic kidney disease (CKD)    Hyponatremia    Onychomycosis    Onychocryptosis    PVD (peripheral vascular disease)    Foot pain, bilateral     Past Medical History:   Diagnosis Date    Anxiety     Arthritis     Atherosclerosis of coronary artery bypass graft of native heart without angina pectoris 10/23/2018    Coronary artery disease with previous bypass, LIMA to the LAD, SVG to ramus, and SVG to RCA in     CAD (coronary artery disease) 10/23/2018    Carotid artery stenosis with cerebral infarction 2015    Dr. St     Cramps, extremity 9/10/2021    Depression     DM2 (diabetes mellitus, type 2) 10/23/2018    Duodenal ulcer     Essential hypertension 10/23/2018    Headache     Heart  attack 2015    Heartburn     HLD (hyperlipidemia)     HTN (hypertension)     Injury of right leg 8/13/2021    Irregular heart beat     Low back pain 03/18/2019    Mild episode of recurrent depressive disorder 10/23/2018    Rash 03/18/2019    Shortness of breath     SOB (shortness of breath)     Type 2 diabetes mellitus with hyperglycemia, without long-term current use of insulin 06/11/2019    Uncontrolled diabetes mellitus 03/18/2019     Past Surgical History:   Procedure Laterality Date    CARDIAC CATHETERIZATION  06/2015    Dr. St     CIRCUMCISION      age 14 yrs old    CORONARY ARTERY BYPASS GRAFT  06/2015    5 bypasses      General Information       Row Name 04/28/25 1026          OT Time and Intention    Document Type re-evaluation  -PG     Mode of Treatment individual therapy;occupational therapy  -PG               User Key  (r) = Recorded By, (t) = Taken By, (c) = Cosigned By      Initials Name Provider Type    PG Ace Daugherty, MAL Occupational Therapist                     Mobility/ADL's       Row Name 04/28/25 1027          Transfers    Transfers stand-sit transfer;sit-stand transfer  -PG     Comment, (Transfers) Patient walked to bathroom using rolling walker contact-guard assist  -PG       Row Name 04/28/25 1027          Activities of Daily Living    BADL Assessment/Intervention toileting  -PG       Row Name 04/28/25 1027          Toileting Assessment/Training    Ola Level (Toileting) toileting skills;minimum assist (75% patient effort);manage urinary drainage device  -PG     Position (Toileting) supported standing  -PG               User Key  (r) = Recorded By, (t) = Taken By, (c) = Cosigned By      Initials Name Provider Type    PG Ace Daugherty, MAL Occupational Therapist                   Obj/Interventions    No documentation.                  Goals/Plan       Row Name 04/28/25 1028          Transfer Goal 1 (OT)    Progress/Outcome (Transfer Goal 1, OT) continuing progress toward goal  -PG        Row Name 04/28/25 1028          Bathing Goal 1 (OT)    Progress/Outcomes (Bathing Goal 1, OT) continuing progress toward goal  -PG       Row Name 04/28/25 1028          Dressing Goal 1 (OT)    Progress/Outcome (Dressing Goal 1, OT) continuing progress toward goal  -PG       Row Name 04/28/25 1028          Toileting Goal 1 (OT)    Progress/Outcome (Toileting Goal 1, OT) continuing progress toward goal  -PG       Row Name 04/28/25 1028          Problem Specific Goal 1 (OT)    Progress/Outcome (Problem Specific Goal 1, OT) continuing progress toward goal  -PG               User Key  (r) = Recorded By, (t) = Taken By, (c) = Cosigned By      Initials Name Provider Type    PG Ace Daugherty, OT Occupational Therapist                   Clinical Impression       Row Name 04/28/25 1028          Plan of Care Review    Progress no change  -PG               User Key  (r) = Recorded By, (t) = Taken By, (c) = Cosigned By      Initials Name Provider Type    PG Ace Daugherty, OT Occupational Therapist                   Outcome Measures       Row Name 04/28/25 1028          How much help from another is currently needed...    Putting on and taking off regular lower body clothing? 2  -PG     Bathing (including washing, rinsing, and drying) 2  -PG     Toileting (which includes using toilet bed pan or urinal) 2  -PG     Putting on and taking off regular upper body clothing 4  -PG     Taking care of personal grooming (such as brushing teeth) 4  -PG     Eating meals 4  -PG     AM-PAC 6 Clicks Score (OT) 18  -PG       Row Name 04/28/25 0712          How much help from another person do you currently need...    Turning from your back to your side while in flat bed without using bedrails? 3  -BT     Moving from lying on back to sitting on the side of a flat bed without bedrails? 3  -BT     Moving to and from a bed to a chair (including a wheelchair)? 3  -BT     Standing up from a chair using your arms (e.g., wheelchair, bedside chair)?  3  -BT     Climbing 3-5 steps with a railing? 2  -BT     To walk in hospital room? 3  -BT     AM-PAC 6 Clicks Score (PT) 17  -BT     Highest Level of Mobility Goal 5 --> Static standing  -BT       Row Name 04/28/25 1028          Functional Assessment    Outcome Measure Options AM-PAC 6 Clicks Daily Activity (OT);Optimal Instrument  -PG       Row Name 04/28/25 1028          Optimal Instrument    Optimal Instrument Optimal - 3  -PG     Bending/Stooping 3  -PG     Standing 2  -PG     Reaching 1  -PG               User Key  (r) = Recorded By, (t) = Taken By, (c) = Cosigned By      Initials Name Provider Type    PG Ace Daugherty OT Occupational Therapist    BT Fidelia Broussard RN Registered Nurse                      OT Recommendation and Plan     Plan of Care Review  Progress: no change     Time Calculation:   Evaluation Complexity (OT)  Review Occupational Profile/Medical/Therapy History Complexity: brief/low complexity  Assessment, Occupational Performance/Identification of Deficit Complexity: 3-5 performance deficits  Clinical Decision Making Complexity (OT): problem focused assessment/low complexity  Overall Complexity of Evaluation (OT): low complexity     Time Calculation- OT       Row Name 04/28/25 1029             Time Calculation- OT    OT Received On 04/28/25  -PG      OT Goal Re-Cert Due Date 05/07/25  -PG         Timed Charges    92662 - OT Therapeutic Activity Minutes 8  -PG      83465 - OT Self Care/Mgmt Minutes 5  -PG         Total Minutes    Timed Charges Total Minutes 13  -PG       Total Minutes 13  -PG                User Key  (r) = Recorded By, (t) = Taken By, (c) = Cosigned By      Initials Name Provider Type    PG Ace Daugherty OT Occupational Therapist                  Therapy Charges for Today       Code Description Service Date Service Provider Modifiers Qty    [id number]  OT THERAPEUTIC ACT EA 15 MIN 4/28/2025 Ace Daugherty OT GO 1                 Ace Daugherty OT  4/28/2025

## 2025-04-28 NOTE — PLAN OF CARE
Goal Outcome Evaluation:  Plan of Care Reviewed With: patient        Progress: no change  Outcome Evaluation: VSS on RA. Patient AAOx4. Blood sugars monitored and maintained with SSI. Wound care complete per orders. Waiting on placement. No other concerns or complaints. Continue plan of care.

## 2025-04-28 NOTE — PROGRESS NOTES
[facility]   Hospitalist Progress Note    Date of admission: 4/15/2025  Patient Name: Alberto Rachel  1960  Date: 4/28/2025      Subjective     Chief Complaint   Patient presents with    Altered Mental Status       Interval Followup: tolerating po.  No n/v.  Positive flatus but no bowel movements.  No abdominal pain.  No shortness of air  Pending rehab    Objective     Vitals:   Temp:  [97.3 °F (36.3 °C)-99.7 °F (37.6 °C)] 99.1 °F (37.3 °C)  Heart Rate:  [77-89] 77  Resp:  [18] 18  BP: (106-141)/(56-61) 120/61    Physical Exam  Awake, conversant, poor health literacy, gen weakness, in bed  CTAB no wheezing room air  Obese abdomen, +BS somewhat full, no guarding nontender    Result Review:  Vital signs, labs and recent relevant imaging reviewed.      CBC          4/22/2025    05:54 4/23/2025    05:36 4/24/2025    02:55   CBC   WBC 11.22  10.63  9.66    RBC 4.49  4.12  3.98    Hemoglobin 13.5  12.3  11.7    Hematocrit 40.0  35.8  34.8    MCV 89.1  86.9  87.4    MCH 30.1  29.9  29.4    MCHC 33.8  34.4  33.6    RDW 13.8  14.0  14.0    Platelets 301  301  295      CMP          4/25/2025    05:24 4/27/2025    05:47 4/28/2025    05:20   CMP   Glucose 102  135  140    BUN 21  15  15    Creatinine 0.98  1.07  1.07    EGFR 85.0  76.5  76.5    Sodium 134  130  133    Potassium 3.9  3.9  3.8    Chloride 97  96  98    Calcium 9.1  8.9  8.7    BUN/Creatinine Ratio 21.4  14.0  14.0    Anion Gap 12.9  12.6  10.5          acetaminophen **OR** acetaminophen    albuterol    [DISCONTINUED] senna-docusate sodium **AND** polyethylene glycol **AND** bisacodyl **AND** bisacodyl    dextrose    dextrose    glucagon (human recombinant)    metoprolol tartrate    ondansetron ODT **OR** ondansetron    sodium chloride    sodium chloride    sodium chloride    ammonium lactate, , Topical, 2 times per day  apixaban, 5 mg, Oral, Q12H  ARIPiprazole, 10 mg, Oral, Daily  aspirin, 81 mg, Oral, Daily  atorvastatin, 80 mg, Oral, Nightly  [Held  by provider] bumetanide, 1 mg, Oral, Daily  [Held by provider] citalopram, 20 mg, Oral, Daily  famotidine, 20 mg, Oral, BID AC  insulin regular, 2-7 Units, Subcutaneous, 4x Daily AC & at Bedtime  levothyroxine, 50 mcg, Oral, Q AM  metoprolol succinate XL, 25 mg, Oral, Q24H  polyethylene glycol, 17 g, Oral, Daily  senna-docusate sodium, 1 tablet, Oral, BID  sodium chloride, 10 mL, Intravenous, Q12H  [Held by provider] traZODone, 100 mg, Oral, Nightly        XR Abdomen KUB  Result Date: 4/23/2025  Impression: Persistent dilated loops of small bowel in the right abdomen suggesting partial small bowel obstruction versus adynamic ileus. Electronically Signed: Heriberto Casillas MD  4/23/2025 11:42 AM EDT  Workstation ID: [id number]    XR Chest 1 View  Result Date: 4/23/2025  Impression: 1.Mild improvement in aeration in the left lung base. Mild residual atelectasis versus infiltrate is seen. There may be a small residual left pleural effusion. 2.The nasogastric tube is positioned in the stomach. 3.Mild atelectasis versus infiltrate at the lateral aspect of the right midlung. Electronically Signed: Emory Quintana MD  4/23/2025 11:32 AM EDT  Workstation ID: [id number]    XR Abdomen KUB  Result Date: 4/21/2025  Impression: 1.Esophagogastric tube with tip in the proximal to mid stomach. 2.Diffuse gaseous distention and dilatation of small bowel loops suggesting small bowel obstruction. Electronically Signed: Heriberto Casillas MD  4/21/2025 4:23 PM EDT  Workstation ID: [id number]    XR Abdomen KUB  Result Date: 4/21/2025  Impression: High-grade partial small bowel obstruction. Electronically Signed: Dino Dailey MD  4/21/2025 7:44 AM EDT  Workstation ID: [id number]    XR Abdomen KUB  Result Date: 4/20/2025  Impression: Stable intestinal distention Electronically Signed: Gian Marcus  4/20/2025 2:30 PM EDT  Workstation ID: [id number]      Assessment / Plan     Summary: 64 y.o. male with history of anxiety, hypertension, CAD status post bypass,  history of stroke, carotid artery stenosis, depression, diabetes, duodenal ulcer, hypertension, dyslipidemia, diabetes mellitus, hospitalized on 4/15/2025 chief complaint of altered mental status, multiple falls, found to have significant life-threatening hyponatremia, nephrology consulted, placed in the critical care unit with critical care consulted, given a round of hypertonic saline, placed on IV fluids several home medications held in the setting of severe hyponatremia including chlorthalidone, Bumex, Abilify, Celexa among others. Cardiology consulted elevated troponins. Planning nuclear stress test. Transferred out of ICU. Started developing distention despite passing gas and having bowel movements, abdominal distention worse, x-ray showing small bowel obstruction pattern, CT imaging obtained, showing gastric, small and large bowel distention concerning for ileus versus versus bowel spasm and peristalsis, could have York's, NG tube placed to low intermittent suction, general surgery consulted     Symptoms resolved with supportive care/decompression no surgical intervention required.  Resolved with fluids/holding of diuretics.  Hyponatremia improving with discontinuing chlorthalidone and reduction of home psychiatric medication regimen.  Pending rehab placement for persistent notable debility.  Needs follow-up with cardiology outpatient.    Assessment/Plan (clinically significant if listed here)  Acute life-threatening hyponatremia, hypoosmolar  High-grade small bowel obstruction with question ileus vs Abel  Acute metabolic encephalopathy  ESVIN, question prerenal/dehydration related, on CKD3 b/l near ~1.4 to 1.5 most recently  Rhabdomyolysis  AGMA   New paroxysmal Aflutter  CAD with history of CABG, with elevated troponins likely type II MI secondary to hyponatremia  Asthma mild intermittent  DM2  Depression/Anxiety  Hx of HTN with low normal BP here  Hypothyroidism  History of stroke and Carotid artery  stenosis  History of duodenal ulcer  Poor health literacy/unable to read    Cont to hold bumex, proBNP repeat within normal limits without needing diuresis, may only need spot dose/as needed dosing outpatient.  Sodium better 133, will continue current medications can spot check/monitor outpatient  Continue bowel regimen, increase further, has some constipation currently  Cont abilify/reduced psych regimen; spot check labs for hyponatremia  Apprec nephro assistance  Continue with Eliquis (new medication needed at discharge) for paroxysmal A-fib, metoprolol succinate  Continue SSI.  Minimal req's  Continue Synthroid. TSH within normal limits  cont aspirin/statin   PT/OT, pt remains very weak, was needing assistance to reposition in bed.  Needs rehab/pending.  Add wafflepad/cushion when up in chair/bed prn for comfort.      Dispo: rehab pending    VTE Prophylaxis:  Pharmacologic & mechanical VTE prophylaxis orders are present.      Code Status (Patient has no pulse and is not breathing): CPR (Attempt to Resuscitate)  Medical Interventions (Patient has pulse or is breathing): Full Support

## 2025-04-29 LAB
GLUCOSE BLDC GLUCOMTR-MCNC: 105 MG/DL (ref 70–99)
GLUCOSE BLDC GLUCOMTR-MCNC: 136 MG/DL (ref 70–99)
GLUCOSE BLDC GLUCOMTR-MCNC: 157 MG/DL (ref 70–99)
GLUCOSE BLDC GLUCOMTR-MCNC: 158 MG/DL (ref 70–99)

## 2025-04-29 PROCEDURE — 99232 SBSQ HOSP IP/OBS MODERATE 35: CPT | Performed by: INTERNAL MEDICINE

## 2025-04-29 PROCEDURE — 97164 PT RE-EVAL EST PLAN CARE: CPT

## 2025-04-29 PROCEDURE — 97530 THERAPEUTIC ACTIVITIES: CPT

## 2025-04-29 PROCEDURE — 63710000001 INSULIN REGULAR HUMAN PER 5 UNITS: Performed by: INTERNAL MEDICINE

## 2025-04-29 PROCEDURE — 82948 REAGENT STRIP/BLOOD GLUCOSE: CPT | Performed by: INTERNAL MEDICINE

## 2025-04-29 PROCEDURE — 92526 ORAL FUNCTION THERAPY: CPT

## 2025-04-29 PROCEDURE — 97110 THERAPEUTIC EXERCISES: CPT

## 2025-04-29 PROCEDURE — 82948 REAGENT STRIP/BLOOD GLUCOSE: CPT

## 2025-04-29 RX ORDER — NICOTINE 21 MG/24HR
1 PATCH, TRANSDERMAL 24 HOURS TRANSDERMAL EVERY 24 HOURS
Status: DISCONTINUED | OUTPATIENT
Start: 2025-04-29 | End: 2025-04-30 | Stop reason: HOSPADM

## 2025-04-29 RX ORDER — POLYETHYLENE GLYCOL 3350 17 G
2 POWDER IN PACKET (EA) ORAL
Status: DISCONTINUED | OUTPATIENT
Start: 2025-04-29 | End: 2025-04-30 | Stop reason: HOSPADM

## 2025-04-29 RX ADMIN — Medication: at 10:18

## 2025-04-29 RX ADMIN — ACETAMINOPHEN 650 MG: 325 TABLET ORAL at 13:26

## 2025-04-29 RX ADMIN — ARIPIPRAZOLE 10 MG: 10 TABLET ORAL at 08:00

## 2025-04-29 RX ADMIN — APIXABAN 5 MG: 5 TABLET, FILM COATED ORAL at 08:00

## 2025-04-29 RX ADMIN — FAMOTIDINE 20 MG: 20 TABLET, FILM COATED ORAL at 16:39

## 2025-04-29 RX ADMIN — FAMOTIDINE 20 MG: 20 TABLET, FILM COATED ORAL at 05:39

## 2025-04-29 RX ADMIN — METOPROLOL SUCCINATE 25 MG: 25 TABLET, EXTENDED RELEASE ORAL at 08:00

## 2025-04-29 RX ADMIN — NICOTINE 1 PATCH: 21 PATCH, EXTENDED RELEASE TRANSDERMAL at 16:39

## 2025-04-29 RX ADMIN — POLYETHYLENE GLYCOL (3350) 17 G: 17 POWDER, FOR SOLUTION ORAL at 08:00

## 2025-04-29 RX ADMIN — Medication: at 21:34

## 2025-04-29 RX ADMIN — SENNOSIDES AND DOCUSATE SODIUM 1 TABLET: 50; 8.6 TABLET ORAL at 21:34

## 2025-04-29 RX ADMIN — INSULIN HUMAN 2 UNITS: 100 INJECTION, SOLUTION PARENTERAL at 17:03

## 2025-04-29 RX ADMIN — LEVOTHYROXINE SODIUM 50 MCG: 0.05 TABLET ORAL at 05:39

## 2025-04-29 RX ADMIN — ATORVASTATIN CALCIUM 80 MG: 40 TABLET, FILM COATED ORAL at 21:34

## 2025-04-29 RX ADMIN — INSULIN HUMAN 2 UNITS: 100 INJECTION, SOLUTION PARENTERAL at 21:34

## 2025-04-29 RX ADMIN — SENNOSIDES AND DOCUSATE SODIUM 1 TABLET: 50; 8.6 TABLET ORAL at 08:00

## 2025-04-29 RX ADMIN — ASPIRIN 81 MG: 81 TABLET, COATED ORAL at 08:00

## 2025-04-29 RX ADMIN — BISACODYL 5 MG: 5 TABLET, COATED ORAL at 21:34

## 2025-04-29 RX ADMIN — Medication 10 ML: at 22:08

## 2025-04-29 RX ADMIN — Medication 10 ML: at 08:00

## 2025-04-29 RX ADMIN — APIXABAN 5 MG: 5 TABLET, FILM COATED ORAL at 21:34

## 2025-04-29 NOTE — PROGRESS NOTES
[facility] Progress Note      Patient Name: Alberto Rachel  : 1960  MRN: [id number]  Primary Care Physician:  Magnus Euceda DO  Date of admission: 4/15/2025    Subjective     Interval History:     Patient offers no complaint  Lying in bed  Decreased appetite  No abdominal pain  No chest pain or palpitations      Review of Systems:   As noted above    Objective     Vitals:   Temp:  [98.4 °F (36.9 °C)-99.1 °F (37.3 °C)] 98.4 °F (36.9 °C)  Heart Rate:  [71-91] 71  Resp:  [18] 18  BP: (102-129)/(46-63) 102/48    Intake/Output Summary (Last 24 hours) at 2025 1706  Last data filed at 2025 1413  Gross per 24 hour   Intake 480 ml   Output 450 ml   Net 30 ml       Physical Exam:    General Appearance: Chronically ill and deconditioned bilateral temporal wasting  Skin: warm and dry  HEENT: oral mucosa normal, nonicteric sclera  Neck: supple, no JVD  Lungs: CTA  Heart: RRR, normal S1 and S2  Abdomen: soft, nontender, nondistended  : no palpable bladder  Extremities: no edema, cyanosis or clubbing  Neuro:  alert, no focalization    Scheduled Meds:     ammonium lactate, , Topical, 2 times per day  apixaban, 5 mg, Oral, Q12H  ARIPiprazole, 10 mg, Oral, Daily  aspirin, 81 mg, Oral, Daily  atorvastatin, 80 mg, Oral, Nightly  [Held by provider] bumetanide, 1 mg, Oral, Daily  [Held by provider] citalopram, 20 mg, Oral, Daily  famotidine, 20 mg, Oral, BID AC  insulin regular, 2-7 Units, Subcutaneous, 4x Daily AC & at Bedtime  levothyroxine, 50 mcg, Oral, Q AM  metoprolol succinate XL, 25 mg, Oral, Q24H  nicotine, 1 patch, Transdermal, Q24H  polyethylene glycol, 17 g, Oral, Daily  senna-docusate sodium, 1 tablet, Oral, BID  sodium chloride, 10 mL, Intravenous, Q12H  [Held by provider] traZODone, 100 mg, Oral, Nightly      IV Meds:        Results Reviewed:   I have personally reviewed the results from the time of this admission to 2025 17:06 EDT     Results from last 7 days   Lab  Units 04/28/25  0520 04/27/25  0547 04/25/25  0524   SODIUM mmol/L 133* 130* 134*   POTASSIUM mmol/L 3.8 3.9 3.9   CHLORIDE mmol/L 98 96* 97*   CO2 mmol/L 24.5 21.4* 24.1   BUN mg/dL 15 15 21   CREATININE mg/dL 1.07 1.07 0.98   CALCIUM mg/dL 8.7 8.9 9.1   GLUCOSE mg/dL 140* 135* 102*       Estimated Creatinine Clearance: 82.5 mL/min (by C-G formula based on SCr of 1.07 mg/dL).    Results from last 7 days   Lab Units 04/25/25  0524 04/24/25  0255 04/23/25  0536   MAGNESIUM mg/dL 1.8 1.9 2.4   PHOSPHORUS mg/dL 2.9 2.4* 2.6             Results from last 7 days   Lab Units 04/24/25  0255 04/23/25  0536   WBC 10*3/mm3 9.66 10.63   HEMOGLOBIN g/dL 11.7* 12.3*   PLATELETS 10*3/mm3 295 301             Assessment / Plan     ASSESSMENT:    - Hypovolemic hyponatremia, possibly secondary to chlorthalidone, urine osmolality 271.  TSH okay.  No signs or symptoms of adrenal insufficiency.  Sodium in stable range, off trazodone Avoid thiazides moving forward.     -- ESVIN, prerenal secondary to volume depletion on the setting of abdominal pain and decreased oral intake improved with IV fluid challenge Stable now with good urine output.  Monitor.     - Chronic kidney disease stage III, baseline creatinine lately around 1.4-1.5.  Has bland UA and minimal proteinuria from 2 years ago.  Follow-up in the office eventually.     -Diabetes mellitus type 2, as per primary team management     - High-grade small bowel obstruction CT abdomen followed closely by general surgery on medical management    - A flutter with tachycardia.    -- Coronary disease status post coronary bypass grafting follow closely with primary team       PLAN:  No repeat lab work today ,will order BMP for tomorrow, last sodium 133.   From renal point of view patient can be discharged closely as an outpatient  Continue surveillance labs    Thank you for involving us in the care of Alberto Rachel.  Please feel free to call with any questions.    Tam Astorag,  MD  04/29/25  17:06 EDT    [facility]  [phone]    Please note that portions of this note were completed with a voice recognition program.

## 2025-04-29 NOTE — PROGRESS NOTES
[facility]   Hospitalist Progress Note    Date of admission: 4/15/2025  Patient Name: Alberto Rachel  1960  Date: 4/29/2025      Subjective     Chief Complaint   Patient presents with    Altered Mental Status     Summary:   Alberto Rachel is a 64 y.o. male with history of anxiety, hypertension, CAD status post bypass, history of stroke, carotid artery stenosis, depression, diabetes, duodenal ulcer, hypertension, dyslipidemia, diabetes mellitus, hospitalized on 4/15/2025 chief complaint of altered mental status, multiple falls, found to have significant life-threatening hyponatremia, nephrology consulted, placed in the critical care unit with critical care consulted, given a round of hypertonic saline, placed on IV fluids several home medications held in the setting of severe hyponatremia including chlorthalidone, Bumex, Abilify, Celexa among others. Cardiology consulted for elevated troponins. Planning nuclear stress test. Transferred out of ICU. Started developing abdominal distention despite passing gas and having bowel movements, x-ray showing small bowel obstruction pattern, CT imaging obtained, showing gastric, small and large bowel distention concerning for ileus versus versus bowel spasm and peristalsis, could have Abel's, NG tube placed to low intermittent suction, general surgery consulted.  Symptoms resolved and resumed on diet with no issues. Hyponatremia improving with discontinuing chlorthalidone and reduction of home psychiatric medication regimen.  Pending rehab placement for persistent notable debility.  Needs follow-up with cardiology outpatient.    Interval Followup:   Seen comfortably in bed.  Continues to eat well and having bowel movements.   Looking for appropriate disposition at this point    Objective     Vitals:   Temp:  [98.4 °F (36.9 °C)-99.1 °F (37.3 °C)] 98.4 °F (36.9 °C)  Heart Rate:  [71-91] 71  Resp:  [18] 18  BP: (102-129)/(46-63) 102/48    Physical Exam  Awake,  conversant, resting bed, no acute distress  CTAB no wheezing room air  Obese abdomen, +BS, soft, nondistended    Result Review:  Vital signs, labs and recent relevant imaging reviewed.      CBC          4/22/2025    05:54 4/23/2025    05:36 4/24/2025    02:55   CBC   WBC 11.22  10.63  9.66    RBC 4.49  4.12  3.98    Hemoglobin 13.5  12.3  11.7    Hematocrit 40.0  35.8  34.8    MCV 89.1  86.9  87.4    MCH 30.1  29.9  29.4    MCHC 33.8  34.4  33.6    RDW 13.8  14.0  14.0    Platelets 301  301  295      CMP          4/25/2025    05:24 4/27/2025    05:47 4/28/2025    05:20   CMP   Glucose 102  135  140    BUN 21  15  15    Creatinine 0.98  1.07  1.07    EGFR 85.0  76.5  76.5    Sodium 134  130  133    Potassium 3.9  3.9  3.8    Chloride 97  96  98    Calcium 9.1  8.9  8.7    BUN/Creatinine Ratio 21.4  14.0  14.0    Anion Gap 12.9  12.6  10.5          acetaminophen **OR** acetaminophen    albuterol    [DISCONTINUED] senna-docusate sodium **AND** polyethylene glycol **AND** bisacodyl **AND** bisacodyl    dextrose    dextrose    glucagon (human recombinant)    metoprolol tartrate    nicotine polacrilex    ondansetron ODT **OR** ondansetron    sodium chloride    sodium chloride    sodium chloride    ammonium lactate, , Topical, 2 times per day  apixaban, 5 mg, Oral, Q12H  ARIPiprazole, 10 mg, Oral, Daily  aspirin, 81 mg, Oral, Daily  atorvastatin, 80 mg, Oral, Nightly  [Held by provider] bumetanide, 1 mg, Oral, Daily  [Held by provider] citalopram, 20 mg, Oral, Daily  famotidine, 20 mg, Oral, BID AC  insulin regular, 2-7 Units, Subcutaneous, 4x Daily AC & at Bedtime  levothyroxine, 50 mcg, Oral, Q AM  metoprolol succinate XL, 25 mg, Oral, Q24H  nicotine, 1 patch, Transdermal, Q24H  polyethylene glycol, 17 g, Oral, Daily  senna-docusate sodium, 1 tablet, Oral, BID  sodium chloride, 10 mL, Intravenous, Q12H  [Held by provider] traZODone, 100 mg, Oral, Nightly        XR Abdomen KUB  Result Date: 4/23/2025  Impression:  Persistent dilated loops of small bowel in the right abdomen suggesting partial small bowel obstruction versus adynamic ileus. Electronically Signed: Heriberto Casillas MD  4/23/2025 11:42 AM EDT  Workstation ID: [id number]    XR Chest 1 View  Result Date: 4/23/2025  Impression: 1.Mild improvement in aeration in the left lung base. Mild residual atelectasis versus infiltrate is seen. There may be a small residual left pleural effusion. 2.The nasogastric tube is positioned in the stomach. 3.Mild atelectasis versus infiltrate at the lateral aspect of the right midlung. Electronically Signed: Emory Quintana MD  4/23/2025 11:32 AM EDT  Workstation ID: [id number]      Assessment / Plan     Assessment/Plan (clinically significant if listed here)  Acute life-threatening hyponatremia, hypoosmolar  High-grade small bowel obstruction with question ileus vs [place]  Acute metabolic encephalopathy  ESVIN, question prerenal/dehydration related, on CKD3 b/l near ~1.4 to 1.5 most recently  Rhabdomyolysis  AGMA   New paroxysmal Aflutter  CAD with history of CABG, with elevated troponins likely type II MI secondary to hyponatremia  Asthma mild intermittent  DM2  Depression/Anxiety  Hx of HTN with low normal BP here  Hypothyroidism  History of stroke and Carotid artery stenosis  History of duodenal ulcer  Poor health literacy/unable to read    Cont to hold bumex, proBNP repeat within normal limits without needing diuresis, may only need spot dose/as needed dosing outpatient.    Repeat sodium levels tomorrow  Continue bowel regimen, now having loose stools  Cont abilify/reduced psych regimen; spot check labs for hyponatremia  Apprec nephro assistance  Continue with Eliquis (new medication needed at discharge) for paroxysmal A-fib, metoprolol succinate  Continue SSI.  Minimal req's  Continue Synthroid. TSH within normal limits  cont aspirin/statin   PT/OT, pt remains very weak, was needing assistance to reposition in bed.  Needs rehab/pending.   Add wafflepad/cushion when up in chair/bed prn for comfort.  Cussed with case management, referral sent    Dispo: rehab pending    VTE Prophylaxis:  Pharmacologic & mechanical VTE prophylaxis orders are present.      Code Status (Patient has no pulse and is not breathing): CPR (Attempt to Resuscitate)  Medical Interventions (Patient has pulse or is breathing): Full Support

## 2025-04-29 NOTE — THERAPY TREATMENT NOTE
Acute Care - Speech Language Pathology   Swallow Treatment Note [facility]     Patient Name: Alberto Rachel  : 1960  MRN: [id number]  Today's Date: 2025               Admit Date: 4/15/2025    Visit Dx:     ICD-10-CM ICD-9-CM   1. Hyponatremia  E87.1 276.1   2. Non-traumatic rhabdomyolysis  M62.82 728.88   3. ESVIN (acute kidney injury)  N17.9 584.9   4. Oropharyngeal dysphagia  R13.12 787.22   5. Decreased activities of daily living (ADL)  Z78.9 V49.89   6. Difficulty walking  R26.2 719.7   7. Stage 3a chronic kidney disease (CKD)  N18.31 585.3   8. Paroxysmal atrial fibrillation  I48.0 427.31     Patient Active Problem List   Diagnosis    Anxiety    Atherosclerosis of coronary artery bypass graft of native heart without angina pectoris    Essential hypertension    Depression    Mixed hyperlipidemia    Type 2 diabetes mellitus with hyperglycemia, without long-term current use of insulin    Hypothyroidism    Arthritis of knee    Illiteracy    Cramps, extremity    Tachycardia    Obesity (BMI 30-39.9)    Degenerative disc disease, lumbar    Primary insomnia    Gastroesophageal reflux disease without esophagitis    Close exposure to COVID-19 virus    Mild intermittent asthma    Candidal dermatitis    Bronchitis    Bilateral shoulder pain    Hyperkalemia    Stage 3a chronic kidney disease (CKD)    Hyponatremia    Onychomycosis    Onychocryptosis    PVD (peripheral vascular disease)    Foot pain, bilateral     Past Medical History:   Diagnosis Date    Anxiety     Arthritis     Atherosclerosis of coronary artery bypass graft of native heart without angina pectoris 10/23/2018    Coronary artery disease with previous bypass, LIMA to the LAD, SVG to ramus, and SVG to RCA in     CAD (coronary artery disease) 10/23/2018    Carotid artery stenosis with cerebral infarction 2015    Dr. Alma Rosa Rosales, extremity 9/10/2021    Depression     DM2 (diabetes mellitus, type 2) 10/23/2018    Duodenal ulcer      Essential hypertension 10/23/2018    Headache     Heart attack 2015    Heartburn     HLD (hyperlipidemia)     HTN (hypertension)     Injury of right leg 8/13/2021    Irregular heart beat     Low back pain 03/18/2019    Mild episode of recurrent depressive disorder 10/23/2018    Rash 03/18/2019    Shortness of breath     SOB (shortness of breath)     Type 2 diabetes mellitus with hyperglycemia, without long-term current use of insulin 06/11/2019    Uncontrolled diabetes mellitus 03/18/2019     Past Surgical History:   Procedure Laterality Date    CARDIAC CATHETERIZATION  06/2015    Dr. St     CIRCUMCISION      age 14 yrs old    CORONARY ARTERY BYPASS GRAFT  06/2015    5 bypasses       SLP Recommendation and Plan      SPEECH PATHOLOGY DYSPHAGIA TREATMENT    Subjective/Behavioral Observations: Patient was pleasant and cooperative. Patient was alert and awake when SLP entered the room. Patient reported that he would be agreeable to participating in swallowing exercises.        Day/time of Treatment: 04/29/2025        Current Diet: Level 0 thin liquids and level 6 soft and bite sized solids with ground meats.        Current Strategies: Patient should eat at a slow rate and take small bites and sips. Patient should double swallow with all solids to provide clearance of residue.         Treatment received: Patient participated in swallowing exercises to improve swallow strength to a more functional level.        Results of treatment: Patient completed the following exercises given maximum to minimal faded cuing: effortful swallow x 10 reps, Geni Maneuver x 10 reps. Patient was educated on the following exercises as he reported being too fatigued to complete them:  Mendelsohn Maneuver, Front lingual resistance, back lingual resistance. Patient tolerated exercises well but fatigued quickly. Patient reported understanding how to complete them and importance of completing them to improve swallow strength to a more  functional level.        Progress toward goals: Progressing.         Barriers to Achieving goals: Medical status.           Plan of care:/changes in plan: No change to plan of care. Patient would benefit from continued speech services to address dysphagia.                                                                                                 EDUCATION  The patient has been educated in the following areas:   Dysphagia (Swallowing Impairment).                Time Calculation:    Time Calculation- SLP       Row Name 04/29/25 1000             Time Calculation- SLP    SLP Start Time 1000  -AW      SLP Stop Time 1100  -AW      SLP Time Calculation (min) 60 min  -AW         Untimed Charges    [id number] Treatment Swallow Minutes 60  -AW         Total Minutes    Untimed Charges Total Minutes 60  -AW       Total Minutes 60  -AW                User Key  (r) = Recorded By, (t) = Taken By, (c) = Cosigned By      Initials Name Provider Type    AW Roslyn Barnes SLP Speech and Language Pathologist                    Therapy Charges for Today       Code Description Service Date Service Provider Modifiers Qty    [id number]  ST TREATMENT SWALLOW 4 4/29/2025 Roslyn Barnes SLP GN 1                 IAIN Sanchez  4/29/2025

## 2025-04-29 NOTE — THERAPY RE-EVALUATION
Acute Care - Physical Therapy Re-Evaluation  [facility]     Patient Name: Alberto Rachel  : 1960  MRN: [id number]  Today's Date: 2025      Visit Dx:     ICD-10-CM ICD-9-CM   1. Hyponatremia  E87.1 276.1   2. Non-traumatic rhabdomyolysis  M62.82 728.88   3. ESVIN (acute kidney injury)  N17.9 584.9   4. Oropharyngeal dysphagia  R13.12 787.22   5. Decreased activities of daily living (ADL)  Z78.9 V49.89   6. Difficulty walking  R26.2 719.7   7. Stage 3a chronic kidney disease (CKD)  N18.31 585.3   8. Paroxysmal atrial fibrillation  I48.0 427.31     Patient Active Problem List   Diagnosis    Anxiety    Atherosclerosis of coronary artery bypass graft of native heart without angina pectoris    Essential hypertension    Depression    Mixed hyperlipidemia    Type 2 diabetes mellitus with hyperglycemia, without long-term current use of insulin    Hypothyroidism    Arthritis of knee    Illiteracy    Cramps, extremity    Tachycardia    Obesity (BMI 30-39.9)    Degenerative disc disease, lumbar    Primary insomnia    Gastroesophageal reflux disease without esophagitis    Close exposure to COVID-19 virus    Mild intermittent asthma    Candidal dermatitis    Bronchitis    Bilateral shoulder pain    Hyperkalemia    Stage 3a chronic kidney disease (CKD)    Hyponatremia    Onychomycosis    Onychocryptosis    PVD (peripheral vascular disease)    Foot pain, bilateral     Past Medical History:   Diagnosis Date    Anxiety     Arthritis     Atherosclerosis of coronary artery bypass graft of native heart without angina pectoris 10/23/2018    Coronary artery disease with previous bypass, LIMA to the LAD, SVG to ramus, and SVG to RCA in     CAD (coronary artery disease) 10/23/2018    Carotid artery stenosis with cerebral infarction 2015    Dr. St     Cramps, extremity 9/10/2021    Depression     DM2 (diabetes mellitus, type 2) 10/23/2018    Duodenal ulcer     Essential hypertension 10/23/2018    Headache     Heart  attack 2015    Heartburn     HLD (hyperlipidemia)     HTN (hypertension)     Injury of right leg 8/13/2021    Irregular heart beat     Low back pain 03/18/2019    Mild episode of recurrent depressive disorder 10/23/2018    Rash 03/18/2019    Shortness of breath     SOB (shortness of breath)     Type 2 diabetes mellitus with hyperglycemia, without long-term current use of insulin 06/11/2019    Uncontrolled diabetes mellitus 03/18/2019     Past Surgical History:   Procedure Laterality Date    CARDIAC CATHETERIZATION  06/2015    Dr. St     CIRCUMCISION      age 14 yrs old    CORONARY ARTERY BYPASS GRAFT  06/2015    5 bypasses     PT Assessment (Last 12 Hours)       PT Evaluation and Treatment       Row Name 04/29/25 1515          Physical Therapy Time and Intention    Subjective Information complains of;weakness;fatigue  -CS     Document Type re-evaluation  -CS     Mode of Treatment individual therapy;physical therapy  -CS     Patient Effort fair  -CS     Symptoms Noted During/After Treatment fatigue  -CS       Row Name 04/29/25 1515          General Information    Patient Observations lethargic  -CS       Row Name 04/29/25 1515          Cognition    Affect/Mental Status (Cognition) flat/blunted affect;low arousal/lethargic  -CS     Orientation Status (Cognition) oriented to;person;place  -CS     Follows Commands (Cognition) delayed response/completion;increased processing time needed;initiation impaired;repetition of directions required  -CS       Row Name 04/29/25 1513          Strength Comprehensive (MMT)    General Manual Muscle Testing (MMT) Assessment lower extremity strength deficits identified  -CS     Comment, General Manual Muscle Testing (MMT) Assessment BLEs assessed at 3-/5  -CS       Row Name 04/29/25 1518          Bed Mobility    Bed Mobility bed mobility (all) activities  -CS     All Activities, Hand (Bed Mobility) verbal cues;maximum assist (25% patient effort)  -CS     Bed Mobility, Safety  "Issues decreased use of legs for bridging/pushing;decreased use of arms for pushing/pulling  -CS     Assistive Device (Bed Mobility) bed rails  -CS     Comment, (Bed Mobility) Pt required increased cues and step by step direction to complete mobility. Pt with slow movement and requiring increased time to process information and execute task  -CS       Row Name 04/29/25 1515          Transfers    Comment, (Transfers) Pt requiring moderate to maximal assist with rolling walker due to fatigue. At times 2 people for safety. Pt not wanting to go further due to fatigue and pain during session  -CS       Row Name 04/29/25 1515          Safety Issues/Impairments Affecting Functional Mobility    Impairments Affecting Function (Mobility) balance;cognition;endurance/activity tolerance;strength;pain  -CS       Row Name 04/29/25 1515          Balance    Balance Assessment sitting dynamic balance  -CS     Dynamic Sitting Balance verbal cues;contact guard;minimal assist  -CS       Row Name             Wound 04/18/25 2312 Left posterior elbow    Wound - Properties Group Placement Date: 04/18/25  -HB Placement Time: 2312  -HB Side: Left  -HB Orientation: posterior  -HB Location: elbow  -HB Additional Comments: optifoam placed on elbow, unknown when placed. Patient states, \"I did it when I fell at home\"  -HB    Retired Wound - Properties Group Placement Date: 04/18/25  -HB Placement Time: 2312  -HB Side: Left  -HB Orientation: posterior  -HB Location: elbow  -HB Additional Comments: optifoam placed on elbow, unknown when placed. Patient states, \"I did it when I fell at home\"  -HB    Retired Wound - Properties Group Placement Date: 04/18/25  -HB Placement Time: 2312  -HB Side: Left  -HB Orientation: posterior  -HB Location: elbow  -HB Additional Comments: optifoam placed on elbow, unknown when placed. Patient states, \"I did it when I fell at home\"  -HB    Retired Wound - Properties Group Date first assessed: 04/18/25  -HB Time first " "assessed: 2312  -HB Side: Left  -HB Location: elbow  -HB Additional Comments: optifoam placed on elbow, unknown when placed. Patient states, \"I did it when I fell at home\"  -HB      Row Name             Wound 04/18/25 2004 Left posterior hand    Wound - Properties Group Placement Date: 04/18/25  -HB Placement Time: 2004  -HB Present on Original Admission: --  -HB, unknown  Side: Left  -HB Orientation: posterior  -HB Location: hand  -HB Additional Comments: optifoam placed on hand, unknown when placed. Patient states, \"I did it when I fell at home\"  -HB    Retired Wound - Properties Group Placement Date: 04/18/25  -HB Placement Time: 2004  -HB Present on Original Admission: --  -HB, unknown  Side: Left  -HB Orientation: posterior  -HB Location: hand  -HB Additional Comments: optifoam placed on hand, unknown when placed. Patient states, \"I did it when I fell at home\"  -HB    Retired Wound - Properties Group Placement Date: 04/18/25  -HB Placement Time: 2004  -HB Present on Original Admission: --  -HB, unknown  Side: Left  -HB Orientation: posterior  -HB Location: hand  -HB Additional Comments: optifoam placed on hand, unknown when placed. Patient states, \"I did it when I fell at home\"  -HB    Retired Wound - Properties Group Date first assessed: 04/18/25  -HB Time first assessed: 2004  -HB Present on Original Admission: --  -HB, unknown  Side: Left  -HB Location: hand  -HB Additional Comments: optifoam placed on hand, unknown when placed. Patient states, \"I did it when I fell at home\"  -HB      Row Name             Wound 04/18/25 2004 Right proximal arm    Wound - Properties Group Placement Date: 04/18/25  -HB Placement Time: 2004  -HB Present on Original Admission: --  -HB, unknown  Side: Right  -HB Orientation: proximal  -HB Location: arm  -HB Additional Comments: optifoam placed, unknown when placed. Patient states, \"I did it when I fell at home\"  -HB    Retired Wound - Properties Group Placement Date: 04/18/25  " "-HB Placement Time: 2004  -HB Present on Original Admission: --  -HB, unknown  Side: Right  -HB Orientation: proximal  -HB Location: arm  -HB Additional Comments: optifoam placed, unknown when placed. Patient states, \"I did it when I fell at home\"  -HB    Retired Wound - Properties Group Placement Date: 04/18/25  -HB Placement Time: 2004 -HB Present on Original Admission: --  -HB, unknown  Side: Right  -HB Orientation: proximal  -HB Location: arm  -HB Additional Comments: optifoam placed, unknown when placed. Patient states, \"I did it when I fell at home\"  -HB    Retired Wound - Properties Group Date first assessed: 04/18/25  -HB Time first assessed: 2004 -HB Present on Original Admission: --  -HB, unknown  Side: Right  -HB Location: arm  -HB Additional Comments: optifoam placed, unknown when placed. Patient states, \"I did it when I fell at home\"  -HB      Row Name             Wound 04/18/25 2004 Left anterior knee    Wound - Properties Group Placement Date: 04/18/25  -HB, unknown  Placement Time: 2004 -HB Present on Original Admission: --  -HB, unknown  Side: Left  -HB Orientation: anterior  -HB Location: knee  -HB Additional Comments: optifoam placed, unknown when placed. Patient states, \"I did it when I fell at home\"  -HB    Retired Wound - Properties Group Placement Date: 04/18/25  -HB, unknown  Placement Time: 2004 -HB Present on Original Admission: --  -HB, unknown  Side: Left  -HB Orientation: anterior  -HB Location: knee  -HB Additional Comments: optifoam placed, unknown when placed. Patient states, \"I did it when I fell at home\"  -HB    Retired Wound - Properties Group Placement Date: 04/18/25  -HB, unknown  Placement Time: 2004  -HB Present on Original Admission: --  -HB, unknown  Side: Left  -HB Orientation: anterior  -HB Location: knee  -HB Additional Comments: optifoam placed, unknown when placed. Patient states, \"I did it when I fell at home\"  -HB    Retired Wound - Properties Group Date first " "assessed: 04/18/25  -HB, unknown  Time first assessed: 2004  -HB Present on Original Admission: --  -HB, unknown  Side: Left  -HB Location: knee  -HB Additional Comments: optifoam placed, unknown when placed. Patient states, \"I did it when I fell at home\"  -HB      Row Name             Wound 04/18/25 2004 Right anterior knee    Wound - Properties Group Placement Date: 04/18/25  -HB Placement Time: 2004 -HB Present on Original Admission: --  -HB, unknown  Side: Right  -HB Orientation: anterior  -HB Location: knee  -HB Additional Comments: optifoam placed, unknown when. Patient states, \"I did it when I fell at home\"  -HB    Retired Wound - Properties Group Placement Date: 04/18/25  -HB Placement Time: 2004  -HB Present on Original Admission: --  -HB, unknown  Side: Right  -HB Orientation: anterior  -HB Location: knee  -HB Additional Comments: optifoam placed, unknown when. Patient states, \"I did it when I fell at home\"  -HB    Retired Wound - Properties Group Placement Date: 04/18/25  -HB Placement Time: 2004  -HB Present on Original Admission: --  -HB, unknown  Side: Right  -HB Orientation: anterior  -HB Location: knee  -HB Additional Comments: optifoam placed, unknown when. Patient states, \"I did it when I fell at home\"  -HB    Retired Wound - Properties Group Date first assessed: 04/18/25  -HB Time first assessed: 2004  -HB Present on Original Admission: --  -HB, unknown  Side: Right  -HB Location: knee  -HB Additional Comments: optifoam placed, unknown when. Patient states, \"I did it when I fell at home\"  -HB      Row Name             Wound 04/21/25 1035 Left anterior thigh    Wound - Properties Group Placement Date: 04/21/25  -KE Placement Time: 1035  -KE Side: Left  -KE Orientation: anterior  -KE Location: thigh  -KE    Retired Wound - Properties Group Placement Date: 04/21/25  -KE Placement Time: 1035  -KE Side: Left  -KE Orientation: anterior  -KE Location: thigh  -KE    Retired Wound - Properties Group " Placement Date: 04/21/25  -KE Placement Time: 1035  -KE Side: Left  -KE Orientation: anterior  -KE Location: thigh  -KE    Retired Wound - Properties Group Date first assessed: 04/21/25  -KE Time first assessed: 1035  -KE Side: Left  -KE Location: thigh  -KE      Row Name 04/29/25 1515          Plan of Care Review    Plan of Care Reviewed With patient  -CS     Progress no change  -CS     Outcome Evaluation To optimize functional mobility and independence, skilled PT services needed to address mobility needs, strength, endurance, and balance impairments.  Continue plan of care for another 10 days with all goals still appropriate and addressed below to meet patient's needs.  -CS       Row Name 04/29/25 1515          Positioning and Restraints    Pre-Treatment Position in bed  -CS     Post Treatment Position bed  -CS     In Bed side lying right;call light within reach;encouraged to call for assist;exit alarm on  -CS       Row Name 04/29/25 1515          Therapy Assessment/Plan (PT)    Rehab Potential (PT) good  -CS     Criteria for Skilled Interventions Met (PT) yes;skilled treatment is necessary  -CS     Therapy Frequency (PT) daily  -CS     Predicted Duration of Therapy Intervention (PT) 10 days  -CS     Problem List (PT) problems related to;balance;mobility;strength;pain  -CS     Activity Limitations Related to Problem List (PT) unable to ambulate safely;unable to transfer safely  -CS       Row Name 04/29/25 1515          PT Evaluation Complexity    History, PT Evaluation Complexity 1-2 personal factors and/or comorbidities  -CS     Examination of Body Systems (PT Eval Complexity) total of 4 or more elements  -CS     Clinical Presentation (PT Evaluation Complexity) stable  -CS     Clinical Decision Making (PT Evaluation Complexity) low complexity  -CS     Overall Complexity (PT Evaluation Complexity) low complexity  -CS       Row Name 04/29/25 1515          Progress Summary (PT)    Progress Toward Functional Goals  (PT) unable to show any progress toward functional goals  -CS       Row Name 04/29/25 1515          Therapy Plan Review/Discharge Plan (PT)    Therapy Plan Review (PT) care plan/treatment goals reviewed;patient  -CS       Row Name 04/29/25 1515          Bed Mobility Goal 1 (PT)    [place] Level/Cues Needed (Bed Mobility Goal 1, PT) standby assist  -CS     Progress/Outcomes (Bed Mobility Goal 1, PT) goal not met;goal revised this date;progress slower than expected  -CS       Row Name 04/29/25 1515          Transfer Goal 1 (PT)    [place] Level/Cues Needed (Transfer Goal 1, PT) contact guard required  -CS     Progress/Outcome (Transfer Goal 1, PT) goal not met;goal revised this date;progress slower than expected  -CS       Row Name 04/29/25 1515          Gait Training Goal 1 (PT)    [place] Level (Gait Training Goal 1, PT) minimum assist (75% or more patient effort)  -CS     Distance (Gait Training Goal 1, PT) 75  -CS     Progress/Outcome (Gait Training Goal 1, PT) goal not met;goal revised this date;progress slower than expected  -CS               User Key  (r) = Recorded By, (t) = Taken By, (c) = Cosigned By      Initials Name Provider Type    Irene Girard, RN Registered Nurse    Radha Baltazar PT Physical Therapist    Sarah Jones LPN Licensed Nurse                    Physical Therapy Education       Title: PT OT SLP Therapies (In Progress)       Topic: Physical Therapy (In Progress)       Point: Mobility training (Done)       Learning Progress Summary            Patient Acceptance, E,TB, VU by AV at 4/17/2025 1516                      Point: Home exercise program (Not Started)       Learner Progress:  Not documented in this visit.              Point: Body mechanics (Done)       Learning Progress Summary            Patient Acceptance, E,TB, VU by AV at 4/17/2025 1516                      Point: Precautions (Done)       Learning Progress Summary            Patient Acceptance, E,TB, VU  by GISELLE at 4/17/2025 1516                                      User Key       Initials Effective Dates Name Provider Type Discipline    AV 06/11/21 -  Reji Lux, PT Physical Therapist PT                  PT Recommendation and Plan  Anticipated Discharge Disposition (PT): sub acute care setting  Planned Therapy Interventions (PT): balance training, bed mobility training, gait training, transfer training, strengthening  Therapy Frequency (PT): daily  Progress Summary (PT)  Progress Toward Functional Goals (PT): unable to show any progress toward functional goals  Plan of Care Reviewed With: patient  Progress: no change  Outcome Evaluation: To optimize functional mobility and independence, skilled PT services needed to address mobility needs, strength, endurance, and balance impairments.  Continue plan of care for another 10 days with all goals still appropriate and addressed below to meet patient's needs.   Outcome Measures       Row Name 04/29/25 1500             How much help from another person do you currently need...    Turning from your back to your side while in flat bed without using bedrails? 2  -CS      Moving from lying on back to sitting on the side of a flat bed without bedrails? 1  -CS      Moving to and from a bed to a chair (including a wheelchair)? 2  -CS      Standing up from a chair using your arms (e.g., wheelchair, bedside chair)? 2  -CS      Climbing 3-5 steps with a railing? 1  -CS      To walk in hospital room? 2  -CS      AM-PAC 6 Clicks Score (PT) 10  -CS         Functional Assessment    Outcome Measure Options AM-PAC 6 Clicks Basic Mobility (PT)  -CS                User Key  (r) = Recorded By, (t) = Taken By, (c) = Cosigned By      Initials Name Provider Type    Radha Baltazar, PT Physical Therapist                     Time Calculation:    PT Charges       Row Name 04/29/25 1516             Time Calculation    PT Received On 04/29/25  -      PT Goal Re-Cert Due Date 05/08/25  -CS          Timed Charges    97021 - PT Therapeutic Activity Minutes 15  -CS         Untimed Charges    PT Eval/Re-eval Minutes 10  -CS         Total Minutes    Timed Charges Total Minutes 15  -CS      Untimed Charges Total Minutes 10  -CS       Total Minutes 25  -CS                User Key  (r) = Recorded By, (t) = Taken By, (c) = Cosigned By      Initials Name Provider Type    CS Radha Melton, PT Physical Therapist                  Therapy Charges for Today       Code Description Service Date Service Provider Modifiers Qty    70839028300 HC PT THERAPEUTIC ACT EA 15 MIN 4/29/2025 Radha Melton, PT GP 1    33741011932 HC PT RE-EVAL ESTABLISHED PLAN 2 4/29/2025 Radha Melton, PT GP 1            PT G-Codes  Outcome Measure Options: AM-PAC 6 Clicks Basic Mobility (PT)  AM-PAC 6 Clicks Score (PT): 10  AM-PAC 6 Clicks Score (OT): 17    Radha Melton PT  4/29/2025

## 2025-04-29 NOTE — THERAPY TREATMENT NOTE
Patient Name: Alberto Rachel  : 1960    MRN: [id number]                              Today's Date: 2025       Admit Date: 4/15/2025    Visit Dx:     ICD-10-CM ICD-9-CM   1. Hyponatremia  E87.1 276.1   2. Non-traumatic rhabdomyolysis  M62.82 728.88   3. ESVIN (acute kidney injury)  N17.9 584.9   4. Oropharyngeal dysphagia  R13.12 787.22   5. Decreased activities of daily living (ADL)  Z78.9 V49.89   6. Difficulty walking  R26.2 719.7   7. Stage 3a chronic kidney disease (CKD)  N18.31 585.3   8. Paroxysmal atrial fibrillation  I48.0 427.31     Patient Active Problem List   Diagnosis    Anxiety    Atherosclerosis of coronary artery bypass graft of native heart without angina pectoris    Essential hypertension    Depression    Mixed hyperlipidemia    Type 2 diabetes mellitus with hyperglycemia, without long-term current use of insulin    Hypothyroidism    Arthritis of knee    Illiteracy    Cramps, extremity    Tachycardia    Obesity (BMI 30-39.9)    Degenerative disc disease, lumbar    Primary insomnia    Gastroesophageal reflux disease without esophagitis    Close exposure to COVID-19 virus    Mild intermittent asthma    Candidal dermatitis    Bronchitis    Bilateral shoulder pain    Hyperkalemia    Stage 3a chronic kidney disease (CKD)    Hyponatremia    Onychomycosis    Onychocryptosis    PVD (peripheral vascular disease)    Foot pain, bilateral     Past Medical History:   Diagnosis Date    Anxiety     Arthritis     Atherosclerosis of coronary artery bypass graft of native heart without angina pectoris 10/23/2018    Coronary artery disease with previous bypass, LIMA to the LAD, SVG to ramus, and SVG to RCA in     CAD (coronary artery disease) 10/23/2018    Carotid artery stenosis with cerebral infarction 2015    Dr. St     Cramps, extremity 9/10/2021    Depression     DM2 (diabetes mellitus, type 2) 10/23/2018    Duodenal ulcer     Essential hypertension 10/23/2018    Headache     Heart  attack 2015    Heartburn     HLD (hyperlipidemia)     HTN (hypertension)     Injury of right leg 8/13/2021    Irregular heart beat     Low back pain 03/18/2019    Mild episode of recurrent depressive disorder 10/23/2018    Rash 03/18/2019    Shortness of breath     SOB (shortness of breath)     Type 2 diabetes mellitus with hyperglycemia, without long-term current use of insulin 06/11/2019    Uncontrolled diabetes mellitus 03/18/2019     Past Surgical History:   Procedure Laterality Date    CARDIAC CATHETERIZATION  06/2015    Dr. St     CIRCUMCISION      age 14 yrs old    CORONARY ARTERY BYPASS GRAFT  06/2015    5 bypasses      General Information       Row Name 04/29/25 1237          OT Time and Intention    Document Type therapy note (daily note)  -PG     Mode of Treatment individual therapy;occupational therapy  -PG               User Key  (r) = Recorded By, (t) = Taken By, (c) = Cosigned By      Initials Name Provider Type    PG Ace Daugherty, MAL Occupational Therapist                     Mobility/ADL's    No documentation.                  Obj/Interventions       Row Name 04/29/25 1237          Shoulder (Therapeutic Exercise)    Shoulder (Therapeutic Exercise) strengthening exercise  -PG     Shoulder Strengthening (Therapeutic Exercise) 1 lb free weight;15 repititions  -PG       Row Name 04/29/25 1237          Elbow/Forearm (Therapeutic Exercise)    Elbow/Forearm (Therapeutic Exercise) strengthening exercise  -PG     Elbow/Forearm Strengthening (Therapeutic Exercise) 1 lb free weight;15 repititions  -PG       Row Name 04/29/25 1237          Motor Skills    Therapeutic Exercise shoulder;elbow/forearm  -PG               User Key  (r) = Recorded By, (t) = Taken By, (c) = Cosigned By      Initials Name Provider Type    PG Ace Daugherty, OT Occupational Therapist                   Goals/Plan    No documentation.                  Clinical Impression       Row Name 04/29/25 1238          Plan of Care Review     Progress no change  -PG               User Key  (r) = Recorded By, (t) = Taken By, (c) = Cosigned By      Initials Name Provider Type    PG Ace Daugherty, OT Occupational Therapist                   Outcome Measures       Row Name 04/29/25 1238          How much help from another is currently needed...    Putting on and taking off regular lower body clothing? 2  -PG     Bathing (including washing, rinsing, and drying) 2  -PG     Toileting (which includes using toilet bed pan or urinal) 2  -PG     Putting on and taking off regular upper body clothing 3  -PG     Taking care of personal grooming (such as brushing teeth) 4  -PG     Eating meals 4  -PG     AM-PAC 6 Clicks Score (OT) 17  -PG       Row Name 04/29/25 0702          How much help from another person do you currently need...    Turning from your back to your side while in flat bed without using bedrails? 3  -BT     Moving from lying on back to sitting on the side of a flat bed without bedrails? 3  -BT     Moving to and from a bed to a chair (including a wheelchair)? 3  -BT     Standing up from a chair using your arms (e.g., wheelchair, bedside chair)? 3  -BT     Climbing 3-5 steps with a railing? 2  -BT     To walk in hospital room? 3  -BT     AM-PAC 6 Clicks Score (PT) 17  -BT     Highest Level of Mobility Goal 5 --> Static standing  -BT       Row Name 04/29/25 1238          Functional Assessment    Outcome Measure Options AM-PAC 6 Clicks Daily Activity (OT);Optimal Instrument  -PG       Row Name 04/29/25 1238          Optimal Instrument    Optimal Instrument Optimal - 3  -PG     Bending/Stooping 3  -PG     Standing 2  -PG     Reaching 1  -PG               User Key  (r) = Recorded By, (t) = Taken By, (c) = Cosigned By      Initials Name Provider Type    PG Ace Daugherty, OT Occupational Therapist    BT Fidelia Broussard RN Registered Nurse                      OT Recommendation and Plan     Plan of Care Review  Progress: no change     Time Calculation:    Evaluation Complexity (OT)  Review Occupational Profile/Medical/Therapy History Complexity: brief/low complexity  Assessment, Occupational Performance/Identification of Deficit Complexity: 3-5 performance deficits  Clinical Decision Making Complexity (OT): problem focused assessment/low complexity  Overall Complexity of Evaluation (OT): low complexity     Time Calculation- OT       Row Name 04/29/25 1239             Time Calculation- OT    OT Received On 04/29/25  -PG      OT Goal Re-Cert Due Date 05/07/25  -PG         Timed Charges    65658 - OT Therapeutic Exercise Minutes 12  -PG         Total Minutes    Timed Charges Total Minutes 12  -PG       Total Minutes 12  -PG                User Key  (r) = Recorded By, (t) = Taken By, (c) = Cosigned By      Initials Name Provider Type    PG Ace Daugherty OT Occupational Therapist                  Therapy Charges for Today       Code Description Service Date Service Provider Modifiers Qty    20689157713  OT THERAPEUTIC ACT EA 15 MIN 4/28/2025 Ace Daugherty OT GO 1    97321308202 HC OT THER PROC EA 15 MIN 4/29/2025 Ace Daugherty OT GO 1                 Ace Daugherty OT  4/29/2025

## 2025-04-29 NOTE — PLAN OF CARE
Goal Outcome Evaluation:  Plan of Care Reviewed With: patient        Progress: no change  Outcome Evaluation: VSS on RA. Patient AAOx4. Blood sugars monitored and maintained with SSI. Patient complained of pain x1, medication administered per MAR. Waiting on placement. No other concerns or complaints. Continue plan of care.

## 2025-04-30 VITALS
WEIGHT: 208.78 LBS | TEMPERATURE: 97.3 F | RESPIRATION RATE: 18 BRPM | SYSTOLIC BLOOD PRESSURE: 143 MMHG | BODY MASS INDEX: 28.28 KG/M2 | DIASTOLIC BLOOD PRESSURE: 74 MMHG | HEART RATE: 89 BPM | OXYGEN SATURATION: 98 % | HEIGHT: 72 IN

## 2025-04-30 LAB
ANION GAP SERPL CALCULATED.3IONS-SCNC: 10.3 MMOL/L (ref 5–15)
BUN SERPL-MCNC: 11 MG/DL (ref 8–23)
BUN/CREAT SERPL: 10.3 (ref 7–25)
CALCIUM SPEC-SCNC: 8.9 MG/DL (ref 8.6–10.5)
CHLORIDE SERPL-SCNC: 98 MMOL/L (ref 98–107)
CO2 SERPL-SCNC: 24.7 MMOL/L (ref 22–29)
CREAT SERPL-MCNC: 1.07 MG/DL (ref 0.76–1.27)
DEPRECATED RDW RBC AUTO: 42.7 FL (ref 37–54)
EGFRCR SERPLBLD CKD-EPI 2021: 76.5 ML/MIN/1.73
ERYTHROCYTE [DISTWIDTH] IN BLOOD BY AUTOMATED COUNT: 13.3 % (ref 12.3–15.4)
GLUCOSE BLDC GLUCOMTR-MCNC: 128 MG/DL (ref 70–99)
GLUCOSE BLDC GLUCOMTR-MCNC: 147 MG/DL (ref 70–99)
GLUCOSE SERPL-MCNC: 110 MG/DL (ref 65–99)
HCT VFR BLD AUTO: 39.5 % (ref 37.5–51)
HGB BLD-MCNC: 13 G/DL (ref 13–17.7)
MAGNESIUM SERPL-MCNC: 1.8 MG/DL (ref 1.6–2.4)
MCH RBC QN AUTO: 29.1 PG (ref 26.6–33)
MCHC RBC AUTO-ENTMCNC: 32.9 G/DL (ref 31.5–35.7)
MCV RBC AUTO: 88.6 FL (ref 79–97)
PLATELET # BLD AUTO: 253 10*3/MM3 (ref 140–450)
PMV BLD AUTO: 9.2 FL (ref 6–12)
POTASSIUM SERPL-SCNC: 3.6 MMOL/L (ref 3.5–5.2)
RBC # BLD AUTO: 4.46 10*6/MM3 (ref 4.14–5.8)
SODIUM SERPL-SCNC: 133 MMOL/L (ref 136–145)
WBC NRBC COR # BLD AUTO: 11.02 10*3/MM3 (ref 3.4–10.8)

## 2025-04-30 PROCEDURE — 80048 BASIC METABOLIC PNL TOTAL CA: CPT | Performed by: INTERNAL MEDICINE

## 2025-04-30 PROCEDURE — 82948 REAGENT STRIP/BLOOD GLUCOSE: CPT | Performed by: INTERNAL MEDICINE

## 2025-04-30 PROCEDURE — 83735 ASSAY OF MAGNESIUM: CPT | Performed by: INTERNAL MEDICINE

## 2025-04-30 PROCEDURE — 97530 THERAPEUTIC ACTIVITIES: CPT

## 2025-04-30 PROCEDURE — 99239 HOSP IP/OBS DSCHRG MGMT >30: CPT | Performed by: INTERNAL MEDICINE

## 2025-04-30 PROCEDURE — 97535 SELF CARE MNGMENT TRAINING: CPT

## 2025-04-30 PROCEDURE — 85027 COMPLETE CBC AUTOMATED: CPT | Performed by: INTERNAL MEDICINE

## 2025-04-30 RX ORDER — POLYETHYLENE GLYCOL 3350 17 G/17G
17 POWDER, FOR SOLUTION ORAL DAILY
Qty: 30 PACKET | Refills: 0 | Status: SHIPPED | OUTPATIENT
Start: 2025-05-01 | End: 2025-05-31

## 2025-04-30 RX ADMIN — ARIPIPRAZOLE 10 MG: 10 TABLET ORAL at 09:20

## 2025-04-30 RX ADMIN — ASPIRIN 81 MG: 81 TABLET, COATED ORAL at 08:55

## 2025-04-30 RX ADMIN — LEVOTHYROXINE SODIUM 50 MCG: 0.05 TABLET ORAL at 05:29

## 2025-04-30 RX ADMIN — METOPROLOL SUCCINATE 25 MG: 25 TABLET, EXTENDED RELEASE ORAL at 08:55

## 2025-04-30 RX ADMIN — APIXABAN 5 MG: 5 TABLET, FILM COATED ORAL at 08:55

## 2025-04-30 RX ADMIN — FAMOTIDINE 20 MG: 20 TABLET, FILM COATED ORAL at 08:55

## 2025-04-30 RX ADMIN — Medication 10 ML: at 08:57

## 2025-04-30 NOTE — DISCHARGE SUMMARY
[facility]         HOSPITALIST  DISCHARGE SUMMARY    Patient Name: Alberto Rachel  : 1960  MRN: [id number]    Date of Admission: 4/15/2025  Date of Discharge:  2025  Primary Care Physician: Magnus Euceda DO    Consults:  Pulmonary critical care  Nephrology  Cardiology  Podiatry  General Surgery  Gastroenterology    Active and Resolved Hospital Problems:  Acute life-threatening hyponatremia, hypoosmolar  High-grade small bowel obstruction with question ileus vs [place]  Acute metabolic encephalopathy  ESVIN, question prerenal/dehydration related, on CKD3 b/l near ~1.4 to 1.5 most recently  Rhabdomyolysis  AGMA   New paroxysmal Aflutter  CAD with history of CABG, with elevated troponins likely type II MI secondary to hyponatremia  Asthma mild intermittent  DM2  Depression/Anxiety  Hx of HTN with low normal BP here  Hypothyroidism  History of stroke and Carotid artery stenosis  History of duodenal ulcer  Poor health literacy/unable to read      Hospital Course     Hospital Course:  Alberto Rachel is a 64 y.o. male with history of anxiety, hypertension, CAD status post bypass, history of stroke, carotid artery stenosis, depression, diabetes, duodenal ulcer, hypertension, dyslipidemia, diabetes mellitus, hospitalized on 4/15/2025 chief complaint of altered mental status, multiple falls, found to have significant life-threatening hyponatremia, nephrology consulted, placed in the critical care unit with critical care consulted, given a round of hypertonic saline, placed on IV fluids several home medications held in the setting of severe hyponatremia including chlorthalidone, Bumex, Abilify, Celexa among others. Cardiology consulted for elevated troponins. Planning nuclear stress test. Transferred out of ICU. Started developing abdominal distention despite passing gas and having bowel movements, x-ray showing small bowel obstruction pattern, CT imaging obtained, showing gastric, small and  large bowel distention concerning for ileus versus versus bowel spasm and peristalsis, could have [place]'s, NG tube placed to low intermittent suction, general surgery consulted.  Symptoms resolved and resumed on diet with no issues. Hyponatremia improving with discontinuing chlorthalidone and reduction of home psychiatric medication regimen.  Patient discharging from hospital to SNF for ongoing strengthening before returning home.  Patient will need to follow-up with cardiology closely as an outpatient.  Recommend intermittent following of labs to monitor sodium.  Patient seen on date of discharge, clinically and hemodynamically stable.  Patient provided concerning signs and symptoms prompting immediate medical attention, patient understanding and agreeable     DISCHARGE Follow Up Recommendations for labs and diagnostics:   Follow-up primary care physician as soon as possible  Follow-up with cardiology  Follow-up with nephrology as indicated, recommend monitoring BMP intermittently flushing sodium      Day of Discharge     Vital Signs:  Temp:  [98.1 °F (36.7 °C)-98.4 °F (36.9 °C)] 98.4 °F (36.9 °C)  Heart Rate:  [66-78] 66  Resp:  [18] 18  BP: (102-136)/(43-70) 121/43  Physical Exam:   Awake, conversant, resting on edge of bed, no acute distress  CTAB no wheezing, breathing comfortable on room air  Obese abdomen, +BS, soft, nondistended    Discharge Details        Discharge Medications        PAUSE taking these medications        Instructions Start Date   bumetanide 1 MG tablet  Wait to take this until your doctor or other care provider tells you to start again.  Commonly known as: BUMEX   TAKE 1 TABLET BY MOUTH ONCE DAILY FOR FLUID      Jardiance 25 MG tablet tablet  Wait to take this until your doctor or other care provider tells you to start again.  Generic drug: empagliflozin   TAKE 1 TABLET BY MOUTH EVERY MORNING TO LOWER BLOOD SUGAR             New Medications        Instructions Start Date   apixaban 5 MG  tablet tablet  Commonly known as: ELIQUIS   5 mg, Oral, Every 12 Hours Scheduled      ARIPiprazole 10 MG tablet  Commonly known as: ABILIFY   10 mg, Oral, Daily      metoprolol succinate XL 25 MG 24 hr tablet  Commonly known as: Toprol XL   25 mg, Oral, Daily      polyethylene glycol 17 g packet  Commonly known as: MIRALAX   17 g, Oral, Daily   Start Date: May 1, 2025            Changes to Medications        Instructions Start Date   atorvastatin 80 MG tablet  Commonly known as: LIPITOR  What changed:   when to take this  additional instructions   80 mg, Oral, Daily, for cholesterol      famotidine 40 MG tablet  Commonly known as: PEPCID  What changed: See the new instructions.   TAKE 1 TABLET BY MOUTH TWICE DAILY FOR STOMACH      metFORMIN  MG 24 hr tablet  Commonly known as: GLUCOPHAGE-XR  What changed:   how much to take  when to take this   500 mg, Oral, Daily With Breakfast             Continue These Medications        Instructions Start Date   albuterol sulfate  (90 Base) MCG/ACT inhaler  Commonly known as: PROVENTIL HFA;VENTOLIN HFA;PROAIR HFA   2 puffs, Inhalation, Every 4 Hours PRN      Aspirin Low Dose 81 MG EC tablet  Generic drug: aspirin   81 mg, Oral, Daily      fenofibrate 145 MG tablet  Commonly known as: TRICOR   145 mg, Oral, Daily, for cholesterol      GNP Vitamin B-12 500 MCG tablet  Generic drug: cyanocobalamin   TAKE 1 TABLET BY MOUTH ONCE DAILY      levothyroxine 50 MCG tablet  Commonly known as: SYNTHROID, LEVOTHROID   TAKE 1 TABLET BY MOUTH EVERY MORNING FOR STOMACH      nitroglycerin 0.4 MG SL tablet  Commonly known as: NITROSTAT   0.4 mg, Sublingual, Every 5 Minutes PRN, Take no more than 3 doses in 15 minutes.      ondansetron 8 MG tablet  Commonly known as: Zofran   8 mg, Oral, Every 8 Hours PRN             Stop These Medications      chlorthalidone 25 MG tablet  Commonly known as: HYGROTON     citalopram 20 MG tablet  Commonly known as: CeleXA      guaifenesin-dextromethorphan 600-30 mg  MG tablet sustained-release 12 hour     pioglitazone 15 MG tablet  Commonly known as: ACTOS     Rybelsus 14 MG tablet  Generic drug: Semaglutide     telmisartan 20 MG tablet  Commonly known as: MICARDIS     traZODone 100 MG tablet  Commonly known as: DESYREL              Allergies   Allergen Reactions    Cyclobenzaprine Unknown - Low Severity     weakness       Discharge Disposition:  Skilled Nursing Facility (DC - External)    Diet:  Hospital:  Diet Order   Procedures    Diet: Gastrointestinal; Low Irritant; Texture: Mechanical Ground (NDD 2); Fluid Consistency: Thin (IDDSI 0)       Discharge Activity:       CODE STATUS:  Code Status and Medical Interventions: CPR (Attempt to Resuscitate); Full Support   Ordered at: 04/15/25 1554     Code Status (Patient has no pulse and is not breathing):    CPR (Attempt to Resuscitate)     Medical Interventions (Patient has pulse or is breathing):    Full Support         Future Appointments   Date Time Provider Department Center   5/6/2025  9:00 AM Magnus Euceda DO [facility]       Additional Instructions for the Follow-ups that You Need to Schedule       Discharge Follow-up with PCP   As directed       Currently Documented PCP:    Magnus Euceda DO    PCP Phone Number:    [phone]     Follow Up Details: 3-5 days hospital f/u        Discharge Follow-up with PCP   As directed       Currently Documented PCP:    Magnus Euceda DO    PCP Phone Number:    [phone]     Follow Up Details: In less than one week       Additional information on Labs and Follow-ups:      Follow up with Dr. Magnus Euceda on Tuesday, May 6th at 9:00 am.    Dr. Lion's office ([facility]) will call you to schedule a follow up appointment.    Follow up with Dr. St (Cardiology) on  Wednesday, May 14th at 11:30 am.           Pertinent  and/or Most Recent Results     LAB RESULTS:      Lab 04/30/25  0522 04/24/25  0255   WBC 11.02* 9.66    HEMOGLOBIN 13.0 11.7*   HEMATOCRIT 39.5 34.8*   PLATELETS 253 295   NEUTROS ABS  --  6.40   IMMATURE GRANS (ABS)  --  0.27*   LYMPHS ABS  --  1.48   MONOS ABS  --  1.30*   EOS ABS  --  0.15   MCV 88.6 87.4         Lab 04/30/25  0522 04/28/25  0520 04/27/25  0547 04/25/25  0524 04/24/25  0255   SODIUM 133* 133* 130* 134* 130*   POTASSIUM 3.6 3.8 3.9 3.9 3.7   CHLORIDE 98 98 96* 97* 99   CO2 24.7 24.5 21.4* 24.1 20.0*   ANION GAP 10.3 10.5 12.6 12.9 11.0   BUN 11 15 15 21 35*   CREATININE 1.07 1.07 1.07 0.98 1.17   EGFR 76.5 76.5 76.5 85.0 68.8   GLUCOSE 110* 140* 135* 102* 79   CALCIUM 8.9 8.7 8.9 9.1 8.7   MAGNESIUM 1.8  --   --  1.8 1.9   PHOSPHORUS  --   --   --  2.9 2.4*             Lab 04/28/25  0520   PROBNP 438.3                 Brief Urine Lab Results  (Last result in the past 365 days)        Color   Clarity   Blood   Leuk Est   Nitrite   Protein   CREAT   Urine HCG        04/22/25 1826 Yellow   Clear   Small (1+)   Trace   Negative   Trace                 Microbiology Results (last 10 days)       Procedure Component Value - Date/Time    Eosinophil Smear - Urine, Urine, Clean Catch [[id number]]  (Normal) Collected: 04/21/25 1543    Lab Status: Final result Specimen: Urine, Clean Catch Updated: 04/22/25 0028     Eosinophil Smear 0 % EOS/100 Cells             XR Abdomen KUB  Result Date: 4/23/2025  Impression: Impression: Persistent dilated loops of small bowel in the right abdomen suggesting partial small bowel obstruction versus adynamic ileus. Electronically Signed: Heriberto Casillas MD  4/23/2025 11:42 AM EDT  Workstation ID: [id number]    XR Chest 1 View  Result Date: 4/23/2025  Impression: Impression: 1.Mild improvement in aeration in the left lung base. Mild residual atelectasis versus infiltrate is seen. There may be a small residual left pleural effusion. 2.The nasogastric tube is positioned in the stomach. 3.Mild atelectasis versus infiltrate at the lateral aspect of the right midlung. Electronically  Signed: Emory Quintana MD  4/23/2025 11:32 AM EDT  Workstation ID: [id number]    XR Abdomen KUB  Result Date: 4/21/2025  Impression: Impression: 1.Esophagogastric tube with tip in the proximal to mid stomach. 2.Diffuse gaseous distention and dilatation of small bowel loops suggesting small bowel obstruction. Electronically Signed: Heriberto Casillas MD  4/21/2025 4:23 PM EDT  Workstation ID: [id number]    XR Abdomen KUB  Result Date: 4/21/2025  Impression: Impression: High-grade partial small bowel obstruction. Electronically Signed: Dino Dailey MD  4/21/2025 7:44 AM EDT  Workstation ID: [id number]    XR Abdomen KUB  Result Date: 4/20/2025  Impression: Impression: Stable intestinal distention Electronically Signed: Gian Marcus  4/20/2025 2:30 PM EDT  Workstation ID: [id number]    XR Abdomen KUB  Result Date: 4/20/2025  Impression: Impression: NG/OG tube courses into the stomach with the tip in the region of the gastric fundus. Bowel gas pattern suggestive of ileus or bowel obstruction. Electronically Signed: Abner Dixon MD  4/20/2025 10:03 AM EDT  Workstation ID: [id number]    XR Abdomen KUB  Result Date: 4/20/2025  Impression: Gas-distended bowel persists. The findings suggest a generalized adynamic ileus.   Portions of this note were completed with a voice recognition program.  4/20/2025 5:57 AM by Donta Mera MD on Workstation: [email or web address]      CT Abdomen Pelvis Without Contrast  Result Date: 4/19/2025  Impression: 1.  Distended stomach, small bowel, and colon are identified. There is a transition zone at about the level of the splenic flexure of colon. These findings are thought most likely to represent a generalized adynamic ileus with focal spasm at the level of the splenic flexure of colon. 2.  There is residual oral contrast agent present, likely related to the recent modified barium swallow (from 4/16/2025) within the descending colon and rectosigmoid colon, which is against a complete mechanical bowel obstruction.  3.  No acute appendicitis or diverticulitis is seen. No pneumoperitoneum. No pneumatosis or portal or mesenteric venous gas. No significant stool burden is suggested. 4.  The study is limited, especially due to motion artifact. 5.  Consider close interval clinical, lab, and if necessary, imaging follow-up to ensure benign progression. 6.  No other definite significant acute findings are appreciated. 7.  Please see above comments for further detail.    Portions of this note were completed with a voice recognition program.  4/19/2025 11:08 PM by Donta Mera MD on Workstation: [email or web address]      XR Abdomen KUB  Result Date: 4/19/2025  Impression: Impression: Dilated small bowel loops that could relate to a small bowel obstruction. CT would be suggested to reassess. Electronically Signed: Harvey Issa MD  4/19/2025 2:56 PM EDT  Workstation ID: [id number]    FL Video Swallow With Speech Single Contrast  Result Date: 4/16/2025  Impression: Impression: 1. No tracheal aspiration or laryngeal penetration observed Please consult speech pathology report for further details regarding the exam and for dietary recommendations. Report dictated by: Racquel Perdue  I have personally reviewed this case and agree with the findings above: Electronically Signed: Taco Kennedy MD  4/16/2025 4:20 PM EDT  Workstation ID: [id number]    CT Head Without Contrast  Result Date: 4/15/2025  Impression: Impression: 1.No acute intracranial abnormality identified by noncontrast CT. 2.Mild parenchymal volume loss and probable chronic small vessel ischemic change. Electronically Signed: Heriberto Casillas MD  4/15/2025 1:48 PM EDT  Workstation ID: [id number]    XR Chest 1 View  Result Date: 4/15/2025  Impression: Impression: Suspected mild left basilar atelectasis. Electronically Signed: Theresa Mancilla MD  4/15/2025 1:00 PM EDT  Workstation ID: [id number]              Results for orders placed during the hospital encounter of 04/15/25    Adult Transthoracic Echo  Complete W/ Cont if Necessary Per Protocol    Interpretation Summary    Left ventricular systolic function is normal. Calculated left ventricular EF = 57.9%    Left ventricular diastolic function is consistent with (grade I) impaired relaxation.    There is mild calcification of the aortic valve.      Labs Pending at Discharge:        Time spent on Discharge including face to face service:  38 minutes    Electronically signed by Ozzy Mcdermott MD, 04/30/25, 11:34 AM EDT.

## 2025-04-30 NOTE — THERAPY TREATMENT NOTE
Patient Name: Alberto Rachel  : 1960    MRN: [id number]                              Today's Date: 2025       Admit Date: 4/15/2025    Visit Dx:     ICD-10-CM ICD-9-CM   1. Hyponatremia  E87.1 276.1   2. Non-traumatic rhabdomyolysis  M62.82 728.88   3. ESVIN (acute kidney injury)  N17.9 584.9   4. Oropharyngeal dysphagia  R13.12 787.22   5. Decreased activities of daily living (ADL)  Z78.9 V49.89   6. Difficulty walking  R26.2 719.7   7. Stage 3a chronic kidney disease (CKD)  N18.31 585.3   8. Paroxysmal atrial fibrillation  I48.0 427.31     Patient Active Problem List   Diagnosis    Anxiety    Atherosclerosis of coronary artery bypass graft of native heart without angina pectoris    Essential hypertension    Depression    Mixed hyperlipidemia    Type 2 diabetes mellitus with hyperglycemia, without long-term current use of insulin    Hypothyroidism    Arthritis of knee    Illiteracy    Cramps, extremity    Tachycardia    Obesity (BMI 30-39.9)    Degenerative disc disease, lumbar    Primary insomnia    Gastroesophageal reflux disease without esophagitis    Close exposure to COVID-19 virus    Mild intermittent asthma    Candidal dermatitis    Bronchitis    Bilateral shoulder pain    Hyperkalemia    Stage 3a chronic kidney disease (CKD)    Hyponatremia    Onychomycosis    Onychocryptosis    PVD (peripheral vascular disease)    Foot pain, bilateral     Past Medical History:   Diagnosis Date    Anxiety     Arthritis     Atherosclerosis of coronary artery bypass graft of native heart without angina pectoris 10/23/2018    Coronary artery disease with previous bypass, LIMA to the LAD, SVG to ramus, and SVG to RCA in     CAD (coronary artery disease) 10/23/2018    Carotid artery stenosis with cerebral infarction 2015    Dr. St     Cramps, extremity 9/10/2021    Depression     DM2 (diabetes mellitus, type 2) 10/23/2018    Duodenal ulcer     Essential hypertension 10/23/2018    Headache     Heart  attack 2015    Heartburn     HLD (hyperlipidemia)     HTN (hypertension)     Injury of right leg 8/13/2021    Irregular heart beat     Low back pain 03/18/2019    Mild episode of recurrent depressive disorder 10/23/2018    Rash 03/18/2019    Shortness of breath     SOB (shortness of breath)     Type 2 diabetes mellitus with hyperglycemia, without long-term current use of insulin 06/11/2019    Uncontrolled diabetes mellitus 03/18/2019     Past Surgical History:   Procedure Laterality Date    CARDIAC CATHETERIZATION  06/2015    Dr. St     CIRCUMCISION      age 14 yrs old    CORONARY ARTERY BYPASS GRAFT  06/2015    5 bypasses      General Information       Row Name 04/30/25 1252          OT Time and Intention    Document Type therapy note (daily note)  -PG     Mode of Treatment individual therapy;occupational therapy  -PG               User Key  (r) = Recorded By, (t) = Taken By, (c) = Cosigned By      Initials Name Provider Type    PG Ace Daugherty OT Occupational Therapist                     Mobility/ADL's       Row Name 04/30/25 1252          Transfers    Transfers bed-chair transfer  -PG     Comment, (Transfers) Walked 200 ft CG rwx  -PG       Row Name 04/30/25 1252          Grooming Assessment/Training    [place] Level (Grooming) grooming skills;oral care regimen;contact guard assist  -PG               User Key  (r) = Recorded By, (t) = Taken By, (c) = Cosigned By      Initials Name Provider Type    PG Ace Daugherty OT Occupational Therapist                   Obj/Interventions    No documentation.                  Goals/Plan    No documentation.                  Clinical Impression       Row Name 04/30/25 1251          Plan of Care Review    Progress no change  -PG               User Key  (r) = Recorded By, (t) = Taken By, (c) = Cosigned By      Initials Name Provider Type    PG Ace Daugherty OT Occupational Therapist                   Outcome Measures       Row Name 04/30/25 1253          How much help  from another is currently needed...    Putting on and taking off regular lower body clothing? 2  -PG     Bathing (including washing, rinsing, and drying) 2  -PG     Toileting (which includes using toilet bed pan or urinal) 3  -PG     Putting on and taking off regular upper body clothing 3  -PG     Taking care of personal grooming (such as brushing teeth) 4  -PG     Eating meals 4  -PG     AM-PAC 6 Clicks Score (OT) 18  -PG       Row Name 04/30/25 0724          How much help from another person do you currently need...    Turning from your back to your side while in flat bed without using bedrails? 3  -SS     Moving from lying on back to sitting on the side of a flat bed without bedrails? 3  -SS     Moving to and from a bed to a chair (including a wheelchair)? 2  -SS     Standing up from a chair using your arms (e.g., wheelchair, bedside chair)? 2  -SS     Climbing 3-5 steps with a railing? 1  -SS     To walk in hospital room? 2  -SS     AM-PAC 6 Clicks Score (PT) 13  -SS     Highest Level of Mobility Goal 4 --> Transfer to chair/commode  -SS       Row Name 04/30/25 1253          Functional Assessment    Outcome Measure Options AM-PAC 6 Clicks Daily Activity (OT);Optimal Instrument  -PG       Row Name 04/30/25 1253          Optimal Instrument    Optimal Instrument Optimal - 3  -PG     Bending/Stooping 3  -PG     Standing 2  -PG     Reaching 1  -PG               User Key  (r) = Recorded By, (t) = Taken By, (c) = Cosigned By      Initials Name Provider Type    PG Ace Daugherty OT Occupational Therapist    SS Terese Leyva, RN Registered Nurse                      OT Recommendation and Plan     Plan of Care Review  Progress: no change     Time Calculation:   Evaluation Complexity (OT)  Review Occupational Profile/Medical/Therapy History Complexity: brief/low complexity  Assessment, Occupational Performance/Identification of Deficit Complexity: 3-5 performance deficits  Clinical Decision Making Complexity (OT):  problem focused assessment/low complexity  Overall Complexity of Evaluation (OT): low complexity     Time Calculation- OT       Row Name 04/30/25 1253             Time Calculation- OT    OT Received On 04/30/25  -PG      OT Goal Re-Cert Due Date 05/07/25  -PG         Timed Charges    77865 - OT Therapeutic Activity Minutes 10  -PG      81422 - OT Self Care/Mgmt Minutes 13  -PG         Total Minutes    Timed Charges Total Minutes 23  -PG       Total Minutes 23  -PG                User Key  (r) = Recorded By, (t) = Taken By, (c) = Cosigned By      Initials Name Provider Type    PG Ace Daugherty OT Occupational Therapist                  Therapy Charges for Today       Code Description Service Date Service Provider Modifiers Qty    41641938709 HC OT THER PROC EA 15 MIN 4/29/2025 Ace Daugherty OT GO 1    04128874230 HC OT THERAPEUTIC ACT EA 15 MIN 4/30/2025 Ace Daugherty OT GO 1    16502670822 HC OT SELF CARE/MGMT/TRAIN EA 15 MIN 4/30/2025 Ace Daugherty OT GO 1                 Ace Daugherty OT  4/30/2025

## 2025-04-30 NOTE — PROGRESS NOTES
[facility] Progress Note      Patient Name: Alberto Rachel  : 1960  MRN: [id number]  Primary Care Physician:  Magnus Euceda DO  Date of admission: 4/15/2025    Subjective     Interval History:     Patient offers no complaint  Lying in bed  Decreased appetite around 50% of his tray  No abdominal pain  No chest pain or palpitations    Urinating spontaneously    Requiring assistance to ambulate    Awaiting placement      Review of Systems:   As noted above    Objective     Vitals:   Temp:  [98.1 °F (36.7 °C)-98.5 °F (36.9 °C)] 98.4 °F (36.9 °C)  Heart Rate:  [66-78] 66  Resp:  [18] 18  BP: (102-136)/(43-70) 121/43    Intake/Output Summary (Last 24 hours) at 2025 0802  Last data filed at 2025 0049  Gross per 24 hour   Intake 240 ml   Output 1500 ml   Net -1260 ml       Physical Exam:    General Appearance: Chronically ill and deconditioned bilateral temporal wasting  Skin: warm and dry  HEENT: oral mucosa normal, nonicteric sclera  Neck: supple, no JVD  Lungs: CTA  Heart: RRR, normal S1 and S2  Abdomen: soft, nontender, nondistended  : no palpable bladder  Extremities: no edema, cyanosis or clubbing  Neuro:  alert, no focalization    Scheduled Meds:     ammonium lactate, , Topical, 2 times per day  apixaban, 5 mg, Oral, Q12H  ARIPiprazole, 10 mg, Oral, Daily  aspirin, 81 mg, Oral, Daily  atorvastatin, 80 mg, Oral, Nightly  [Held by provider] bumetanide, 1 mg, Oral, Daily  [Held by provider] citalopram, 20 mg, Oral, Daily  famotidine, 20 mg, Oral, BID AC  insulin regular, 2-7 Units, Subcutaneous, 4x Daily AC & at Bedtime  levothyroxine, 50 mcg, Oral, Q AM  metoprolol succinate XL, 25 mg, Oral, Q24H  nicotine, 1 patch, Transdermal, Q24H  polyethylene glycol, 17 g, Oral, Daily  senna-docusate sodium, 1 tablet, Oral, BID  sodium chloride, 10 mL, Intravenous, Q12H  [Held by provider] traZODone, 100 mg, Oral, Nightly      IV Meds:        Results Reviewed:   I have  personally reviewed the results from the time of this admission to 4/30/2025 08:02 EDT     Results from last 7 days   Lab Units 04/30/25  0522 04/28/25  0520 04/27/25  0547   SODIUM mmol/L 133* 133* 130*   POTASSIUM mmol/L 3.6 3.8 3.9   CHLORIDE mmol/L 98 98 96*   CO2 mmol/L 24.7 24.5 21.4*   BUN mg/dL 11 15 15   CREATININE mg/dL 1.07 1.07 1.07   CALCIUM mg/dL 8.9 8.7 8.9   GLUCOSE mg/dL 110* 140* 135*       Estimated Creatinine Clearance: 81.1 mL/min (by C-G formula based on SCr of 1.07 mg/dL).    Results from last 7 days   Lab Units 04/30/25  0522 04/25/25  0524 04/24/25  0255   MAGNESIUM mg/dL 1.8 1.8 1.9   PHOSPHORUS mg/dL  --  2.9 2.4*             Results from last 7 days   Lab Units 04/30/25  0522 04/24/25  0255   WBC 10*3/mm3 11.02* 9.66   HEMOGLOBIN g/dL 13.0 11.7*   PLATELETS 10*3/mm3 253 295             Assessment / Plan     ASSESSMENT:    - Hypovolemic hyponatremia, possibly secondary to chlorthalidone, urine osmolality 271.  TSH okay.  No signs or symptoms of adrenal insufficiency.  Sodium in stable range, off trazodone Avoid thiazides moving forward.     -- ESVIN, prerenal secondary to volume depletion on the setting of abdominal pain and decreased oral intake improved with IV fluid challenge Stable now with good urine output.  Monitor.     - Chronic kidney disease stage III, baseline creatinine lately around 1.4-1.5.  Has bland UA and minimal proteinuria from 2 years ago.  Follow-up in the office eventually.     -Diabetes mellitus type 2, as per primary team management     - High-grade small bowel obstruction CT abdomen followed closely by general surgery on medical management    - A flutter with tachycardia.    -- Coronary disease status post coronary bypass grafting follow closely with primary team       PLAN:  Sodium and renal function remained stable  From renal point of view patient can be discharged closely as an outpatient  Patient awaiting placement  Continue surveillance labs    Discussed with  nursing staff at bedside    Thank you for involving us in the care of Alberto Rachel.  Please feel free to call with any questions.    Tam Astorga MD  04/30/25  08:02 EDT    [facility]  [phone]    Please note that portions of this note were completed with a voice recognition program.

## 2025-04-30 NOTE — PLAN OF CARE
Goal Outcome Evaluation:              Outcome Evaluation: Patient vital signs stable on room air. Patient blood sugars controlled with SSI. Pt. has had no complaints this shift. Continue plan of care.

## 2025-05-05 LAB
QT INTERVAL: 338 MS
QTC INTERVAL: 429 MS

## 2025-05-12 ENCOUNTER — TELEPHONE (OUTPATIENT)
Dept: FAMILY MEDICINE CLINIC | Facility: CLINIC | Age: 65
End: 2025-05-12

## 2025-05-12 NOTE — TELEPHONE ENCOUNTER
Caller: Saúl Rachel (Son)    Relationship to patient: Emergency Contact    Best call back number: [phone]     New or established patient?  [] New  [x] Established    Date of discharge: DISCHARGED ON  5.2.2025    Facility discharged from:     [facility]    Diagnosis/Symptoms: LOW SODIUM    Length of stay (If applicable): 12 DAYS     Additional Details:     PER CALLER PATIENT MISSED APPOINTMENT ON 5.6.2024 DUE TO   PATIENT ADMITTED TO [facility] \[facility].\"" IN [place] ON 5.2.2025 AFTER BEING DISCHARGED FROM [facility] FOR REHAB.    CALLER WILL MAKE SURE THEY CONTACT DR. MINOR TO SCHEDULE FOLLOW UP AFTER DISCHARGE FROM [facility] (EXPECTED THIS WEEK). PER CALLER PATIENT WILL BE MEETING WITH HOME HEALTH TO SET UP ALSO.

## 2025-05-15 DIAGNOSIS — E78.2 MIXED HYPERLIPIDEMIA: ICD-10-CM

## 2025-05-15 DIAGNOSIS — E03.9 HYPOTHYROIDISM, UNSPECIFIED TYPE: Chronic | ICD-10-CM

## 2025-05-15 DIAGNOSIS — F51.01 PRIMARY INSOMNIA: Chronic | ICD-10-CM

## 2025-05-15 DIAGNOSIS — K21.9 GASTROESOPHAGEAL REFLUX DISEASE WITHOUT ESOPHAGITIS: ICD-10-CM

## 2025-05-15 NOTE — TELEPHONE ENCOUNTER
LOV: 08/08/2024    Pt currently admitted to [facility] for rehab. Will schedule f/u appt with PCP once d/c'd.

## 2025-05-16 RX ORDER — ASPIRIN 81 MG/1
81 TABLET, COATED ORAL DAILY
Qty: 30 TABLET | Refills: 0 | Status: SHIPPED | OUTPATIENT
Start: 2025-05-16

## 2025-05-16 RX ORDER — CITALOPRAM HYDROBROMIDE 20 MG/1
20 TABLET ORAL DAILY
Qty: 90 TABLET | Refills: 0 | Status: SHIPPED | OUTPATIENT
Start: 2025-05-16

## 2025-05-16 RX ORDER — LEVOTHYROXINE SODIUM 50 UG/1
50 TABLET ORAL
Qty: 90 TABLET | Refills: 0 | Status: SHIPPED | OUTPATIENT
Start: 2025-05-16

## 2025-05-16 RX ORDER — TRAZODONE HYDROCHLORIDE 100 MG/1
100 TABLET ORAL NIGHTLY
Qty: 30 TABLET | Refills: 0 | Status: SHIPPED | OUTPATIENT
Start: 2025-05-16

## 2025-05-16 RX ORDER — METFORMIN HYDROCHLORIDE 500 MG/1
1000 TABLET, EXTENDED RELEASE ORAL 2 TIMES DAILY
Qty: 120 TABLET | Refills: 0 | Status: SHIPPED | OUTPATIENT
Start: 2025-05-16

## 2025-05-16 RX ORDER — ATORVASTATIN CALCIUM 80 MG/1
80 TABLET, FILM COATED ORAL DAILY
Qty: 30 TABLET | Refills: 0 | Status: SHIPPED | OUTPATIENT
Start: 2025-05-16

## 2025-05-16 RX ORDER — CHLORTHALIDONE 25 MG/1
25 TABLET ORAL DAILY
Qty: 30 TABLET | Refills: 3 | Status: SHIPPED | OUTPATIENT
Start: 2025-05-16

## 2025-05-16 RX ORDER — DM/PE/ACETAMINOPHEN/CHLORPHENR 10-5-325-2
500 TABLET, SEQUENTIAL ORAL DAILY
Qty: 90 TABLET | Refills: 0 | Status: SHIPPED | OUTPATIENT
Start: 2025-05-16

## 2025-05-16 RX ORDER — BUMETANIDE 1 MG/1
1 TABLET ORAL DAILY PRN
Qty: 90 TABLET | Refills: 0 | Status: SHIPPED | OUTPATIENT
Start: 2025-05-16

## 2025-05-16 RX ORDER — FAMOTIDINE 40 MG/1
TABLET, FILM COATED ORAL
Qty: 60 TABLET | Refills: 0 | Status: SHIPPED | OUTPATIENT
Start: 2025-05-16

## 2025-05-16 RX ORDER — ARIPIPRAZOLE 20 MG/1
20 TABLET ORAL DAILY
Qty: 30 TABLET | Refills: 0 | Status: SHIPPED | OUTPATIENT
Start: 2025-05-16

## 2025-05-19 ENCOUNTER — TELEPHONE (OUTPATIENT)
Dept: FAMILY MEDICINE CLINIC | Facility: CLINIC | Age: 65
End: 2025-05-19
Payer: MEDICARE

## 2025-05-19 NOTE — TELEPHONE ENCOUNTER
Caller: Ashley  Relationship: [facility]   Best call back number:   Who are you requesting to speak with (clinical staff, provider, specific staff member):    MA     What was the call regarding:    Ashley from [facility] states she wants to do a PT start with [facility] for our patient as he is leaving the rehab center. She sent an epic message to Dr. Euceda and is sending a fax now, she would like to get out there today if possible.    Please advise, thank you.     [facility] also called for hospital follow up after discharge from [place] last Friday. Patient will be seen in our office on 5/29.

## 2025-05-28 ENCOUNTER — OFFICE VISIT (OUTPATIENT)
Dept: FAMILY MEDICINE CLINIC | Facility: CLINIC | Age: 65
End: 2025-05-28
Payer: MEDICARE

## 2025-05-28 VITALS
HEART RATE: 83 BPM | OXYGEN SATURATION: 99 % | RESPIRATION RATE: 16 BRPM | SYSTOLIC BLOOD PRESSURE: 129 MMHG | TEMPERATURE: 97.8 F | BODY MASS INDEX: 26.01 KG/M2 | WEIGHT: 185.8 LBS | HEIGHT: 71 IN | DIASTOLIC BLOOD PRESSURE: 67 MMHG

## 2025-05-28 DIAGNOSIS — E11.65 TYPE 2 DIABETES MELLITUS WITH HYPERGLYCEMIA, WITHOUT LONG-TERM CURRENT USE OF INSULIN: Primary | ICD-10-CM

## 2025-05-28 DIAGNOSIS — F51.01 PRIMARY INSOMNIA: ICD-10-CM

## 2025-05-28 DIAGNOSIS — E03.9 HYPOTHYROIDISM, UNSPECIFIED TYPE: ICD-10-CM

## 2025-05-28 DIAGNOSIS — I10 ESSENTIAL HYPERTENSION: ICD-10-CM

## 2025-05-28 DIAGNOSIS — E78.2 MIXED HYPERLIPIDEMIA: ICD-10-CM

## 2025-05-28 LAB
ALBUMIN UR-MCNC: 2.5 MG/DL
CREAT UR-MCNC: 156 MG/DL
EXPIRATION DATE: ABNORMAL
HBA1C MFR BLD: 6.4 % (ref 4.5–5.7)
Lab: ABNORMAL
MICROALBUMIN/CREAT UR: 16 MG/G (ref 0–29)

## 2025-05-28 PROCEDURE — 82570 ASSAY OF URINE CREATININE: CPT | Performed by: FAMILY MEDICINE

## 2025-05-28 PROCEDURE — 82043 UR ALBUMIN QUANTITATIVE: CPT | Performed by: FAMILY MEDICINE

## 2025-05-28 RX ORDER — METOPROLOL SUCCINATE 25 MG/1
TABLET, EXTENDED RELEASE ORAL
Qty: 30 TABLET | Refills: 0 | Status: SHIPPED | OUTPATIENT
Start: 2025-05-28

## 2025-05-28 RX ORDER — ORAL SEMAGLUTIDE 14 MG/1
TABLET ORAL
COMMUNITY
Start: 2025-05-15 | End: 2025-05-28

## 2025-05-28 RX ORDER — TELMISARTAN 20 MG/1
TABLET ORAL
COMMUNITY
Start: 2025-05-07 | End: 2025-05-28

## 2025-05-28 RX ORDER — LEVOCETIRIZINE DIHYDROCHLORIDE 5 MG/1
5 TABLET, FILM COATED ORAL EVERY EVENING
Qty: 30 TABLET | Refills: 2 | Status: SHIPPED | OUTPATIENT
Start: 2025-05-28

## 2025-05-28 RX ORDER — PIOGLITAZONE 15 MG/1
TABLET ORAL
COMMUNITY
Start: 2025-05-15 | End: 2025-05-28

## 2025-05-28 NOTE — PROGRESS NOTES
"Chief Complaint  Hospital Follow Up Visit (Dc'd 5/20/25 /Pt reports he is feeling better today. /Pt states he is having allergy issues, throat hoarseness. No soreness or difficulty swelling. )    Subjective          Alberto Rachel presents to [facility]  History of Present Illness    History of Present Illness  The patient presents for evaluation of diabetes and allergies, recently in the hospital for hyponatremia among other findings.     He has been experiencing issues with his glucometer, specifically a low battery, which has hindered his ability to monitor his blood glucose levels. He believes the device requires AA batteries.    He reports experiencing allergies and is seeking medication to manage this condition. Losing weight, BMI 25, reviewed medicines and update list in chart from discharge.       Objective   Vital Signs:   /67 (BP Location: Right arm, Patient Position: Sitting, Cuff Size: Adult)   Pulse 83   Temp 97.8 °F (36.6 °C)   Resp 16   Ht 180.3 cm (71\")   Wt 84.3 kg (185 lb 12.8 oz)   SpO2 99%   BMI 25.91 kg/m²       Physical Exam  Vitals reviewed.   Constitutional:       Appearance: Normal appearance. He is well-developed.   HENT:      Head: Normocephalic and atraumatic.      Right Ear: External ear normal.      Left Ear: External ear normal.      Nose: Congestion and rhinorrhea present.   Eyes:      Conjunctiva/sclera: Conjunctivae normal.      Pupils: Pupils are equal, round, and reactive to light.   Cardiovascular:      Rate and Rhythm: Normal rate.   Pulmonary:      Effort: Pulmonary effort is normal.      Breath sounds: Normal breath sounds.   Abdominal:      General: There is no distension.   Skin:     General: Skin is warm and dry.   Neurological:      Mental Status: He is alert and oriented to person, place, and time. Mental status is at baseline.   Psychiatric:         Mood and Affect: Mood and affect normal.         Behavior: Behavior " normal.         Thought Content: Thought content normal.         Judgment: Judgment normal.          Result Review :   The following data was reviewed by: Magnus Euceda DO on 05/28/2025:  Common labs          4/28/2025    05:20 4/30/2025    05:22 5/28/2025    15:45   Common Labs   Glucose 140  110     BUN 15  11     Creatinine 1.07  1.07     Sodium 133  133     Potassium 3.8  3.6     Chloride 98  98     Calcium 8.7  8.9     WBC  11.02     Hemoglobin  13.0     Hematocrit  39.5     Platelets  253     Hemoglobin A1C   6.4           Results  Labs   - Blood glucose test: Under 7                 Assessment and Plan    Diagnoses and all orders for this visit:    1. Type 2 diabetes mellitus with hyperglycemia, without long-term current use of insulin (Primary)  -     Microalbumin / Creatinine Urine Ratio - Urine, Clean Catch; Future  -     POC Glycosylated Hemoglobin (Hb A1C)  -     Microalbumin / Creatinine Urine Ratio - Urine, Clean Catch  -     CBC (No Diff); Future  -     Renal Function Panel; Future    2. Hypothyroidism, unspecified type  -     CBC (No Diff); Future  -     Renal Function Panel; Future    3. Primary insomnia  -     CBC (No Diff); Future  -     Renal Function Panel; Future    4. Essential hypertension    5. Mixed hyperlipidemia    Other orders  -     levocetirizine (XYZAL) 5 MG tablet; Take 1 tablet by mouth Every Evening.  Dispense: 30 tablet; Refill: 2        Assessment & Plan  1. Diabetes mellitus.  - Blood glucose levels are well-regulated, with a recent reading <7.  - Significant weight loss noted, which is beneficial for diabetes management.  - Laboratory tests will be conducted today to assess blood count and kidney function.  - AA batteries will be provided for the glucometer to ensure continued blood sugar monitoring. A comprehensive list of current medications will be printed for reference.    2. Allergies.  - Reports having allergies and requires medication.  - Allergy medication will be sent  to the pharmacy.    3. Hyponatremia  - recheck labs today    Follow-up  The patient will follow up in 2 to 3 weeks.          Follow Up   Return in about 3 weeks (around 6/18/2025), or if symptoms worsen or fail to improve, for Next scheduled follow up, Recheck, Labs before.    Patient was given instructions and counseling regarding his condition or for health maintenance advice. Please see specific information pulled into the AVS if appropriate.       Transcribed from ambient dictation for Magnus Euceda DO by Magnus Euceda DO.  05/28/25   15:51 EDT    Patient or patient representative verbalized consent for the use of Ambient Listening during the visit with  Magnus Euceda DO for chart documentation. 5/28/2025  15:54 EDT

## 2025-06-04 ENCOUNTER — TELEPHONE (OUTPATIENT)
Dept: FAMILY MEDICINE CLINIC | Facility: CLINIC | Age: 65
End: 2025-06-04

## 2025-06-04 NOTE — TELEPHONE ENCOUNTER
Caller: MALDONADO ABDALLA COORDINATOR- [facility]    Relationship:     Best call back number: [phone]     What is the best time to reach you: ANYTIME     Who are you requesting to speak with (clinical staff, provider,  specific staff member): CLINICAL     What was the call regarding: SHANDA IS CALLING RECOMMENDING A PEER TO PEER PHONE CALLING TO SCHEDULE   A TIME FRAME.

## 2025-06-12 DIAGNOSIS — E78.2 MIXED HYPERLIPIDEMIA: ICD-10-CM

## 2025-06-12 DIAGNOSIS — F51.01 PRIMARY INSOMNIA: Chronic | ICD-10-CM

## 2025-06-12 DIAGNOSIS — E11.65 TYPE 2 DIABETES MELLITUS WITH HYPERGLYCEMIA, WITHOUT LONG-TERM CURRENT USE OF INSULIN: Chronic | ICD-10-CM

## 2025-06-12 DIAGNOSIS — K21.9 GASTROESOPHAGEAL REFLUX DISEASE WITHOUT ESOPHAGITIS: ICD-10-CM

## 2025-06-12 RX ORDER — ATORVASTATIN CALCIUM 80 MG/1
80 TABLET, FILM COATED ORAL DAILY
Qty: 30 TABLET | Refills: 0 | Status: SHIPPED | OUTPATIENT
Start: 2025-06-12

## 2025-06-12 RX ORDER — ORAL SEMAGLUTIDE 14 MG/1
1 TABLET ORAL EVERY MORNING
Qty: 30 TABLET | Refills: 1 | Status: SHIPPED | OUTPATIENT
Start: 2025-06-12

## 2025-06-12 RX ORDER — TRAZODONE HYDROCHLORIDE 100 MG/1
100 TABLET ORAL NIGHTLY
Qty: 30 TABLET | Refills: 0 | Status: SHIPPED | OUTPATIENT
Start: 2025-06-12

## 2025-06-12 RX ORDER — EMPAGLIFLOZIN 25 MG/1
TABLET, FILM COATED ORAL
Qty: 30 TABLET | Refills: 1 | Status: SHIPPED | OUTPATIENT
Start: 2025-06-12

## 2025-06-12 RX ORDER — ASPIRIN 81 MG/1
81 TABLET, COATED ORAL DAILY
Qty: 30 TABLET | Refills: 0 | Status: SHIPPED | OUTPATIENT
Start: 2025-06-12

## 2025-06-12 RX ORDER — METFORMIN HYDROCHLORIDE 500 MG/1
1000 TABLET, EXTENDED RELEASE ORAL EVERY 12 HOURS SCHEDULED
Qty: 120 TABLET | Refills: 0 | Status: SHIPPED | OUTPATIENT
Start: 2025-06-12

## 2025-06-12 RX ORDER — FAMOTIDINE 40 MG/1
TABLET, FILM COATED ORAL
Qty: 60 TABLET | Refills: 0 | Status: SHIPPED | OUTPATIENT
Start: 2025-06-12

## 2025-06-13 ENCOUNTER — TELEPHONE (OUTPATIENT)
Dept: FAMILY MEDICINE CLINIC | Facility: CLINIC | Age: 65
End: 2025-06-13

## 2025-06-13 RX ORDER — POLYETHYLENE GLYCOL 3350 17 G/17G
17 POWDER, FOR SOLUTION ORAL DAILY PRN
COMMUNITY
Start: 2025-05-28

## 2025-06-13 RX ORDER — ONDANSETRON 8 MG/1
8 TABLET, ORALLY DISINTEGRATING ORAL EVERY 8 HOURS PRN
COMMUNITY
Start: 2025-05-28

## 2025-06-13 RX ORDER — CITALOPRAM HYDROBROMIDE 20 MG/1
20 TABLET ORAL DAILY
COMMUNITY
Start: 2025-06-12

## 2025-06-13 RX ORDER — PIOGLITAZONE 15 MG/1
15 TABLET ORAL DAILY
COMMUNITY
Start: 2025-06-12

## 2025-06-13 RX ORDER — CHLORTHALIDONE 25 MG/1
25 TABLET ORAL DAILY
COMMUNITY
Start: 2025-06-12 | End: 2025-06-24 | Stop reason: HOSPADM

## 2025-06-13 RX ORDER — TELMISARTAN 20 MG/1
20 TABLET ORAL DAILY
COMMUNITY
Start: 2025-06-03

## 2025-06-13 NOTE — TELEPHONE ENCOUNTER
Syeda with [facility] called regarding Alberto.   Sadia states that Shabbir has not been taking his medication at all on his own.   Sadia is concerned in regard to Shabbir's living conditions, sadia states that he has very little food at home and needs help making food for himself. She says that he does not have a good support system at home to assist him with these things. She is going to contact social work for possible assistance.   His next appointment is 6/18/25.

## 2025-06-16 ENCOUNTER — TELEPHONE (OUTPATIENT)
Dept: FAMILY MEDICINE CLINIC | Facility: CLINIC | Age: 65
End: 2025-06-16
Payer: MEDICARE

## 2025-06-16 NOTE — TELEPHONE ENCOUNTER
Caller: MADISON Ayon [facility]    Best call back number: [phone]    Who are you requesting to speak with (clinical staff, provider,  specific staff member): CLINICAL      What was the call regarding: REPORTS INSURANCE HAS DENIED OT HOME HEALTH -  NURSING ONLY UNTIL 07/08  BUT AS OF NOW, PT IS STILL CONTINUING    STATES WE ARE WELCOME TO APPEAL    PLEASE ADVISE

## 2025-06-16 NOTE — TELEPHONE ENCOUNTER
[facility] has called and states that pt has fallen and has cuts on his arms that has been scabbed over.

## 2025-06-16 NOTE — TELEPHONE ENCOUNTER
[facility] has called and states that pt has fallen and has cuts on his arms that has been scabbed over.

## 2025-06-18 ENCOUNTER — APPOINTMENT (OUTPATIENT)
Dept: CT IMAGING | Facility: HOSPITAL | Age: 65
End: 2025-06-18
Payer: MEDICARE

## 2025-06-18 ENCOUNTER — APPOINTMENT (OUTPATIENT)
Dept: GENERAL RADIOLOGY | Facility: HOSPITAL | Age: 65
End: 2025-06-18
Payer: MEDICARE

## 2025-06-18 ENCOUNTER — HOSPITAL ENCOUNTER (INPATIENT)
Facility: HOSPITAL | Age: 65
LOS: 6 days | Discharge: NURSING, REHAB OR LONG-TERM CARE | End: 2025-06-24
Attending: EMERGENCY MEDICINE | Admitting: STUDENT IN AN ORGANIZED HEALTH CARE EDUCATION/TRAINING PROGRAM
Payer: MEDICARE

## 2025-06-18 ENCOUNTER — OFFICE VISIT (OUTPATIENT)
Dept: FAMILY MEDICINE CLINIC | Facility: CLINIC | Age: 65
End: 2025-06-18
Payer: MEDICARE

## 2025-06-18 VITALS
TEMPERATURE: 97.4 F | HEART RATE: 110 BPM | DIASTOLIC BLOOD PRESSURE: 60 MMHG | OXYGEN SATURATION: 100 % | SYSTOLIC BLOOD PRESSURE: 95 MMHG | RESPIRATION RATE: 28 BRPM

## 2025-06-18 DIAGNOSIS — R06.82 TACHYPNEA: Primary | ICD-10-CM

## 2025-06-18 DIAGNOSIS — F44.89: ICD-10-CM

## 2025-06-18 DIAGNOSIS — N17.9 AKI (ACUTE KIDNEY INJURY): ICD-10-CM

## 2025-06-18 DIAGNOSIS — E11.65 TYPE 2 DIABETES MELLITUS WITH HYPERGLYCEMIA, WITHOUT LONG-TERM CURRENT USE OF INSULIN: ICD-10-CM

## 2025-06-18 DIAGNOSIS — Z78.9 DECREASED ACTIVITIES OF DAILY LIVING (ADL): ICD-10-CM

## 2025-06-18 DIAGNOSIS — R26.2 DIFFICULTY WALKING: ICD-10-CM

## 2025-06-18 DIAGNOSIS — E11.65 TYPE 2 DIABETES MELLITUS WITH HYPERGLYCEMIA, WITHOUT LONG-TERM CURRENT USE OF INSULIN: Chronic | ICD-10-CM

## 2025-06-18 DIAGNOSIS — N17.9 ACUTE RENAL FAILURE, UNSPECIFIED ACUTE RENAL FAILURE TYPE: Primary | ICD-10-CM

## 2025-06-18 LAB
ALBUMIN SERPL-MCNC: 4.1 G/DL (ref 3.5–5.2)
ALBUMIN/GLOB SERPL: 1 G/DL
ALP SERPL-CCNC: 69 U/L (ref 39–117)
ALT SERPL W P-5'-P-CCNC: 20 U/L (ref 1–41)
ANION GAP SERPL CALCULATED.3IONS-SCNC: 27.3 MMOL/L (ref 5–15)
AST SERPL-CCNC: 31 U/L (ref 1–40)
BACTERIA UR QL AUTO: ABNORMAL /HPF
BASOPHILS # BLD AUTO: 0.03 10*3/MM3 (ref 0–0.2)
BASOPHILS NFR BLD AUTO: 0.2 % (ref 0–1.5)
BILIRUB SERPL-MCNC: 1.3 MG/DL (ref 0–1.2)
BILIRUB UR QL STRIP: NEGATIVE
BUN SERPL-MCNC: 83.8 MG/DL (ref 8–23)
BUN/CREAT SERPL: 23.9 (ref 7–25)
CALCIUM SPEC-SCNC: 9.6 MG/DL (ref 8.6–10.5)
CHLORIDE SERPL-SCNC: 79 MMOL/L (ref 98–107)
CK SERPL-CCNC: 85 U/L (ref 20–200)
CLARITY UR: ABNORMAL
CO2 SERPL-SCNC: 17.7 MMOL/L (ref 22–29)
COLOR UR: YELLOW
CREAT SERPL-MCNC: 3.5 MG/DL (ref 0.76–1.27)
D-LACTATE SERPL-SCNC: 1.9 MMOL/L (ref 0.5–2)
D-LACTATE SERPL-SCNC: 2.4 MMOL/L (ref 0.5–2)
DEPRECATED RDW RBC AUTO: 39.5 FL (ref 37–54)
EGFRCR SERPLBLD CKD-EPI 2021: 18.5 ML/MIN/1.73
EOSINOPHIL # BLD AUTO: 0.01 10*3/MM3 (ref 0–0.4)
EOSINOPHIL NFR BLD AUTO: 0.1 % (ref 0.3–6.2)
ERYTHROCYTE [DISTWIDTH] IN BLOOD BY AUTOMATED COUNT: 13.2 % (ref 12.3–15.4)
GEN 5 1HR TROPONIN T REFLEX: 57 NG/L
GLOBULIN UR ELPH-MCNC: 4.1 GM/DL
GLUCOSE SERPL-MCNC: 147 MG/DL (ref 65–99)
GLUCOSE UR STRIP-MCNC: ABNORMAL MG/DL
HCT VFR BLD AUTO: 43.3 % (ref 37.5–51)
HGB BLD-MCNC: 15.2 G/DL (ref 13–17.7)
HGB UR QL STRIP.AUTO: ABNORMAL
HOLD SPECIMEN: NORMAL
HOLD SPECIMEN: NORMAL
HYALINE CASTS UR QL AUTO: ABNORMAL /LPF
IMM GRANULOCYTES # BLD AUTO: 0.08 10*3/MM3 (ref 0–0.05)
IMM GRANULOCYTES NFR BLD AUTO: 0.6 % (ref 0–0.5)
KETONES UR QL STRIP: ABNORMAL
LEUKOCYTE ESTERASE UR QL STRIP.AUTO: ABNORMAL
LYMPHOCYTES # BLD AUTO: 1.92 10*3/MM3 (ref 0.7–3.1)
LYMPHOCYTES NFR BLD AUTO: 13.5 % (ref 19.6–45.3)
MAGNESIUM SERPL-MCNC: 2.1 MG/DL (ref 1.6–2.4)
MCH RBC QN AUTO: 28.7 PG (ref 26.6–33)
MCHC RBC AUTO-ENTMCNC: 35.1 G/DL (ref 31.5–35.7)
MCV RBC AUTO: 81.9 FL (ref 79–97)
MONOCYTES # BLD AUTO: 1.12 10*3/MM3 (ref 0.1–0.9)
MONOCYTES NFR BLD AUTO: 7.9 % (ref 5–12)
NEUTROPHILS NFR BLD AUTO: 11.06 10*3/MM3 (ref 1.7–7)
NEUTROPHILS NFR BLD AUTO: 77.7 % (ref 42.7–76)
NITRITE UR QL STRIP: NEGATIVE
NRBC BLD AUTO-RTO: 0 /100 WBC (ref 0–0.2)
PH UR STRIP.AUTO: <=5 [PH] (ref 5–8)
PLATELET # BLD AUTO: 267 10*3/MM3 (ref 140–450)
PMV BLD AUTO: 10.5 FL (ref 6–12)
POTASSIUM SERPL-SCNC: 3 MMOL/L (ref 3.5–5.2)
PROT SERPL-MCNC: 8.2 G/DL (ref 6–8.5)
PROT UR QL STRIP: ABNORMAL
RBC # BLD AUTO: 5.29 10*6/MM3 (ref 4.14–5.8)
RBC # UR STRIP: ABNORMAL /HPF
REF LAB TEST METHOD: ABNORMAL
SODIUM SERPL-SCNC: 124 MMOL/L (ref 136–145)
SP GR UR STRIP: 1.02 (ref 1–1.03)
SQUAMOUS #/AREA URNS HPF: ABNORMAL /HPF
T4 FREE SERPL-MCNC: 2.2 NG/DL (ref 0.92–1.68)
TROPONIN T % DELTA: -5
TROPONIN T NUMERIC DELTA: -3 NG/L
TROPONIN T SERPL HS-MCNC: 60 NG/L
TSH SERPL DL<=0.05 MIU/L-ACNC: 6.16 UIU/ML (ref 0.27–4.2)
UROBILINOGEN UR QL STRIP: ABNORMAL
WBC # UR STRIP: ABNORMAL /HPF
WBC NRBC COR # BLD AUTO: 14.22 10*3/MM3 (ref 3.4–10.8)
WHOLE BLOOD HOLD COAG: NORMAL
WHOLE BLOOD HOLD SPECIMEN: NORMAL

## 2025-06-18 PROCEDURE — 81001 URINALYSIS AUTO W/SCOPE: CPT | Performed by: EMERGENCY MEDICINE

## 2025-06-18 PROCEDURE — 84484 ASSAY OF TROPONIN QUANT: CPT | Performed by: EMERGENCY MEDICINE

## 2025-06-18 PROCEDURE — 80053 COMPREHEN METABOLIC PANEL: CPT | Performed by: EMERGENCY MEDICINE

## 2025-06-18 PROCEDURE — 93005 ELECTROCARDIOGRAM TRACING: CPT | Performed by: EMERGENCY MEDICINE

## 2025-06-18 PROCEDURE — 71045 X-RAY EXAM CHEST 1 VIEW: CPT

## 2025-06-18 PROCEDURE — 93010 ELECTROCARDIOGRAM REPORT: CPT | Performed by: SPECIALIST

## 2025-06-18 PROCEDURE — 84443 ASSAY THYROID STIM HORMONE: CPT | Performed by: EMERGENCY MEDICINE

## 2025-06-18 PROCEDURE — 3074F SYST BP LT 130 MM HG: CPT | Performed by: FAMILY MEDICINE

## 2025-06-18 PROCEDURE — 87186 SC STD MICRODIL/AGAR DIL: CPT | Performed by: STUDENT IN AN ORGANIZED HEALTH CARE EDUCATION/TRAINING PROGRAM

## 2025-06-18 PROCEDURE — 99223 1ST HOSP IP/OBS HIGH 75: CPT | Performed by: STUDENT IN AN ORGANIZED HEALTH CARE EDUCATION/TRAINING PROGRAM

## 2025-06-18 PROCEDURE — 1126F AMNT PAIN NOTED NONE PRSNT: CPT | Performed by: FAMILY MEDICINE

## 2025-06-18 PROCEDURE — 25810000003 SODIUM CHLORIDE 0.9 % SOLUTION: Performed by: EMERGENCY MEDICINE

## 2025-06-18 PROCEDURE — 3078F DIAST BP <80 MM HG: CPT | Performed by: FAMILY MEDICINE

## 2025-06-18 PROCEDURE — 25010000002 HEPARIN (PORCINE) PER 1000 UNITS: Performed by: STUDENT IN AN ORGANIZED HEALTH CARE EDUCATION/TRAINING PROGRAM

## 2025-06-18 PROCEDURE — 87077 CULTURE AEROBIC IDENTIFY: CPT | Performed by: STUDENT IN AN ORGANIZED HEALTH CARE EDUCATION/TRAINING PROGRAM

## 2025-06-18 PROCEDURE — 51702 INSERT TEMP BLADDER CATH: CPT

## 2025-06-18 PROCEDURE — 83935 ASSAY OF URINE OSMOLALITY: CPT | Performed by: STUDENT IN AN ORGANIZED HEALTH CARE EDUCATION/TRAINING PROGRAM

## 2025-06-18 PROCEDURE — 83930 ASSAY OF BLOOD OSMOLALITY: CPT | Performed by: STUDENT IN AN ORGANIZED HEALTH CARE EDUCATION/TRAINING PROGRAM

## 2025-06-18 PROCEDURE — 36415 COLL VENOUS BLD VENIPUNCTURE: CPT | Performed by: STUDENT IN AN ORGANIZED HEALTH CARE EDUCATION/TRAINING PROGRAM

## 2025-06-18 PROCEDURE — 94799 UNLISTED PULMONARY SVC/PX: CPT

## 2025-06-18 PROCEDURE — 99214 OFFICE O/P EST MOD 30 MIN: CPT | Performed by: FAMILY MEDICINE

## 2025-06-18 PROCEDURE — 82550 ASSAY OF CK (CPK): CPT | Performed by: STUDENT IN AN ORGANIZED HEALTH CARE EDUCATION/TRAINING PROGRAM

## 2025-06-18 PROCEDURE — 84439 ASSAY OF FREE THYROXINE: CPT | Performed by: EMERGENCY MEDICINE

## 2025-06-18 PROCEDURE — 74176 CT ABD & PELVIS W/O CONTRAST: CPT

## 2025-06-18 PROCEDURE — 3044F HG A1C LEVEL LT 7.0%: CPT | Performed by: FAMILY MEDICINE

## 2025-06-18 PROCEDURE — 94761 N-INVAS EAR/PLS OXIMETRY MLT: CPT

## 2025-06-18 PROCEDURE — 87086 URINE CULTURE/COLONY COUNT: CPT | Performed by: STUDENT IN AN ORGANIZED HEALTH CARE EDUCATION/TRAINING PROGRAM

## 2025-06-18 PROCEDURE — 83735 ASSAY OF MAGNESIUM: CPT | Performed by: EMERGENCY MEDICINE

## 2025-06-18 PROCEDURE — 83605 ASSAY OF LACTIC ACID: CPT | Performed by: EMERGENCY MEDICINE

## 2025-06-18 PROCEDURE — 25810000003 SODIUM CHLORIDE 0.9 % SOLUTION: Performed by: STUDENT IN AN ORGANIZED HEALTH CARE EDUCATION/TRAINING PROGRAM

## 2025-06-18 PROCEDURE — 85025 COMPLETE CBC W/AUTO DIFF WBC: CPT | Performed by: EMERGENCY MEDICINE

## 2025-06-18 PROCEDURE — 99291 CRITICAL CARE FIRST HOUR: CPT

## 2025-06-18 PROCEDURE — 25010000002 CEFTRIAXONE PER 250 MG: Performed by: EMERGENCY MEDICINE

## 2025-06-18 RX ORDER — SODIUM CHLORIDE 0.9 % (FLUSH) 0.9 %
10 SYRINGE (ML) INJECTION AS NEEDED
Status: DISCONTINUED | OUTPATIENT
Start: 2025-06-18 | End: 2025-06-24 | Stop reason: HOSPADM

## 2025-06-18 RX ORDER — NICOTINE POLACRILEX 4 MG
15 LOZENGE BUCCAL
Status: DISCONTINUED | OUTPATIENT
Start: 2025-06-18 | End: 2025-06-21

## 2025-06-18 RX ORDER — HEPARIN SODIUM 5000 [USP'U]/ML
5000 INJECTION, SOLUTION INTRAVENOUS; SUBCUTANEOUS EVERY 8 HOURS SCHEDULED
Status: DISCONTINUED | OUTPATIENT
Start: 2025-06-18 | End: 2025-06-24 | Stop reason: HOSPADM

## 2025-06-18 RX ORDER — IBUPROFEN 600 MG/1
1 TABLET ORAL
Status: DISCONTINUED | OUTPATIENT
Start: 2025-06-18 | End: 2025-06-21

## 2025-06-18 RX ORDER — INSULIN LISPRO 100 [IU]/ML
2-7 INJECTION, SOLUTION INTRAVENOUS; SUBCUTANEOUS
Status: DISCONTINUED | OUTPATIENT
Start: 2025-06-19 | End: 2025-06-21

## 2025-06-18 RX ORDER — SODIUM CHLORIDE 0.9 % (FLUSH) 0.9 %
10 SYRINGE (ML) INJECTION EVERY 12 HOURS SCHEDULED
Status: DISCONTINUED | OUTPATIENT
Start: 2025-06-18 | End: 2025-06-24 | Stop reason: HOSPADM

## 2025-06-18 RX ORDER — POTASSIUM CHLORIDE 750 MG/1
40 CAPSULE, EXTENDED RELEASE ORAL ONCE
Status: COMPLETED | OUTPATIENT
Start: 2025-06-18 | End: 2025-06-18

## 2025-06-18 RX ORDER — NITROGLYCERIN 0.4 MG/1
0.4 TABLET SUBLINGUAL
Status: DISCONTINUED | OUTPATIENT
Start: 2025-06-18 | End: 2025-06-24 | Stop reason: HOSPADM

## 2025-06-18 RX ORDER — SODIUM CHLORIDE 9 MG/ML
40 INJECTION, SOLUTION INTRAVENOUS AS NEEDED
Status: DISCONTINUED | OUTPATIENT
Start: 2025-06-18 | End: 2025-06-24 | Stop reason: HOSPADM

## 2025-06-18 RX ORDER — DEXTROSE MONOHYDRATE 25 G/50ML
25 INJECTION, SOLUTION INTRAVENOUS
Status: DISCONTINUED | OUTPATIENT
Start: 2025-06-18 | End: 2025-06-21

## 2025-06-18 RX ORDER — SODIUM CHLORIDE 9 MG/ML
100 INJECTION, SOLUTION INTRAVENOUS CONTINUOUS
Status: DISCONTINUED | OUTPATIENT
Start: 2025-06-18 | End: 2025-06-19

## 2025-06-18 RX ADMIN — CEFTRIAXONE SODIUM 2000 MG: 2 INJECTION, POWDER, FOR SOLUTION INTRAMUSCULAR; INTRAVENOUS at 19:29

## 2025-06-18 RX ADMIN — SODIUM CHLORIDE 100 ML/HR: 9 INJECTION, SOLUTION INTRAVENOUS at 21:38

## 2025-06-18 RX ADMIN — SODIUM CHLORIDE 1000 ML: 9 INJECTION, SOLUTION INTRAVENOUS at 17:19

## 2025-06-18 RX ADMIN — HEPARIN SODIUM 5000 UNITS: 5000 INJECTION INTRAVENOUS; SUBCUTANEOUS at 23:17

## 2025-06-18 RX ADMIN — POTASSIUM CHLORIDE 40 MEQ: 750 CAPSULE, EXTENDED RELEASE ORAL at 23:17

## 2025-06-18 NOTE — ED PROVIDER NOTES
Time: 5:44 PM EDT  Date of encounter:  6/18/2025  Independent Historian/Clinical History and Information was obtained by:   Patient    History is limited by: N/A    Chief Complaint: Weakness      History of Present Illness:  Patient is a 64 y.o. year old male who presents to the emergency department for evaluation of weakness and dizziness this gotten worse since Saturday.  Patient reports that he has had diarrhea.  Patient denies vomiting.  Patient denies pain.  Patient has no chest pain or shortness of breath.  Patient has no cough or hemoptysis.  Patient has no leg pain or swelling.      Patient Care Team  Primary Care Provider: Magnus Euceda DO    Past Medical History:     Allergies   Allergen Reactions    Cyclobenzaprine Unknown - Low Severity     weakness     Past Medical History:   Diagnosis Date    Anxiety     Arthritis     Atherosclerosis of coronary artery bypass graft of native heart without angina pectoris 10/23/2018    Coronary artery disease with previous bypass, LIMA to the LAD, SVG to ramus, and SVG to RCA in 2015    CAD (coronary artery disease) 10/23/2018    Carotid artery stenosis with cerebral infarction 06/01/2015    Dr. St     Cramps, extremity 9/10/2021    Depression     DM2 (diabetes mellitus, type 2) 10/23/2018    Duodenal ulcer     Essential hypertension 10/23/2018    Headache     Heart attack 2015    Heartburn     HLD (hyperlipidemia)     HTN (hypertension)     Injury of right leg 8/13/2021    Irregular heart beat     Low back pain 03/18/2019    Mild episode of recurrent depressive disorder 10/23/2018    Rash 03/18/2019    Shortness of breath     SOB (shortness of breath)     Type 2 diabetes mellitus with hyperglycemia, without long-term current use of insulin 06/11/2019    Uncontrolled diabetes mellitus 03/18/2019     Past Surgical History:   Procedure Laterality Date    CARDIAC CATHETERIZATION  06/2015    Dr. St     CIRCUMCISION      age 14 yrs old    CORONARY ARTERY BYPASS GRAFT   06/2015    5 bypasses     Family History   Problem Relation Age of Onset    Heart disease Other        Home Medications:  Prior to Admission medications    Medication Sig Start Date End Date Taking? Authorizing Provider   albuterol sulfate  (90 Base) MCG/ACT inhaler Inhale 2 puffs Every 4 (Four) Hours As Needed for Wheezing. 2/14/22   Magnus Euceda DO   ARIPiprazole (ABILIFY) 20 MG tablet TAKE 1 TABLET BY MOUTH ONCE DAILY 5/16/25   Magnus Euceda DO   Aspirin Low Dose 81 MG EC tablet TAKE 1 TABLET BY MOUTH ONCE DAILY 6/12/25   Magnus Euceda DO   atorvastatin (LIPITOR) 80 MG tablet TAKE 1 TABLET BY MOUTH ONCE DAILY FOR CHOLESTEROL 6/12/25   Magnus Euceda DO   bumetanide (BUMEX) 1 MG tablet Take 1 tablet by mouth Daily As Needed (fluid). 5/16/25   Magnus Euceda DO   chlorthalidone (HYGROTON) 25 MG tablet  6/12/25   Kenney Conn MD   citalopram (CeleXA) 20 MG tablet  6/12/25   ProviderKenney MD   cyanocobalamin (GNP Vitamin B-12) 500 MCG tablet TAKE 1 TABLET BY MOUTH ONCE DAILY 5/16/25   Magnus Euceda DO   famotidine (PEPCID) 40 MG tablet TAKE 1 TABLET BY MOUTH TWICE DAILY FOR STOMACH 6/12/25   Magnus Euceda DO   fenofibrate (TRICOR) 145 MG tablet TAKE 1 TABLET BY MOUTH ONCE DAILY FOR CHOLESTEROL 3/25/25   Magnus Euceda DO   Jardiance 25 MG tablet tablet TAKE 1 TABLET BY MOUTH EVERY MORNING TO LOWER BLOOD SUGAR 6/12/25   Magnus Euceda DO   levocetirizine (XYZAL) 5 MG tablet Take 1 tablet by mouth Every Evening. 5/28/25   Magnus Euceda DO   levothyroxine (SYNTHROID, LEVOTHROID) 50 MCG tablet Take 1 tablet by mouth Every Morning. 5/16/25   Magnus Euceda DO   metFORMIN ER (GLUCOPHAGE-XR) 500 MG 24 hr tablet TAKE 2 TABLETS BY MOUTH TWICE DAILY 6/12/25   Magnus Euceda DO   metoprolol succinate XL (TOPROL-XL) 25 MG 24 hr tablet TAKE 1 TABLET BY MOUTH ONCE DAILY FOR BLOOD PRESSURE OR HEART 5/28/25   Magnus Euceda DO   nitroglycerin (NITROSTAT) 0.4 MG SL tablet Place 1 tablet under the tongue  "Every 5 (Five) Minutes As Needed for Chest Pain. Take no more than 3 doses in 15 minutes. 2/14/22   Magnus Euceda DO   ondansetron (Zofran) 8 MG tablet Take 1 tablet by mouth Every 8 (Eight) Hours As Needed for Nausea or Vomiting. 12/16/22   Jessica Brower APRN   ondansetron ODT (ZOFRAN-ODT) 8 MG disintegrating tablet  5/28/25   Kenney Conn MD   pioglitazone (ACTOS) 15 MG tablet  6/12/25   Kenney Conn MD   polyethylene glycol (MIRALAX) 17 GM/SCOOP powder  5/28/25   Kenney Conn MD   Rybelsus 14 MG tablet TAKE 1 TABLET BY MOUTH EVERY MORNING 6/12/25   Magnus Euceda DO   telmisartan (MICARDIS) 20 MG tablet  6/3/25   Kenney Conn MD   traZODone (DESYREL) 100 MG tablet TAKE 1 TABLET BY MOUTH AT BEDTIME FOR SLEEP 6/12/25   Magnus Euceda DO        Social History:   Social History     Tobacco Use    Smoking status: Never     Passive exposure: Past    Smokeless tobacco: Current     Types: Chew   Vaping Use    Vaping status: Never Used   Substance Use Topics    Alcohol use: Not Currently    Drug use: Never         Review of Systems:  Review of Systems   Constitutional:  Negative for chills and fever.   HENT:  Negative for congestion, rhinorrhea and sore throat.    Eyes:  Negative for pain and visual disturbance.   Respiratory:  Negative for apnea, cough, chest tightness and shortness of breath.    Cardiovascular:  Negative for chest pain and palpitations.   Gastrointestinal:  Negative for abdominal pain, diarrhea, nausea and vomiting.   Genitourinary:  Negative for difficulty urinating and dysuria.   Musculoskeletal:  Negative for joint swelling and myalgias.   Skin:  Negative for color change.   Neurological:  Positive for weakness. Negative for seizures and headaches.   Psychiatric/Behavioral: Negative.     All other systems reviewed and are negative.       Physical Exam:  /62   Pulse 87   Temp 97.6 °F (36.4 °C) (Oral)   Resp 23   Ht 180.3 cm (70.98\")   Wt 82 kg " (180 lb 12.4 oz)   SpO2 100%   BMI 25.22 kg/m²     Physical Exam  Vitals and nursing note reviewed.   Constitutional:       General: He is not in acute distress.     Appearance: Normal appearance. He is not toxic-appearing.   HENT:      Head: Normocephalic and atraumatic.      Jaw: There is normal jaw occlusion.      Mouth/Throat:      Comments: (+) Dry oropharynx  Eyes:      General: Lids are normal.      Extraocular Movements: Extraocular movements intact.      Conjunctiva/sclera: Conjunctivae normal.      Pupils: Pupils are equal, round, and reactive to light.   Cardiovascular:      Rate and Rhythm: Normal rate and regular rhythm.      Pulses: Normal pulses.      Heart sounds: Normal heart sounds.   Pulmonary:      Effort: Pulmonary effort is normal. No respiratory distress.      Breath sounds: Normal breath sounds. No wheezing or rhonchi.   Abdominal:      General: Abdomen is flat.      Palpations: Abdomen is soft.      Tenderness: There is no abdominal tenderness. There is no guarding or rebound.   Musculoskeletal:         General: Normal range of motion.      Cervical back: Normal range of motion and neck supple.      Right lower leg: No edema.      Left lower leg: No edema.   Skin:     General: Skin is warm and dry.   Neurological:      Mental Status: He is alert and oriented to person, place, and time. Mental status is at baseline.   Psychiatric:         Mood and Affect: Mood normal.                    Medical Decision Making:      Comorbidities that affect care:    Hypertension, diabetes    External Notes reviewed:    Hospital Discharge Summary: Patient was last admitted for hyponatremia      The following orders were placed and all results were independently analyzed by me:  Orders Placed This Encounter   Procedures    XR Chest 1 View    CT Abdomen Pelvis Without Contrast    [place] Draw    Comprehensive Metabolic Panel    Magnesium    High Sensitivity Troponin T    TSH Rfx On Abnormal To Free T4    CBC  Auto Differential    Lactic Acid, Plasma    T4, Free    STAT Lactic Acid, Reflex    High Sensitivity Troponin T 1Hr    Urinalysis With Microscopic If Indicated (No Culture) - Urine, Clean Catch    Urinalysis, Microscopic Only - Urine, Clean Catch    NPO Diet NPO Type: Strict NPO    Undress & Gown    Continuous Pulse Oximetry    Insert Indwelling Urinary Catheter    Assess Need for Indwelling Urinary Catheter - Follow Removal Protocol    Urinary Catheter Care    Inpatient Hospitalist Consult    Oxygen Therapy- Nasal Cannula; Titrate 1-6 LPM Per SpO2; 90 - 95%    ECG 12 Lead ED Triage Standing Order; Dysrhythmia    Insert Peripheral IV    CBC & Differential    Green Top (Gel)    Lavender Top    Gold Top - SST    Light Blue Top       Medications Given in the Emergency Department:  Medications   sodium chloride 0.9 % flush 10 mL (has no administration in time range)   sodium chloride 0.9 % bolus 1,000 mL (0 mL Intravenous Stopped 6/18/25 1749)   cefTRIAXone (ROCEPHIN) 2,000 mg in sodium chloride 0.9 % 100 mL IVPB-VTB (0 mg Intravenous Stopped 6/18/25 2004)        ED Course:         Labs:    Lab Results (last 24 hours)       Procedure Component Value Units Date/Time    CBC & Differential [[id number]]  (Abnormal) Collected: 06/18/25 1612    Specimen: Blood Updated: 06/18/25 1620    Narrative:      The following orders were created for panel order CBC & Differential.  Procedure                               Abnormality         Status                     ---------                               -----------         ------                     CBC Auto Differential[[id number]]        Abnormal            Final result                 Please view results for these tests on the individual orders.    Comprehensive Metabolic Panel [[id number]]  (Abnormal) Collected: 06/18/25 1612    Specimen: Blood Updated: 06/18/25 1647     Glucose 147 mg/dL      BUN 83.8 mg/dL      Creatinine 3.50 mg/dL      Sodium 124 mmol/L      Potassium 3.0 mmol/L       Comment: Slight hemolysis detected by analyzer. Result may be falsely elevated.        Chloride 79 mmol/L      CO2 17.7 mmol/L      Calcium 9.6 mg/dL      Total Protein 8.2 g/dL      Albumin 4.1 g/dL      ALT (SGPT) 20 U/L      AST (SGOT) 31 U/L      Alkaline Phosphatase 69 U/L      Total Bilirubin 1.3 mg/dL      Globulin 4.1 gm/dL      A/G Ratio 1.0 g/dL      BUN/Creatinine Ratio 23.9     Anion Gap 27.3 mmol/L      eGFR 18.5 mL/min/1.73     Narrative:      GFR Categories in Chronic Kidney Disease (CKD)              GFR Category          GFR (mL/min/1.73)    Interpretation  G1                    90 or greater        Normal or high (1)  G2                    60-89                Mild decrease (1)  G3a                   45-59                Mild to moderate decrease  G3b                   30-44                Moderate to severe decrease  G4                    15-29                Severe decrease  G5                    14 or less           Kidney failure    (1)In the absence of evidence of kidney disease, neither GFR category G1 or G2 fulfill the criteria for CKD.    eGFR calculation 2021 CKD-EPI creatinine equation, which does not include race as a factor    Magnesium [[id number]]  (Normal) Collected: 06/18/25 1612    Specimen: Blood Updated: 06/18/25 1647     Magnesium 2.1 mg/dL     High Sensitivity Troponin T [[id number]]  (Abnormal) Collected: 06/18/25 1612    Specimen: Blood Updated: 06/18/25 1709     HS Troponin T 60 ng/L     Narrative:      High Sensitive Troponin T Reference Range:  <14.0 ng/L- Negative Female for AMI  <22.0 ng/L- Negative Male for AMI  >=14 - Abnormal Female indicating possible myocardial injury.  >=22 - Abnormal Male indicating possible myocardial injury.   Clinicians would have to utilize clinical acumen, EKG, Troponin, and serial changes to determine if it is an Acute Myocardial Infarction or myocardial injury due to an underlying chronic condition.         TSH Rfx On Abnormal To Free T4  [[id number]]  (Abnormal) Collected: 06/18/25 1612    Specimen: Blood Updated: 06/18/25 1653     TSH 6.160 uIU/mL     CBC Auto Differential [[id number]]  (Abnormal) Collected: 06/18/25 1612    Specimen: Blood Updated: 06/18/25 1620     WBC 14.22 10*3/mm3      RBC 5.29 10*6/mm3      Hemoglobin 15.2 g/dL      Hematocrit 43.3 %      MCV 81.9 fL      MCH 28.7 pg      MCHC 35.1 g/dL      RDW 13.2 %      RDW-SD 39.5 fl      MPV 10.5 fL      Platelets 267 10*3/mm3      Neutrophil % 77.7 %      Lymphocyte % 13.5 %      Monocyte % 7.9 %      Eosinophil % 0.1 %      Basophil % 0.2 %      Immature Grans % 0.6 %      Neutrophils, Absolute 11.06 10*3/mm3      Lymphocytes, Absolute 1.92 10*3/mm3      Monocytes, Absolute 1.12 10*3/mm3      Eosinophils, Absolute 0.01 10*3/mm3      Basophils, Absolute 0.03 10*3/mm3      Immature Grans, Absolute 0.08 10*3/mm3      nRBC 0.0 /100 WBC     T4, Free [[id number]]  (Abnormal) Collected: 06/18/25 1612    Specimen: Blood Updated: 06/18/25 1720     Free T4 2.20 ng/dL     Lactic Acid, Plasma [[id number]]  (Abnormal) Collected: 06/18/25 1613    Specimen: Blood from Arm, Right Updated: 06/18/25 1709     Lactate 2.4 mmol/L     High Sensitivity Troponin T 1Hr [[id number]]  (Abnormal) Collected: 06/18/25 1817    Specimen: Blood Updated: 06/18/25 1903     HS Troponin T 57 ng/L      Troponin T Numeric Delta -3 ng/L      Troponin T % Delta -5    Narrative:      High Sensitive Troponin T Reference Range:  <14.0 ng/L- Negative Female for AMI  <22.0 ng/L- Negative Male for AMI  >=14 - Abnormal Female indicating possible myocardial injury.  >=22 - Abnormal Male indicating possible myocardial injury.   Clinicians would have to utilize clinical acumen, EKG, Troponin, and serial changes to determine if it is an Acute Myocardial Infarction or myocardial injury due to an underlying chronic condition.         Urinalysis With Microscopic If Indicated (No Culture) - Indwelling Urethral Catheter [[id number]]  (Abnormal)  Collected: 06/18/25 1818    Specimen: Urine from Indwelling Urethral Catheter Updated: 06/18/25 1831     Color, UA Yellow     Appearance, UA Cloudy     pH, UA <=5.0     Specific Gravity, UA 1.016     Glucose, UA >=1000 mg/dL (3+)     Ketones, UA Trace     Bilirubin, UA Negative     Blood, UA Moderate (2+)     Protein, UA Trace     Leuk Esterase, UA Large (3+)     Nitrite, UA Negative     Urobilinogen, UA 1.0 E.U./dL    Urinalysis, Microscopic Only - Indwelling Urethral Catheter [[id number]]  (Abnormal) Collected: 06/18/25 1818    Specimen: Urine from Indwelling Urethral Catheter Updated: 06/18/25 1831     RBC, UA 0-2 /HPF      WBC, UA Too Numerous to Count /HPF      Bacteria, UA 3+ /HPF      Squamous Epithelial Cells, UA 0-2 /HPF      Hyaline Casts, UA 0-2 /LPF      Methodology Automated Microscopy    STAT Lactic Acid, Reflex [[id number]]  (Normal) Collected: 06/18/25 1928    Specimen: Blood from Arm, Left Updated: 06/18/25 2020     Lactate 1.9 mmol/L              Imaging:    CT Abdomen Pelvis Without Contrast  Result Date: 6/18/2025  CT ABDOMEN PELVIS WO CONTRAST Date of Exam: 6/18/2025 6:55 PM EDT Indication: Flank pain, kidney stone suspected Abdominal pain flank pain. Comparison: CT abdomen pelvis 4/19/2025 Technique: Axial CT images were obtained of the abdomen and pelvis without the administration of contrast. Reconstructed coronal and sagittal images were also obtained. Automated exposure control and iterative construction methods were used. Findings: Included Chest: Bibasal atelectasis. Coronary artery calcifications. Status post CABG Liver: No significant abnormality.  Gallbladder and biliary tree: No significant abnormality.  Spleen: No significant abnormality.  Pancreas: No significant abnormality.  Adrenal glands: No significant abnormality.  Kidneys and ureters: Bilateral renal cysts including hyperattenuating left renal cyst which could represent a hemorrhagic or proteinaceous cyst. No  hydroureteronephrosis.  Stomach and duodenum:No significant abnormality. Small and large bowel: Large volume stool in the rectum and sigmoid colon. No evidence of bowel obstruction. No significant pericolonic inflammatory changes seen. No findings to suggest acute appendicitis. Peritoneal cavity: No free fluid or free air. Bladder: Orozco in decompressed bladder.  Pelvic organs: No significant abnormality.  Vasculature: Atherosclerotic calcifications.  Lymph nodes: No pathologic appearing lymph nodes by imaging criteria.  Bones and soft tissues: Degenerative changes of the imaged spine. No acute osseous abnormality.     Impression: No acute findings in the abdomen/pelvis. Electronically Signed: Castro Ponce MD  6/18/2025 8:05 PM EDT  Workstation ID: [id number]    XR Chest 1 View  Result Date: 6/18/2025  XR CHEST 1 VW Date of Exam: 6/18/2025 4:29 PM EDT Indication: Dysrhythmia Triage Protocol Comparison: 4/23/2025 Findings: Heart size is within normal limits. There are postoperative changes of prior sternotomy and presumably bypass. Pulmonary vascular markings in the chest are normal. The lungs are clear. No acute infiltrates or pleural fluid collections are identified.     Impression: 1.Postoperative changes of prior sternotomy and presumably bypass. 2.No active disease. Electronically Signed: Dino Dailey MD  6/18/2025 4:32 PM EDT  Workstation ID: [id number]        Differential Diagnosis and Discussion:    Weakness: Based on the patient's history, signs, and symptoms, the diffential diagnosis includes but is not limited to meningitis, stroke, sepsis, subarachnoid hemorrhage, intracranial bleeding, encephalitis, acute uti, dehydration, MS, myasthenia gravis, Guillan Dryden, migraine variant, neuromuscular disorders vertigo, electrolyte imbalance, and metabolic disorders.    PROCEDURES:    Labs were collected in the emergency department and all labs were reviewed and interpreted by me.  CT scan was performed in the  emergency department and the CT scan radiology impression was interpreted by me.    ECG 12 Lead ED Triage Standing Order; Dysrhythmia   Preliminary Result   HEART LGGS=577  bpm   RR Nawivhfy=140  ms   FL Interval=  ms   P Horizontal Axis=  deg   P Front Axis=  deg   QRSD Zdignmzz=813  ms   QT Tmbecxit=067  ms   CWxH=287  ms   QRS Axis=3  deg   T Wave Axis=  deg   - ABNORMAL ECG -   Atrial fibrillation   Paired ventricular premature complexes   Nonspecific intraventricular conduction delay   Abnormal T, consider ischemia, lateral leads   Prolonged QT interval   Date and Time of Study:2025-06-18 16:11:18          Procedures    MDM     Amount and/or Complexity of Data Reviewed  Decide to obtain previous medical records or to obtain history from someone other than the patient: yes    The patient´s CBC that was reviewed and interpreted by me shows no abnormalities of critical concern. Of note, there is no anemia requiring a blood transfusion and the platelet count is acceptable.  CMP shows a sodium of 124 and acute renal failure with an elevated BUN and creatinine.  Patient was given fluids in the emerge department.  CT scan is negative for acute intra-abdominal pathology.        Patient was placed on the cardiac monitor after being given fluids for hypotension and acute renal failure with hyponatremia.  They were monitored for ventricular ectopy, arrhythmia, tachycardia, hypoxia, and changes in blood pressure.  Patient was rechecked several times throughout their stay for mental status decline and for reassessment of worsening changes in vital signs.     Total Critical Care time of 40 minutes. Total critical care time documented does not include time spent on separately billed procedures for services of nurses or physician assistants. I personally saw and examined the patient. I have reviewed all diagnostic interpretations and treatment plans as written. I was present for the key portions of any procedures performed and  the inclusive time noted in any critical care statement. Critical care time includes patient management by me, time spent at the patients bedside,  time to review lab and imaging results, discussing patient care, documentation in the medical record, and time spent with family or caregiver.          Patient Care Considerations:    None      Consultants/Shared Management Plan:    Case was discussed with Dr. Dandy who agrees with admission.    Social Determinants of Health:    Patient is independent, reliable, and has access to care.       Disposition and Care Coordination:    Admit:   Through independent evaluation of the patient's history, physical, and imperical data, the patient meets criteria for inpatient admission to the hospital.        Final diagnoses:   Acute renal failure, unspecified acute renal failure type        ED Disposition       ED Disposition   Intended Admit    Condition   --    Comment   --               This medical record created using voice recognition software.             Jennifer Nix MD  06/18/25 [id number]

## 2025-06-19 LAB
ALBUMIN SERPL-MCNC: 3.6 G/DL (ref 3.5–5.2)
ALBUMIN/GLOB SERPL: 1 G/DL
ALP SERPL-CCNC: 61 U/L (ref 39–117)
ALT SERPL W P-5'-P-CCNC: 15 U/L (ref 1–41)
ANION GAP SERPL CALCULATED.3IONS-SCNC: 24.5 MMOL/L (ref 5–15)
ANION GAP SERPL CALCULATED.3IONS-SCNC: 29.4 MMOL/L (ref 5–15)
AST SERPL-CCNC: 24 U/L (ref 1–40)
BASOPHILS # BLD AUTO: 0.03 10*3/MM3 (ref 0–0.2)
BASOPHILS NFR BLD AUTO: 0.2 % (ref 0–1.5)
BILIRUB SERPL-MCNC: 1.2 MG/DL (ref 0–1.2)
BUN SERPL-MCNC: 69.5 MG/DL (ref 8–23)
BUN SERPL-MCNC: 77.5 MG/DL (ref 8–23)
BUN/CREAT SERPL: 25.7 (ref 7–25)
BUN/CREAT SERPL: 26.4 (ref 7–25)
CALCIUM SPEC-SCNC: 9 MG/DL (ref 8.6–10.5)
CALCIUM SPEC-SCNC: 9.5 MG/DL (ref 8.6–10.5)
CHLORIDE SERPL-SCNC: 82 MMOL/L (ref 98–107)
CHLORIDE SERPL-SCNC: 85 MMOL/L (ref 98–107)
CO2 SERPL-SCNC: 14.5 MMOL/L (ref 22–29)
CO2 SERPL-SCNC: 14.6 MMOL/L (ref 22–29)
CREAT SERPL-MCNC: 2.63 MG/DL (ref 0.76–1.27)
CREAT SERPL-MCNC: 3.01 MG/DL (ref 0.76–1.27)
DEPRECATED RDW RBC AUTO: 40 FL (ref 37–54)
EGFRCR SERPLBLD CKD-EPI 2021: 22.1 ML/MIN/1.73
EGFRCR SERPLBLD CKD-EPI 2021: 26 ML/MIN/1.73
EOSINOPHIL # BLD AUTO: 0.02 10*3/MM3 (ref 0–0.4)
EOSINOPHIL NFR BLD AUTO: 0.2 % (ref 0.3–6.2)
ERYTHROCYTE [DISTWIDTH] IN BLOOD BY AUTOMATED COUNT: 13.2 % (ref 12.3–15.4)
GLOBULIN UR ELPH-MCNC: 3.6 GM/DL
GLUCOSE BLDC GLUCOMTR-MCNC: 106 MG/DL (ref 70–99)
GLUCOSE BLDC GLUCOMTR-MCNC: 107 MG/DL (ref 70–99)
GLUCOSE BLDC GLUCOMTR-MCNC: 79 MG/DL (ref 70–99)
GLUCOSE BLDC GLUCOMTR-MCNC: 87 MG/DL (ref 70–99)
GLUCOSE SERPL-MCNC: 83 MG/DL (ref 65–99)
GLUCOSE SERPL-MCNC: 90 MG/DL (ref 65–99)
HCT VFR BLD AUTO: 37.5 % (ref 37.5–51)
HGB BLD-MCNC: 13.1 G/DL (ref 13–17.7)
IMM GRANULOCYTES # BLD AUTO: 0.19 10*3/MM3 (ref 0–0.05)
IMM GRANULOCYTES NFR BLD AUTO: 1.5 % (ref 0–0.5)
LYMPHOCYTES # BLD AUTO: 1.89 10*3/MM3 (ref 0.7–3.1)
LYMPHOCYTES NFR BLD AUTO: 15.3 % (ref 19.6–45.3)
MAGNESIUM SERPL-MCNC: 1.9 MG/DL (ref 1.6–2.4)
MCH RBC QN AUTO: 29 PG (ref 26.6–33)
MCHC RBC AUTO-ENTMCNC: 34.9 G/DL (ref 31.5–35.7)
MCV RBC AUTO: 83 FL (ref 79–97)
MONOCYTES # BLD AUTO: 1.01 10*3/MM3 (ref 0.1–0.9)
MONOCYTES NFR BLD AUTO: 8.2 % (ref 5–12)
NEUTROPHILS NFR BLD AUTO: 74.6 % (ref 42.7–76)
NEUTROPHILS NFR BLD AUTO: 9.18 10*3/MM3 (ref 1.7–7)
NRBC BLD AUTO-RTO: 0 /100 WBC (ref 0–0.2)
OSMOLALITY SERPL: 297 MOSM/KG (ref 280–301)
OSMOLALITY UR: 429 MOSM/KG (ref 50–1400)
PHOSPHATE SERPL-MCNC: 2.2 MG/DL (ref 2.5–4.5)
PLATELET # BLD AUTO: 229 10*3/MM3 (ref 140–450)
PMV BLD AUTO: 10.8 FL (ref 6–12)
POTASSIUM SERPL-SCNC: 2.9 MMOL/L (ref 3.5–5.2)
POTASSIUM SERPL-SCNC: 2.9 MMOL/L (ref 3.5–5.2)
PROT SERPL-MCNC: 7.2 G/DL (ref 6–8.5)
RBC # BLD AUTO: 4.52 10*6/MM3 (ref 4.14–5.8)
SODIUM SERPL-SCNC: 124 MMOL/L (ref 136–145)
SODIUM SERPL-SCNC: 126 MMOL/L (ref 136–145)
WBC NRBC COR # BLD AUTO: 12.32 10*3/MM3 (ref 3.4–10.8)

## 2025-06-19 PROCEDURE — 84100 ASSAY OF PHOSPHORUS: CPT | Performed by: STUDENT IN AN ORGANIZED HEALTH CARE EDUCATION/TRAINING PROGRAM

## 2025-06-19 PROCEDURE — 82948 REAGENT STRIP/BLOOD GLUCOSE: CPT

## 2025-06-19 PROCEDURE — 80053 COMPREHEN METABOLIC PANEL: CPT | Performed by: STUDENT IN AN ORGANIZED HEALTH CARE EDUCATION/TRAINING PROGRAM

## 2025-06-19 PROCEDURE — 25010000002 CEFTRIAXONE PER 250 MG: Performed by: STUDENT IN AN ORGANIZED HEALTH CARE EDUCATION/TRAINING PROGRAM

## 2025-06-19 PROCEDURE — 94799 UNLISTED PULMONARY SVC/PX: CPT

## 2025-06-19 PROCEDURE — 25010000002 HEPARIN (PORCINE) PER 1000 UNITS: Performed by: STUDENT IN AN ORGANIZED HEALTH CARE EDUCATION/TRAINING PROGRAM

## 2025-06-19 PROCEDURE — 85025 COMPLETE CBC W/AUTO DIFF WBC: CPT | Performed by: STUDENT IN AN ORGANIZED HEALTH CARE EDUCATION/TRAINING PROGRAM

## 2025-06-19 PROCEDURE — 25010000002 POTASSIUM CHLORIDE 10 MEQ/100ML SOLUTION: Performed by: STUDENT IN AN ORGANIZED HEALTH CARE EDUCATION/TRAINING PROGRAM

## 2025-06-19 PROCEDURE — 97161 PT EVAL LOW COMPLEX 20 MIN: CPT

## 2025-06-19 PROCEDURE — 94761 N-INVAS EAR/PLS OXIMETRY MLT: CPT

## 2025-06-19 PROCEDURE — 25010000003 DEXTROSE 5 % SOLUTION: Performed by: STUDENT IN AN ORGANIZED HEALTH CARE EDUCATION/TRAINING PROGRAM

## 2025-06-19 PROCEDURE — 83735 ASSAY OF MAGNESIUM: CPT | Performed by: STUDENT IN AN ORGANIZED HEALTH CARE EDUCATION/TRAINING PROGRAM

## 2025-06-19 PROCEDURE — 97165 OT EVAL LOW COMPLEX 30 MIN: CPT

## 2025-06-19 PROCEDURE — 99232 SBSQ HOSP IP/OBS MODERATE 35: CPT | Performed by: STUDENT IN AN ORGANIZED HEALTH CARE EDUCATION/TRAINING PROGRAM

## 2025-06-19 PROCEDURE — 25010000002 MAGNESIUM SULFATE 2 GM/50ML SOLUTION: Performed by: STUDENT IN AN ORGANIZED HEALTH CARE EDUCATION/TRAINING PROGRAM

## 2025-06-19 RX ORDER — POTASSIUM CHLORIDE 7.45 MG/ML
10 INJECTION INTRAVENOUS
Status: COMPLETED | OUTPATIENT
Start: 2025-06-19 | End: 2025-06-19

## 2025-06-19 RX ORDER — ALBUTEROL SULFATE 0.83 MG/ML
2.5 SOLUTION RESPIRATORY (INHALATION) EVERY 6 HOURS PRN
Status: DISCONTINUED | OUTPATIENT
Start: 2025-06-19 | End: 2025-06-24 | Stop reason: HOSPADM

## 2025-06-19 RX ORDER — FAMOTIDINE 20 MG/1
20 TABLET, FILM COATED ORAL NIGHTLY
Status: DISCONTINUED | OUTPATIENT
Start: 2025-06-19 | End: 2025-06-24 | Stop reason: HOSPADM

## 2025-06-19 RX ORDER — MAGNESIUM SULFATE HEPTAHYDRATE 40 MG/ML
2 INJECTION, SOLUTION INTRAVENOUS ONCE
Status: COMPLETED | OUTPATIENT
Start: 2025-06-19 | End: 2025-06-19

## 2025-06-19 RX ORDER — MUPIROCIN 2 %
1 OINTMENT (GRAM) TOPICAL 3 TIMES DAILY
Status: DISCONTINUED | OUTPATIENT
Start: 2025-06-19 | End: 2025-06-24 | Stop reason: HOSPADM

## 2025-06-19 RX ORDER — ASPIRIN 81 MG/1
81 TABLET ORAL DAILY
Status: DISCONTINUED | OUTPATIENT
Start: 2025-06-20 | End: 2025-06-24 | Stop reason: HOSPADM

## 2025-06-19 RX ORDER — METOPROLOL SUCCINATE 25 MG/1
25 TABLET, EXTENDED RELEASE ORAL DAILY
Status: DISCONTINUED | OUTPATIENT
Start: 2025-06-20 | End: 2025-06-24 | Stop reason: HOSPADM

## 2025-06-19 RX ORDER — TRAZODONE HYDROCHLORIDE 100 MG/1
100 TABLET ORAL NIGHTLY
Status: DISCONTINUED | OUTPATIENT
Start: 2025-06-19 | End: 2025-06-24 | Stop reason: HOSPADM

## 2025-06-19 RX ORDER — ARIPIPRAZOLE 20 MG/1
20 TABLET ORAL DAILY
Qty: 30 TABLET | Refills: 0 | Status: SHIPPED | OUTPATIENT
Start: 2025-06-19

## 2025-06-19 RX ORDER — LEVOTHYROXINE SODIUM 50 UG/1
50 TABLET ORAL
Status: DISCONTINUED | OUTPATIENT
Start: 2025-06-20 | End: 2025-06-24 | Stop reason: HOSPADM

## 2025-06-19 RX ORDER — ATORVASTATIN CALCIUM 40 MG/1
80 TABLET, FILM COATED ORAL DAILY
Status: DISCONTINUED | OUTPATIENT
Start: 2025-06-20 | End: 2025-06-24 | Stop reason: HOSPADM

## 2025-06-19 RX ORDER — POTASSIUM CHLORIDE 750 MG/1
40 CAPSULE, EXTENDED RELEASE ORAL ONCE
Status: COMPLETED | OUTPATIENT
Start: 2025-06-19 | End: 2025-06-19

## 2025-06-19 RX ADMIN — FAMOTIDINE 20 MG: 20 TABLET, FILM COATED ORAL at 21:06

## 2025-06-19 RX ADMIN — MUPIROCIN 1 APPLICATION: 20 OINTMENT TOPICAL at 11:28

## 2025-06-19 RX ADMIN — Medication 10 ML: at 08:34

## 2025-06-19 RX ADMIN — MAGNESIUM SULFATE HEPTAHYDRATE 2 G: 40 INJECTION, SOLUTION INTRAVENOUS at 10:15

## 2025-06-19 RX ADMIN — POTASSIUM CHLORIDE 10 MEQ: 7.46 INJECTION, SOLUTION INTRAVENOUS at 11:28

## 2025-06-19 RX ADMIN — HEPARIN SODIUM 5000 UNITS: 5000 INJECTION INTRAVENOUS; SUBCUTANEOUS at 13:36

## 2025-06-19 RX ADMIN — Medication 10 ML: at 21:06

## 2025-06-19 RX ADMIN — CEFTRIAXONE SODIUM 1000 MG: 1 INJECTION, POWDER, FOR SOLUTION INTRAMUSCULAR; INTRAVENOUS at 12:41

## 2025-06-19 RX ADMIN — TRAZODONE HYDROCHLORIDE 100 MG: 100 TABLET ORAL at 21:06

## 2025-06-19 RX ADMIN — MUPIROCIN 1 APPLICATION: 20 OINTMENT TOPICAL at 17:15

## 2025-06-19 RX ADMIN — POTASSIUM CHLORIDE 10 MEQ: 7.46 INJECTION, SOLUTION INTRAVENOUS at 10:15

## 2025-06-19 RX ADMIN — MUPIROCIN 1 APPLICATION: 20 OINTMENT TOPICAL at 21:06

## 2025-06-19 RX ADMIN — HEPARIN SODIUM 5000 UNITS: 5000 INJECTION INTRAVENOUS; SUBCUTANEOUS at 06:31

## 2025-06-19 RX ADMIN — POTASSIUM CHLORIDE 40 MEQ: 750 CAPSULE, EXTENDED RELEASE ORAL at 10:15

## 2025-06-19 RX ADMIN — SODIUM BICARBONATE 150 MEQ: 84 INJECTION INTRAVENOUS at 13:36

## 2025-06-19 RX ADMIN — SODIUM BICARBONATE 150 MEQ: 84 INJECTION INTRAVENOUS at 23:55

## 2025-06-19 RX ADMIN — POTASSIUM CHLORIDE 10 MEQ: 7.46 INJECTION, SOLUTION INTRAVENOUS at 13:41

## 2025-06-19 RX ADMIN — HEPARIN SODIUM 5000 UNITS: 5000 INJECTION INTRAVENOUS; SUBCUTANEOUS at 21:06

## 2025-06-19 RX ADMIN — POTASSIUM CHLORIDE 10 MEQ: 7.46 INJECTION, SOLUTION INTRAVENOUS at 12:41

## 2025-06-19 NOTE — PLAN OF CARE
Goal Outcome Evaluation:  Plan of Care Reviewed With: (P) patient        Progress: (P) no change  Outcome Evaluation: (P) Patient presents with balance, coordination, endurance, and strength deficits that limits his ability to complete functional mobility safely and independently. Patient will benefit from skilled PT services to address these deficits and increase functional mobility.    Anticipated Discharge Disposition (PT): (P) sub acute care setting

## 2025-06-19 NOTE — THERAPY EVALUATION
Patient Name: Alberto Rachel  : 1960    MRN: [id number]                              Today's Date: 2025       Admit Date: 2025    Visit Dx:     ICD-10-CM ICD-9-CM   1. Acute renal failure, unspecified acute renal failure type  N17.9 584.9   2. Decreased activities of daily living (ADL)  Z78.9 V49.89     Patient Active Problem List   Diagnosis    Anxiety    Atherosclerosis of coronary artery bypass graft of native heart without angina pectoris    Essential hypertension    Depression    Mixed hyperlipidemia    Type 2 diabetes mellitus with hyperglycemia, without long-term current use of insulin    Hypothyroidism    Arthritis of knee    Illiteracy    Cramps, extremity    Tachycardia    Obesity (BMI 30-39.9)    Degenerative disc disease, lumbar    Primary insomnia    Gastroesophageal reflux disease without esophagitis    Close exposure to COVID-19 virus    Mild intermittent asthma    Candidal dermatitis    Bronchitis    Bilateral shoulder pain    Hyperkalemia    Stage 3a chronic kidney disease (CKD)    Hyponatremia    Onychomycosis    Onychocryptosis    PVD (peripheral vascular disease)    Foot pain, bilateral    ESVIN (acute kidney injury)     Past Medical History:   Diagnosis Date    Anxiety     Arthritis     Atherosclerosis of coronary artery bypass graft of native heart without angina pectoris 10/23/2018    Coronary artery disease with previous bypass, LIMA to the LAD, SVG to ramus, and SVG to RCA in     CAD (coronary artery disease) 10/23/2018    Carotid artery stenosis with cerebral infarction 2015    Dr. St     Cramps, extremity 9/10/2021    Depression     DM2 (diabetes mellitus, type 2) 10/23/2018    Duodenal ulcer     Essential hypertension 10/23/2018    Headache     Heart attack 2015    Heartburn     HLD (hyperlipidemia)     HTN (hypertension)     Injury of right leg 2021    Irregular heart beat     Low back pain 2019    Mild episode of recurrent depressive disorder  10/23/2018    Rash 03/18/2019    Shortness of breath     SOB (shortness of breath)     Type 2 diabetes mellitus with hyperglycemia, without long-term current use of insulin 06/11/2019    Uncontrolled diabetes mellitus 03/18/2019     Past Surgical History:   Procedure Laterality Date    CARDIAC CATHETERIZATION  06/2015    Dr. St     CIRCUMCISION      age 14 yrs old    CORONARY ARTERY BYPASS GRAFT  06/2015    5 bypasses      General Information       Row Name 06/19/25 0937          OT Time and Intention    Document Type evaluation  -AV     Mode of Treatment individual therapy;occupational therapy  -AV       Row Name 06/19/25 0937          General Information    Patient Profile Reviewed yes  -AV     Prior Level of Function independent:;ADL's;transfer  takes sponge baths. stands at sink to groom. standard commode. ambulates with STC. no home O2.  -AV     Existing Precautions/Restrictions fall;oxygen therapy device and L/min  impaired skin integrity. clear liquid diet.  -AV     Barriers to Rehab none identified  -AV       Row Name 06/19/25 0937          Occupational Profile    Reason for Services/Referral (Occupational Profile) Patient is a 64 year old male admitted to [facility] on [facility]/18/25 with weakness/ dizziness and fall. He is currently on 4th floor/ 2L O2 with sats 100%. OT consulted due to recent decline in ADL/ transfer independence. No previous OT services for current condition.  -AV       Row Name 06/19/25 0937          Living Environment    People in Home alone  -AV       Row Name 06/19/25 0937          Home Main Entrance    Number of Stairs, Main Entrance none  -AV       Row Name 06/19/25 0937          Stairs Within Home, Primary    Number of Stairs, Within Home, Primary none  -AV       Row Name 06/19/25 0937          Cognition    Orientation Status (Cognition) --  alert. able to follow commands. flat affect throughout evaluation. soft voice quality noted.  -AV       Row Name 06/19/25 0937           Safety Issues/Impairments Affecting Functional Mobility    Impairments Affecting Function (Mobility) balance;endurance/activity tolerance;strength  -AV               User Key  (r) = Recorded By, (t) = Taken By, (c) = Cosigned By      Initials Name Provider Type    Rigo Rush OT Occupational Therapist                     Mobility/ADL's       Row Name 06/19/25 0940          Transfers    Comment, (Transfers) supine to long sitting moderate assist. declined further transfers.  -AV       Row Name 06/19/25 0940          Activities of Daily Living    BADL Assessment/Intervention --  Independent feeding with setup. mod/max assist bathing and dressing. fisher catheter in place/ no BM during hospitalization.  -AV               User Key  (r) = Recorded By, (t) = Taken By, (c) = Cosigned By      Initials Name Provider Type    Rigo Rush OT Occupational Therapist                   Obj/Interventions       Row Name 06/19/25 0941          Sensory Assessment (Somatosensory)    Sensory Assessment (Somatosensory) UE sensation intact  -AV     Sensory Assessment sensory awareness to light touch  -AV       Row Name 06/19/25 0941          Vision Assessment/Intervention    Visual Impairment/Limitations WFL  -AV     Vision Assessment Comment poor eye contact throughout evaluation  -AV       Row Name 06/19/25 0941          Range of Motion Comprehensive    Comment, General Range of Motion bilateral active shoulder flexion <90  -AV       Row Name 06/19/25 0941          Strength Comprehensive (MMT)    Comment, General Manual Muscle Testing (MMT) Assessment 4(-)/5 bilateral biceps, triceps and   -AV       Row Name 06/19/25 0941          Motor Skills    Motor Skills coordination;functional endurance  -AV     Coordination WFL  right dominant  -AV     Functional Endurance fair minus  -AV       Row Name 06/19/25 0941          Balance    Balance Assessment sitting static balance  -AV     Static Sitting Balance minimal assist   -AV     Position, Sitting Balance long sitting  -AV               User Key  (r) = Recorded By, (t) = Taken By, (c) = Cosigned By      Initials Name Provider Type    Rigo Rush, MAL Occupational Therapist                   Goals/Plan       Row Name 06/19/25 0943          Transfer Goal 1 (OT)    Activity/Assistive Device (Transfer Goal 1, OT) transfers, all  -AV     Cat[place] Level/Cues Needed (Transfer Goal 1, OT) modified independence  -AV     Time Frame (Transfer Goal 1, OT) long term goal (LTG);10 days  -AV       Row Name 06/19/25 0943          Bathing Goal 1 (OT)    Activity/Device (Bathing Goal 1, OT) bathing skills, all  -AV     Cat[place] Level/Cues Needed (Bathing Goal 1, OT) modified independence  -AV     Time Frame (Bathing Goal 1, OT) long term goal (LTG);10 days  -AV       Row Name 06/19/25 0943          Dressing Goal 1 (OT)    Activity/Device (Dressing Goal 1, OT) dressing skills, all  -AV     Cat[place]/Cues Needed (Dressing Goal 1, OT) modified independence  -AV     Time Frame (Dressing Goal 1, OT) long term goal (LTG);10 days  -AV       Row Name 06/19/25 0943          Toileting Goal 1 (OT)    Activity/Device (Toileting Goal 1, OT) toileting skills, all;raised toilet seat  -AV     Cat[place] Level/Cues Needed (Toileting Goal 1, OT) modified independence  -AV     Time Frame (Toileting Goal 1, OT) long term goal (LTG);10 days  -AV       Row Name 06/19/25 0943          Grooming Goal 1 (OT)    Activity/Device (Grooming Goal 1, OT) grooming skills, all  -AV     Cat[place] (Grooming Goal 1, OT) modified independence  -AV     Time Frame (Grooming Goal 1, OT) long term goal (LTG);10 days  -AV       Row Name 06/19/25 0943          Strength Goal 1 (OT)    Strength Goal 1 (OT) Patient will demonstrate 4/5 bilateral biceps, triceps and  to increase ADL/ transfer independence.  -AV     Time Frame (Strength Goal 1, OT) long term goal (LTG);10 days  -AV       Row Name 06/19/25 0943           Problem Specific Goal 1 (OT)    Problem Specific Goal 1 (OT) Patient will demonstrate fair endurance/activity tolerance needed to support ADLs.  -AV     Time Frame (Problem Specific Goal 1, OT) long term goal (LTG);10 days  -AV       Row Name 06/19/25 0943          Therapy Assessment/Plan (OT)    Planned Therapy Interventions (OT) activity tolerance training;BADL retraining;functional balance retraining;occupation/activity based interventions;patient/caregiver education/training;ROM/therapeutic exercise;strengthening exercise;transfer/mobility retraining  -AV               User Key  (r) = Recorded By, (t) = Taken By, (c) = Cosigned By      Initials Name Provider Type    AV Rigo Chambers, MAL Occupational Therapist                   Clinical Impression       Row Name 06/19/25 0942          Pain Assessment    Additional Documentation Pain Scale: FACES Pre/Post-Treatment (Group)  -AV       Row Name 06/19/25 0942          Pain Scale: FACES Pre/Post-Treatment    Pain: FACES Scale, Pretreatment 0-->no hurt  -AV     Posttreatment Pain Rating 0-->no hurt  -AV       [facility] Name 06/19/25 0942          Plan of Care Review    Plan of Care Reviewed With patient  -AV     Progress no change  first session for evaluation  -AV     Outcome Evaluation Patient presents with limitations of balance, strength and endurance/ activity tolerance which are impacting ADL/ transfer independence. Skilled OT services are indicated to remediate/ compensate for deficits to maximize independence and safety with functional ADL tasks.  -AV       Row Name 06/19/25 0942          Therapy Assessment/Plan (OT)    Patient/Family Therapy Goal Statement (OT) none stated  -AV     Rehab Potential (OT) good  -AV     Criteria for Skilled Therapeutic Interventions Met (OT) yes;meets criteria;skilled treatment is necessary  -AV     Therapy Frequency (OT) 5 times/wk  -AV       Row Name 06/19/25 0942          Therapy Plan Review/Discharge Plan (OT)    Equipment Needs  Upon Discharge (OT) walker, rolling;commode chair  -AV     Anticipated Discharge Disposition (OT) sub acute care setting  -AV       Row Name 06/19/25 0942          Vital Signs    O2 Delivery Pre Treatment nasal cannula  -AV     O2 Delivery Intra Treatment nasal cannula  -AV     O2 Delivery Post Treatment nasal cannula  -AV       Row Name 06/19/25 0942          Positioning and Restraints    Pre-Treatment Position in bed  -AV     Post Treatment Position bed  -AV     In Bed call light within reach;encouraged to call for assist;exit alarm on  -AV               User Key  (r) = Recorded By, (t) = Taken By, (c) = Cosigned By      Initials Name Provider Type    AV Rigo Chambers OT Occupational Therapist                   Outcome Measures       Row Name 06/19/25 0945          How much help from another is currently needed...    Putting on and taking off regular lower body clothing? 2  -AV     Bathing (including washing, rinsing, and drying) 2  -AV     Toileting (which includes using toilet bed pan or urinal) 1  -AV     Putting on and taking off regular upper body clothing 2  -AV     Taking care of personal grooming (such as brushing teeth) 2  -AV     Eating meals 4  -AV     AM-PAC 6 Clicks Score (OT) 13  -AV       Row Name 06/19/25 0731 06/18/25 7316       How much help from another person do you currently need...    Turning from your back to your side while in flat bed without using bedrails? 4  -AS 4  -EP    Moving from lying on back to sitting on the side of a flat bed without bedrails? 3  -AS 3  -EP    Moving to and from a bed to a chair (including a wheelchair)? 3  -AS 3  -EP    Standing up from a chair using your arms (e.g., wheelchair, bedside chair)? 3  -AS 3  -EP    Climbing 3-5 steps with a railing? 2  -AS 2  -EP    To walk in hospital room? 3  -AS 3  -EP    AM-PAC 6 Clicks Score (PT) 18  -AS 18  -EP      Row Name 06/19/25 0949          Functional Assessment    Outcome Measure Options AM-PAC 6 Clicks Daily  Activity (OT);Optimal Instrument  -AV       Row Name 06/19/25 0945          Optimal Instrument    Optimal Instrument Optimal - 3  -AV     Bending/Stooping 3  -AV     Standing 5  -AV     Reaching 2  -AV     From the list, choose the 3 activities you would most like to be able to do without any difficulty Bending/stooping;Standing;Reaching  -AV     Total Score Optimal - 3 10  -AV               User Key  (r) = Recorded By, (t) = Taken By, (c) = Cosigned By      Initials Name Provider Type    AV Rigo Chambers OT Occupational Therapist    Tonja Stover RN Registered Nurse    AS Iram Daigle RN Registered Nurse                    Occupational Therapy Education       Title: PT OT SLP Therapies (Done)       Topic: Occupational Therapy (Done)       Point: ADL training (Done)       Learning Progress Summary            Patient Acceptance, E, VU by AV at 6/19/2025 0945                      Point: Home exercise program (Done)       Learning Progress Summary            Patient Acceptance, E, VU by AV at 6/19/2025 0945                      Point: Precautions (Done)       Learning Progress Summary            Patient Acceptance, E, VU by AV at 6/19/2025 0945                      Point: Body mechanics (Done)       Learning Progress Summary            Patient Acceptance, E, VU by AV at 6/19/2025 0945                                      User Key       Initials Effective Dates Name Provider Type Discipline     06/16/21 -  Rigo Chambers OT Occupational Therapist OT                  OT Recommendation and Plan  Planned Therapy Interventions (OT): activity tolerance training, BADL retraining, functional balance retraining, occupation/activity based interventions, patient/caregiver education/training, ROM/therapeutic exercise, strengthening exercise, transfer/mobility retraining  Therapy Frequency (OT): 5 times/wk  Plan of Care Review  Plan of Care Reviewed With: patient  Progress: no change (first session for  evaluation)  Outcome Evaluation: Patient presents with limitations of balance, strength and endurance/ activity tolerance which are impacting ADL/ transfer independence. Skilled OT services are indicated to remediate/ compensate for deficits to maximize independence and safety with functional ADL tasks.     Time Calculation:   Evaluation Complexity (OT)  Review Occupational Profile/Medical/Therapy History Complexity: expanded/moderate complexity  Assessment, Occupational Performance/Identification of Deficit Complexity: 3-5 performance deficits  Clinical Decision Making Complexity (OT): problem focused assessment/low complexity  Overall Complexity of Evaluation (OT): low complexity     Time Calculation- OT       Row Name 06/19/25 0946             Time Calculation- OT    OT Received On 06/19/25  -AV      OT Goal Re-Cert Due Date 06/28/25  -AV         Untimed Charges    OT Eval/Re-eval Minutes 35  -AV         Total Minutes    Untimed Charges Total Minutes 35  -AV       Total Minutes 35  -AV                User Key  (r) = Recorded By, (t) = Taken By, (c) = Cosigned By      Initials Name Provider Type    AV Rigo Chambers OT Occupational Therapist                  Therapy Charges for Today       Code Description Service Date Service Provider Modifiers Qty    [id number]  OT EVAL LOW COMPLEXITY 3 6/19/2025 Rigo Chambers OT GO 1                 Rigo Chambers OT  6/19/2025

## 2025-06-19 NOTE — PLAN OF CARE
Goal Outcome Evaluation:  Plan of Care Reviewed With: patient           Outcome Evaluation: Patient is AO x3, disoriented to time. Blood glucose monitored, treated per MAR. Patient seen by wound care RN today. Patient recieved IV and PO K replacement. IV abx and IV Mg given. Sodium bicarbonate with D5W currently infusing at 100 mL/hr. No complaints of pain or discomfort at this time. No acute changes throughout shift. VSS. Continue with current plan of care.

## 2025-06-19 NOTE — PLAN OF CARE
Goal Outcome Evaluation:  Plan of Care Reviewed With: patient        Progress: no change  Outcome Evaluation: Patient admitted to 4MT[facility] for inpatient ESVIN treatment, patient is not complaining of any pain at this time, is on room air with no signs of distress, patient has a fisher in place with urine output, is on a clear liquid diet, have taken photos of wounds to place in chart, patient is currently receiving fluids via IV per mar.

## 2025-06-19 NOTE — SIGNIFICANT NOTE
Wound Eval / Progress Noted    MAINOR Parson     Patient Name: Alberto Rachel  : 1960  MRN: [id number]  Today's Date: 2025                 Admit Date: 2025    Visit Dx:    ICD-10-CM ICD-9-CM   1. Acute renal failure, unspecified acute renal failure type  N17.9 584.9   2. Decreased activities of daily living (ADL)  Z78.9 V49.89         ESVIN (acute kidney injury)        Past Medical History:   Diagnosis Date    Anxiety     Arthritis     Atherosclerosis of coronary artery bypass graft of native heart without angina pectoris 10/23/2018    Coronary artery disease with previous bypass, LIMA to the LAD, SVG to ramus, and SVG to RCA in     CAD (coronary artery disease) 10/23/2018    Carotid artery stenosis with cerebral infarction 2015    Dr. tS     Cramps, extremity 9/10/2021    Depression     DM2 (diabetes mellitus, type 2) 10/23/2018    Duodenal ulcer     Essential hypertension 10/23/2018    Headache     Heart attack 2015    Heartburn     HLD (hyperlipidemia)     HTN (hypertension)     Injury of right leg 2021    Irregular heart beat     Low back pain 2019    Mild episode of recurrent depressive disorder 10/23/2018    Rash 2019    Shortness of breath     SOB (shortness of breath)     Type 2 diabetes mellitus with hyperglycemia, without long-term current use of insulin 2019    Uncontrolled diabetes mellitus 2019        Past Surgical History:   Procedure Laterality Date    CARDIAC CATHETERIZATION  2015    Dr. St     CIRCUMCISION      age 14 yrs old    CORONARY ARTERY BYPASS GRAFT  2015    5 bypasses         Physical Assessment:  Wound 25 Left posterior hand (Active)   Wound Image   25 0755   Dressing Appearance open to air 25 0755   Closure None 25 0755   Base closed/resurfaced;dry;scab 25 0755   Periwound intact;blistered;dry 25 0755   Periwound Temperature warm 25 0755   Periwound Skin Turgor soft 25 0755    Edges rolled/closed 06/19/25 0755   Drainage Amount none 06/19/25 0755   Care, Wound cleansed with;sterile normal saline 06/19/25 0755   Dressing Care open to air 06/19/25 0755       Wound 04/18/25 2004 Left anterior knee (Active)   Wound Image   06/19/25 0755   Dressing Appearance open to air 06/19/25 0755   Closure None 06/19/25 0755   Base closed/resurfaced;dry;scab 06/19/25 0755   Periwound intact;dry;redness 06/19/25 0755   Periwound Temperature warm 06/19/25 0755   Periwound Skin Turgor soft 06/19/25 0755   Edges rolled/closed 06/19/25 0755   Drainage Amount none 06/19/25 0755   Care, Wound cleansed with;sterile normal saline 06/19/25 0755   Dressing Care open to air 06/19/25 0755        Wound Check / Follow-up: Patient was seen today for a wound consult. Patient was awake, alert and oriented at the time of the assessment; he was agreeable to the visit.     Patient with thick dry scabbing to the left anterior knee and left hand with redness to the wound edges. No active drainage or fluctuant noted. Recommending TID application of mupirocin ointment to the scabbing and leave open to air    Blood filled blister present to the left anterior hand, blistering is intact. Recommending to cover the blister with non-adherent petroleum gauze and secure with a band and or mini-silicone border dressing.     Blanchable intact redness to buttocks.  Recommending 3 times daily application of the blue top moisture barrier and leave open to air.  Implement every 2 hours turns and offload at all times.    Impression: Scattered scabs, blanchable redness to buttocks, blister    Short term goals: Regain skin integrity, skin protection, moisture prevention, pressure reduction, daily dressing change, topical treatment, quality skin care hygiene.    Araceli Rudolph RN    6/19/2025    10:14 EDT

## 2025-06-19 NOTE — PLAN OF CARE
Goal Outcome Evaluation:  Plan of Care Reviewed With: patient        Progress: no change (first session for evaluation)  Outcome Evaluation: Patient presents with limitations of balance, strength and endurance/ activity tolerance which are impacting ADL/ transfer independence. Skilled OT services are indicated to remediate/ compensate for deficits to maximize independence and safety with functional ADL tasks.    Anticipated Discharge Disposition (OT): sub acute care setting

## 2025-06-19 NOTE — H&P
[facility]  Date: 2025   Patient Name: Alberto Rachel  : 1960  MRN: [id number]  Primary Care Physician:  Magnus Euceda DO  Date of admission: 2025    Subjective   Subjective     Chief Complaint: Generalized weakness, dizziness    HPI:    Alberto Rachel is a 64 y.o. male with a past medical history of hypertension, hyperlipidemia, type 2 diabetes mellitus, hypothyroidism, CKD stage IIIa, CAD s/p CABG presented to the ED for evaluation of weakness and dizziness that started on  and has been worsening.  Patient noted to have generalized weakness.  Had a witnessed fall while getting out of the car on , did not hit his head.  Patient has been experiencing multiple episodes of diarrhea since last few days.  Denies any nausea, vomiting.  Poor oral intake for the past few days.  Denies any abdominal pain, chest pain, fevers, chills, hematochezia, melena.  Patient is on Lasix, chlorthalidone, telmisartan at home.  Orozco catheter placed in the ER and drained 500 cc urine    Vital signs upon arrival temperature 97.6, pulse 92, respiratory 21, blood pressure 133/72 on room air saturating at 96%.  Initial troponin 60, repeat 57, sodium 124, potassium 3, chloride 79, bicarb 17.7, creatinine 3.50, month ago was 1.07, rest of the CMP with no significant findings, TSH 6.16, free T42.2, lactic acid 2.4, repeat 1.9, WBC 14.2, normal hemoglobin, platelets, urinalysis showed 3+ bacteria numerous to count WBC, cloudy appearance.  CT abdomen pelvis without contrast showed no acute findings.  Chest x-ray showed no active disease.  Received 1 L IV fluids and ceftriaxone in the ED.  Patient admitted for further evaluation and management of ESVIN, gastroenteritis, dehydration, moderate hyponatremia, anion gap metabolic acidosis, UTI      Personal History     Past Medical History:   Diagnosis Date    Anxiety     Arthritis     Atherosclerosis of coronary artery bypass  graft of native heart without angina pectoris 10/23/2018    Coronary artery disease with previous bypass, LIMA to the LAD, SVG to ramus, and SVG to RCA in 2015    CAD (coronary artery disease) 10/23/2018    Carotid artery stenosis with cerebral infarction 06/01/2015    Dr. St     Cramps, extremity 9/10/2021    Depression     DM2 (diabetes mellitus, type 2) 10/23/2018    Duodenal ulcer     Essential hypertension 10/23/2018    Headache     Heart attack 2015    Heartburn     HLD (hyperlipidemia)     HTN (hypertension)     Injury of right leg 8/13/2021    Irregular heart beat     Low back pain 03/18/2019    Mild episode of recurrent depressive disorder 10/23/2018    Rash 03/18/2019    Shortness of breath     SOB (shortness of breath)     Type 2 diabetes mellitus with hyperglycemia, without long-term current use of insulin 06/11/2019    Uncontrolled diabetes mellitus 03/18/2019         Past Surgical History:   Procedure Laterality Date    CARDIAC CATHETERIZATION  06/2015    Dr. St     CIRCUMCISION      age 14 yrs old    CORONARY ARTERY BYPASS GRAFT  06/2015    5 bypasses         Family History   Problem Relation Age of Onset    Heart disease Other          Social History     Socioeconomic History    Marital status:    Tobacco Use    Smoking status: Never     Passive exposure: Past    Smokeless tobacco: Current     Types: Chew   Vaping Use    Vaping status: Never Used   Substance and Sexual Activity    Alcohol use: Not Currently    Drug use: Never    Sexual activity: Defer         Home Medications:  ARIPiprazole, Semaglutide, albuterol sulfate HFA, aspirin, atorvastatin, bumetanide, chlorthalidone, citalopram, cyanocobalamin, empagliflozin, famotidine, fenofibrate, levocetirizine, levothyroxine, metFORMIN ER, metoprolol succinate XL, nitroglycerin, ondansetron ODT, pioglitazone, polyethylene glycol, telmisartan, and traZODone    Allergies:  Allergies   Allergen Reactions    Cyclobenzaprine Unknown - Low  Severity     weakness         Objective   Objective     Vitals:   Temp:  [97.4 °F (36.3 °C)-97.6 °F (36.4 °C)] 97.6 °F (36.4 °C)  Heart Rate:  [] 90  Resp:  [21-28] 25  BP: ()/(52-80) 128/63    Physical Exam    Constitutional: Awake, fatigued, dry   Eyes: Pupils equal, sclerae anicteric, no conjunctival injection   HENT: NCAT, mucous membranes dry   Respiratory: Clear to auscultation bilaterally, nonlabored respirations    Cardiovascular: RRR, no murmurs   Gastrointestinal: Positive bowel sounds, soft, nontender, nondistended   Musculoskeletal: No bilateral ankle edema, no clubbing or cyanosis to extremities   Neurologic: Oriented x 3, speech clear    Result Review    Result Review:  I have personally reviewed the results from the time of this admission to 6/18/2025 22:11 EDT and agree with these findings:  [x]  Laboratory  []  Microbiology  [x]  Radiology  [x]  EKG/Telemetry   []  Cardiology/Vascular   []  Pathology  []  Old records  []  Other:        Imaging Results (Last 24 Hours)       Procedure Component Value Units Date/Time    CT Abdomen Pelvis Without Contrast [[id number]] Collected: 06/18/25 2000     Updated: 06/18/25 2007    Narrative:      CT ABDOMEN PELVIS WO CONTRAST    Date of Exam: 6/18/2025 6:55 PM EDT    Indication: Flank pain, kidney stone suspected  Abdominal pain  flank pain.    Comparison: CT abdomen pelvis 4/19/2025    Technique: Axial CT images were obtained of the abdomen and pelvis without the administration of contrast. Reconstructed coronal and sagittal images were also obtained. Automated exposure control and iterative construction methods were used.      Findings:  Included Chest: Bibasal atelectasis. Coronary artery calcifications. Status post CABG    Liver: No significant abnormality.     Gallbladder and biliary tree: No significant abnormality.     Spleen: No significant abnormality.     Pancreas: No significant abnormality.     Adrenal glands: No significant abnormality.      Kidneys and ureters: Bilateral renal cysts including hyperattenuating left renal cyst which could represent a hemorrhagic or proteinaceous cyst. No hydroureteronephrosis.     Stomach and duodenum:No significant abnormality.    Small and large bowel: Large volume stool in the rectum and sigmoid colon. No evidence of bowel obstruction. No significant pericolonic inflammatory changes seen. No findings to suggest acute appendicitis.    Peritoneal cavity: No free fluid or free air.    Bladder: Orozco in decompressed bladder.     Pelvic organs: No significant abnormality.     Vasculature: Atherosclerotic calcifications.     Lymph nodes: No pathologic appearing lymph nodes by imaging criteria.     Bones and soft tissues: Degenerative changes of the imaged spine. No acute osseous abnormality.      Impression:      Impression:  No acute findings in the abdomen/pelvis.      Electronically Signed: Castro Ponce MD    6/18/2025 8:05 PM EDT    Workstation ID: [id number]    XR Chest 1 View [[id number]] Collected: 06/18/25 1631     Updated: 06/18/25 1634    Narrative:      XR CHEST 1 VW    Date of Exam: 6/18/2025 4:29 PM EDT    Indication: Dysrhythmia Triage Protocol    Comparison: 4/23/2025    Findings:  Heart size is within normal limits. There are postoperative changes of prior sternotomy and presumably bypass. Pulmonary vascular markings in the chest are normal. The lungs are clear. No acute infiltrates or pleural fluid collections are identified.      Impression:      Impression:  1.Postoperative changes of prior sternotomy and presumably bypass.  2.No active disease.        Electronically Signed: Dino Dailey MD    6/18/2025 4:32 PM EDT    Workstation ID: [id number]                   Assessment & Plan   Assessment / Plan     Assessment/Plan:   Gastroenteritis likely viral   Dehydration  ESVIN due to prerenal from dehydration  Anion gap metabolic acidosis due to ESVIN and diarrhea  Moderate hyponatremia likely due to  dehydration  UTI  Hypokalemia  Hypertension  Hyperlipidemia  Type 2 diabetes mellitus  Hypothyroidism  CKD stage IIIa  CAD status post CABG    Plan  Admit to inpatient, telemetry  Received 1 L IV fluids, continue normal saline infusion at 100 cc/h  Monitor urine output  Repeat BMP at midnight and adjust the IV fluid rate based on the sodium levels  Monitor creatinine, electrolytes with BMP at midnight and with a.m. labs  Follow-up on urine culture, enteric bacterial panel, stool C. difficile  Continue ceftriaxone  Ordered 40 mEq p.o. recheck and replace with a.m. labs  Hold Bumex, chlorthalidone, Jardiance, metoprolol, telmisartan in the setting of ESVIN and dehydration.  Resume when appropriate  Clear liquid diet  Advance diet as tolerated  Low-dose sliding scale insulin  Resume other appropriate home medications once reconciled  Fall precautions  PT OT consult    VTE Prophylaxis:  Pharmacologic VTE prophylaxis orders are signed & held.      CODE STATUS:    Code Status (Patient has no pulse and is not breathing): CPR (Attempt to Resuscitate)  Medical Interventions (Patient has pulse or is breathing): Full Support  Level Of Support Discussed With: Patient    Admission Status:  I believe this patient meets inpatient status.    Part of this note may be an electronic transcription/translation of spoken language to printed text using the Dragon Dictation System    Narendra Sutton MD

## 2025-06-19 NOTE — NURSING NOTE
Patient Alberto Rachel admitted to 4MT[facility] from the ED. Patient is alert and oriented to person, place, and situation. Patient oriented to unit, call light system and bed alarm use, teaching effective. Patient agreed to bed alarm use. Candida Auris screening revealed patient will NOT need tested, contact isolation and bleach cleaning due to no risk factors. Patient currently is not a concern for an active CDIFF infection.    4 eyes skin assessment completed with Arelsi Castillo RN. Per policy, the following skin interventions were put in place as a result of the skin assessment below: Wound care/nutrition consulted for present wound, Wound care/nutrition consulted for skin breakdown, LDA created and pictures and measurements taken of present wounds, and LDA created and pictures and measurements taken of current skin breakdown    Skin Assessments (most recent)      Flowsheet Row Most Recent Value   Skin WDL .WDL except, all   Skin Color/Characteristics maroon/purple, redness blanchable   Skin Temperature cool   Skin Moisture dry, flaky   Skin Elasticity slow return to original state   Skin Integrity bruised (ecchymotic), scab   Additional Documentation Wound (LDA)   Sensory Perception 3-->slightly limited   Moisture 3-->occasionally moist   Activity 3-->walks occasionally   Mobility 3-->slightly limited   Nutrition 2-->probably inadequate   Friction and Shear 2-->potential problem   Ferny Score 16            Fall assessment completed. Per policy, the patient was determined be a High fall risk due to [employer] score 50 or greater (only used after first 24 hours of admission). Patient assessed to need the following mobility assistance: 1 Assist with the following assistive devices: Walker, and will be using a bedside commode and a urinal for toileting. Per policy, the following fall interventions were put in place: Bed Alarm, Assistive devices at beside (hearing aids, glasses, walker, lifts), and Room  close to nurse's station.     Patient's belongings that were sent with family:   Patient's belongings that were sent to security: None  Patient's belongings locked in safe box in room: None  Patient's belongings that were sent to pharmacy: None  Patient's belongings kept at bedside per patient: Clothing

## 2025-06-19 NOTE — THERAPY EVALUATION
Acute Care - Physical Therapy Initial Evaluation  MAINOR Parson     Patient Name: Alberto Rachel  : 1960  MRN: [id number]  Today's Date: 2025      Visit Dx:     ICD-10-CM ICD-9-CM   1. Acute renal failure, unspecified acute renal failure type  N17.9 584.9   2. Decreased activities of daily living (ADL)  Z78.9 V49.89   3. Difficulty walking  R26.2 719.7     Patient Active Problem List   Diagnosis    Anxiety    Atherosclerosis of coronary artery bypass graft of native heart without angina pectoris    Essential hypertension    Depression    Mixed hyperlipidemia    Type 2 diabetes mellitus with hyperglycemia, without long-term current use of insulin    Hypothyroidism    Arthritis of knee    Illiteracy    Cramps, extremity    Tachycardia    Obesity (BMI 30-39.9)    Degenerative disc disease, lumbar    Primary insomnia    Gastroesophageal reflux disease without esophagitis    Close exposure to COVID-19 virus    Mild intermittent asthma    Candidal dermatitis    Bronchitis    Bilateral shoulder pain    Hyperkalemia    Stage 3a chronic kidney disease (CKD)    Hyponatremia    Onychomycosis    Onychocryptosis    PVD (peripheral vascular disease)    Foot pain, bilateral    ESVIN (acute kidney injury)     Past Medical History:   Diagnosis Date    Anxiety     Arthritis     Atherosclerosis of coronary artery bypass graft of native heart without angina pectoris 10/23/2018    Coronary artery disease with previous bypass, LIMA to the LAD, SVG to ramus, and SVG to RCA in     CAD (coronary artery disease) 10/23/2018    Carotid artery stenosis with cerebral infarction 2015    Dr. St     Cramps, extremity 9/10/2021    Depression     DM2 (diabetes mellitus, type 2) 10/23/2018    Duodenal ulcer     Essential hypertension 10/23/2018    Headache     Heart attack 2015    Heartburn     HLD (hyperlipidemia)     HTN (hypertension)     Injury of right leg 2021    Irregular heart beat     Low back pain 2019    Mild  episode of recurrent depressive disorder 10/23/2018    Rash 03/18/2019    Shortness of breath     SOB (shortness of breath)     Type 2 diabetes mellitus with hyperglycemia, without long-term current use of insulin 06/11/2019    Uncontrolled diabetes mellitus 03/18/2019     Past Surgical History:   Procedure Laterality Date    CARDIAC CATHETERIZATION  06/2015    Dr. St     CIRCUMCISION      age 14 yrs old    CORONARY ARTERY BYPASS GRAFT  06/2015    5 bypasses     PT Assessment (Last 12 Hours)       PT Evaluation and Treatment       Row Name 06/19/25 1149          Physical Therapy Time and Intention    Subjective Information no complaints (P)   -LC     Document Type evaluation (P)   -LC     Mode of Treatment individual therapy;physical therapy (P)   -LC     Patient Effort good (P)   -LC     Symptoms Noted During/After Treatment fatigue (P)   -LC       Row Name 06/19/25 1149          General Information    Patient Profile Reviewed yes (P)   -LC     Patient Observations alert;cooperative;agree to therapy (P)   -LC     Prior Level of Function independent:;all household mobility;gait;transfer;bed mobility (P)   Patient lives at home, alone. Patient uses a STC for ambulation and has a SW available if needed. Patient has no steps to enter home or inside. No home 02.  -     Equipment Currently Used at Home cane, straight (P)   -LC     Existing Precautions/Restrictions fall;oxygen therapy device and L/min (P)   -LC       Row Name 06/19/25 1149          Living Environment    Current Living Arrangements home (P)   -LC     Home Accessibility wheelchair accessible (P)   -LC     People in Home alone (P)   -LC     Primary Care Provided by self (P)   -LC       Row Name 06/19/25 1149          Home Main Entrance    Number of Stairs, Main Entrance none (P)   -LC       Row Name 06/19/25 1149          Stairs Within Home, Primary    Number of Stairs, Within Home, Primary none (P)   -LC       Row Name 06/19/25 1149          Home Use of  Assistive/Adaptive Equipment    Equipment Currently Used at Home cane, straight (P)   -LC       Row Name 06/19/25 1149          Pain    Additional Documentation Pain Scale: FACES Pre/Post-Treatment (Group) (P)   -LC       Row Name 06/19/25 1149          Pain Scale: FACES Pre/Post-Treatment    Pain: FACES Scale, Pretreatment 0-->no hurt (P)   -     Posttreatment Pain Rating 0-->no hurt (P)   -LC       Row Name 06/19/25 1149          Cognition    Affect/Mental Status (Cognition) flat/blunted affect (P)   -     Behavioral Issues (Cognition) unable/difficult to assess (P)   -     Orientation Status (Cognition) -- (P)   Patient was alert and able to follow commands. Flat affect displayed throughout the session, low voice with ability to only say few words at a time noted.  -     Cognitive Function (Cognition) WFL (P)   -     Personal Safety Interventions gait belt;nonskid shoes/slippers when out of bed (P)   -LC       Row Name 06/19/25 1149          Range of Motion (ROM)    Range of Motion ROM is WFL (P)   -LC       Row Name 06/19/25 1149          Range of Motion Comprehensive    General Range of Motion bilateral upper extremity ROM WFL (P)   -LC       Row Name 06/19/25 1149          Bed Mobility    Bed Mobility bed mobility (all) activities (P)   -     All Activities, Spencer (Bed Mobility) minimum assist (75% patient effort) (P)   -     Bed Mobility, Safety Issues decreased use of legs for bridging/pushing (P)   -     Assistive Device (Bed Mobility) head of bed elevated (P)   -LC       Row Name 06/19/25 1149          Transfers    Transfers sit-stand transfer;stand-sit transfer (P)   -LC       Row Name 06/19/25 1149          Sit-Stand Transfer    Sit-Stand Spencer (Transfers) minimum assist (75% patient effort) (P)   -     Assistive Device (Sit-Stand Transfers) walker, front-wheeled (P)   -LC       Row Name 06/19/25 1149          Stand-Sit Transfer    Stand-Sit [place] (Transfers)  "minimum assist (75% patient effort) (P)   -LC     Assistive Device (Stand-Sit Transfers) walker, front-wheeled (P)   -LC       Row Name 06/19/25 1149          Gait/Stairs (Locomotion)    Comment, (Gait/Stairs) Patient declined gait training secondary to fatigue. (P)   -LC       Row Name 06/19/25 1149          Safety Issues/Impairments Affecting Functional Mobility    Safety Issues Affecting Function (Mobility) sequencing abilities;judgment;insight into deficits/self-awareness;awareness of need for assistance (P)   -LC     Impairments Affecting Function (Mobility) balance;endurance/activity tolerance;strength;coordination (P)   -LC       Row Name 06/19/25 1149          Balance    Balance Assessment standing static balance (P)   -LC     Static Sitting Balance minimal assist (P)   -     Static Standing Balance minimal assist (P)   -     Position/Device Used, Standing Balance supported;walker, front-wheeled (P)   -LC       Row Name             Wound 04/18/25 2004 Left posterior hand    Wound - Properties Group Placement Date: 04/18/25  -HB Placement Time: 2004  -HB Present on Original Admission: --  -HB, unknown  Side: Left  -HB Orientation: posterior  -HB Location: hand  -HB Additional Comments: optifoam placed on hand, unknown when placed. Patient states, \"I did it when I fell at home\"  -HB    Retired Wound - Properties Group Placement Date: 04/18/25  -HB Placement Time: 2004  -HB Present on Original Admission: --  -HB, unknown  Side: Left  -HB Orientation: posterior  -HB Location: hand  -HB Additional Comments: optifoam placed on hand, unknown when placed. Patient states, \"I did it when I fell at home\"  -HB    Retired Wound - Properties Group Placement Date: 04/18/25  -HB Placement Time: 2004  -HB Present on Original Admission: --  -HB, unknown  Side: Left  -HB Orientation: posterior  -HB Location: hand  -HB Additional Comments: optifoam placed on hand, unknown when placed. Patient states, \"I did it when I fell " "at home\"  -HB    Retired Wound - Properties Group Date first assessed: 04/18/25  -HB Time first assessed: 2004  -HB Present on Original Admission: --  -HB, unknown  Side: Left  -HB Location: hand  -HB Additional Comments: optifoam placed on hand, unknown when placed. Patient states, \"I did it when I fell at home\"  -HB      Row Name             [REMOVED] Wound 04/18/25 2312 Left posterior elbow    Wound - Properties Group Placement Date: 04/18/25  -HB Placement Time: 2312  -HB Side: Left  -HB Orientation: posterior  -HB Location: elbow  -HB Additional Comments: optifoam placed on elbow, unknown when placed. Patient states, \"I did it when I fell at home\"  -HB Removal Date: 06/19/25  -ANTOLIN Removal Time: 1012  -ANTOLIN    Retired Wound - Properties Group Placement Date: 04/18/25  -HB Placement Time: 2312  -HB Side: Left  -HB Orientation: posterior  -HB Location: elbow  -HB Additional Comments: optifoam placed on elbow, unknown when placed. Patient states, \"I did it when I fell at home\"  -HB Removal Date: 06/19/25  -ANTOLIN Removal Time: 1012  -ANTOLIN    Retired Wound - Properties Group Placement Date: 04/18/25  -HB Placement Time: 2312  -HB Side: Left  -HB Orientation: posterior  -HB Location: elbow  -HB Additional Comments: optifoam placed on elbow, unknown when placed. Patient states, \"I did it when I fell at home\"  -HB Removal Date: 06/19/25  -ANTOLIN Removal Time: 1012  -ANTOLIN    Retired Wound - Properties Group Date first assessed: 04/18/25  -HB Time first assessed: 2312  -HB Side: Left  -HB Location: elbow  -HB Additional Comments: optifoam placed on elbow, unknown when placed. Patient states, \"I did it when I fell at home\"  -HB Resolution Date: 06/19/25  -ANTOLIN Resolution Time: 1012  -ANTOLIN      Row Name             Wound 04/18/25 2004 Left anterior knee    Wound - Properties Group Placement Date: 04/18/25  -HB, unknown  Placement Time: 2004  -HB Present on Original Admission: --  -HB, unknown  Side: Left  -HB Orientation: anterior  -HB " "Location: knee  -HB Additional Comments: optifoam placed, unknown when placed. Patient states, \"I did it when I fell at home\"  -HB    Retired Wound - Properties Group Placement Date: 04/18/25  -HB, unknown  Placement Time: 2004 -HB Present on Original Admission: --  -HB, unknown  Side: Left  -HB Orientation: anterior  -HB Location: knee  -HB Additional Comments: optifoam placed, unknown when placed. Patient states, \"I did it when I fell at home\"  -HB    Retired Wound - Properties Group Placement Date: 04/18/25  -HB, unknown  Placement Time: 2004 -HB Present on Original Admission: --  -HB, unknown  Side: Left  -HB Orientation: anterior  -HB Location: knee  -HB Additional Comments: optifoam placed, unknown when placed. Patient states, \"I did it when I fell at home\"  -HB    Retired Wound - Properties Group Date first assessed: 04/18/25  -HB, unknown  Time first assessed: 2004 -HB Present on Original Admission: --  -HB, unknown  Side: Left  -HB Location: knee  -HB Additional Comments: optifoam placed, unknown when placed. Patient states, \"I did it when I fell at home\"  -HB      Row Name             [REMOVED] Wound 04/21/25 1035 Left anterior thigh    Wound - Properties Group Placement Date: 04/21/25  -KE Placement Time: 1035  -KE Side: Left  -KE Orientation: anterior  -KE Location: thigh  -KE Removal Date: 06/19/25  -ANTOLIN Removal Time: 1012  -ANTOLIN    Retired Wound - Properties Group Placement Date: 04/21/25  -KE Placement Time: 1035  -KE Side: Left  -KE Orientation: anterior  -KE Location: thigh  -KE Removal Date: 06/19/25  -ANTOLIN Removal Time: 1012  -ANTOLIN    Retired Wound - Properties Group Placement Date: 04/21/25  -KE Placement Time: 1035  -KE Side: Left  -KE Orientation: anterior  -KE Location: thigh  -KE Removal Date: 06/19/25  -ANTOLIN Removal Time: 1012  -ANTOLIN    Retired Wound - Properties Group Date first assessed: 04/21/25  -KE Time first assessed: 1035  -KE Side: Left  -KE Location: thigh  -KE Resolution Date: 06/19/25  " "-ANTOLIN Resolution Time: 1012 -ANTOLIN      Row Name             [REMOVED] Wound 04/18/25 2004 Right anterior knee    Wound - Properties Group Placement Date: 04/18/25  -HB Placement Time: 2004  -HB Present on Original Admission: --  -HB, unknown  Side: Right  -HB Orientation: anterior  -HB Location: knee  -HB Additional Comments: optifoam placed, unknown when. Patient states, \"I did it when I fell at home\"  -HB Removal Date: 06/19/25  -ANTOLIN Removal Time: 1012 -ANTOLIN    Retired Wound - Properties Group Placement Date: 04/18/25  -HB Placement Time: 2004  -HB Present on Original Admission: --  -HB, unknown  Side: Right  -HB Orientation: anterior  -HB Location: knee  -HB Additional Comments: optifoam placed, unknown when. Patient states, \"I did it when I fell at home\"  -HB Removal Date: 06/19/25  -ANTOLIN Removal Time: 1012 -ANTOLIN    Retired Wound - Properties Group Placement Date: 04/18/25  -HB Placement Time: 2004  -HB Present on Original Admission: --  -HB, unknown  Side: Right  -HB Orientation: anterior  -HB Location: knee  -HB Additional Comments: optifoam placed, unknown when. Patient states, \"I did it when I fell at home\"  -HB Removal Date: 06/19/25  -ANTOLIN Removal Time: 1012 -ANTOLIN    Retired Wound - Properties Group Date first assessed: 04/18/25  -HB Time first assessed: 2004  -HB Present on Original Admission: --  -HB, unknown  Side: Right  -HB Location: knee  -HB Additional Comments: optifoam placed, unknown when. Patient states, \"I did it when I fell at home\"  -HB Resolution Date: 06/19/25  -ANTOLIN Resolution Time: 1012 -ANTOLIN      Row Name             [REMOVED] Wound 04/18/25 2004 Right proximal arm    Wound - Properties Group Placement Date: 04/18/25  -HB Placement Time: 2004  -HB Present on Original Admission: --  -HB, unknown  Side: Right  -HB Orientation: proximal  -HB Location: arm  -HB Additional Comments: optifoam placed, unknown when placed. Patient states, \"I did it when I fell at home\"  -HB Removal Date: 06/19/25  -ANTOLIN Removal " "Time: 1013 -ANTOLIN    Retired Wound - Properties Group Placement Date: 04/18/25  -HB Placement Time: 2004  -HB Present on Original Admission: --  -HB, unknown  Side: Right  -HB Orientation: proximal  -HB Location: arm  -HB Additional Comments: optifoam placed, unknown when placed. Patient states, \"I did it when I fell at home\"  -HB Removal Date: 06/19/25  -ANTOLIN Removal Time: 1013 -ANTOLIN    Retired Wound - Properties Group Placement Date: 04/18/25  -HB Placement Time: 2004  -HB Present on Original Admission: --  -HB, unknown  Side: Right  -HB Orientation: proximal  -HB Location: arm  -HB Additional Comments: optifoam placed, unknown when placed. Patient states, \"I did it when I fell at home\"  -HB Removal Date: 06/19/25  -ANTOLIN Removal Time: 1013 -ANTOLIN    Retired Wound - Properties Group Date first assessed: 04/18/25  -HB Time first assessed: 2004  -HB Present on Original Admission: --  -HB, unknown  Side: Right  -HB Location: arm  -HB Additional Comments: optifoam placed, unknown when placed. Patient states, \"I did it when I fell at home\"  -HB Resolution Date: 06/19/25  -ANTOLIN Resolution Time: 1013 -JP      Row Name 06/19/25 1149          Plan of Care Review    Plan of Care Reviewed With patient (P)   -LC     Progress no change (P)   -LC     Outcome Evaluation Patient presents with balance, coordination, endurance, and strength deficits that limits his ability to complete functional mobility safely and independently. Patient will benefit from skilled PT services to address these deficits and increase functional mobility. (P)   -LC       Row Name 06/19/25 1149          Vital Signs    O2 Delivery Pre Treatment nasal cannula (P)   -LC     O2 Delivery Intra Treatment nasal cannula (P)   -LC     O2 Delivery Post Treatment nasal cannula (P)   -LC     Pre Patient Position Supine (P)   -LC     Intra Patient Position Standing (P)   -LC     Post Patient Position Supine (P)   -LC       Row Name 06/19/25 1149          Positioning and " Restraints    Pre-Treatment Position in bed (P)   -LC     Post Treatment Position bed (P)   -LC     In Bed fowlers;encouraged to call for assist;call light within reach;exit alarm on;with nsg (P)   -       Row Name 06/19/25 1144          Therapy Assessment/Plan (PT)    Rehab Potential (PT) good (P)   -LC     Criteria for Skilled Interventions Met (PT) yes;meets criteria;skilled treatment is necessary (P)   -LC     Therapy Frequency (PT) daily (P)   -LC     Predicted Duration of Therapy Intervention (PT) 10 days (P)   -LC     Problem List (PT) balance;problems related to;coordination;mobility;strength (P)   -     Activity Limitations Related to Problem List (PT) unable to transfer safely;unable to ambulate safely (P)   -       Row Name 06/19/25 1146          Therapy Plan Review/Discharge Plan (PT)    Therapy Plan Review (PT) evaluation/treatment results reviewed;patient (P)   -[facility] Name 06/19/25 1140          Physical Therapy Goals    Bed Mobility Goal Selection (PT) bed mobility, PT goal 1 (P)   -LC     Transfer Goal Selection (PT) transfer, PT goal 1 (P)   -LC     Gait Training Goal Selection (PT) gait training, PT goal 1 (P)   -       Row Name 06/19/25 1147          Bed Mobility Goal 1 (PT)    Activity/Assistive Device (Bed Mobility Goal 1, PT) bed mobility activities, all;bed rails (P)   -LC     [place] Level/Cues Needed (Bed Mobility Goal 1, PT) modified independence (P)   -LC     Time Frame (Bed Mobility Goal 1, PT) 10 days (P)   -       Row Name 06/19/25 1140          Transfer Goal 1 (PT)    Activity/Assistive Device (Transfer Goal 1, PT) sit-to-stand/stand-to-sit;walker, rolling (P)   -LC     [place] Level/Cues Needed (Transfer Goal 1, PT) modified independence (P)   -LC     Time Frame (Transfer Goal 1, PT) 10 days (P)   -       Row Name 06/19/25 1143          Gait Training Goal 1 (PT)    Activity/Assistive Device (Gait Training Goal 1, PT) gait (walking locomotion);assistive  device use;walker, rolling (P)   -     [place] Level (Gait Training Goal 1, PT) modified independence (P)   -     Distance (Gait Training Goal 1, PT) 150 (P)   -     Time Frame (Gait Training Goal 1, PT) 10 days (P)   -               User Key  (r) = Recorded By, (t) = Taken By, (c) = Cosigned By      Initials Name Provider Type    Irene Girard, RN Registered Nurse    Araceli Brady RN Registered Nurse    Sarah Jones LPN Licensed Nurse    Olga Camejo, PT Student PT Student                    Physical Therapy Education       Title: PT OT SLP Therapies (Done)       Topic: Physical Therapy (Done)       Point: Mobility training (Done)       Learning Progress Summary            Patient Acceptance, E,TB, VU,NR by  at 6/19/2025 1211                      Point: Home exercise program (Done)       Learning Progress Summary            Patient Acceptance, E,TB, VU,NR by  at 6/19/2025 1211                      Point: Body mechanics (Done)       Learning Progress Summary            Patient Acceptance, E,TB, VU,NR by  at 6/19/2025 1211                      Point: Precautions (Done)       Learning Progress Summary            Patient Acceptance, E,TB, VU,NR by  at 6/19/2025 1211                                      User Key       Initials Effective Dates Name Provider Type Discipline     05/28/25 -  Olga Givens, PT Student PT Student PT                  PT Recommendation and Plan  Anticipated Discharge Disposition (PT): (P) sub acute care setting  Planned Therapy Interventions (PT): (P) bed mobility training, balance training, gait training, home exercise program, neuromuscular re-education, patient/family education, strengthening, transfer training  Therapy Frequency (PT): (P) daily  Plan of Care Reviewed With: (P) patient  Progress: (P) no change  Outcome Evaluation: (P) Patient presents with balance, coordination, endurance, and strength deficits that limits his  ability to complete functional mobility safely and independently. Patient will benefit from skilled PT services to address these deficits and increase functional mobility.   Outcome Measures       Row Name 06/19/25 1200             How much help from another person do you currently need...    Turning from your back to your side while in flat bed without using bedrails? 3 (P)   -LC      Moving from lying on back to sitting on the side of a flat bed without bedrails? 3 (P)   -LC      Moving to and from a bed to a chair (including a wheelchair)? 3 (P)   -LC      Standing up from a chair using your arms (e.g., wheelchair, bedside chair)? 3 (P)   -LC      Climbing 3-5 steps with a railing? 2 (P)   -LC      To walk in hospital room? 2 (P)   -LC      AM-PAC 6 Clicks Score (PT) 16 (P)   -LC         Functional Assessment    Outcome Measure Options AM-PAC 6 Clicks Basic Mobility (PT) (P)   -LC                User Key  (r) = Recorded By, (t) = Taken By, (c) = Cosigned By      Initials Name Provider Type    LC Olga Givens, PT Student PT Student                     Time Calculation:    PT Charges       Row Name 06/19/25 1149             Time Calculation    PT Received On 06/19/25 (P)   -LC      PT Goal Re-Cert Due Date 06/28/25 (P)   -LC         Untimed Charges    PT Eval/Re-eval Minutes 35 (P)   -LC         Total Minutes    Untimed Charges Total Minutes 35 (P)   -LC       Total Minutes 35 (P)   -LC                User Key  (r) = Recorded By, (t) = Taken By, (c) = Cosigned By      Initials Name Provider Type     Olga Givens, PT Student PT Student                  Therapy Charges for Today       Code Description Service Date Service Provider Modifiers Qty    [id number] HC PT EVAL LOW COMPLEXITY 3 6/19/2025 Olga Givens, PT Student GP 1            PT G-Codes  Outcome Measure Options: (P) AM-PAC 6 Clicks Basic Mobility (PT)  AM-PAC 6 Clicks Score (PT): (P) 16  AM-PAC 6 Clicks Score (OT): 13    Olga  Chapin Cunningham, PT Student  6/19/2025

## 2025-06-19 NOTE — CONSULTS
Nutrition Services    Patient Name: Alberto Rachel  YOB: 1960  MRN: [id number]  Admission date: 6/18/2025      CLINICAL NUTRITION ASSESSMENT      Reason for Assessment  MST Score 2+     H&P:  Past Medical History:   Diagnosis Date    Anxiety     Arthritis     Atherosclerosis of coronary artery bypass graft of native heart without angina pectoris 10/23/2018    Coronary artery disease with previous bypass, LIMA to the LAD, SVG to ramus, and SVG to RCA in 2015    CAD (coronary artery disease) 10/23/2018    Carotid artery stenosis with cerebral infarction 06/01/2015    Dr. Alma Rosa Rosales, extremity 9/10/2021    Depression     DM2 (diabetes mellitus, type 2) 10/23/2018    Duodenal ulcer     Essential hypertension 10/23/2018    Headache     Heart attack 2015    Heartburn     HLD (hyperlipidemia)     HTN (hypertension)     Injury of right leg 8/13/2021    Irregular heart beat     Low back pain 03/18/2019    Mild episode of recurrent depressive disorder 10/23/2018    Rash 03/18/2019    Shortness of breath     SOB (shortness of breath)     Type 2 diabetes mellitus with hyperglycemia, without long-term current use of insulin 06/11/2019    Uncontrolled diabetes mellitus 03/18/2019        Current Problems:   Active Hospital Problems    Diagnosis     **ESVIN (acute kidney injury)         Nutrition/Diet History         Narrative    Alberto Rachel is a 64 y.o. male with a past medical history of hypertension, hyperlipidemia, type 2 diabetes mellitus, hypothyroidism, CKD stage IIIa, CAD s/p CABG presented to the ED for evaluation of weakness and dizziness that started on Sunday and has been worsening.  Patient noted to have generalized weakness.     Patient is Aa0x4 upon RD visit, NAD. Very soft spoken,  Reports 70% intake of morning meal tray.  Denies any recent instances of NVDC.   Last BM 06/16  Denies any known food allergies.    pounds.        Anthropometrics        Current Height, Weight Height: 172.7  "cm (68\")  Weight: 78.5 kg (173 lb 1 oz)   Current BMI Body mass index is 26.31 kg/m².   BMI Classification Normal range   % IBW    Adjusted Body Weight (ABW)    Weight Hx  Wt Readings from Last 30 Encounters:   06/18/25 2230 78.5 kg (173 lb 1 oz)   06/18/25 1933 82 kg (180 lb 12.4 oz)   05/28/25 1537 84.3 kg (185 lb 12.8 oz)   04/30/25 0542 94.7 kg (208 lb 12.4 oz)   04/29/25 0546 98.4 kg (216 lb 14.9 oz)   04/27/25 0612 96.8 kg (213 lb 6.5 oz)   04/26/25 0500 98.6 kg (217 lb 6 oz)   04/25/25 2138 98.6 kg (217 lb 6 oz)   04/25/25 0502 98.6 kg (217 lb 6 oz)   04/24/25 0453 102 kg (224 lb 10.4 oz)   04/23/25 0527 103 kg (227 lb 15.3 oz)   04/22/25 0539 107 kg (234 lb 12.6 oz)   04/21/25 0443 100 kg (221 lb 1.9 oz)   04/20/25 0549 99.5 kg (219 lb 5.7 oz)   04/19/25 0638 100 kg (221 lb 5.5 oz)   04/18/25 0555 99.9 kg (220 lb 3.8 oz)   04/17/25 0520 100 kg (220 lb 10.9 oz)   04/16/25 0410 100 kg (220 lb 10.9 oz)   04/15/25 1719 101 kg (223 lb 1.7 oz)   04/15/25 1220 102 kg (223 lb 15.8 oz)   10/29/24 1308 103 kg (226 lb)   08/08/24 1248 101 kg (223 lb 9.6 oz)   07/18/24 1342 98.9 kg (218 lb)   07/17/24 1007 98.9 kg (218 lb)   06/19/24 0939 101 kg (223 lb)   06/12/24 0936 98.4 kg (217 lb)   06/05/24 1308 98.4 kg (217 lb)   05/22/24 1458 93.4 kg (205 lb 12.8 oz)   09/06/23 1119 93.4 kg (205 lb 12.8 oz)   05/15/23 1251 87.7 kg (193 lb 6.4 oz)   02/15/23 1205 89.5 kg (197 lb 4.8 oz)   12/16/22 0941 88.4 kg (194 lb 14.4 oz)   11/15/22 0947 93.6 kg (206 lb 6.4 oz)   11/08/22 0909 91.4 kg (201 lb 8 oz)   11/04/22 0939 96.2 kg (212 lb)   10/26/22 1522 93.9 kg (207 lb)   08/23/22 1255 95.8 kg (211 lb 4.8 oz)   05/16/22 1442 97.1 kg (214 lb)   03/15/22 1347 99.2 kg (218 lb 9.6 oz)   03/02/22 1532 101 kg (222 lb 1.6 oz)   02/14/22 1346 100 kg (220 lb 6.4 oz)   01/11/22 1209 98.5 kg (217 lb 3.2 oz)   11/12/21 1348 98.5 kg (217 lb 3.2 oz)   11/05/21 1445 98.2 kg (216 lb 9.6 oz)   10/15/21 1443 96.3 kg (212 lb 3.2 oz)   09/27/21 1434 " "102 kg (223 lb 12.8 oz)   09/17/21 1455 101 kg (223 lb)          Wt Change Observation Significant weight loss noted 4/30-6/18     Estimated/Assessed Needs  Estimated Needs based on: Current Body Weight       Energy Requirements 22-25 kcal/kg    EST Needs (kcal/day) 8759-9718 kcals/d       Protein Requirements 1.0-1.2 g/kg   EST Daily Needs (g/day) 79-94 g/d       Fluid Requirements 1 ml/kcal    Estimated Needs (mL/day) 6680-3633 mL/d     Labs/Medications         Pertinent Labs Reviewed.   Results from last 7 days   Lab Units 06/19/25  0513 06/18/25  2340 06/18/25  1612   SODIUM mmol/L 124* 126* 124*   POTASSIUM mmol/L 2.9* 2.9* 3.0*   CHLORIDE mmol/L 85* 82* 79*   CO2 mmol/L 14.5* 14.6* 17.7*   BUN mg/dL 69.5* 77.5* 83.8*   CREATININE mg/dL 2.63* 3.01* 3.50*   CALCIUM mg/dL 9.0 9.5 9.6   BILIRUBIN mg/dL 1.2  --  1.3*   ALK PHOS U/L 61  --  69   ALT (SGPT) U/L 15  --  20   AST (SGOT) U/L 24  --  31   GLUCOSE mg/dL 83 90 147*     Results from last 7 days   Lab Units 06/19/25  0513 06/18/25  1612   MAGNESIUM mg/dL 1.9 2.1   PHOSPHORUS mg/dL 2.2*  --    HEMOGLOBIN g/dL 13.1 15.2   HEMATOCRIT % 37.5 43.3     No results found for: \"COVID19\"  Lab Results   Component Value Date    HGBA1C 6.4 (A) 05/28/2025         Pertinent Medications Reviewed.     Malnutrition Severity Assessment              Nutrition Diagnosis         Nutrition Dx Problem 1 Inadequate energy Intake related to Inability to consume sufficient energy as evidenced by clear liquid diet.     Nutrition Intervention           Current Nutrition Orders & Evaluation of Intake       Current PO Diet Diet: Liquid; Clear Liquid; Fluid Consistency: Thin (IDDSI 0)   Supplement No active supplement orders           Nutrition Intervention/Prescription        Continue Diet Rx, until patient cleared for solid food. Cardiac/Renal diet appropriate.         Medical Nutrition Therapy/Nutrition Education          Learner     Readiness N/A  N/A     Method     Response N/A  N/A "     Monitor/Evaluation        Monitor PO intake     Nutrition Discharge Plan         To be determined     Electronically signed by:  Venkata Darnell RD  06/19/25 15:07 EDT

## 2025-06-19 NOTE — PROGRESS NOTES
[facility]   Hospitalist Progress Note  Date: 2025  Patient Name: Alberto Rachel  : 1960  MRN: [id number]  Date of admission: 2025  Room/Bed: [facility]/      Subjective   Subjective     Chief Complaint: Generalized weakness, dizziness     Summary:Alberto Rachel is a 64 y.o. male with a past medical history of hypertension, hyperlipidemia, type 2 diabetes mellitus, hypothyroidism, CKD stage IIIa, CAD s/p CABG presented to the ED for evaluation of weakness and dizziness that started on  and has been worsening.  Patient noted to have generalized weakness.  Had a witnessed fall while getting out of the car on , did not hit his head.  Patient has been experiencing multiple episodes of diarrhea since last few days.  Denies any nausea, vomiting.  Poor oral intake for the past few days.  Denies any abdominal pain, chest pain, fevers, chills, hematochezia, melena.  Patient is on Lasix, chlorthalidone, telmisartan at home.  Orozco catheter placed in the ER and drained 500 cc urine.     On presentation to ER, initial troponin 60, repeat 57, sodium 124, potassium 3, chloride 79, bicarb 17.7, creatinine 3.50, month ago was 1.07, rest of the CMP with no significant findings, TSH 6.16, free T42.2, lactic acid 2.4, repeat 1.9, WBC 14.2, normal hemoglobin, platelets, urinalysis showed 3+ bacteria numerous to count WBC, cloudy appearance.  CT abdomen pelvis without contrast showed no acute findings.  Chest x-ray showed no active disease.  Received 1 L IV fluids and ceftriaxone in the ED.  Patient admitted for further evaluation and management of ESVIN, gastroenteritis, dehydration, moderate hyponatremia, anion gap metabolic acidosis, UTI    Interval Followup: Patient laying in bed, denies any active complaints.  Vital stable.  Lactic acidosis resolved.  Hypokalemia with potassium 2.9, repleted.  Hypophosphatemia, repleted.  Nephrology consulted for hyponatremia, ESVIN along with metabolic acidosis.   Started on bicarb drip.    Review of Systems    All systems reviewed and negative except for what is outlined above.      Objective   Objective     Vitals:   Temp:  [97.4 °F (36.3 °C)-98.1 °F (36.7 °C)] 98.1 °F (36.7 °C)  Heart Rate:  [] 81  Resp:  [21-28] 24  BP: ()/(52-82) 97/67    Physical Exam   General: Awake, alert, NAD  Cardiovascular: RRR, no murmurs   Pulmonary: CTA bilaterally; no wheezes; no conversational dyspnea  Gastrointestinal: S/ND/NT, +BS  Neuro: Alert, awake, oriented x 3; speech clear; no tremor      Result Review    Result Review:  I have personally reviewed these results:  [x]  Laboratory      Lab 06/19/25 0513 06/18/25  1928 06/18/25  1613 06/18/25  1612   WBC 12.32*  --   --  14.22*   HEMOGLOBIN 13.1  --   --  15.2   HEMATOCRIT 37.5  --   --  43.3   PLATELETS 229  --   --  267   NEUTROS ABS 9.18*  --   --  11.06*   IMMATURE GRANS (ABS) 0.19*  --   --  0.08*   LYMPHS ABS 1.89  --   --  1.92   MONOS ABS 1.01*  --   --  1.12*   EOS ABS 0.02  --   --  0.01   MCV 83.0  --   --  81.9   LACTATE  --  1.9 2.4*  --          Lab 06/19/25 0513 06/18/25  2340 06/18/25  1612   SODIUM 124* 126* 124*   POTASSIUM 2.9* 2.9* 3.0*   CHLORIDE 85* 82* 79*   CO2 14.5* 14.6* 17.7*   ANION GAP 24.5* 29.4* 27.3*   BUN 69.5* 77.5* 83.8*   CREATININE 2.63* 3.01* 3.50*   EGFR 26.0* 22.1* 18.5*   GLUCOSE 83 90 147*   CALCIUM 9.0 9.5 9.6   MAGNESIUM 1.9  --  2.1   PHOSPHORUS 2.2*  --   --    TSH  --   --  6.160*         Lab 06/19/25 0513 06/18/25  1612   TOTAL PROTEIN 7.2 8.2   ALBUMIN 3.6 4.1   GLOBULIN 3.6 4.1   ALT (SGPT) 15 20   AST (SGOT) 24 31   BILIRUBIN 1.2 1.3*   ALK PHOS 61 69         Lab 06/18/25  1817 06/18/25  1612   HSTROP T 57* 60*                 Brief Urine Lab Results  (Last result in the past 365 days)        Color   Clarity   Blood   Leuk Est   Nitrite   Protein   CREAT   Urine HCG        06/18/25 1818 Yellow   Cloudy   Moderate (2+)   Large (3+)   Negative   Trace                 [x]   Microbiology   Microbiology Results (last 10 days)       ** No results found for the last 240 hours. **          [x]  Radiology  CT Abdomen Pelvis Without Contrast  Result Date: 6/18/2025  Impression: No acute findings in the abdomen/pelvis. Electronically Signed: Castro Ponce MD  6/18/2025 8:05 PM EDT  Workstation ID: [id number]    XR Chest 1 View  Result Date: 6/18/2025  Impression: 1.Postoperative changes of prior sternotomy and presumably bypass. 2.No active disease. Electronically Signed: Dino Dailey MD  6/18/2025 4:32 PM EDT  Workstation ID: [id number]    []  EKG/Telemetry   []  Cardiology/Vascular   []  Pathology  []  Old records  []  Other:    Assessment & Plan   Assessment / Plan     Assessment:  Gastroenteritis likely viral   Dehydration  ESVIN due to prerenal from dehydration  Hyponatremia  Anion gap metabolic acidosis due to ESVIN and diarrhea  UTI  Hypokalemia, repleted  Hyperphosphatemia, repleted  Hypertension  Hyperlipidemia  Type 2 diabetes mellitus  Hypothyroidism  CKD stage IIIa  CAD status post CABG    Plan:  Patient currently being managed in hospitalist service.  Hyponatremia with sodium 124 and ESVIN with creatinine 2.63, creatinine 3.50 on presentation.  Also has anion gap metabolic acidosis.  Started on IV sodium bicarb at 100 cc/h.  Nephrology consulted, appreciate input.  Continue to trend and monitor renal function.  Leukocytosis improving.  Lactic acidosis resolved.  On IV ceftriaxone for UTI.  Urine culture pending.  HypoNatremia with sodium 124.  Obtaining urine osmolarity along with serum osmolarity.  Hypokalemia with potassium 2.9, repleting.  Hypophosphatemia, repleted.  Appreciate further input by nephrologist.  Morning labs reviewed, 6/19/2023.  Appropriate home medications restarted.  Labs in AM.    PT/OT.  Clinical course to dictate further management.     Discussed with RN.    VTE Prophylaxis:  Pharmacologic VTE prophylaxis orders are present.        CODE STATUS:   Code Status  (Patient has no pulse and is not breathing): CPR (Attempt to Resuscitate)  Medical Interventions (Patient has pulse or is breathing): Full Support  Level Of Support Discussed With: Patient      Electronically signed by Eusebia Garcia MD, 06/19/25, 9:56 AM EDT.

## 2025-06-19 NOTE — PROGRESS NOTES
[facility] Progress Note      Patient Name: Alberto Rachel  : 1960  MRN: [id number]  Primary Care Physician:  Magnus Euceda DO  Date of admission: 2025      Neph  Mr  Ha  Nephrology Progress Note - Chart Review Only     Mr Rachel is a 63 yo male w anxiety ,  coronary artery disease s/p CABG , diabetes mellitus , depression , hypertension     Presents for generalized weakness , found to have ESVIN w Cr of 3.5 from baseline 1.0 w HAGMA , and hyponatremia     Labs     Results from last 7 days   Lab Units 25  0513 25  2340 25  1612   SODIUM mmol/L 124* 126* 124*   POTASSIUM mmol/L 2.9* 2.9* 3.0*   CHLORIDE mmol/L 85* 82* 79*   CO2 mmol/L 14.5* 14.6* 17.7*   BUN mg/dL 69.5* 77.5* 83.8*   CREATININE mg/dL 2.63* 3.01* 3.50*   CALCIUM mg/dL 9.0 9.5 9.6   BILIRUBIN mg/dL 1.2  --  1.3*   ALK PHOS U/L 61  --  69   ALT (SGPT) U/L 15  --  20   AST (SGOT) U/L 24  --  31   GLUCOSE mg/dL 83 90 147*       Acute on chronic hyponatremia  ,likely from ESVIN   ESVIN preranal     Plan   -Agree with bicarb drip   -Will follow closely and recommendations will follow     Thank you for involving us in the care of Alberto Rachel.  Please feel free to call with any questions.    Tam Astorga MD  25  11:27 EDT    [facility]  [phone]    Please note that portions of this note were completed with a voice recognition program.

## 2025-06-20 LAB
ANION GAP SERPL CALCULATED.3IONS-SCNC: 10 MMOL/L (ref 5–15)
BASOPHILS # BLD AUTO: 0.03 10*3/MM3 (ref 0–0.2)
BASOPHILS NFR BLD AUTO: 0.4 % (ref 0–1.5)
BUN SERPL-MCNC: 40.5 MG/DL (ref 8–23)
BUN/CREAT SERPL: 25.3 (ref 7–25)
CALCIUM SPEC-SCNC: 8.2 MG/DL (ref 8.6–10.5)
CHLORIDE SERPL-SCNC: 90 MMOL/L (ref 98–107)
CO2 SERPL-SCNC: 27 MMOL/L (ref 22–29)
CREAT SERPL-MCNC: 1.6 MG/DL (ref 0.76–1.27)
DEPRECATED RDW RBC AUTO: 42 FL (ref 37–54)
EGFRCR SERPLBLD CKD-EPI 2021: 47.2 ML/MIN/1.73
EOSINOPHIL # BLD AUTO: 0.12 10*3/MM3 (ref 0–0.4)
EOSINOPHIL NFR BLD AUTO: 1.5 % (ref 0.3–6.2)
ERYTHROCYTE [DISTWIDTH] IN BLOOD BY AUTOMATED COUNT: 13.5 % (ref 12.3–15.4)
GLUCOSE BLDC GLUCOMTR-MCNC: 101 MG/DL (ref 70–99)
GLUCOSE BLDC GLUCOMTR-MCNC: 105 MG/DL (ref 70–99)
GLUCOSE BLDC GLUCOMTR-MCNC: 105 MG/DL (ref 70–99)
GLUCOSE BLDC GLUCOMTR-MCNC: 96 MG/DL (ref 70–99)
GLUCOSE SERPL-MCNC: 104 MG/DL (ref 65–99)
HCT VFR BLD AUTO: 35 % (ref 37.5–51)
HGB BLD-MCNC: 12 G/DL (ref 13–17.7)
IMM GRANULOCYTES # BLD AUTO: 0.05 10*3/MM3 (ref 0–0.05)
IMM GRANULOCYTES NFR BLD AUTO: 0.6 % (ref 0–0.5)
LYMPHOCYTES # BLD AUTO: 2.02 10*3/MM3 (ref 0.7–3.1)
LYMPHOCYTES NFR BLD AUTO: 25.9 % (ref 19.6–45.3)
MAGNESIUM SERPL-MCNC: 2 MG/DL (ref 1.6–2.4)
MCH RBC QN AUTO: 29.1 PG (ref 26.6–33)
MCHC RBC AUTO-ENTMCNC: 34.3 G/DL (ref 31.5–35.7)
MCV RBC AUTO: 85 FL (ref 79–97)
MONOCYTES # BLD AUTO: 0.71 10*3/MM3 (ref 0.1–0.9)
MONOCYTES NFR BLD AUTO: 9.1 % (ref 5–12)
NEUTROPHILS NFR BLD AUTO: 4.88 10*3/MM3 (ref 1.7–7)
NEUTROPHILS NFR BLD AUTO: 62.5 % (ref 42.7–76)
NRBC BLD AUTO-RTO: 0 /100 WBC (ref 0–0.2)
PHOSPHATE SERPL-MCNC: 1.7 MG/DL (ref 2.5–4.5)
PLATELET # BLD AUTO: 180 10*3/MM3 (ref 140–450)
PMV BLD AUTO: 10 FL (ref 6–12)
POTASSIUM SERPL-SCNC: 2.9 MMOL/L (ref 3.5–5.2)
QT INTERVAL: 398 MS
QTC INTERVAL: 526 MS
RBC # BLD AUTO: 4.12 10*6/MM3 (ref 4.14–5.8)
SODIUM SERPL-SCNC: 127 MMOL/L (ref 136–145)
WBC NRBC COR # BLD AUTO: 7.81 10*3/MM3 (ref 3.4–10.8)

## 2025-06-20 PROCEDURE — 25810000003 LACTATED RINGERS PER 1000 ML: Performed by: INTERNAL MEDICINE

## 2025-06-20 PROCEDURE — 97110 THERAPEUTIC EXERCISES: CPT

## 2025-06-20 PROCEDURE — 94799 UNLISTED PULMONARY SVC/PX: CPT

## 2025-06-20 PROCEDURE — 84100 ASSAY OF PHOSPHORUS: CPT | Performed by: STUDENT IN AN ORGANIZED HEALTH CARE EDUCATION/TRAINING PROGRAM

## 2025-06-20 PROCEDURE — 80048 BASIC METABOLIC PNL TOTAL CA: CPT | Performed by: STUDENT IN AN ORGANIZED HEALTH CARE EDUCATION/TRAINING PROGRAM

## 2025-06-20 PROCEDURE — 82948 REAGENT STRIP/BLOOD GLUCOSE: CPT

## 2025-06-20 PROCEDURE — 99232 SBSQ HOSP IP/OBS MODERATE 35: CPT | Performed by: STUDENT IN AN ORGANIZED HEALTH CARE EDUCATION/TRAINING PROGRAM

## 2025-06-20 PROCEDURE — 25010000002 HEPARIN (PORCINE) PER 1000 UNITS: Performed by: STUDENT IN AN ORGANIZED HEALTH CARE EDUCATION/TRAINING PROGRAM

## 2025-06-20 PROCEDURE — 25010000002 CEFTRIAXONE PER 250 MG: Performed by: STUDENT IN AN ORGANIZED HEALTH CARE EDUCATION/TRAINING PROGRAM

## 2025-06-20 PROCEDURE — 85025 COMPLETE CBC W/AUTO DIFF WBC: CPT | Performed by: STUDENT IN AN ORGANIZED HEALTH CARE EDUCATION/TRAINING PROGRAM

## 2025-06-20 PROCEDURE — 83735 ASSAY OF MAGNESIUM: CPT | Performed by: STUDENT IN AN ORGANIZED HEALTH CARE EDUCATION/TRAINING PROGRAM

## 2025-06-20 PROCEDURE — 25810000003 SODIUM CHLORIDE 0.9 % SOLUTION: Performed by: STUDENT IN AN ORGANIZED HEALTH CARE EDUCATION/TRAINING PROGRAM

## 2025-06-20 RX ORDER — SODIUM CHLORIDE, SODIUM LACTATE, POTASSIUM CHLORIDE, CALCIUM CHLORIDE 600; 310; 30; 20 MG/100ML; MG/100ML; MG/100ML; MG/100ML
100 INJECTION, SOLUTION INTRAVENOUS CONTINUOUS
Status: DISCONTINUED | OUTPATIENT
Start: 2025-06-20 | End: 2025-06-23

## 2025-06-20 RX ORDER — POTASSIUM CHLORIDE 750 MG/1
40 CAPSULE, EXTENDED RELEASE ORAL ONCE
Status: COMPLETED | OUTPATIENT
Start: 2025-06-20 | End: 2025-06-20

## 2025-06-20 RX ORDER — FENTANYL/ROPIVACAINE/NS/PF 2-625MCG/1
15 PLASTIC BAG, INJECTION (ML) EPIDURAL
Status: COMPLETED | OUTPATIENT
Start: 2025-06-20 | End: 2025-06-20

## 2025-06-20 RX ADMIN — POTASSIUM CHLORIDE 40 MEQ: 750 CAPSULE, EXTENDED RELEASE ORAL at 11:46

## 2025-06-20 RX ADMIN — HEPARIN SODIUM 5000 UNITS: 5000 INJECTION INTRAVENOUS; SUBCUTANEOUS at 05:31

## 2025-06-20 RX ADMIN — ATORVASTATIN CALCIUM 80 MG: 40 TABLET, FILM COATED ORAL at 08:22

## 2025-06-20 RX ADMIN — MUPIROCIN 1 APPLICATION: 20 OINTMENT TOPICAL at 16:27

## 2025-06-20 RX ADMIN — MUPIROCIN 1 APPLICATION: 20 OINTMENT TOPICAL at 08:22

## 2025-06-20 RX ADMIN — MUPIROCIN 1 APPLICATION: 20 OINTMENT TOPICAL at 21:07

## 2025-06-20 RX ADMIN — Medication 10 ML: at 21:07

## 2025-06-20 RX ADMIN — HEPARIN SODIUM 5000 UNITS: 5000 INJECTION INTRAVENOUS; SUBCUTANEOUS at 21:07

## 2025-06-20 RX ADMIN — ASPIRIN 81 MG: 81 TABLET, DELAYED RELEASE ORAL at 08:22

## 2025-06-20 RX ADMIN — LEVOTHYROXINE SODIUM 50 MCG: 0.05 TABLET ORAL at 05:31

## 2025-06-20 RX ADMIN — POTASSIUM PHOSPHATE, MONOBASIC AND POTASSIUM PHOSPHATE, DIBASIC 15 MMOL: 224; 236 INJECTION, SOLUTION, CONCENTRATE INTRAVENOUS at 10:52

## 2025-06-20 RX ADMIN — Medication 10 ML: at 08:22

## 2025-06-20 RX ADMIN — TRAZODONE HYDROCHLORIDE 100 MG: 100 TABLET ORAL at 21:07

## 2025-06-20 RX ADMIN — FAMOTIDINE 20 MG: 20 TABLET, FILM COATED ORAL at 21:07

## 2025-06-20 RX ADMIN — POTASSIUM PHOSPHATE, MONOBASIC AND POTASSIUM PHOSPHATE, DIBASIC 15 MMOL: 224; 236 INJECTION, SOLUTION, CONCENTRATE INTRAVENOUS at 14:36

## 2025-06-20 RX ADMIN — CEFTRIAXONE SODIUM 1000 MG: 1 INJECTION, POWDER, FOR SOLUTION INTRAMUSCULAR; INTRAVENOUS at 14:36

## 2025-06-20 RX ADMIN — SODIUM CHLORIDE, POTASSIUM CHLORIDE, SODIUM LACTATE AND CALCIUM CHLORIDE 100 ML/HR: 600; 310; 30; 20 INJECTION, SOLUTION INTRAVENOUS at 10:52

## 2025-06-20 RX ADMIN — HEPARIN SODIUM 5000 UNITS: 5000 INJECTION INTRAVENOUS; SUBCUTANEOUS at 13:22

## 2025-06-20 NOTE — CONSULTS
[facility] Consult Note      Patient Name: Alberto Rachel  : 1960  MRN: [id number]  Primary Care Physician:  Magnus Euceda DO  Referring Physician: Magnus Euceda DO  Date of admission: 2025    Subjective     Reason for Consult:  abnormal renal function     HPI:   Alberto Rachel is a 64 y.o. male  past medical history of anxiety ,  coronary artery disease s/p CABG x 5  w chronic diastolic heart failure  ( echocardiogram left ventricular systolic function is normal. Calculated left ventricular EF = 57.9%, Left ventricular diastolic function is consistent with (grade I) impaired relaxation. )  , diabetes mellitus type 2,  dyslipidemia , hypothyroidism , depression , hypertension, Carotid artery stenosis with cerebral infarction, history of duodenal ulcer ,  and chronic kidney disease stage III .      Presents for generalized weakness, decreased appetite and 15 to 20 pounds of weight loss for the last 3 months associated with fall , found to have ESVIN w Cr of 3.5 from baseline 1.0 w HAGMA , and hyponatremia     Nephrology consultation has been requested for further recommendations .       Review of Systems:   14 point review of systems is otherwise negative except for mentioned above on HPI    Personal History     Past Medical History:   Diagnosis Date    Anxiety     Arthritis     Atherosclerosis of coronary artery bypass graft of native heart without angina pectoris 10/23/2018    Coronary artery disease with previous bypass, LIMA to the LAD, SVG to ramus, and SVG to RCA in     CAD (coronary artery disease) 10/23/2018    Carotid artery stenosis with cerebral infarction 2015    Dr. Alma Rosa Rosales, extremity 9/10/2021    Depression     DM2 (diabetes mellitus, type 2) 10/23/2018    Duodenal ulcer     Essential hypertension 10/23/2018    Headache     Heart attack 2015    Heartburn     HLD (hyperlipidemia)     HTN (hypertension)     Injury of right leg 2021     Irregular heart beat     Low back pain 03/18/2019    Mild episode of recurrent depressive disorder 10/23/2018    Rash 03/18/2019    Shortness of breath     SOB (shortness of breath)     Type 2 diabetes mellitus with hyperglycemia, without long-term current use of insulin 06/11/2019    Uncontrolled diabetes mellitus 03/18/2019       Past Surgical History:   Procedure Laterality Date    CARDIAC CATHETERIZATION  06/2015    Dr. St     CIRCUMCISION      age 14 yrs old    CORONARY ARTERY BYPASS GRAFT  06/2015    5 bypasses       Family History: family history includes Heart disease in an other family member.    Social History:  reports that he has never smoked. He has been exposed to tobacco smoke. His smokeless tobacco use includes chew. He reports that he does not currently use alcohol. He reports that he does not use drugs.    Home Medications:  Prior to Admission medications    Medication Sig Start Date End Date Taking? Authorizing Provider   albuterol sulfate  (90 Base) MCG/ACT inhaler Inhale 2 puffs Every 4 (Four) Hours As Needed for Wheezing. 2/14/22  Yes Magnus Euceda DO   Aspirin Low Dose 81 MG EC tablet TAKE 1 TABLET BY MOUTH ONCE DAILY 6/12/25  Yes Magnus Euceda DO   atorvastatin (LIPITOR) 80 MG tablet TAKE 1 TABLET BY MOUTH ONCE DAILY FOR CHOLESTEROL 6/12/25  Yes Magnus Euceda DO   bumetanide (BUMEX) 1 MG tablet Take 1 tablet by mouth Daily As Needed (fluid). 5/16/25  Yes Magnus Euceda DO   chlorthalidone (HYGROTON) 25 MG tablet Take 1 tablet by mouth Daily. 6/12/25  Yes Kenney Conn MD   citalopram (CeleXA) 20 MG tablet Take 1 tablet by mouth Daily. 6/12/25  Yes Kenney Conn MD   cyanocobalamin (GNP Vitamin B-12) 500 MCG tablet TAKE 1 TABLET BY MOUTH ONCE DAILY 5/16/25  Yes Magnus Euceda DO   famotidine (PEPCID) 40 MG tablet TAKE 1 TABLET BY MOUTH TWICE DAILY FOR STOMACH  Patient taking differently: Take 1 tablet by mouth 2 (Two) Times a Day. 6/12/25  Yes Magnus Euceda DO    fenofibrate (TRICOR) 145 MG tablet TAKE 1 TABLET BY MOUTH ONCE DAILY FOR CHOLESTEROL 3/25/25  Yes Magnus Euceda DO   Jardiance 25 MG tablet tablet TAKE 1 TABLET BY MOUTH EVERY MORNING TO LOWER BLOOD SUGAR  Patient taking differently: Take 1 tablet by mouth Daily. 6/12/25  Yes Magnus Euceda DO   levocetirizine (XYZAL) 5 MG tablet Take 1 tablet by mouth Every Evening. 5/28/25  Yes Magnus Euceda DO   levothyroxine (SYNTHROID, LEVOTHROID) 50 MCG tablet Take 1 tablet by mouth Every Morning. 5/16/25  Yes Magnus Euceda DO   metFORMIN ER (GLUCOPHAGE-XR) 500 MG 24 hr tablet TAKE 2 TABLETS BY MOUTH TWICE DAILY 6/12/25  Yes Magnus Euceda DO   metoprolol succinate XL (TOPROL-XL) 25 MG 24 hr tablet TAKE 1 TABLET BY MOUTH ONCE DAILY FOR BLOOD PRESSURE OR HEART  Patient taking differently: Take 1 tablet by mouth Daily. 5/28/25  Yes Magnus Euceda DO   ondansetron ODT (ZOFRAN-ODT) 8 MG disintegrating tablet Place 1 tablet on the tongue Every 8 (Eight) Hours As Needed. 5/28/25  Yes Kenney Conn MD   pioglitazone (ACTOS) 15 MG tablet Take 1 tablet by mouth Daily. 6/12/25  Yes Kenney Conn MD   polyethylene glycol (MIRALAX) 17 GM/SCOOP powder Take 17 g by mouth Daily As Needed. 5/28/25  Yes Kenney Conn MD   Rybelsus 14 MG tablet TAKE 1 TABLET BY MOUTH EVERY MORNING 6/12/25  Yes Magnus Euceda DO   telmisartan (MICARDIS) 20 MG tablet Take 1 tablet by mouth Daily. 6/3/25  Yes Kenney Conn MD   traZODone (DESYREL) 100 MG tablet TAKE 1 TABLET BY MOUTH AT BEDTIME FOR SLEEP 6/12/25  Yes Magnus Euceda DO   ARIPiprazole (ABILIFY) 20 MG tablet TAKE 1 TABLET BY MOUTH ONCE DAILY 6/19/25   Magnus Euceda DO   nitroglycerin (NITROSTAT) 0.4 MG SL tablet Place 1 tablet under the tongue Every 5 (Five) Minutes As Needed for Chest Pain. Take no more than 3 doses in 15 minutes. 2/14/22   Euceda, Magnus, DO       Allergies:  Allergies   Allergen Reactions    Cyclobenzaprine Unknown - Low Severity     weakness        Objective     Vitals:   Temp:  [97.7 °F (36.5 °C)-99.1 °F (37.3 °C)] 99.1 °F (37.3 °C)  Heart Rate:  [66-86] 74  Resp:  [22] 22  BP: ()/(51-68) 92/51    Intake/Output Summary (Last 24 hours) at 6/20/2025 0828  Last data filed at 6/20/2025 0613  Gross per 24 hour   Intake 480 ml   Output 2600 ml   Net -2120 ml       Physical Exam:   Constitutional: Awake, alert, no acute distress.  HEENT: Sclera anicteric, no conjunctival injection  Neck: Supple, no thyromegaly, no lymphadenopathy, trachea at midline, no JVD  Respiratory: Clear to auscultation bilaterally, nonlabored respiration  Cardiovascular: RRR, no murmurs, no rubs or gallops, no carotid bruit  Gastrointestinal: Positive bowel sounds, abdomen is soft, nontender and nondistended  : No palpable bladder  Musculoskeletal: No edema, no clubbing or cyanosis.  Laceration to right hand  Neurologic: Oriented x3, moving all extremities, normal speech and mental status  Skin: Warm and dry       Scheduled Meds:     aspirin, 81 mg, Oral, Daily  atorvastatin, 80 mg, Oral, Daily  cefTRIAXone, 1,000 mg, Intravenous, Q24H  famotidine, 20 mg, Oral, Nightly  heparin (porcine), 5,000 Units, Subcutaneous, Q8H  insulin lispro, 2-7 Units, Subcutaneous, 4x Daily AC & at Bedtime  levothyroxine, 50 mcg, Oral, Q AM  metoprolol succinate XL, 25 mg, Oral, Daily  mupirocin, 1 Application, Topical, TID  sodium chloride, 10 mL, Intravenous, Q12H  traZODone, 100 mg, Oral, Nightly      IV Meds:   sodium bicarbonate, 150 mEq, Last Rate: 150 mEq (06/19/25 2355)        Results Reviewed:   I have personally reviewed the results from the time of this admission to 6/20/2025 08:28 EDT     Lab Results   Component Value Date    GLUCOSE 104 (H) 06/20/2025    CALCIUM 8.2 (L) 06/20/2025     (L) 06/20/2025    K 2.9 (L) 06/20/2025    CO2 27.0 06/20/2025    CL 90 (L) 06/20/2025    BUN 40.5 (H) 06/20/2025    CREATININE 1.60 (H) 06/20/2025    EGFRIFNONA 55 (L) 01/12/2022    BCR 25.3 (H)  06/20/2025    ANIONGAP 10.0 06/20/2025      Lab Results   Component Value Date    MG 2.0 06/20/2025    PHOS 1.7 (C) 06/20/2025    ALBUMIN 3.6 06/19/2025           Assessment / Plan       ESVIN (acute kidney injury)      ASSESSMENT:    Acute kidney injury secondary to prerenal state from decreased oral intake, while on home dose of bumetanide, chlorthalidone, Jardiance,, Rybelsus, and telmisartan affecting renal autoregulation and volume status.  Currently on hold.  Improving with IV fluid challenge  Chronic kidney disease stage III.  Baseline creatinine 1 to 1.2 secondary to hypertensive nephrosclerosis  Hypertension w CKD adjusting regimen  Metabolic acidosis secondary to acute kidney injury improving with bicarbonate drip  CAD s/p CABG x 5 .  On medical management  Hypophosphatemia on replacement protocol secondary to decreased oral intake.  Hypovolemic hyponatremia from ESVIN and prerenal state improved with medical management  Diabetes mellitus type 2 as per primary team    PLAN:  Patient responded to IV fluid challenge will adjust his sodium bicarbonate drip  to lactated Ringer infusion   Agree with holding antihypertensive medications, diuretics, SGLT2 inhibitors  Continue phosphorus replacement  Continue surveillance labs    Discussed with nursing staff at bedside    Thank you for involving us in the care of Alberto Rachel.  Please feel free to call with any questions.    Tam Astorga MD  06/20/25  08:28 EDT    [facility]  [phone]      Please note that portions of this note were completed with a voice recognition program.

## 2025-06-20 NOTE — PLAN OF CARE
Goal Outcome Evaluation:  Plan of Care Reviewed With: patient        Progress: improving  Outcome Evaluation: Patient is AO x3, disoriented to time. Patient is currenlty on RA-1L NC, no signs of respiratory distress noted. O2 sats remain > 90% throughout shift. Blood glucose monitored, treated per MAR. IV abx administered. IV KPhos administered. LR currently infusing at 100 mL/hr. No complaints of pain or discomfort at this time. No acute changes throughout shift. VSS. Continue with current plan of care.

## 2025-06-20 NOTE — PROGRESS NOTES
[facility] Clinical Pharmacy Services: Renal Dose Adjustment     Famotidine has been appropriately renally dose adjusted based on our System P&T approved policy. Pharmacy will continue to monitor patient renal function while in-house.     Grey Downs, PharmD  Clinical Pharmacist

## 2025-06-20 NOTE — PLAN OF CARE
Goal Outcome Evaluation:              Outcome Evaluation: Pt AOx3. Blood glucose treated per MAR. VSS. No reports of pain during shift. Catheter care completed. No aute changes during shift. Continue POC.

## 2025-06-20 NOTE — THERAPY TREATMENT NOTE
Patient Name: Alberto Rachel  : 1960    MRN: [id number]                              Today's Date: 2025       Admit Date: 2025    Visit Dx:     ICD-10-CM ICD-9-CM   1. Acute renal failure, unspecified acute renal failure type  N17.9 584.9   2. Decreased activities of daily living (ADL)  Z78.9 V49.89   3. Difficulty walking  R26.2 719.7     Patient Active Problem List   Diagnosis    Anxiety    Atherosclerosis of coronary artery bypass graft of native heart without angina pectoris    Essential hypertension    Depression    Mixed hyperlipidemia    Type 2 diabetes mellitus with hyperglycemia, without long-term current use of insulin    Hypothyroidism    Arthritis of knee    Illiteracy    Cramps, extremity    Tachycardia    Obesity (BMI 30-39.9)    Degenerative disc disease, lumbar    Primary insomnia    Gastroesophageal reflux disease without esophagitis    Close exposure to COVID-19 virus    Mild intermittent asthma    Candidal dermatitis    Bronchitis    Bilateral shoulder pain    Hyperkalemia    Stage 3a chronic kidney disease (CKD)    Hyponatremia    Onychomycosis    Onychocryptosis    PVD (peripheral vascular disease)    Foot pain, bilateral    ESVIN (acute kidney injury)     Past Medical History:   Diagnosis Date    Anxiety     Arthritis     Atherosclerosis of coronary artery bypass graft of native heart without angina pectoris 10/23/2018    Coronary artery disease with previous bypass, LIMA to the LAD, SVG to ramus, and SVG to RCA in     CAD (coronary artery disease) 10/23/2018    Carotid artery stenosis with cerebral infarction 2015    Dr. St     Cramps, extremity 9/10/2021    Depression     DM2 (diabetes mellitus, type 2) 10/23/2018    Duodenal ulcer     Essential hypertension 10/23/2018    Headache     Heart attack 2015    Heartburn     HLD (hyperlipidemia)     HTN (hypertension)     Injury of right leg 2021    Irregular heart beat     Low back pain 2019    Mild episode  of recurrent depressive disorder 10/23/2018    Rash 03/18/2019    Shortness of breath     SOB (shortness of breath)     Type 2 diabetes mellitus with hyperglycemia, without long-term current use of insulin 06/11/2019    Uncontrolled diabetes mellitus 03/18/2019     Past Surgical History:   Procedure Laterality Date    CARDIAC CATHETERIZATION  06/2015    Dr. St     CIRCUMCISION      age 14 yrs old    CORONARY ARTERY BYPASS GRAFT  06/2015    5 bypasses      General Information       Row Name 06/20/25 1100          OT Time and Intention    Subjective Information complains of;weakness;fatigue  -     Document Type therapy note (daily note)  -     Mode of Treatment individual therapy;occupational therapy  -KH     Patient Effort good  -     Symptoms Noted During/After Treatment fatigue  -       Row Name 06/20/25 1100          General Information    Patient Profile Reviewed yes  -     Existing Precautions/Restrictions fall  -       Row Name 06/20/25 1100          Safety Issues/Impairments Affecting Functional Mobility    Impairments Affecting Function (Mobility) balance;endurance/activity tolerance;strength;coordination  -               User Key  (r) = Recorded By, (t) = Taken By, (c) = Cosigned By      Initials Name Provider Type    Zoey Gonzales, MAL Occupational Therapist                     Mobility/ADL's       Row Name 06/20/25 1100          Bed Mobility    Bed Mobility supine-sit  -     All Activities, [place] (Bed Mobility) minimum assist (75% patient effort)  -     Supine-Sit [place] (Bed Mobility) contact guard  -     Bed Mobility, Safety Issues decreased use of legs for bridging/pushing  -     Assistive Device (Bed Mobility) head of bed elevated  -       Row Name 06/20/25 1100          Transfers    Transfers sit-stand transfer;stand-sit transfer  -       Row Name 06/20/25 1100          Sit-Stand Transfer    Sit-Stand [place] (Transfers) contact guard  -      Assistive Device (Sit-Stand Transfers) walker, front-wheeled  -       Row Name 06/20/25 1100          Stand-Sit Transfer    Stand-Sit [place] (Transfers) contact guard  -     Assistive Device (Stand-Sit Transfers) walker, front-wheeled  [facility] Name 06/20/25 1100          Functional Mobility    Functional Mobility- Ind. Level contact guard assist  -     Functional Mobility- Device walker, front-wheeled  -     Functional Mobility- Comment Pt able to take 3-4 sidesteps towards HOB with CGA using RW.  -               User Key  (r) = Recorded By, (t) = Taken By, (c) = Cosigned By      Initials Name Provider Type    Zoey Gonzales OT Occupational Therapist                   Obj/Interventions       [facility] Name 06/20/25 1101          Shoulder (Therapeutic Exercise)    Shoulder (Therapeutic Exercise) strengthening exercise  -     Shoulder Strengthening (Therapeutic Exercise) bilateral;horizontal aBduction/aDduction;left;right;2 sets;10 repetitions;yellow;resistance band;sitting  -[facility] Name 06/20/25 1101          Motor Skills    Motor Skills functional endurance  -     Functional Endurance F-  -     Therapeutic Exercise shoulder  Therapeutic exercises are indicated to facilitate increased strength and endurance for improved participation with functional/occupation-based tasks.  -       Row Name 06/20/25 1101          Balance    Balance Assessment standing dynamic balance  -     Dynamic Standing Balance contact guard  -     Position/Device Used, Standing Balance walker, front-wheel  -               User Key  (r) = Recorded By, (t) = Taken By, (c) = Cosigned By      Initials Name Provider Type    Zoey Gonzales OT Occupational Therapist                   Goals/Plan    No documentation.                  Clinical Impression       Row Name 06/20/25 1102          Plan of Care Review    Plan of Care Reviewed With patient  -     Progress improving  -     Outcome Evaluation  Pt presents with limitations of balance, strength, and endurance which impede the ability to perform ADLs and transfers as prior.  Patient completed seated BUE TherEX sitting EOB, able to take sidesteps toward HOB this date.  Pt x1 assist with RW and gait belt. The skills of a therapist will be required to safely and effectively implement treatment plan to restore maximal level of function.  -       Row Name 06/20/25 1102          Therapy Assessment/Plan (OT)    Rehab Potential (OT) good  -     Criteria for Skilled Therapeutic Interventions Met (OT) yes;meets criteria;skilled treatment is necessary  -     Therapy Frequency (OT) 5 times/wk  -       Row Name 06/20/25 1102          Therapy Plan Review/Discharge Plan (OT)    Anticipated Discharge Disposition (OT) sub acute care setting  -       Row Name 06/20/25 1102          Positioning and Restraints    Pre-Treatment Position in bed  -     Post Treatment Position bed  -KH     In Bed supine;call light within reach;encouraged to call for assist;exit alarm on  -               User Key  (r) = Recorded By, (t) = Taken By, (c) = Cosigned By      Initials Name Provider Type     Zoey Patel, OT Occupational Therapist                   Outcome Measures       Row Name 06/20/25 1103          How much help from another is currently needed...    Putting on and taking off regular lower body clothing? 2  -KH     Bathing (including washing, rinsing, and drying) 2  -KH     Toileting (which includes using toilet bed pan or urinal) 1  -KH     Putting on and taking off regular upper body clothing 2  -KH     Taking care of personal grooming (such as brushing teeth) 2  -KH     Eating meals 4  -KH     AM-PAC 6 Clicks Score (OT) 13  -       Row Name 06/20/25 0740          How much help from another person do you currently need...    Turning from your back to your side while in flat bed without using bedrails? 3  -AS     Moving from lying on back to sitting on the side  of a flat bed without bedrails? 3  -AS     Moving to and from a bed to a chair (including a wheelchair)? 2  -AS     Standing up from a chair using your arms (e.g., wheelchair, bedside chair)? 3  -AS     Climbing 3-5 steps with a railing? 2  -AS     To walk in hospital room? 2  -AS     AM-PAC 6 Clicks Score (PT) 15  -AS     Highest Level of Mobility Goal Move to Chair/Commode-4  -AS       Row Name 06/20/25 1103          Functional Assessment    Outcome Measure Options AM-PAC 6 Clicks Daily Activity (OT);Optimal Instrument  -KH       Row Name 06/20/25 1103          Optimal Instrument    Optimal Instrument Optimal - 3  -KH     Bending/Stooping 3  -KH     Standing 2  -KH     Reaching 2  -KH     From the list, choose the 3 activities you would most like to be able to do without any difficulty Bending/stooping;Reaching;Standing  -KH     Total Score Optimal - 3 7  -KH               User Key  (r) = Recorded By, (t) = Taken By, (c) = Cosigned By      Initials Name Provider Type    AS Iram Daigle, RN Registered Nurse    Zoey Gonzales OT Occupational Therapist                    Occupational Therapy Education       Title: PT OT SLP Therapies (Done)       Topic: Occupational Therapy (Done)       Point: ADL training (Done)       Learning Progress Summary            Patient Acceptance, E, VU by  at 6/19/2025 0945                      Point: Home exercise program (Done)       Learning Progress Summary            Patient Acceptance, E, VU by AV at 6/19/2025 0945                      Point: Precautions (Done)       Learning Progress Summary            Patient Acceptance, E, VU by AV at 6/19/2025 0945                      Point: Body mechanics (Done)       Learning Progress Summary            Patient Acceptance, E, VU by  at 6/19/2025 0945                                      User Key       Initials Effective Dates Name Provider Type Discipline     06/16/21 -  Rigo Chambers, OT Occupational Therapist OT                   OT Recommendation and Plan  Therapy Frequency (OT): 5 times/wk  Plan of Care Review  Plan of Care Reviewed With: patient  Progress: improving  Outcome Evaluation: Pt presents with limitations of balance, strength, and endurance which impede the ability to perform ADLs and transfers as prior.  Patient completed seated BUE TherEX sitting EOB, able to take sidesteps toward HOB this date.  Pt x1 assist with RW and gait belt. The skills of a therapist will be required to safely and effectively implement treatment plan to restore maximal level of function.     Time Calculation:         Time Calculation- OT       Row Name 06/20/25 1104             Time Calculation- OT    OT Received On 06/20/25  -KH      OT Goal Re-Cert Due Date 06/28/25  -KH         Timed Charges    66264 - OT Therapeutic Exercise Minutes 10  -KH         Total Minutes    Timed Charges Total Minutes 10  -KH       Total Minutes 10  -KH                User Key  (r) = Recorded By, (t) = Taken By, (c) = Cosigned By      Initials Name Provider Type     Zoey Patel OT Occupational Therapist                  Therapy Charges for Today       Code Description Service Date Service Provider Modifiers Qty    [id number]  OT THER PROC EA 15 MIN 6/20/2025 Zoey Patel OT GO 1                 Zoey Patel OT  6/20/2025

## 2025-06-20 NOTE — PROGRESS NOTES
[facility]   Hospitalist Progress Note  Date: 2025  Patient Name: Alberto Rachel  : 1960  MRN: [id number]  Date of admission: 2025  Room/Bed: [facility]/2      Subjective   Subjective     Chief Complaint: Generalized weakness, dizziness     Summary:Alberto Rachel is a 64 y.o. male with a past medical history of hypertension, hyperlipidemia, type 2 diabetes mellitus, hypothyroidism, CKD stage IIIa, CAD s/p CABG presented to the ED for evaluation of weakness and dizziness that started on  and has been worsening.  Patient noted to have generalized weakness.  Had a witnessed fall while getting out of the car on , did not hit his head.  Patient has been experiencing multiple episodes of diarrhea since last few days.  Denies any nausea, vomiting.  Poor oral intake for the past few days.  Denies any abdominal pain, chest pain, fevers, chills, hematochezia, melena.  Patient is on Lasix, chlorthalidone, telmisartan at home.  Orozco catheter placed in the ER and drained 500 cc urine.     On presentation to ER, initial troponin 60, repeat 57, sodium 124, potassium 3, chloride 79, bicarb 17.7, creatinine 3.50, month ago was 1.07, rest of the CMP with no significant findings, TSH 6.16, free T42.2, lactic acid 2.4, repeat 1.9, WBC 14.2, normal hemoglobin, platelets, urinalysis showed 3+ bacteria numerous to count WBC, cloudy appearance.  CT abdomen pelvis without contrast showed no acute findings.  Chest x-ray showed no active disease.  Received 1 L IV fluids and ceftriaxone in the ED.  Patient admitted for further evaluation and management of ESVIN, gastroenteritis, dehydration, moderate hyponatremia, anion gap metabolic acidosis, UTI.    Interval Followup: Patient laying in bed, denies any active complaints. Stated he feels slightly better today.  Vital stable.  Lactic acidosis resolved.  Hypokalemia and Hypophosphatemia, repleted. Hyponatremia improving. Nephrology following.      Review of  Systems    All systems reviewed and negative except for what is outlined above.      Objective   Objective     Vitals:   Temp:  [97.7 °F (36.5 °C)-99.1 °F (37.3 °C)] 99.1 °F (37.3 °C)  Heart Rate:  [66-86] 74  Resp:  [20-22] 20  BP: ()/(51-68) 92/51    Physical Exam   General: Awake, alert, NAD  Cardiovascular: RRR, no murmurs   Pulmonary: CTA bilaterally; no wheezes; no conversational dyspnea  Gastrointestinal: S/ND/NT, +BS  Neuro: Alert, awake, oriented x 3; speech clear; no tremor      Result Review    Result Review:  I have personally reviewed these results:  [x]  Laboratory      Lab 06/20/25  0516 06/19/25  0513 06/18/25  1928 06/18/25  1613 06/18/25  1612   WBC 7.81 12.32*  --   --  14.22*   HEMOGLOBIN 12.0* 13.1  --   --  15.2   HEMATOCRIT 35.0* 37.5  --   --  43.3   PLATELETS 180 229  --   --  267   NEUTROS ABS 4.88 9.18*  --   --  11.06*   IMMATURE GRANS (ABS) 0.05 0.19*  --   --  0.08*   LYMPHS ABS 2.02 1.89  --   --  1.92   MONOS ABS 0.71 1.01*  --   --  1.12*   EOS ABS 0.12 0.02  --   --  0.01   MCV 85.0 83.0  --   --  81.9   LACTATE  --   --  1.9 2.4*  --          Lab 06/20/25  0516 06/19/25  0513 06/18/25  2340 06/18/25  1612   SODIUM 127* 124* 126* 124*   POTASSIUM 2.9* 2.9* 2.9* 3.0*   CHLORIDE 90* 85* 82* 79*   CO2 27.0 14.5* 14.6* 17.7*   ANION GAP 10.0 24.5* 29.4* 27.3*   BUN 40.5* 69.5* 77.5* 83.8*   CREATININE 1.60* 2.63* 3.01* 3.50*   EGFR 47.2* 26.0* 22.1* 18.5*   GLUCOSE 104* 83 90 147*   CALCIUM 8.2* 9.0 9.5 9.6   MAGNESIUM 2.0 1.9  --  2.1   PHOSPHORUS 1.7* 2.2*  --   --    TSH  --   --   --  6.160*         Lab 06/19/25  0513 06/18/25  1612   TOTAL PROTEIN 7.2 8.2   ALBUMIN 3.6 4.1   GLOBULIN 3.6 4.1   ALT (SGPT) 15 20   AST (SGOT) 24 31   BILIRUBIN 1.2 1.3*   ALK PHOS 61 69         Lab 06/18/25  1817 06/18/25  1612   HSTROP T 57* 60*                 Brief Urine Lab Results  (Last result in the past 365 days)        Color   Clarity   Blood   Leuk Est   Nitrite   Protein   CREAT    Urine HCG        06/18/25 1818 Yellow   Cloudy   Moderate (2+)   Large (3+)   Negative   Trace                 [x]  Microbiology   Microbiology Results (last 10 days)       ** No results found for the last 240 hours. **          [x]  Radiology  CT Abdomen Pelvis Without Contrast  Result Date: 6/18/2025  Impression: No acute findings in the abdomen/pelvis. Electronically Signed: Castro Ponce MD  6/18/2025 8:05 PM EDT  Workstation ID: [id number]    XR Chest 1 View  Result Date: 6/18/2025  Impression: 1.Postoperative changes of prior sternotomy and presumably bypass. 2.No active disease. Electronically Signed: Dino Dailey MD  6/18/2025 4:32 PM EDT  Workstation ID: [id number]    []  EKG/Telemetry   []  Cardiology/Vascular   []  Pathology  []  Old records  []  Other:    Assessment & Plan   Assessment / Plan     Assessment:  Gastroenteritis likely viral   Dehydration  ESVIN due to prerenal from dehydration  Hyponatremia  Anion gap metabolic acidosis due to ESVIN and diarrhea  UTI  Hypokalemia, repleted  Hyperphosphatemia, repleted  Hypertension  Hyperlipidemia  Type 2 diabetes mellitus  Hypothyroidism  CKD stage IIIa  CAD status post CABG    Plan:  Patient currently being managed in hospitalist service.  Hyponatremia improving;  sodium 127 today.   ESVIN improving with creatinine 1.6 today, creatinine 3.50 on presentation.    IV fluid switched to LR today.  Nephrology following, appreciate further input.  Continue to trend and monitor renal function.  Leukocytosis resolved.  Lactic acidosis resolved.  On IV ceftriaxone for UTI.  Urine culture pending.  Hypokalemia with potassium 2.9, repleted.  Hypophosphatemia, repleted.  Appreciate further input by nephrologist.  Morning labs reviewed, 6/20/2023.  Appropriate home medications restarted.  Labs in AM.    PT/OT.  Clinical course to dictate further management.     Discussed with RN.    VTE Prophylaxis:  Pharmacologic VTE prophylaxis orders are present.        CODE STATUS:    Code Status (Patient has no pulse and is not breathing): CPR (Attempt to Resuscitate)  Medical Interventions (Patient has pulse or is breathing): Full Support  Level Of Support Discussed With: Patient      Electronically signed by Eusebia Garcia MD, 06/20/25, 9:56 AM EDT.

## 2025-06-21 LAB
ANION GAP SERPL CALCULATED.3IONS-SCNC: 14.9 MMOL/L (ref 5–15)
BACTERIA SPEC AEROBE CULT: ABNORMAL
BASOPHILS # BLD AUTO: 0.04 10*3/MM3 (ref 0–0.2)
BASOPHILS NFR BLD AUTO: 0.5 % (ref 0–1.5)
BUN SERPL-MCNC: 23.7 MG/DL (ref 8–23)
BUN/CREAT SERPL: 17.8 (ref 7–25)
CALCIUM SPEC-SCNC: 8.7 MG/DL (ref 8.6–10.5)
CHLORIDE SERPL-SCNC: 94 MMOL/L (ref 98–107)
CO2 SERPL-SCNC: 23.1 MMOL/L (ref 22–29)
CREAT SERPL-MCNC: 1.33 MG/DL (ref 0.76–1.27)
DEPRECATED RDW RBC AUTO: 42.5 FL (ref 37–54)
EGFRCR SERPLBLD CKD-EPI 2021: 59 ML/MIN/1.73
EOSINOPHIL # BLD AUTO: 0.13 10*3/MM3 (ref 0–0.4)
EOSINOPHIL NFR BLD AUTO: 1.7 % (ref 0.3–6.2)
ERYTHROCYTE [DISTWIDTH] IN BLOOD BY AUTOMATED COUNT: 13.7 % (ref 12.3–15.4)
GLUCOSE BLDC GLUCOMTR-MCNC: 129 MG/DL (ref 70–99)
GLUCOSE BLDC GLUCOMTR-MCNC: 69 MG/DL (ref 70–99)
GLUCOSE BLDC GLUCOMTR-MCNC: 80 MG/DL (ref 70–99)
GLUCOSE BLDC GLUCOMTR-MCNC: 81 MG/DL (ref 70–99)
GLUCOSE SERPL-MCNC: 77 MG/DL (ref 65–99)
HBA1C MFR BLD: 6.2 % (ref 4.8–5.6)
HCT VFR BLD AUTO: 33.6 % (ref 37.5–51)
HGB BLD-MCNC: 11.6 G/DL (ref 13–17.7)
IMM GRANULOCYTES # BLD AUTO: 0.06 10*3/MM3 (ref 0–0.05)
IMM GRANULOCYTES NFR BLD AUTO: 0.8 % (ref 0–0.5)
LYMPHOCYTES # BLD AUTO: 1.85 10*3/MM3 (ref 0.7–3.1)
LYMPHOCYTES NFR BLD AUTO: 24.4 % (ref 19.6–45.3)
MAGNESIUM SERPL-MCNC: 1.8 MG/DL (ref 1.6–2.4)
MCH RBC QN AUTO: 29.2 PG (ref 26.6–33)
MCHC RBC AUTO-ENTMCNC: 34.5 G/DL (ref 31.5–35.7)
MCV RBC AUTO: 84.6 FL (ref 79–97)
MONOCYTES # BLD AUTO: 0.7 10*3/MM3 (ref 0.1–0.9)
MONOCYTES NFR BLD AUTO: 9.2 % (ref 5–12)
NEUTROPHILS NFR BLD AUTO: 4.79 10*3/MM3 (ref 1.7–7)
NEUTROPHILS NFR BLD AUTO: 63.4 % (ref 42.7–76)
NRBC BLD AUTO-RTO: 0 /100 WBC (ref 0–0.2)
PHOSPHATE SERPL-MCNC: 2.5 MG/DL (ref 2.5–4.5)
PLATELET # BLD AUTO: 190 10*3/MM3 (ref 140–450)
PMV BLD AUTO: 10.2 FL (ref 6–12)
POTASSIUM SERPL-SCNC: 3.4 MMOL/L (ref 3.5–5.2)
RBC # BLD AUTO: 3.97 10*6/MM3 (ref 4.14–5.8)
SODIUM SERPL-SCNC: 132 MMOL/L (ref 136–145)
WBC NRBC COR # BLD AUTO: 7.57 10*3/MM3 (ref 3.4–10.8)

## 2025-06-21 PROCEDURE — 94799 UNLISTED PULMONARY SVC/PX: CPT

## 2025-06-21 PROCEDURE — 25810000003 LACTATED RINGERS PER 1000 ML: Performed by: INTERNAL MEDICINE

## 2025-06-21 PROCEDURE — 80048 BASIC METABOLIC PNL TOTAL CA: CPT | Performed by: STUDENT IN AN ORGANIZED HEALTH CARE EDUCATION/TRAINING PROGRAM

## 2025-06-21 PROCEDURE — 25010000002 HEPARIN (PORCINE) PER 1000 UNITS: Performed by: STUDENT IN AN ORGANIZED HEALTH CARE EDUCATION/TRAINING PROGRAM

## 2025-06-21 PROCEDURE — 83036 HEMOGLOBIN GLYCOSYLATED A1C: CPT | Performed by: STUDENT IN AN ORGANIZED HEALTH CARE EDUCATION/TRAINING PROGRAM

## 2025-06-21 PROCEDURE — 94761 N-INVAS EAR/PLS OXIMETRY MLT: CPT

## 2025-06-21 PROCEDURE — 82948 REAGENT STRIP/BLOOD GLUCOSE: CPT

## 2025-06-21 PROCEDURE — 83735 ASSAY OF MAGNESIUM: CPT | Performed by: STUDENT IN AN ORGANIZED HEALTH CARE EDUCATION/TRAINING PROGRAM

## 2025-06-21 PROCEDURE — 99232 SBSQ HOSP IP/OBS MODERATE 35: CPT | Performed by: STUDENT IN AN ORGANIZED HEALTH CARE EDUCATION/TRAINING PROGRAM

## 2025-06-21 PROCEDURE — 82948 REAGENT STRIP/BLOOD GLUCOSE: CPT | Performed by: STUDENT IN AN ORGANIZED HEALTH CARE EDUCATION/TRAINING PROGRAM

## 2025-06-21 PROCEDURE — 25810000003 SODIUM CHLORIDE 0.9 % SOLUTION: Performed by: STUDENT IN AN ORGANIZED HEALTH CARE EDUCATION/TRAINING PROGRAM

## 2025-06-21 PROCEDURE — 85025 COMPLETE CBC W/AUTO DIFF WBC: CPT | Performed by: STUDENT IN AN ORGANIZED HEALTH CARE EDUCATION/TRAINING PROGRAM

## 2025-06-21 PROCEDURE — 84100 ASSAY OF PHOSPHORUS: CPT | Performed by: STUDENT IN AN ORGANIZED HEALTH CARE EDUCATION/TRAINING PROGRAM

## 2025-06-21 RX ORDER — POTASSIUM CHLORIDE 750 MG/1
40 CAPSULE, EXTENDED RELEASE ORAL ONCE
Status: COMPLETED | OUTPATIENT
Start: 2025-06-21 | End: 2025-06-21

## 2025-06-21 RX ORDER — AMOXICILLIN 500 MG/1
500 CAPSULE ORAL EVERY 8 HOURS SCHEDULED
Status: DISCONTINUED | OUTPATIENT
Start: 2025-06-21 | End: 2025-06-24 | Stop reason: HOSPADM

## 2025-06-21 RX ORDER — METFORMIN HYDROCHLORIDE 500 MG/1
500 TABLET, EXTENDED RELEASE ORAL 2 TIMES DAILY WITH MEALS
Status: DISCONTINUED | OUTPATIENT
Start: 2025-06-22 | End: 2025-06-23

## 2025-06-21 RX ORDER — ACETAMINOPHEN 325 MG/1
650 TABLET ORAL EVERY 6 HOURS PRN
Status: DISCONTINUED | OUTPATIENT
Start: 2025-06-21 | End: 2025-06-24 | Stop reason: HOSPADM

## 2025-06-21 RX ADMIN — HEPARIN SODIUM 5000 UNITS: 5000 INJECTION INTRAVENOUS; SUBCUTANEOUS at 14:24

## 2025-06-21 RX ADMIN — MUPIROCIN 1 APPLICATION: 20 OINTMENT TOPICAL at 17:50

## 2025-06-21 RX ADMIN — MUPIROCIN 1 APPLICATION: 20 OINTMENT TOPICAL at 09:01

## 2025-06-21 RX ADMIN — MUPIROCIN 1 APPLICATION: 20 OINTMENT TOPICAL at 14:24

## 2025-06-21 RX ADMIN — SODIUM CHLORIDE, POTASSIUM CHLORIDE, SODIUM LACTATE AND CALCIUM CHLORIDE 100 ML/HR: 600; 310; 30; 20 INJECTION, SOLUTION INTRAVENOUS at 00:19

## 2025-06-21 RX ADMIN — Medication 10 ML: at 21:41

## 2025-06-21 RX ADMIN — LEVOTHYROXINE SODIUM 50 MCG: 0.05 TABLET ORAL at 05:31

## 2025-06-21 RX ADMIN — AMOXICILLIN 500 MG: 500 CAPSULE ORAL at 14:24

## 2025-06-21 RX ADMIN — ATORVASTATIN CALCIUM 80 MG: 40 TABLET, FILM COATED ORAL at 09:01

## 2025-06-21 RX ADMIN — ACETAMINOPHEN 650 MG: 325 TABLET ORAL at 09:04

## 2025-06-21 RX ADMIN — ASPIRIN 81 MG: 81 TABLET, DELAYED RELEASE ORAL at 09:01

## 2025-06-21 RX ADMIN — Medication 10 ML: at 09:01

## 2025-06-21 RX ADMIN — SODIUM CHLORIDE 500 ML: 9 INJECTION, SOLUTION INTRAVENOUS at 12:52

## 2025-06-21 RX ADMIN — POTASSIUM CHLORIDE 40 MEQ: 750 CAPSULE, EXTENDED RELEASE ORAL at 11:24

## 2025-06-21 RX ADMIN — HEPARIN SODIUM 5000 UNITS: 5000 INJECTION INTRAVENOUS; SUBCUTANEOUS at 05:31

## 2025-06-21 NOTE — PLAN OF CARE
Goal Outcome Evaluation:              Outcome Evaluation: Patient alert and oriented x 3. wound care done per order. BP soft. Bp meds held. No other complaints.

## 2025-06-21 NOTE — PROGRESS NOTES
[facility]   Hospitalist Progress Note  Date: 2025  Patient Name: Alberto Rachel  : 1960  MRN: [id number]  Date of admission: 2025  Room/Bed: [facility]/      Subjective   Subjective     Chief Complaint: Generalized weakness, dizziness     Summary:Alberto Rachel is a 64 y.o. male with a past medical history of hypertension, hyperlipidemia, type 2 diabetes mellitus, hypothyroidism, CKD stage IIIa, CAD s/p CABG presented to the ED for evaluation of weakness and dizziness that started on  and has been worsening.  Patient noted to have generalized weakness.  Had a witnessed fall while getting out of the car on , did not hit his head.  Patient has been experiencing multiple episodes of diarrhea since last few days.  Denies any nausea, vomiting.  Poor oral intake for the past few days.  Denies any abdominal pain, chest pain, fevers, chills, hematochezia, melena.  Patient is on Lasix, chlorthalidone, telmisartan at home.  Orozco catheter placed in the ER and drained 500 cc urine.     On presentation to ER, initial troponin 60, repeat 57, sodium 124, potassium 3, chloride 79, bicarb 17.7, creatinine 3.50, month ago was 1.07, rest of the CMP with no significant findings, TSH 6.16, free T42.2, lactic acid 2.4, repeat 1.9, WBC 14.2, normal hemoglobin, platelets, urinalysis showed 3+ bacteria numerous to count WBC, cloudy appearance.  CT abdomen pelvis without contrast showed no acute findings.  Chest x-ray showed no active disease.  Received 1 L IV fluids and ceftriaxone in the ED.  Patient admitted for further evaluation and management of ESVIN, gastroenteritis, dehydration, moderate hyponatremia, anion gap metabolic acidosis, UTI.     Interval Followup: Patient laying in bed, denies any active complaints. Stated he feels slightly better today. Hypokalemia repleted. Urine culture grew Enterococcus faecalis. Antibiotic switched to Ampicillin PO today. Vitals stable.      Review of Systems    All  systems reviewed and negative except for what is outlined above.      Objective   Objective     Vitals:   Temp:  [98.1 °F (36.7 °C)-98.4 °F (36.9 °C)] 98.2 °F (36.8 °C)  Heart Rate:  [64-72] 68  Resp:  [18-20] 18  BP: ()/(51-60) 100/60    Physical Exam   General: Awake, alert, NAD  Cardiovascular: RRR, no murmurs   Pulmonary: CTA bilaterally; no wheezes; no conversational dyspnea  Gastrointestinal: S/ND/NT, +BS  Neuro: Alert, awake, oriented x 3; speech clear; no tremor      Result Review    Result Review:  I have personally reviewed these results:  [x]  Laboratory      Lab 06/21/25 0427 06/20/25  0516 06/19/25  0513 06/18/25  1928 06/18/25  1613   WBC 7.57 7.81 12.32*  --   --    HEMOGLOBIN 11.6* 12.0* 13.1  --   --    HEMATOCRIT 33.6* 35.0* 37.5  --   --    PLATELETS 190 180 229  --   --    NEUTROS ABS 4.79 4.88 9.18*  --   --    IMMATURE GRANS (ABS) 0.06* 0.05 0.19*  --   --    LYMPHS ABS 1.85 2.02 1.89  --   --    MONOS ABS 0.70 0.71 1.01*  --   --    EOS ABS 0.13 0.12 0.02  --   --    MCV 84.6 85.0 83.0  --   --    LACTATE  --   --   --  1.9 2.4*         Lab 06/21/25 0427 06/20/25  0516 06/19/25  0513 06/18/25  2340 06/18/25  1612   SODIUM 132* 127* 124*   < > 124*   POTASSIUM 3.4* 2.9* 2.9*   < > 3.0*   CHLORIDE 94* 90* 85*   < > 79*   CO2 23.1 27.0 14.5*   < > 17.7*   ANION GAP 14.9 10.0 24.5*   < > 27.3*   BUN 23.7* 40.5* 69.5*   < > 83.8*   CREATININE 1.33* 1.60* 2.63*   < > 3.50*   EGFR 59.0* 47.2* 26.0*   < > 18.5*   GLUCOSE 77 104* 83   < > 147*   CALCIUM 8.7 8.2* 9.0   < > 9.6   MAGNESIUM 1.8 2.0 1.9  --  2.1   PHOSPHORUS 2.5 1.7* 2.2*  --   --    TSH  --   --   --   --  6.160*    < > = values in this interval not displayed.         Lab 06/19/25  0513 06/18/25  1612   TOTAL PROTEIN 7.2 8.2   ALBUMIN 3.6 4.1   GLOBULIN 3.6 4.1   ALT (SGPT) 15 20   AST (SGOT) 24 31   BILIRUBIN 1.2 1.3*   ALK PHOS 61 69         Lab 06/18/25  1817 06/18/25  1612   HSTROP T 57* 60*                 Brief Urine Lab  Results  (Last result in the past 365 days)        Color   Clarity   Blood   Leuk Est   Nitrite   Protein   CREAT   Urine HCG        06/18/25 1818 Yellow   Cloudy   Moderate (2+)   Large (3+)   Negative   Trace                 [x]  Microbiology   Microbiology Results (last 10 days)       Procedure Component Value - Date/Time    Urine Culture - Urine, Indwelling Urethral Catheter [[id number]]  (Abnormal)  (Susceptibility) Collected: 06/18/25 1818    Lab Status: Final result Specimen: Urine from Indwelling Urethral Catheter Updated: 06/21/25 0434     Urine Culture 25,000 CFU/mL Enterococcus faecalis    Narrative:      Colonization of the urinary tract without infection is common. Treatment is discouraged unless the patient is symptomatic, pregnant, or undergoing an invasive urologic procedure.    Susceptibility        Enterococcus faecalis      ROSLYN      Ampicillin Susceptible      Levofloxacin Susceptible      Nitrofurantoin Susceptible      Vancomycin Susceptible                                 [x]  Radiology  CT Abdomen Pelvis Without Contrast  Result Date: 6/18/2025  Impression: No acute findings in the abdomen/pelvis. Electronically Signed: Castro Ponce MD  6/18/2025 8:05 PM EDT  Workstation ID: [id number]    XR Chest 1 View  Result Date: 6/18/2025  Impression: 1.Postoperative changes of prior sternotomy and presumably bypass. 2.No active disease. Electronically Signed: Dino Dailey MD  6/18/2025 4:32 PM EDT  Workstation ID: [id number]    []  EKG/Telemetry   []  Cardiology/Vascular   []  Pathology  []  Old records  []  Other:    Assessment & Plan   Assessment / Plan     Assessment:  Gastroenteritis likely viral   Dehydration  ESVIN due to prerenal from dehydration  Hyponatremia  Anion gap metabolic acidosis due to ESVIN and diarrhea  UTI  Hypokalemia, repleted  Hyperphosphatemia, repleted  Hypertension  Hyperlipidemia  Type 2 diabetes mellitus  Hypothyroidism  CKD stage IIIa  CAD status post CABG    Plan:  Patient  currently being managed in hospitalist service.  Hyponatremia improving;  sodium 132 today.   ESVIN improving with creatinine 1.33 today, creatinine 3.50 on presentation.    Nephrology following, appreciate further input.  Continue to trend and monitor renal function.  Leukocytosis resolved.  Lactic acidosis resolved.  Antibiotic switched to PO amoxicillin PO for 5 days as urine culture grew Enterococcus faecalis.  Hypokalemia, repleted.  Hypophosphatemia, resolved.  Appreciate further input by nephrologist.  Hypoglycemia this AM; HbA1c 6.2. ISS discontinued and started on decreased dose of Metformin 500 mg BID.  Morning labs reviewed, 6/21/2023.  Appropriate home medications restarted.  Labs in AM.    Clinical course to determine disposition; precert started at [facility].     Discussed with RN.    VTE Prophylaxis:  Pharmacologic VTE prophylaxis orders are present.        CODE STATUS:   Code Status (Patient has no pulse and is not breathing): CPR (Attempt to Resuscitate)  Medical Interventions (Patient has pulse or is breathing): Full Support  Level Of Support Discussed With: Patient      Electronically signed by Eusebia Garcia MD, 06/21/25, 9:56 AM EDT.

## 2025-06-21 NOTE — PROGRESS NOTES
[facility] Progress Note      Patient Name: Alberto Rachel  : 1960  MRN: [id number]  Primary Care Physician:  Magnus Euceda DO  Date of admission: 2025    Subjective     Interval History:   Clinically stable   improved a lot feels better   kidney function has improved significantly   responded very well to IV fluids   no signs of uremia   blood pressure is still soft      Review of Systems:   As noted above    Objective     Vitals:   Temp:  [97.5 °F (36.4 °C)-98.4 °F (36.9 °C)] 97.5 °F (36.4 °C)  Heart Rate:  [60-72] 60  Resp:  [18-20] 18  BP: ()/(51-60) 95/56    Intake/Output Summary (Last 24 hours) at 2025 1226  Last data filed at 2025 1130  Gross per 24 hour   Intake 720 ml   Output 5050 ml   Net -4330 ml       Physical Exam:    General Appearance: alert, oriented x 3, no acute distress   Skin: warm and dry  HEENT: oral mucosa normal, nonicteric sclera  Neck: supple, no JVD  Lungs: CTA  Heart: RRR, normal S1 and S2  Abdomen: soft, nontender, nondistended  : no palpable bladder  Extremities: no edema, cyanosis or clubbing  Neuro: normal speech and mental status     Scheduled Meds:     amoxicillin, 500 mg, Oral, Q8H  aspirin, 81 mg, Oral, Daily  atorvastatin, 80 mg, Oral, Daily  famotidine, 20 mg, Oral, Nightly  heparin (porcine), 5,000 Units, Subcutaneous, Q8H  insulin lispro, 2-7 Units, Subcutaneous, 4x Daily AC & at Bedtime  levothyroxine, 50 mcg, Oral, Q AM  metoprolol succinate XL, 25 mg, Oral, Daily  mupirocin, 1 Application, Topical, TID  sodium chloride, 500 mL, Intravenous, Once  sodium chloride, 10 mL, Intravenous, Q12H  traZODone, 100 mg, Oral, Nightly      IV Meds:   lactated ringers, 100 mL/hr, Last Rate: 100 mL/hr (25 0019)        Results Reviewed:   I have personally reviewed the results from the time of this admission to 2025 12:26 EDT     Results from last 7 days   Lab Units 25  0427 25  0516 25  0513  06/18/25  2340 06/18/25  1612   SODIUM mmol/L 132* 127* 124*   < > 124*   POTASSIUM mmol/L 3.4* 2.9* 2.9*   < > 3.0*   CHLORIDE mmol/L 94* 90* 85*   < > 79*   CO2 mmol/L 23.1 27.0 14.5*   < > 17.7*   BUN mg/dL 23.7* 40.5* 69.5*   < > 83.8*   CREATININE mg/dL 1.33* 1.60* 2.63*   < > 3.50*   CALCIUM mg/dL 8.7 8.2* 9.0   < > 9.6   BILIRUBIN mg/dL  --   --  1.2  --  1.3*   ALK PHOS U/L  --   --  61  --  69   ALT (SGPT) U/L  --   --  15  --  20   AST (SGOT) U/L  --   --  24  --  31   GLUCOSE mg/dL 77 104* 83   < > 147*    < > = values in this interval not displayed.     Estimated Creatinine Clearance: 60.7 mL/min (A) (by C-G formula based on SCr of 1.33 mg/dL (H)).  Results from last 7 days   Lab Units 06/21/25  0427 06/20/25  0516 06/19/25  0513   MAGNESIUM mg/dL 1.8 2.0 1.9   PHOSPHORUS mg/dL 2.5 1.7* 2.2*         Results from last 7 days   Lab Units 06/21/25  0427 06/20/25  0516 06/19/25  0513 06/18/25  1612   WBC 10*3/mm3 7.57 7.81 12.32* 14.22*   HEMOGLOBIN g/dL 11.6* 12.0* 13.1 15.2   PLATELETS 10*3/mm3 190 180 229 267           Assessment / Plan     ASSESSMENT:  Acute kidney injury secondary to prerenal state from decreased oral intake, while on home dose of bumetanide, chlorthalidone, Jardiance,, Rybelsus, and telmisartan affecting renal autoregulation and volume status.  Currently on hold.  Improving with IV fluid challenge  Chronic kidney disease stage III.  Baseline creatinine 1 to 1.2 secondary to hypertensive nephrosclerosis  Hypertension w CKD adjusting regimen  Metabolic acidosis secondary to acute kidney injury improving with bicarbonate drip  CAD s/p CABG x 5 .  On medical management  Hypophosphatemia on replacement protocol secondary to decreased oral intake.  Hypovolemic hyponatremia from ESVIN and prerenal state improved with medical management  Diabetes mellitus type 2 as per primary team    PLAN:  Renal function has improved continue with IV fluids for now  Please continue to hold the blood pressure  medicines and the diuretics.  If the blood pressure drop can add the midodrine 10 mg 3 times a day  Dose medicate per GFR  Avoid nephrotoxins  Intake output charting  Electrolyte replacement  Thank for the consult      Ida Knight MD  06/21/25  12:26 EDT    [facility]  [phone]

## 2025-06-21 NOTE — PLAN OF CARE
Goal Outcome Evaluation:              Outcome Evaluation: Pt. remains alert and oriented x3 with disorientation to time. LR infusing at 100ml/hr. VSS. Continue care per Plan of Care.

## 2025-06-22 ENCOUNTER — APPOINTMENT (OUTPATIENT)
Dept: GENERAL RADIOLOGY | Facility: HOSPITAL | Age: 65
End: 2025-06-22
Payer: MEDICARE

## 2025-06-22 LAB
ANION GAP SERPL CALCULATED.3IONS-SCNC: 15 MMOL/L (ref 5–15)
BASOPHILS # BLD AUTO: 0.05 10*3/MM3 (ref 0–0.2)
BASOPHILS NFR BLD AUTO: 0.3 % (ref 0–1.5)
BUN SERPL-MCNC: 17.3 MG/DL (ref 8–23)
BUN/CREAT SERPL: 13.1 (ref 7–25)
CALCIUM SPEC-SCNC: 8.9 MG/DL (ref 8.6–10.5)
CHLORIDE SERPL-SCNC: 96 MMOL/L (ref 98–107)
CO2 SERPL-SCNC: 20 MMOL/L (ref 22–29)
CREAT SERPL-MCNC: 1.32 MG/DL (ref 0.76–1.27)
DEPRECATED RDW RBC AUTO: 42.4 FL (ref 37–54)
EGFRCR SERPLBLD CKD-EPI 2021: 59.5 ML/MIN/1.73
EOSINOPHIL # BLD AUTO: 0 10*3/MM3 (ref 0–0.4)
EOSINOPHIL NFR BLD AUTO: 0 % (ref 0.3–6.2)
ERYTHROCYTE [DISTWIDTH] IN BLOOD BY AUTOMATED COUNT: 13.6 % (ref 12.3–15.4)
GLUCOSE SERPL-MCNC: 93 MG/DL (ref 65–99)
HCT VFR BLD AUTO: 36.3 % (ref 37.5–51)
HGB BLD-MCNC: 12.2 G/DL (ref 13–17.7)
IMM GRANULOCYTES # BLD AUTO: 0.12 10*3/MM3 (ref 0–0.05)
IMM GRANULOCYTES NFR BLD AUTO: 0.7 % (ref 0–0.5)
LYMPHOCYTES # BLD AUTO: 1.45 10*3/MM3 (ref 0.7–3.1)
LYMPHOCYTES NFR BLD AUTO: 8.9 % (ref 19.6–45.3)
MAGNESIUM SERPL-MCNC: 1.4 MG/DL (ref 1.6–2.4)
MCH RBC QN AUTO: 28.7 PG (ref 26.6–33)
MCHC RBC AUTO-ENTMCNC: 33.6 G/DL (ref 31.5–35.7)
MCV RBC AUTO: 85.4 FL (ref 79–97)
MONOCYTES # BLD AUTO: 0.96 10*3/MM3 (ref 0.1–0.9)
MONOCYTES NFR BLD AUTO: 5.9 % (ref 5–12)
NEUTROPHILS NFR BLD AUTO: 13.79 10*3/MM3 (ref 1.7–7)
NEUTROPHILS NFR BLD AUTO: 84.2 % (ref 42.7–76)
NRBC BLD AUTO-RTO: 0 /100 WBC (ref 0–0.2)
PHOSPHATE SERPL-MCNC: 2 MG/DL (ref 2.5–4.5)
PLATELET # BLD AUTO: 209 10*3/MM3 (ref 140–450)
PMV BLD AUTO: 10.4 FL (ref 6–12)
POTASSIUM SERPL-SCNC: 3.5 MMOL/L (ref 3.5–5.2)
RBC # BLD AUTO: 4.25 10*6/MM3 (ref 4.14–5.8)
SODIUM SERPL-SCNC: 131 MMOL/L (ref 136–145)
WBC NRBC COR # BLD AUTO: 16.37 10*3/MM3 (ref 3.4–10.8)

## 2025-06-22 PROCEDURE — 87040 BLOOD CULTURE FOR BACTERIA: CPT | Performed by: INTERNAL MEDICINE

## 2025-06-22 PROCEDURE — 85025 COMPLETE CBC W/AUTO DIFF WBC: CPT | Performed by: STUDENT IN AN ORGANIZED HEALTH CARE EDUCATION/TRAINING PROGRAM

## 2025-06-22 PROCEDURE — 84100 ASSAY OF PHOSPHORUS: CPT | Performed by: STUDENT IN AN ORGANIZED HEALTH CARE EDUCATION/TRAINING PROGRAM

## 2025-06-22 PROCEDURE — 25010000002 ONDANSETRON PER 1 MG: Performed by: STUDENT IN AN ORGANIZED HEALTH CARE EDUCATION/TRAINING PROGRAM

## 2025-06-22 PROCEDURE — 99232 SBSQ HOSP IP/OBS MODERATE 35: CPT | Performed by: INTERNAL MEDICINE

## 2025-06-22 PROCEDURE — 25010000002 HEPARIN (PORCINE) PER 1000 UNITS: Performed by: STUDENT IN AN ORGANIZED HEALTH CARE EDUCATION/TRAINING PROGRAM

## 2025-06-22 PROCEDURE — 71045 X-RAY EXAM CHEST 1 VIEW: CPT

## 2025-06-22 PROCEDURE — 83735 ASSAY OF MAGNESIUM: CPT | Performed by: STUDENT IN AN ORGANIZED HEALTH CARE EDUCATION/TRAINING PROGRAM

## 2025-06-22 PROCEDURE — 25810000003 LACTATED RINGERS PER 1000 ML: Performed by: INTERNAL MEDICINE

## 2025-06-22 PROCEDURE — 80048 BASIC METABOLIC PNL TOTAL CA: CPT | Performed by: STUDENT IN AN ORGANIZED HEALTH CARE EDUCATION/TRAINING PROGRAM

## 2025-06-22 PROCEDURE — 25810000003 SODIUM CHLORIDE 0.9 % SOLUTION: Performed by: INTERNAL MEDICINE

## 2025-06-22 PROCEDURE — 25010000002 MAGNESIUM SULFATE 4 GM/100ML SOLUTION: Performed by: INTERNAL MEDICINE

## 2025-06-22 RX ORDER — MAGNESIUM SULFATE HEPTAHYDRATE 40 MG/ML
4 INJECTION, SOLUTION INTRAVENOUS ONCE
Status: COMPLETED | OUTPATIENT
Start: 2025-06-22 | End: 2025-06-22

## 2025-06-22 RX ORDER — ONDANSETRON 2 MG/ML
4 INJECTION INTRAMUSCULAR; INTRAVENOUS EVERY 6 HOURS PRN
Status: DISCONTINUED | OUTPATIENT
Start: 2025-06-22 | End: 2025-06-24 | Stop reason: HOSPADM

## 2025-06-22 RX ORDER — FENTANYL/ROPIVACAINE/NS/PF 2-625MCG/1
15 PLASTIC BAG, INJECTION (ML) EPIDURAL ONCE
Status: COMPLETED | OUTPATIENT
Start: 2025-06-22 | End: 2025-06-22

## 2025-06-22 RX ORDER — FENTANYL/ROPIVACAINE/NS/PF 2-625MCG/1
15 PLASTIC BAG, INJECTION (ML) EPIDURAL
Status: DISPENSED | OUTPATIENT
Start: 2025-06-22 | End: 2025-06-22

## 2025-06-22 RX ADMIN — METFORMIN HYDROCHLORIDE 500 MG: 500 TABLET, EXTENDED RELEASE ORAL at 09:17

## 2025-06-22 RX ADMIN — POTASSIUM PHOSPHATE, MONOBASIC AND POTASSIUM PHOSPHATE, DIBASIC 15 MMOL: 224; 236 INJECTION, SOLUTION, CONCENTRATE INTRAVENOUS at 18:23

## 2025-06-22 RX ADMIN — AMOXICILLIN 500 MG: 500 CAPSULE ORAL at 15:15

## 2025-06-22 RX ADMIN — METFORMIN HYDROCHLORIDE 500 MG: 500 TABLET, EXTENDED RELEASE ORAL at 18:23

## 2025-06-22 RX ADMIN — ATORVASTATIN CALCIUM 80 MG: 40 TABLET, FILM COATED ORAL at 09:16

## 2025-06-22 RX ADMIN — AMOXICILLIN 500 MG: 500 CAPSULE ORAL at 21:04

## 2025-06-22 RX ADMIN — MUPIROCIN 1 APPLICATION: 20 OINTMENT TOPICAL at 12:08

## 2025-06-22 RX ADMIN — POTASSIUM PHOSPHATE, MONOBASIC AND POTASSIUM PHOSPHATE, DIBASIC 15 MMOL: 224; 236 INJECTION, SOLUTION, CONCENTRATE INTRAVENOUS at 12:09

## 2025-06-22 RX ADMIN — LEVOTHYROXINE SODIUM 50 MCG: 0.05 TABLET ORAL at 05:34

## 2025-06-22 RX ADMIN — AMOXICILLIN 500 MG: 500 CAPSULE ORAL at 05:34

## 2025-06-22 RX ADMIN — MUPIROCIN 1 APPLICATION: 20 OINTMENT TOPICAL at 09:17

## 2025-06-22 RX ADMIN — MUPIROCIN 1 APPLICATION: 20 OINTMENT TOPICAL at 18:23

## 2025-06-22 RX ADMIN — TRAZODONE HYDROCHLORIDE 100 MG: 100 TABLET ORAL at 21:04

## 2025-06-22 RX ADMIN — Medication 10 ML: at 09:17

## 2025-06-22 RX ADMIN — ASPIRIN 81 MG: 81 TABLET, DELAYED RELEASE ORAL at 09:16

## 2025-06-22 RX ADMIN — MAGNESIUM SULFATE HEPTAHYDRATE 4 G: 40 INJECTION, SOLUTION INTRAVENOUS at 12:09

## 2025-06-22 RX ADMIN — SODIUM CHLORIDE, POTASSIUM CHLORIDE, SODIUM LACTATE AND CALCIUM CHLORIDE 100 ML/HR: 600; 310; 30; 20 INJECTION, SOLUTION INTRAVENOUS at 03:37

## 2025-06-22 RX ADMIN — FAMOTIDINE 20 MG: 20 TABLET, FILM COATED ORAL at 21:04

## 2025-06-22 RX ADMIN — ONDANSETRON 4 MG: 2 INJECTION INTRAMUSCULAR; INTRAVENOUS at 00:22

## 2025-06-22 RX ADMIN — HEPARIN SODIUM 5000 UNITS: 5000 INJECTION INTRAVENOUS; SUBCUTANEOUS at 15:15

## 2025-06-22 RX ADMIN — HEPARIN SODIUM 5000 UNITS: 5000 INJECTION INTRAVENOUS; SUBCUTANEOUS at 05:34

## 2025-06-22 RX ADMIN — Medication 10 ML: at 21:09

## 2025-06-22 RX ADMIN — HEPARIN SODIUM 5000 UNITS: 5000 INJECTION INTRAVENOUS; SUBCUTANEOUS at 21:04

## 2025-06-22 RX ADMIN — SODIUM CHLORIDE, POTASSIUM CHLORIDE, SODIUM LACTATE AND CALCIUM CHLORIDE 100 ML/HR: 600; 310; 30; 20 INJECTION, SOLUTION INTRAVENOUS at 18:24

## 2025-06-22 NOTE — PROGRESS NOTES
[facility]   Hospitalist Progress Note  Date: 2025  Patient Name: Alberto Rachel  : 1960  MRN: [id number]  Date of admission: 2025  Room/Bed: [facility]      Subjective   Subjective     Chief Complaint: Generalized weakness, dizziness     Summary:Alberto Rachel is a 64 y.o. male with a past medical history of hypertension, hyperlipidemia, type 2 diabetes mellitus, hypothyroidism, CKD stage IIIa, CAD s/p CABG presented to the ED for evaluation of weakness and dizziness that started on  and has been worsening.  Patient noted to have generalized weakness.  Had a witnessed fall while getting out of the car on , did not hit his head.  Patient has been experiencing multiple episodes of diarrhea since last few days.  Denies any nausea, vomiting.  Poor oral intake for the past few days.  Denies any abdominal pain, chest pain, fevers, chills, hematochezia, melena.  Patient is on Lasix, chlorthalidone, telmisartan at home.  Orozco catheter placed in the ER and drained 500 cc urine.     On presentation to ER, initial troponin 60, repeat 57, sodium 124, potassium 3, chloride 79, bicarb 17.7, creatinine 3.50, month ago was 1.07, rest of the CMP with no significant findings, TSH 6.16, free T42.2, lactic acid 2.4, repeat 1.9, WBC 14.2, normal hemoglobin, platelets, urinalysis showed 3+ bacteria numerous to count WBC, cloudy appearance.  CT abdomen pelvis without contrast showed no acute findings.  Chest x-ray showed no active disease.  Received 1 L IV fluids and ceftriaxone in the ED.  Patient admitted for further evaluation and management of ESVIN, gastroenteritis, dehydration, moderate hyponatremia, anion gap metabolic acidosis, UTI.     Interval Followup: No acute events overnight.  Has no new complaints this morning.  Denies dizziness or shortness of breath.    Objective   Objective     Vitals:   Temp:  [97.5 °F (36.4 °C)-100 °F (37.8 °C)] 97.9 °F (36.6 °C)  Heart Rate:  [70-91] 70  Resp:   [18-20] 20  BP: ()/(58-68) 112/62    Physical Exam   General: Awake, alert, NAD  Cardiovascular: RRR, no murmurs   Pulmonary: CTA bilaterally; no wheezes; no conversational dyspnea  Gastrointestinal: S/ND/NT, +BS  Neuro: Alert, awake, oriented x 3; speech clear; no tremor    Result Review    I have personally reviewed these results:  [x]  Laboratory personally reviewed BMP, CBC, magnesium, phosphorus      Lab 06/22/25  0430 06/21/25  0427 06/20/25  0516 06/19/25  0513 06/18/25  1928 06/18/25  1613   WBC 16.37* 7.57 7.81   < >  --   --    HEMOGLOBIN 12.2* 11.6* 12.0*   < >  --   --    HEMATOCRIT 36.3* 33.6* 35.0*   < >  --   --    PLATELETS 209 190 180   < >  --   --    NEUTROS ABS 13.79* 4.79 4.88   < >  --   --    IMMATURE GRANS (ABS) 0.12* 0.06* 0.05   < >  --   --    LYMPHS ABS 1.45 1.85 2.02   < >  --   --    MONOS ABS 0.96* 0.70 0.71   < >  --   --    EOS ABS 0.00 0.13 0.12   < >  --   --    MCV 85.4 84.6 85.0   < >  --   --    LACTATE  --   --   --   --  1.9 2.4*    < > = values in this interval not displayed.         Lab 06/22/25  0430 06/21/25  0427 06/20/25  0516 06/18/25  2340 06/18/25  1612   SODIUM 131* 132* 127*   < > 124*   POTASSIUM 3.5 3.4* 2.9*   < > 3.0*   CHLORIDE 96* 94* 90*   < > 79*   CO2 20.0* 23.1 27.0   < > 17.7*   ANION GAP 15.0 14.9 10.0   < > 27.3*   BUN 17.3 23.7* 40.5*   < > 83.8*   CREATININE 1.32* 1.33* 1.60*   < > 3.50*   EGFR 59.5* 59.0* 47.2*   < > 18.5*   GLUCOSE 93 77 104*   < > 147*   CALCIUM 8.9 8.7 8.2*   < > 9.6   MAGNESIUM 1.4* 1.8 2.0   < > 2.1   PHOSPHORUS 2.0* 2.5 1.7*   < >  --    HEMOGLOBIN A1C  --  6.20*  --   --   --    TSH  --   --   --   --  6.160*    < > = values in this interval not displayed.         Lab 06/19/25  0513 06/18/25  1612   TOTAL PROTEIN 7.2 8.2   ALBUMIN 3.6 4.1   GLOBULIN 3.6 4.1   ALT (SGPT) 15 20   AST (SGOT) 24 31   BILIRUBIN 1.2 1.3*   ALK PHOS 61 69         Lab 06/18/25  1817 06/18/25  1612   HSTROP T 57* 60*                 Brief Urine  Lab Results  (Last result in the past 365 days)        Color   Clarity   Blood   Leuk Est   Nitrite   Protein   CREAT   Urine HCG        06/18/25 1818 Yellow   Cloudy   Moderate (2+)   Large (3+)   Negative   Trace                 [x]  Microbiology   [x]  Radiology  []  EKG/Telemetry   []  Cardiology/Vascular   []  Pathology  []  Old records  []  Other:    Assessment & Plan   Assessment / Plan   Gastroenteritis likely viral   Dehydration  ESVIN due to prerenal from dehydration  Hyponatremia  Anion gap metabolic acidosis due to ESVIN and diarrhea  UTI  Hypokalemia, repleted  Hyperphosphatemia, repleted  Hypertension  Hyperlipidemia  Type 2 diabetes mellitus  Hypothyroidism  CKD stage IIIa  CAD status post CABG    Continue to monitor in the hospital for workup and management as above  Notable jump in WBC from 7-16 after being transitioned to oral ampicillin  Will obtain repeat blood cultures, chest x-ray obtained and personally reviewed without evidence of pneumonia  Will monitor WBC in a.m. and if worsening, consider transitioning back to IV antibiotics  Discussed with nephrology, renal function improving, continue LR at 100 cc/h  Hyponatremia remains stable  Antibiotic switched to PO amoxicillin PO for 5 days as urine culture grew Enterococcus faecalis  Continue with metformin twice daily  Continue appropriate home medications  Trend renal function and electrolytes with a.m. BMP, magnesium   Trend Hgb and WBC with a.m. CBC    Clinical course to determine disposition; precert started at [facility] [place].     Discussed with RN, nephrology    VTE Prophylaxis:  Pharmacologic VTE prophylaxis orders are present.        CODE STATUS:   Code Status (Patient has no pulse and is not breathing): CPR (Attempt to Resuscitate)  Medical Interventions (Patient has pulse or is breathing): Full Support  Level Of Support Discussed With: Patient

## 2025-06-22 NOTE — PROGRESS NOTES
[facility] Progress Note      Patient Name: Alberto Rachel  : 1960  MRN: [id number]  Primary Care Physician:  Magnus Euceda DO  Date of admission: 2025    Subjective     Interval History:   Clinically stable   Renal function remains stable with a creatinine 1.32  Noticed white blood cell count has gone up to 16.37 from 7.57  No fever no chest pain no dizziness no shortness of breath  Feels okay   responded very well to IV fluids   no signs of uremia   blood pressure is still soft      Review of Systems:   As noted above    Objective     Vitals:   Temp:  [97.5 °F (36.4 °C)-100 °F (37.8 °C)] 97.9 °F (36.6 °C)  Heart Rate:  [70-91] 70  Resp:  [18-20] 20  BP: ()/(58-68) 112/62    Intake/Output Summary (Last 24 hours) at 2025 1209  Last data filed at 2025 0725  Gross per 24 hour   Intake 240 ml   Output 2825 ml   Net -2585 ml       Physical Exam:    General Appearance: alert, oriented x 3, no acute distress   Skin: warm and dry  HEENT: oral mucosa normal, nonicteric sclera  Neck: supple, no JVD  Lungs: CTA  Heart: RRR, normal S1 and S2  Abdomen: soft, nontender, nondistended  : no palpable bladder  Extremities: no edema, cyanosis or clubbing  Neuro: normal speech and mental status     Scheduled Meds:     amoxicillin, 500 mg, Oral, Q8H  aspirin, 81 mg, Oral, Daily  atorvastatin, 80 mg, Oral, Daily  famotidine, 20 mg, Oral, Nightly  heparin (porcine), 5,000 Units, Subcutaneous, Q8H  levothyroxine, 50 mcg, Oral, Q AM  metFORMIN ER, 500 mg, Oral, BID With Meals  metoprolol succinate XL, 25 mg, Oral, Daily  mupirocin, 1 Application, Topical, TID  potassium phosphate, 15 mmol, Intravenous, Q3H  sodium chloride, 10 mL, Intravenous, Q12H  traZODone, 100 mg, Oral, Nightly      IV Meds:   lactated ringers, 100 mL/hr, Last Rate: 100 mL/hr (25 6319)        Results Reviewed:   I have personally reviewed the results from the time of this admission to 2025 12:09  EDT     Results from last 7 days   Lab Units 06/22/25  0430 06/21/25  0427 06/20/25  0516 06/19/25  0513 06/18/25  2340 06/18/25  1612   SODIUM mmol/L 131* 132* 127* 124*   < > 124*   POTASSIUM mmol/L 3.5 3.4* 2.9* 2.9*   < > 3.0*   CHLORIDE mmol/L 96* 94* 90* 85*   < > 79*   CO2 mmol/L 20.0* 23.1 27.0 14.5*   < > 17.7*   BUN mg/dL 17.3 23.7* 40.5* 69.5*   < > 83.8*   CREATININE mg/dL 1.32* 1.33* 1.60* 2.63*   < > 3.50*   CALCIUM mg/dL 8.9 8.7 8.2* 9.0   < > 9.6   BILIRUBIN mg/dL  --   --   --  1.2  --  1.3*   ALK PHOS U/L  --   --   --  61  --  69   ALT (SGPT) U/L  --   --   --  15  --  20   AST (SGOT) U/L  --   --   --  24  --  31   GLUCOSE mg/dL 93 77 104* 83   < > 147*    < > = values in this interval not displayed.     Estimated Creatinine Clearance: 61.1 mL/min (A) (by C-G formula based on SCr of 1.32 mg/dL (H)).  Results from last 7 days   Lab Units 06/22/25 0430 06/21/25 0427 06/20/25  0516   MAGNESIUM mg/dL 1.4* 1.8 2.0   PHOSPHORUS mg/dL 2.0* 2.5 1.7*         Results from last 7 days   Lab Units 06/22/25  0430 06/21/25  0427 06/20/25  0516 06/19/25  0513 06/18/25  1612   WBC 10*3/mm3 16.37* 7.57 7.81 12.32* 14.22*   HEMOGLOBIN g/dL 12.2* 11.6* 12.0* 13.1 15.2   PLATELETS 10*3/mm3 209 190 180 229 267           Assessment / Plan     ASSESSMENT:  Acute kidney injury secondary to prerenal state from decreased oral intake, while on home dose of bumetanide, chlorthalidone, Jardiance,, Rybelsus, and telmisartan affecting renal autoregulation and volume status.  Currently on hold.  Improving with IV fluid challenge  Chronic kidney disease stage III.  Baseline creatinine 1 to 1.2 secondary to hypertensive nephrosclerosis  Hypertension w CKD adjusting regimen  Metabolic acidosis secondary to acute kidney injury improving with bicarbonate drip  CAD s/p CABG x 5 .  On medical management  Hypophosphatemia on replacement protocol secondary to decreased oral intake.  Hypovolemic hyponatremia from ESVIN and prerenal state  improved with medical management  Diabetes mellitus type 2 as per primary team    PLAN:  Renal function has improved   Encourage oral hydration   Workup for leukocytosis and diarrhea per primary team  please continue to hold the blood pressure medicines and the diuretics.  If the blood pressure drop can add the midodrine 10 mg 3 times a day  Dose medicate per GFR  Avoid nephrotoxins  Intake output charting  Electrolyte replacement  Thank for the consult      Ida Knight MD  06/22/25  12:09 EDT    [facility]  [phone]

## 2025-06-22 NOTE — PLAN OF CARE
Goal Outcome Evaluation:              Outcome Evaluation: Patient remains alert and oriented x3. N/V treated once. Continue care per Plan of Care.

## 2025-06-22 NOTE — PLAN OF CARE
Goal Outcome Evaluation:              Outcome Evaluation: Patient alert and oriented x3. Slow to respond. No complaints throughout shift. Starting 2nd KPHOS now.

## 2025-06-23 LAB
ANION GAP SERPL CALCULATED.3IONS-SCNC: 11.8 MMOL/L (ref 5–15)
BASOPHILS # BLD AUTO: 0.04 10*3/MM3 (ref 0–0.2)
BASOPHILS NFR BLD AUTO: 0.3 % (ref 0–1.5)
BUN SERPL-MCNC: 13.1 MG/DL (ref 8–23)
BUN/CREAT SERPL: 12.7 (ref 7–25)
CALCIUM SPEC-SCNC: 8.9 MG/DL (ref 8.6–10.5)
CHLORIDE SERPL-SCNC: 102 MMOL/L (ref 98–107)
CO2 SERPL-SCNC: 21.2 MMOL/L (ref 22–29)
CREAT SERPL-MCNC: 1.03 MG/DL (ref 0.76–1.27)
DEPRECATED RDW RBC AUTO: 46.2 FL (ref 37–54)
EGFRCR SERPLBLD CKD-EPI 2021: 80.1 ML/MIN/1.73
EOSINOPHIL # BLD AUTO: 0.17 10*3/MM3 (ref 0–0.4)
EOSINOPHIL NFR BLD AUTO: 1.4 % (ref 0.3–6.2)
ERYTHROCYTE [DISTWIDTH] IN BLOOD BY AUTOMATED COUNT: 14.1 % (ref 12.3–15.4)
GLUCOSE BLDC GLUCOMTR-MCNC: 106 MG/DL (ref 70–99)
GLUCOSE BLDC GLUCOMTR-MCNC: 67 MG/DL (ref 70–99)
GLUCOSE BLDC GLUCOMTR-MCNC: 80 MG/DL (ref 70–99)
GLUCOSE BLDC GLUCOMTR-MCNC: 83 MG/DL (ref 70–99)
GLUCOSE BLDC GLUCOMTR-MCNC: 90 MG/DL (ref 70–99)
GLUCOSE SERPL-MCNC: 60 MG/DL (ref 65–99)
HCT VFR BLD AUTO: 36.1 % (ref 37.5–51)
HGB BLD-MCNC: 11.7 G/DL (ref 13–17.7)
IMM GRANULOCYTES # BLD AUTO: 0.05 10*3/MM3 (ref 0–0.05)
IMM GRANULOCYTES NFR BLD AUTO: 0.4 % (ref 0–0.5)
LYMPHOCYTES # BLD AUTO: 2.02 10*3/MM3 (ref 0.7–3.1)
LYMPHOCYTES NFR BLD AUTO: 17 % (ref 19.6–45.3)
MAGNESIUM SERPL-MCNC: 2 MG/DL (ref 1.6–2.4)
MCH RBC QN AUTO: 28.8 PG (ref 26.6–33)
MCHC RBC AUTO-ENTMCNC: 32.4 G/DL (ref 31.5–35.7)
MCV RBC AUTO: 88.9 FL (ref 79–97)
MONOCYTES # BLD AUTO: 1.03 10*3/MM3 (ref 0.1–0.9)
MONOCYTES NFR BLD AUTO: 8.7 % (ref 5–12)
NEUTROPHILS NFR BLD AUTO: 72.2 % (ref 42.7–76)
NEUTROPHILS NFR BLD AUTO: 8.54 10*3/MM3 (ref 1.7–7)
NRBC BLD AUTO-RTO: 0 /100 WBC (ref 0–0.2)
PHOSPHATE SERPL-MCNC: 2.5 MG/DL (ref 2.5–4.5)
PLATELET # BLD AUTO: 190 10*3/MM3 (ref 140–450)
PMV BLD AUTO: 10.2 FL (ref 6–12)
POTASSIUM SERPL-SCNC: 4.2 MMOL/L (ref 3.5–5.2)
RBC # BLD AUTO: 4.06 10*6/MM3 (ref 4.14–5.8)
SODIUM SERPL-SCNC: 135 MMOL/L (ref 136–145)
WBC NRBC COR # BLD AUTO: 11.85 10*3/MM3 (ref 3.4–10.8)

## 2025-06-23 PROCEDURE — 80048 BASIC METABOLIC PNL TOTAL CA: CPT | Performed by: STUDENT IN AN ORGANIZED HEALTH CARE EDUCATION/TRAINING PROGRAM

## 2025-06-23 PROCEDURE — 99233 SBSQ HOSP IP/OBS HIGH 50: CPT | Performed by: INTERNAL MEDICINE

## 2025-06-23 PROCEDURE — 85025 COMPLETE CBC W/AUTO DIFF WBC: CPT | Performed by: STUDENT IN AN ORGANIZED HEALTH CARE EDUCATION/TRAINING PROGRAM

## 2025-06-23 PROCEDURE — 94799 UNLISTED PULMONARY SVC/PX: CPT

## 2025-06-23 PROCEDURE — 82948 REAGENT STRIP/BLOOD GLUCOSE: CPT

## 2025-06-23 PROCEDURE — 84100 ASSAY OF PHOSPHORUS: CPT | Performed by: STUDENT IN AN ORGANIZED HEALTH CARE EDUCATION/TRAINING PROGRAM

## 2025-06-23 PROCEDURE — 25810000003 LACTATED RINGERS PER 1000 ML: Performed by: INTERNAL MEDICINE

## 2025-06-23 PROCEDURE — 83735 ASSAY OF MAGNESIUM: CPT | Performed by: STUDENT IN AN ORGANIZED HEALTH CARE EDUCATION/TRAINING PROGRAM

## 2025-06-23 PROCEDURE — 97116 GAIT TRAINING THERAPY: CPT

## 2025-06-23 PROCEDURE — 25010000002 HEPARIN (PORCINE) PER 1000 UNITS: Performed by: STUDENT IN AN ORGANIZED HEALTH CARE EDUCATION/TRAINING PROGRAM

## 2025-06-23 RX ORDER — IBUPROFEN 600 MG/1
1 TABLET ORAL
Status: DISCONTINUED | OUTPATIENT
Start: 2025-06-23 | End: 2025-06-24 | Stop reason: HOSPADM

## 2025-06-23 RX ORDER — NICOTINE POLACRILEX 4 MG
15 LOZENGE BUCCAL
Status: DISCONTINUED | OUTPATIENT
Start: 2025-06-23 | End: 2025-06-24 | Stop reason: HOSPADM

## 2025-06-23 RX ORDER — DEXTROSE MONOHYDRATE 25 G/50ML
25 INJECTION, SOLUTION INTRAVENOUS
Status: DISCONTINUED | OUTPATIENT
Start: 2025-06-23 | End: 2025-06-24 | Stop reason: HOSPADM

## 2025-06-23 RX ORDER — INSULIN LISPRO 100 [IU]/ML
2-7 INJECTION, SOLUTION INTRAVENOUS; SUBCUTANEOUS
Status: DISCONTINUED | OUTPATIENT
Start: 2025-06-23 | End: 2025-06-24 | Stop reason: HOSPADM

## 2025-06-23 RX ORDER — METOPROLOL SUCCINATE 25 MG/1
TABLET, EXTENDED RELEASE ORAL
Qty: 30 TABLET | Refills: 0 | Status: SHIPPED | OUTPATIENT
Start: 2025-06-23

## 2025-06-23 RX ADMIN — HEPARIN SODIUM 5000 UNITS: 5000 INJECTION INTRAVENOUS; SUBCUTANEOUS at 21:49

## 2025-06-23 RX ADMIN — LEVOTHYROXINE SODIUM 50 MCG: 0.05 TABLET ORAL at 05:22

## 2025-06-23 RX ADMIN — SODIUM CHLORIDE, POTASSIUM CHLORIDE, SODIUM LACTATE AND CALCIUM CHLORIDE 100 ML/HR: 600; 310; 30; 20 INJECTION, SOLUTION INTRAVENOUS at 14:30

## 2025-06-23 RX ADMIN — ATORVASTATIN CALCIUM 80 MG: 40 TABLET, FILM COATED ORAL at 08:20

## 2025-06-23 RX ADMIN — HEPARIN SODIUM 5000 UNITS: 5000 INJECTION INTRAVENOUS; SUBCUTANEOUS at 05:22

## 2025-06-23 RX ADMIN — ASPIRIN 81 MG: 81 TABLET, DELAYED RELEASE ORAL at 08:20

## 2025-06-23 RX ADMIN — SODIUM CHLORIDE, POTASSIUM CHLORIDE, SODIUM LACTATE AND CALCIUM CHLORIDE 100 ML/HR: 600; 310; 30; 20 INJECTION, SOLUTION INTRAVENOUS at 04:24

## 2025-06-23 RX ADMIN — HEPARIN SODIUM 5000 UNITS: 5000 INJECTION INTRAVENOUS; SUBCUTANEOUS at 14:09

## 2025-06-23 RX ADMIN — AMOXICILLIN 500 MG: 500 CAPSULE ORAL at 05:22

## 2025-06-23 RX ADMIN — AMOXICILLIN 500 MG: 500 CAPSULE ORAL at 14:09

## 2025-06-23 RX ADMIN — MUPIROCIN 1 APPLICATION: 20 OINTMENT TOPICAL at 14:10

## 2025-06-23 RX ADMIN — TRAZODONE HYDROCHLORIDE 100 MG: 100 TABLET ORAL at 21:49

## 2025-06-23 RX ADMIN — Medication 10 ML: at 08:20

## 2025-06-23 RX ADMIN — MUPIROCIN 1 APPLICATION: 20 OINTMENT TOPICAL at 18:36

## 2025-06-23 RX ADMIN — MUPIROCIN 1 APPLICATION: 20 OINTMENT TOPICAL at 08:20

## 2025-06-23 RX ADMIN — Medication 10 ML: at 21:48

## 2025-06-23 RX ADMIN — AMOXICILLIN 500 MG: 500 CAPSULE ORAL at 22:02

## 2025-06-23 RX ADMIN — FAMOTIDINE 20 MG: 20 TABLET, FILM COATED ORAL at 21:49

## 2025-06-23 NOTE — PLAN OF CARE
Goal Outcome Evaluation:  Plan of Care Reviewed With: patient           Outcome Evaluation: Patient AO x3, on room air, NSR on monitor. Order to discontinue fisher and fisher removed, patient tolerated well, and awaiting patient to urinate still. Plan of care reviewed with patient.

## 2025-06-23 NOTE — PROGRESS NOTES
[facility]   Hospitalist Progress Note  Date: 2025  Patient Name: Alberto Rachel  : 1960  MRN: [id number]  Date of admission: 2025  Room/Bed: [facility]      Subjective   Subjective     Chief Complaint: Generalized weakness, dizziness     Summary:Alberto Rachel is a 64 y.o. male with a past medical history of hypertension, hyperlipidemia, type 2 diabetes mellitus, hypothyroidism, CKD stage IIIa, CAD s/p CABG presented to the ED for evaluation of weakness and dizziness that started on  and has been worsening.  Patient noted to have generalized weakness.  Had a witnessed fall while getting out of the car on , did not hit his head.  Patient has been experiencing multiple episodes of diarrhea since last few days.  Patient is on Lasix, chlorthalidone, telmisartan at home.  Orozco catheter placed in the ER and drained 500 cc urine.  On presentation to ER, initial troponin 60, repeat 57, sodium 124, potassium 3, chloride 79, bicarb 17.7, creatinine 3.50, month ago was 1.07, rest of the CMP with no significant findings, TSH 6.16, free T42.2, lactic acid 2.4, repeat 1.9, WBC 14.2, normal hemoglobin, platelets, urinalysis showed 3+ bacteria numerous to count WBC, cloudy appearance.  CT abdomen pelvis without contrast showed no acute findings.  Chest x-ray showed no active disease.  Received 1 L IV fluids and ceftriaxone in the ED.  Patient admitted for further evaluation and management of ESVIN, gastroenteritis, dehydration, moderate hyponatremia, anion gap metabolic acidosis, UTI.  Found to have E faecalis UTI, now completing oral antibiotics.  Renal function normalized, voiding trial on .  PT/OT consulted recommended rehab, peer to be performed on .    Interval Followup: No acute events overnight.  Overall feeling well and has no new complaints.    Objective   Objective     Vitals:   Temp:  [97.3 °F (36.3 °C)-98.4 °F (36.9 °C)] 98.2 °F (36.8 °C)  Heart Rate:  [61-73] 73  Resp:  [16-21]  16  BP: ()/(51-56) 102/52    Physical Exam   General: Awake, alert, NAD, pleasant  Cardiovascular: RRR, no MRG  Pulmonary: CTA bilaterally; no wheezes; no conversational dyspnea  Gastrointestinal: S/ND/NT, +BS  Neuro: Alert, awake, oriented x 3; speech clear; no tremor    Result Review    I have personally reviewed these results:  [x]  Laboratory personally reviewed BMP, CBC, magnesium, phosphorus      Lab 06/23/25  0436 06/22/25  0430 06/21/25  0427 06/19/25  0513 06/18/25  1928 06/18/25  1613   WBC 11.85* 16.37* 7.57   < >  --   --    HEMOGLOBIN 11.7* 12.2* 11.6*   < >  --   --    HEMATOCRIT 36.1* 36.3* 33.6*   < >  --   --    PLATELETS 190 209 190   < >  --   --    NEUTROS ABS 8.54* 13.79* 4.79   < >  --   --    IMMATURE GRANS (ABS) 0.05 0.12* 0.06*   < >  --   --    LYMPHS ABS 2.02 1.45 1.85   < >  --   --    MONOS ABS 1.03* 0.96* 0.70   < >  --   --    EOS ABS 0.17 0.00 0.13   < >  --   --    MCV 88.9 85.4 84.6   < >  --   --    LACTATE  --   --   --   --  1.9 2.4*    < > = values in this interval not displayed.         Lab 06/23/25  0436 06/22/25  0430 06/21/25  0427 06/18/25  2340 06/18/25  1612   SODIUM 135* 131* 132*   < > 124*   POTASSIUM 4.2 3.5 3.4*   < > 3.0*   CHLORIDE 102 96* 94*   < > 79*   CO2 21.2* 20.0* 23.1   < > 17.7*   ANION GAP 11.8 15.0 14.9   < > 27.3*   BUN 13.1 17.3 23.7*   < > 83.8*   CREATININE 1.03 1.32* 1.33*   < > 3.50*   EGFR 80.1 59.5* 59.0*   < > 18.5*   GLUCOSE 60* 93 77   < > 147*   CALCIUM 8.9 8.9 8.7   < > 9.6   MAGNESIUM 2.0 1.4* 1.8   < > 2.1   PHOSPHORUS 2.5 2.0* 2.5   < >  --    HEMOGLOBIN A1C  --   --  6.20*  --   --    TSH  --   --   --   --  6.160*    < > = values in this interval not displayed.         Lab 06/19/25  0513 06/18/25  1612   TOTAL PROTEIN 7.2 8.2   ALBUMIN 3.6 4.1   GLOBULIN 3.6 4.1   ALT (SGPT) 15 20   AST (SGOT) 24 31   BILIRUBIN 1.2 1.3*   ALK PHOS 61 69         Lab 06/18/25  1817 06/18/25  1612   HSTROP T 57* 60*                 Brief Urine Lab  Results  (Last result in the past 365 days)        Color   Clarity   Blood   Leuk Est   Nitrite   Protein   CREAT   Urine HCG        06/18/25 1818 Yellow   Cloudy   Moderate (2+)   Large (3+)   Negative   Trace                 [x]  Microbiology   [x]  Radiology  []  EKG/Telemetry   []  Cardiology/Vascular   []  Pathology  []  Old records  []  Other:    Assessment & Plan   Assessment / Plan   Gastroenteritis likely viral   Dehydration  ESVIN due to prerenal from dehydration  Hyponatremia  Anion gap metabolic acidosis due to ESVIN and diarrhea  UTI  Hypokalemia, repleted  Hyperphosphatemia, repleted  Hypertension  Hyperlipidemia  Type 2 diabetes mellitus  Hypothyroidism  CKD stage IIIa  CAD status post CABG    Continue to monitor in the hospital for workup and management as above  Cussed with nephrology-renal function has normalized, will attempt voiding trial today and DC IV fluids  DC metformin, start sliding scale insulin  Leukocytosis improving, chest x-ray negative for pneumonia, repeat blood cultures no growth to date  Plan to complete a 5-day course of amoxicillin for Enterococcus faecalis in urine  Continue appropriate home medications  PT/OT consulted and recommended rehab.  Rehab was denied by insurance company, [id number] performed on 6/23.  They requested updated PT notes and will give us a final decision after they have been reviewed  Trend renal function and electrolytes with a.m. BMP, magnesium   Trend Hgb and WBC with a.m. CBC    Discussed with: Bedside RN, nephrology    Total of 53 minutes spent reviewing labs and imaging, counseling and educating the patient and family, ordering medications, discussing with specialty services, documenting clinical information in the electronic medical record, and care coordination.    VTE Prophylaxis:  Pharmacologic VTE prophylaxis orders are present.        CODE STATUS:   Code Status (Patient has no pulse and is not breathing): CPR (Attempt to Resuscitate)  Medical  Interventions (Patient has pulse or is breathing): Full Support  Level Of Support Discussed With: Patient

## 2025-06-23 NOTE — PLAN OF CARE
Goal Outcome Evaluation:  Plan of Care Reviewed With: patient        Progress: improving  Outcome Evaluation: No changes noted. NSR on tele. VSS. LR@100ml/h infusing. PIV to LAC and RAC patent. Orozco catheter in place. Call light in reach.

## 2025-06-23 NOTE — PROGRESS NOTES
[facility]     Nephrology Progress Note      Patient Name: Alberto Rachel  : 1960  MRN: [id number]  Primary Care Physician:  Magnus Euceda DO  Date of admission: 2025    Subjective   Subjective     Interval History:  Patient Reports feeling better.  No new complaints.  Voiding well.  2800 cc urine output recorded.  Labs reviewed.  Son at bedside.    Review of Systems   All systems were reviewed and negative except for: Was mentioned above.    Objective   Objective     Vitals:   Temp:  [97.3 °F (36.3 °C)-98.4 °F (36.9 °C)] 98.2 °F (36.8 °C)  Heart Rate:  [61-73] 73  Resp:  [16-21] 16  BP: ()/(51-56) 102/52  Physical Exam:   Constitutional: Awake, alert, no distress.  Sitting in bed.   Eyes: sclerae anicteric, no conjunctival injection   HENT: mucous membranes moist   Neck: Supple, no thyromegaly, no lymphadenopathy, trachea midline, No JVD   Respiratory: Clear to auscultation bilaterally, nonlabored respirations    Cardiovascular: RRR, no murmurs, rubs, or gallops.   Gastrointestinal: Positive bowel sounds, soft, nontender, nondistended   Musculoskeletal: No edema, no clubbing or cyanosis   Psychiatric: Appropriate affect, cooperative   Neurologic: Oriented x 3, moving all extremities, Cranial Nerves grossly intact, speech clear   Skin: warm and dry, no rashes     Result Review    Result Reviewed:  I have personally reviewed the results from the time of this admission to 2025 18:27 EDT and agree with these findings:  [x]  Laboratory  []  Microbiology  [x]  Radiology  []  EKG/Telemetry   []  Cardiology/Vascular   []  Pathology  [x]  Old records  []  Other:        Lab 25  0436 25  0430 25  0427 25  0516 25  0513 25  2340 25  1612   SODIUM 135* 131* 132* 127* 124* 126* 124*   POTASSIUM 4.2 3.5 3.4* 2.9* 2.9* 2.9* 3.0*   CHLORIDE 102 96* 94* 90* 85* 82* 79*   CO2 21.2* 20.0* 23.1 27.0 14.5* 14.6* 17.7*   BUN 13.1 17.3 23.7* 40.5* 69.5* 77.5* 83.8*    CREATININE 1.03 1.32* 1.33* 1.60* 2.63* 3.01* 3.50*   GLUCOSE 60* 93 77 104* 83 90 147*   EGFR 80.1 59.5* 59.0* 47.2* 26.0* 22.1* 18.5*   ANION GAP 11.8 15.0 14.9 10.0 24.5* 29.4* 27.3*   MAGNESIUM 2.0 1.4* 1.8 2.0 1.9  --  2.1   PHOSPHORUS 2.5 2.0* 2.5 1.7* 2.2*  --   --            Most notable findings include: As above.    UA compatible with UTI.  Enterococcus faecalis.    Assessment & Plan   Assessment / Plan       Active Hospital Problems:  Active Hospital Problems    Diagnosis     **ESVIN (acute kidney injury)    Enterococcus faecalis UTI.  Anion gap acidosis.  Chronic kidney disease stage III    Assessment and Plan:    - Acute kidney injury secondary to intravascular volume depletion due to diuretics.  Peak creatinine 3.0, resolved.  DC IV fluid and monitor.  - CKD stage III, baseline creatinine 1.4-1.5 from previous records, thought to be secondary to hypertensive nephrosclerosis, previously bland urinary sediment and mild proteinuria.  CT abdomen pelvis without contrast showed no hydronephrosis.  - Mild anemia.  Monitor.  - Hypertension, blood pressure is controlled.  - Metabolic acidosis, multifactorial, improved.  - Coronary disease, status post CABG, medical management.  - Hyponatremia, hypovolemia, resolved.  - Type 2 diabetes with complications, per primary.  Discussed with son and nursing staff.    Will follow, please call with any questions!    Electronically signed by Rajendra Lion MD, 6/23/2025, 18:27 EDT.    [phone]

## 2025-06-23 NOTE — THERAPY TREATMENT NOTE
Acute Care - Physical Therapy Treatment Note  [facility]     Patient Name: Alberto Rachel  : 1960  MRN: [id number]  Today's Date: 2025      Visit Dx:     ICD-10-CM ICD-9-CM   1. Acute renal failure, unspecified acute renal failure type  N17.9 584.9   2. Decreased activities of daily living (ADL)  Z78.9 V49.89   3. Difficulty walking  R26.2 719.7     Patient Active Problem List   Diagnosis    Anxiety    Atherosclerosis of coronary artery bypass graft of native heart without angina pectoris    Essential hypertension    Depression    Mixed hyperlipidemia    Type 2 diabetes mellitus with hyperglycemia, without long-term current use of insulin    Hypothyroidism    Arthritis of knee    Illiteracy    Cramps, extremity    Tachycardia    Obesity (BMI 30-39.9)    Degenerative disc disease, lumbar    Primary insomnia    Gastroesophageal reflux disease without esophagitis    Close exposure to COVID-19 virus    Mild intermittent asthma    Candidal dermatitis    Bronchitis    Bilateral shoulder pain    Hyperkalemia    Stage 3a chronic kidney disease (CKD)    Hyponatremia    Onychomycosis    Onychocryptosis    PVD (peripheral vascular disease)    Foot pain, bilateral    ESVIN (acute kidney injury)     Past Medical History:   Diagnosis Date    Anxiety     Arthritis     Atherosclerosis of coronary artery bypass graft of native heart without angina pectoris 10/23/2018    Coronary artery disease with previous bypass, LIMA to the LAD, SVG to ramus, and SVG to RCA in     CAD (coronary artery disease) 10/23/2018    Carotid artery stenosis with cerebral infarction 2015    Dr. St     Cramps, extremity 9/10/2021    Depression     DM2 (diabetes mellitus, type 2) 10/23/2018    Duodenal ulcer     Essential hypertension 10/23/2018    Headache     Heart attack 2015    Heartburn     HLD (hyperlipidemia)     HTN (hypertension)     Injury of right leg 2021    Irregular heart beat     Low back pain 2019    Mild  episode of recurrent depressive disorder 10/23/2018    Rash 03/18/2019    Shortness of breath     SOB (shortness of breath)     Type 2 diabetes mellitus with hyperglycemia, without long-term current use of insulin 06/11/2019    Uncontrolled diabetes mellitus 03/18/2019     Past Surgical History:   Procedure Laterality Date    CARDIAC CATHETERIZATION  06/2015    Dr. St     CIRCUMCISION      age 14 yrs old    CORONARY ARTERY BYPASS GRAFT  06/2015    5 bypasses     PT Assessment (Last 12 Hours)       PT Evaluation and Treatment       Row Name 06/23/25 1100          Physical Therapy Time and Intention    Subjective Information complains of;fatigue;weakness (P)   -DF     Document Type therapy note (daily note) (P)   -DF     Mode of Treatment individual therapy;physical therapy (P)   -DF     Patient Effort good (P)   -DF     Symptoms Noted During/After Treatment shortness of breath (P)   -DF       Row Name 06/23/25 1100          General Information    Patient Profile Reviewed yes (P)   -DF     Patient Observations alert;cooperative;agree to therapy (P)   -DF       Row Name 06/23/25 1100          Bed Mobility    Bed Mobility sit-supine;supine-sit (P)   -DF     Supine-Sit [place] (Bed Mobility) minimum assist (75% patient effort);1 person assist (P)   -DF     Sit-Supine [place] (Bed Mobility) minimum assist (75% patient effort);1 person assist (P)   -DF       Row Name 06/23/25 1100          Transfers    Transfers sit-stand transfer;stand-sit transfer (P)   -DF       Row Name 06/23/25 1100          Sit-Stand Transfer    Sit-Stand [place] (Transfers) minimum assist (75% patient effort);1 person assist (P)   -DF     Assistive Device (Sit-Stand Transfers) walker, front-wheeled (P)   -DF       Row Name 06/23/25 1100          Stand-Sit Transfer    Stand-Sit [place] (Transfers) contact guard (P)   -DF     Assistive Device (Stand-Sit Transfers) walker, front-wheeled (P)   -DF       Row Name 06/23/25 1100        "   Gait/Stairs (Locomotion)    Gait/Stairs Locomotion gait/ambulation independence (P)   -DF     [place] Level (Gait) contact guard (P)   -DF     Assistive Device (Gait) walker, front-wheeled (P)   -DF     Patient was able to Ambulate yes (P)   -DF     Distance in Feet (Gait) 56 (P)   -DF     Pattern (Gait) step-through (P)   -DF       Row Name             Wound 04/18/25 2004 Left posterior hand    Wound - Properties Group Placement Date: 04/18/25  -HB Placement Time: 2004  -HB Present on Original Admission: --  -HB, unknown  Side: Left  -HB Orientation: posterior  -HB Location: hand  -HB Additional Comments: optifoam placed on hand, unknown when placed. Patient states, \"I did it when I fell at home\"  -HB    Retired Wound - Properties Group Placement Date: 04/18/25  -HB Placement Time: 2004  -HB Present on Original Admission: --  -HB, unknown  Side: Left  -HB Orientation: posterior  -HB Location: hand  -HB Additional Comments: optifoam placed on hand, unknown when placed. Patient states, \"I did it when I fell at home\"  -HB    Retired Wound - Properties Group Placement Date: 04/18/25  -HB Placement Time: 2004  -HB Present on Original Admission: --  -HB, unknown  Side: Left  -HB Orientation: posterior  -HB Location: hand  -HB Additional Comments: optifoam placed on hand, unknown when placed. Patient states, \"I did it when I fell at home\"  -HB    Retired Wound - Properties Group Date first assessed: 04/18/25  -HB Time first assessed: 2004  -HB Present on Original Admission: --  -HB, unknown  Side: Left  -HB Location: hand  -HB Additional Comments: optifoam placed on hand, unknown when placed. Patient states, \"I did it when I fell at home\"  -HB      Row Name             Wound 04/18/25 2004 Left anterior knee    Wound - Properties Group Placement Date: 04/18/25  -HB, unknown  Placement Time: 2004  -HB Present on Original Admission: --  -HB, unknown  Side: Left  -HB Orientation: anterior  -HB Location: knee  -HB " "Additional Comments: optifoam placed, unknown when placed. Patient states, \"I did it when I fell at home\"  -HB    Retired Wound - Properties Group Placement Date: 04/18/25  -HB, unknown  Placement Time: 2004 -HB Present on Original Admission: --  -HB, unknown  Side: Left  -HB Orientation: anterior  -HB Location: knee  -HB Additional Comments: optifoam placed, unknown when placed. Patient states, \"I did it when I fell at home\"  -HB    Retired Wound - Properties Group Placement Date: 04/18/25  -HB, unknown  Placement Time: 2004 -HB Present on Original Admission: --  -HB, unknown  Side: Left  -HB Orientation: anterior  -HB Location: knee  -HB Additional Comments: optifoam placed, unknown when placed. Patient states, \"I did it when I fell at home\"  -HB    Retired Wound - Properties Group Date first assessed: 04/18/25  -HB, unknown  Time first assessed: 2004 -HB Present on Original Admission: --  -HB, unknown  Side: Left  -HB Location: knee  -HB Additional Comments: optifoam placed, unknown when placed. Patient states, \"I did it when I fell at home\"  -HB      Row Name 06/23/25 1100          Progress Summary (PT)    Progress Toward Functional Goals (PT) progress toward functional goals is fair (P)   -DF     Daily Progress Summary (PT) Pt tolerated treatment well, reported shortness of breath and fatigue after ambulation, otherwise no adverse effects from treatment. (P)   -DF               User Key  (r) = Recorded By, (t) = Taken By, (c) = Cosigned By      Initials Name Provider Type    Sarah Jones LPN Licensed Nurse    Gianni Frey, PT Student PT Student                    Physical Therapy Education       Title: PT OT SLP Therapies (Done)       Topic: Physical Therapy (Done)       Point: Mobility training (Done)       Learning Progress Summary            Patient Acceptance, E,TB, VU,NR by  at 6/19/2025 1211                      Point: Home exercise program (Done)       Learning Progress Summary         "    Patient Acceptance, E,TB, VU,NR by  at 6/19/2025 1211                      Point: Body mechanics (Done)       Learning Progress Summary            Patient Acceptance, E,TB, VU,NR by  at 6/19/2025 1211                      Point: Precautions (Done)       Learning Progress Summary            Patient Acceptance, E,TB, VU,NR by  at 6/19/2025 1211                                      User Key       Initials Effective Dates Name Provider Type Discipline     05/28/25 -  Olga Givens, PT Student PT Student PT                  PT Recommendation and Plan     Progress Summary (PT)  Progress Toward Functional Goals (PT): (P) progress toward functional goals is fair  Daily Progress Summary (PT): (P) Pt tolerated treatment well, reported shortness of breath and fatigue after ambulation, otherwise no adverse effects from treatment.   Outcome Measures       Row Name 06/23/25 1100             How much help from another person do you currently need...    Turning from your back to your side while in flat bed without using bedrails? 3 (P)   -DF      Moving from lying on back to sitting on the side of a flat bed without bedrails? 3 (P)   -DF      Moving to and from a bed to a chair (including a wheelchair)? 3 (P)   -DF      Standing up from a chair using your arms (e.g., wheelchair, bedside chair)? 3 (P)   -DF      Climbing 3-5 steps with a railing? 2 (P)   -DF      To walk in hospital room? 3 (P)   -DF      AM-PAC 6 Clicks Score (PT) 17 (P)   -DF         Functional Assessment    Outcome Measure Options AM-PAC 6 Clicks Basic Mobility (PT) (P)   -DF                User Key  (r) = Recorded By, (t) = Taken By, (c) = Cosigned By      Initials Name Provider Type    DF Gianni Klein, PT Student PT Student                     Time Calculation:    PT Charges       Row Name 06/23/25 1154             Time Calculation    PT Received On 06/23/25 (P)   -DF         Timed Charges    82277 - PT Therapeutic Exercise Minutes 5 (P)    -DF      58125 - Gait Training Minutes  10 (P)   -DF         Total Minutes    Timed Charges Total Minutes 15 (P)   -DF       Total Minutes 15 (P)   -DF                User Key  (r) = Recorded By, (t) = Taken By, (c) = Cosigned By      Initials Name Provider Type    Gianni Frey, PT Student PT Student                      PT G-Codes  Outcome Measure Options: (P) AM-PAC 6 Clicks Basic Mobility (PT)  AM-PAC 6 Clicks Score (PT): (P) 17  AM-PAC 6 Clicks Score (OT): 13    Gianni Klein, PT Student  6/23/2025

## 2025-06-24 VITALS
HEART RATE: 63 BPM | BODY MASS INDEX: 26.23 KG/M2 | RESPIRATION RATE: 16 BRPM | TEMPERATURE: 98.6 F | DIASTOLIC BLOOD PRESSURE: 48 MMHG | SYSTOLIC BLOOD PRESSURE: 106 MMHG | OXYGEN SATURATION: 100 % | WEIGHT: 173.06 LBS | HEIGHT: 68 IN

## 2025-06-24 LAB
ANION GAP SERPL CALCULATED.3IONS-SCNC: 12.8 MMOL/L (ref 5–15)
BASOPHILS # BLD AUTO: 0.04 10*3/MM3 (ref 0–0.2)
BASOPHILS NFR BLD AUTO: 0.4 % (ref 0–1.5)
BUN SERPL-MCNC: 12.7 MG/DL (ref 8–23)
BUN/CREAT SERPL: 12 (ref 7–25)
CALCIUM SPEC-SCNC: 8.2 MG/DL (ref 8.6–10.5)
CHLORIDE SERPL-SCNC: 101 MMOL/L (ref 98–107)
CO2 SERPL-SCNC: 18.2 MMOL/L (ref 22–29)
CREAT SERPL-MCNC: 1.06 MG/DL (ref 0.76–1.27)
DEPRECATED RDW RBC AUTO: 45.5 FL (ref 37–54)
EGFRCR SERPLBLD CKD-EPI 2021: 77.4 ML/MIN/1.73
EOSINOPHIL # BLD AUTO: 0.08 10*3/MM3 (ref 0–0.4)
EOSINOPHIL NFR BLD AUTO: 0.9 % (ref 0.3–6.2)
ERYTHROCYTE [DISTWIDTH] IN BLOOD BY AUTOMATED COUNT: 14.1 % (ref 12.3–15.4)
GLUCOSE BLDC GLUCOMTR-MCNC: 76 MG/DL (ref 70–99)
GLUCOSE BLDC GLUCOMTR-MCNC: 83 MG/DL (ref 70–99)
GLUCOSE BLDC GLUCOMTR-MCNC: 93 MG/DL (ref 70–99)
GLUCOSE SERPL-MCNC: 88 MG/DL (ref 65–99)
HCT VFR BLD AUTO: 35.1 % (ref 37.5–51)
HGB BLD-MCNC: 11.6 G/DL (ref 13–17.7)
IMM GRANULOCYTES # BLD AUTO: 0.08 10*3/MM3 (ref 0–0.05)
IMM GRANULOCYTES NFR BLD AUTO: 0.9 % (ref 0–0.5)
LYMPHOCYTES # BLD AUTO: 1.68 10*3/MM3 (ref 0.7–3.1)
LYMPHOCYTES NFR BLD AUTO: 18.3 % (ref 19.6–45.3)
MAGNESIUM SERPL-MCNC: 1.4 MG/DL (ref 1.6–2.4)
MCH RBC QN AUTO: 29.3 PG (ref 26.6–33)
MCHC RBC AUTO-ENTMCNC: 33 G/DL (ref 31.5–35.7)
MCV RBC AUTO: 88.6 FL (ref 79–97)
MONOCYTES # BLD AUTO: 0.82 10*3/MM3 (ref 0.1–0.9)
MONOCYTES NFR BLD AUTO: 9 % (ref 5–12)
NEUTROPHILS NFR BLD AUTO: 6.46 10*3/MM3 (ref 1.7–7)
NEUTROPHILS NFR BLD AUTO: 70.5 % (ref 42.7–76)
NRBC BLD AUTO-RTO: 0 /100 WBC (ref 0–0.2)
PHOSPHATE SERPL-MCNC: 1.4 MG/DL (ref 2.5–4.5)
PLATELET # BLD AUTO: 195 10*3/MM3 (ref 140–450)
PMV BLD AUTO: 9.4 FL (ref 6–12)
POTASSIUM SERPL-SCNC: 3.7 MMOL/L (ref 3.5–5.2)
RBC # BLD AUTO: 3.96 10*6/MM3 (ref 4.14–5.8)
SODIUM SERPL-SCNC: 132 MMOL/L (ref 136–145)
WBC NRBC COR # BLD AUTO: 9.16 10*3/MM3 (ref 3.4–10.8)

## 2025-06-24 PROCEDURE — 97535 SELF CARE MNGMENT TRAINING: CPT

## 2025-06-24 PROCEDURE — 99239 HOSP IP/OBS DSCHRG MGMT >30: CPT | Performed by: INTERNAL MEDICINE

## 2025-06-24 PROCEDURE — 25010000002 MAGNESIUM SULFATE IN D5W 1G/100ML (PREMIX) 1-5 GM/100ML-% SOLUTION: Performed by: STUDENT IN AN ORGANIZED HEALTH CARE EDUCATION/TRAINING PROGRAM

## 2025-06-24 PROCEDURE — 25010000002 MAGNESIUM SULFATE IN D5W 1G/100ML (PREMIX) 1-5 GM/100ML-% SOLUTION: Performed by: INTERNAL MEDICINE

## 2025-06-24 PROCEDURE — 80048 BASIC METABOLIC PNL TOTAL CA: CPT | Performed by: STUDENT IN AN ORGANIZED HEALTH CARE EDUCATION/TRAINING PROGRAM

## 2025-06-24 PROCEDURE — 85025 COMPLETE CBC W/AUTO DIFF WBC: CPT | Performed by: STUDENT IN AN ORGANIZED HEALTH CARE EDUCATION/TRAINING PROGRAM

## 2025-06-24 PROCEDURE — 25810000003 SODIUM CHLORIDE 0.9 % SOLUTION: Performed by: STUDENT IN AN ORGANIZED HEALTH CARE EDUCATION/TRAINING PROGRAM

## 2025-06-24 PROCEDURE — 82948 REAGENT STRIP/BLOOD GLUCOSE: CPT

## 2025-06-24 PROCEDURE — 25810000003 LACTATED RINGERS SOLUTION: Performed by: INTERNAL MEDICINE

## 2025-06-24 PROCEDURE — 25010000002 HEPARIN (PORCINE) PER 1000 UNITS: Performed by: STUDENT IN AN ORGANIZED HEALTH CARE EDUCATION/TRAINING PROGRAM

## 2025-06-24 PROCEDURE — 97530 THERAPEUTIC ACTIVITIES: CPT

## 2025-06-24 PROCEDURE — 83735 ASSAY OF MAGNESIUM: CPT | Performed by: STUDENT IN AN ORGANIZED HEALTH CARE EDUCATION/TRAINING PROGRAM

## 2025-06-24 PROCEDURE — 84100 ASSAY OF PHOSPHORUS: CPT | Performed by: STUDENT IN AN ORGANIZED HEALTH CARE EDUCATION/TRAINING PROGRAM

## 2025-06-24 PROCEDURE — 82948 REAGENT STRIP/BLOOD GLUCOSE: CPT | Performed by: INTERNAL MEDICINE

## 2025-06-24 RX ORDER — MAGNESIUM SULFATE 1 G/100ML
1 INJECTION INTRAVENOUS ONCE
Status: COMPLETED | OUTPATIENT
Start: 2025-06-24 | End: 2025-06-24

## 2025-06-24 RX ORDER — INSULIN LISPRO 100 [IU]/ML
2-7 INJECTION, SOLUTION INTRAVENOUS; SUBCUTANEOUS
Start: 2025-06-24

## 2025-06-24 RX ORDER — FENTANYL/ROPIVACAINE/NS/PF 2-625MCG/1
15 PLASTIC BAG, INJECTION (ML) EPIDURAL ONCE
Status: COMPLETED | OUTPATIENT
Start: 2025-06-24 | End: 2025-06-24

## 2025-06-24 RX ORDER — METOPROLOL SUCCINATE 25 MG/1
25 TABLET, EXTENDED RELEASE ORAL DAILY
Start: 2025-06-24

## 2025-06-24 RX ORDER — AMOXICILLIN 500 MG/1
500 CAPSULE ORAL EVERY 8 HOURS SCHEDULED
Qty: 3 CAPSULE | Refills: 0 | Status: SHIPPED | OUTPATIENT
Start: 2025-06-24 | End: 2025-06-25

## 2025-06-24 RX ADMIN — POTASSIUM PHOSPHATE, MONOBASIC AND POTASSIUM PHOSPHATE, DIBASIC 15 MMOL: 224; 236 INJECTION, SOLUTION, CONCENTRATE INTRAVENOUS at 06:09

## 2025-06-24 RX ADMIN — AMOXICILLIN 500 MG: 500 CAPSULE ORAL at 14:00

## 2025-06-24 RX ADMIN — ASPIRIN 81 MG: 81 TABLET, DELAYED RELEASE ORAL at 08:29

## 2025-06-24 RX ADMIN — AMOXICILLIN 500 MG: 500 CAPSULE ORAL at 05:42

## 2025-06-24 RX ADMIN — MAGNESIUM SULFATE 1 G: 1 INJECTION INTRAVENOUS at 06:08

## 2025-06-24 RX ADMIN — ATORVASTATIN CALCIUM 80 MG: 40 TABLET, FILM COATED ORAL at 08:28

## 2025-06-24 RX ADMIN — MUPIROCIN 1 APPLICATION: 20 OINTMENT TOPICAL at 14:00

## 2025-06-24 RX ADMIN — HEPARIN SODIUM 5000 UNITS: 5000 INJECTION INTRAVENOUS; SUBCUTANEOUS at 14:00

## 2025-06-24 RX ADMIN — LEVOTHYROXINE SODIUM 50 MCG: 0.05 TABLET ORAL at 05:42

## 2025-06-24 RX ADMIN — HEPARIN SODIUM 5000 UNITS: 5000 INJECTION INTRAVENOUS; SUBCUTANEOUS at 05:42

## 2025-06-24 RX ADMIN — MAGNESIUM SULFATE 1 G: 1 INJECTION INTRAVENOUS at 14:00

## 2025-06-24 RX ADMIN — Medication 10 ML: at 08:29

## 2025-06-24 RX ADMIN — SODIUM CHLORIDE, POTASSIUM CHLORIDE, SODIUM LACTATE AND CALCIUM CHLORIDE 500 ML: 600; 310; 30; 20 INJECTION, SOLUTION INTRAVENOUS at 09:44

## 2025-06-24 RX ADMIN — MUPIROCIN 1 APPLICATION: 20 OINTMENT TOPICAL at 17:22

## 2025-06-24 RX ADMIN — MUPIROCIN 1 APPLICATION: 20 OINTMENT TOPICAL at 08:29

## 2025-06-24 NOTE — PLAN OF CARE
Goal Outcome Evaluation:  Plan of Care Reviewed With: patient        Progress: no change  Outcome Evaluation: pt to [facility] to [facility]. \[facility].\"" in [place], report called to GARY Schaefer. Son, Saúl, informed of discharge and okay with it. ivs removed. waiting on EMS for transportation. skin care performed.

## 2025-06-24 NOTE — DISCHARGE SUMMARY
[facility]         HOSPITALIST  DISCHARGE SUMMARY    Patient Name: Alberto Rachel  : 1960  MRN: [id number]    Date of Admission: 2025  Date of Discharge:  25  Primary Care Physician: Magnus Euceda DO    Consultants:  - Nephrology: Dr. Rajendra Lion, Dr. Ida Knight, [facility]    [facility] Problems:  Gastroenteritis likely viral   UTI due to Enterococcus faecalis  Dehydration  ESVIN due to prerenal from dehydration  Hyponatremia  Anion gap metabolic acidosis due to ESVIN and diarrhea  Hypokalemia, repleted  Hyperphosphatemia, repleted  Hypertension  Hyperlipidemia  Type 2 diabetes mellitus  Hypothyroidism  CKD stage IIIa  CAD status post CABG    Hospital Course     Hospital Course:  Alberto Rachel is a 64 y.o. male with hypertension, hyperlipidemia, type 2 diabetes mellitus, hypothyroidism, CKD stage IIIa, CAD s/p CABG who presented to the ED with complaints of dizziness, weakness and diarrhea.  Eval in ED significant labs showing elevated creatinine compared to baseline.  Urinalysis consistent with UTI.  CT abdomen/pelvis without contrast did not have any acute abnormalities.  CXR without any active disease.  Empiric antibiotics and IV fluids started.  Nephrology consulted to assist in care.  Urine culture returned positive for E faecalis, patient will complete antibiotic treatment after discharge.  PT/OT evaluated patient and rehab placement recommended, patient will discharge to [place] for continued work with skilled therapy services.  Patient is to follow-up with nephrology in 1 week with BMP to be obtained prior to appointment per nephrology.  On day of discharge patient hemodynamically stable and no additional inpatient evaluation or workup necessary at this time, patient discharged home with outpatient follow-up.    DISCHARGE Follow Up Recommendations for labs and diagnostics:   - Follow-up with PCP in 3 to 5 days  -Follow-up with nephrology in 1 week  with BMP prior to appointment    Day of Discharge     Vital Signs:  Temp:  [98.5 °F (36.9 °C)-99.1 °F (37.3 °C)] 98.6 °F (37 °C)  Heart Rate:  [68-88] 68  Resp:  [16] 16  BP: ()/(46-70) 108/46  Physical Exam:   Gen: Conversant, sitting up in bed  Resp: Normal respiratory effort, equal chest rise bilaterally  Card: RRR, No m/r/g  Abd: Soft, Nontender, Nondistended, + bowel sounds    Discharge Details        Discharge Medications        PAUSE taking these medications        Instructions Start Date   metFORMIN  MG 24 hr tablet  Wait to take this until your doctor or other care provider tells you to start again.  Commonly known as: GLUCOPHAGE-XR   1,000 mg, Oral, Every 12 Hours Scheduled      pioglitazone 15 MG tablet  Wait to take this until your doctor or other care provider tells you to start again.  Commonly known as: ACTOS   Take 1 tablet by mouth Daily.      Rybelsus 14 MG tablet  Wait to take this until your doctor or other care provider tells you to start again.  Generic drug: Semaglutide   14 mg, Oral, Every Morning             New Medications        Instructions Start Date   amoxicillin 500 MG capsule  Commonly known as: AMOXIL   500 mg, Oral, Every 8 Hours Scheduled      Insulin Lispro 100 UNIT/ML injection  Commonly known as: humaLOG   2-7 Units, Subcutaneous, 4 Times Daily Before Meals & Nightly             Changes to Medications        Instructions Start Date   empagliflozin 10 MG tablet tablet  Commonly known as: Jardiance  What changed:   medication strength  See the new instructions.   10 mg, Oral, Daily      famotidine 40 MG tablet  Commonly known as: PEPCID  What changed: See the new instructions.   TAKE 1 TABLET BY MOUTH TWICE DAILY FOR STOMACH      metoprolol succinate XL 25 MG 24 hr tablet  Commonly known as: TOPROL-XL  What changed: See the new instructions.   TAKE 1 TABLET BY MOUTH ONCE DAILY FOR BLOOD PRESSURE OR HEART      metoprolol succinate XL 25 MG 24 hr tablet  Commonly known  as: TOPROL-XL  What changed: You were already taking a medication with the same name, and this prescription was added. Make sure you understand how and when to take each.   25 mg, Oral, Daily             Continue These Medications        Instructions Start Date   albuterol sulfate  (90 Base) MCG/ACT inhaler  Commonly known as: PROVENTIL HFA;VENTOLIN HFA;PROAIR HFA   2 puffs, Inhalation, Every 4 Hours PRN      ARIPiprazole 20 MG tablet  Commonly known as: ABILIFY   20 mg, Oral, Daily      Aspirin Low Dose 81 MG EC tablet  Generic drug: aspirin   81 mg, Oral, Daily      atorvastatin 80 MG tablet  Commonly known as: LIPITOR   80 mg, Oral, Daily, for cholesterol      bumetanide 1 MG tablet  Commonly known as: BUMEX   1 mg, Oral, Daily PRN      citalopram 20 MG tablet  Commonly known as: CeleXA   Take 1 tablet by mouth Daily.      fenofibrate 145 MG tablet  Commonly known as: TRICOR   145 mg, Oral, Daily, for cholesterol      GNP Vitamin B-12 500 MCG tablet  Generic drug: cyanocobalamin   500 mcg, Oral, Daily      levocetirizine 5 MG tablet  Commonly known as: XYZAL   5 mg, Oral, Every Evening      levothyroxine 50 MCG tablet  Commonly known as: SYNTHROID, LEVOTHROID   50 mcg, Oral, Every Early Morning      nitroglycerin 0.4 MG SL tablet  Commonly known as: NITROSTAT   0.4 mg, Sublingual, Every 5 Minutes PRN, Take no more than 3 doses in 15 minutes.      ondansetron ODT 8 MG disintegrating tablet  Commonly known as: ZOFRAN-ODT   Place 1 tablet on the tongue Every 8 (Eight) Hours As Needed.      polyethylene glycol 17 GM/SCOOP powder  Commonly known as: MIRALAX   Take 17 g by mouth Daily As Needed.      telmisartan 20 MG tablet  Commonly known as: MICARDIS   Take 1 tablet by mouth Daily.      traZODone 100 MG tablet  Commonly known as: DESYREL   100 mg, Oral, Nightly, for sleep.             Stop These Medications      chlorthalidone 25 MG tablet  Commonly known as: HYGROTON              Allergies   Allergen  Reactions    Cyclobenzaprine Unknown - Low Severity     weakness       Discharge Disposition:  Rehab Facility or Unit (DC - External)    Diet:  Hospital:  Diet Order   Procedures    Diet: Diabetic, Cardiac; Healthy Heart (2-3 Na+); Consistent Carbohydrate; Fluid Consistency: Thin (IDDSI 0)       Discharge Activity:   Activity Instructions       Activity as Tolerated              CODE STATUS:  Code Status and Medical Interventions: CPR (Attempt to Resuscitate); Full Support   Ordered at: 06/18/25 2211     Code Status (Patient has no pulse and is not breathing):    CPR (Attempt to Resuscitate)     Medical Interventions (Patient has pulse or is breathing):    Full Support     Level Of Support Discussed With:    Patient       No future appointments.    Additional Instructions for the Follow-ups that You Need to Schedule       Discharge Follow-up with PCP   As directed       Currently Documented PCP:    Magnus Euceda DO    PCP Phone Number:    [phone]     Follow Up Details: Follow-up in 3-5 days        Basic Metabolic Panel    Jun 29, 2025 (Approximate)      Release to patient: Routine Release                Pertinent  and/or Most Recent Results     RADIOLOGY:  XR Chest 1 View [[id number]] Juventino as Reviewed   Order Status: Completed Collected: 06/22/25 1109    Updated: 06/22/25 1114   Narrative:     XR CHEST 1 VW    Date of Exam: 6/22/2025 10:52 AM EDT    Indication: inc wbc    Comparison: Chest radiograph 6/18/2025    FINDINGS:    The lungs are well-expanded. The heart and pulmonary vasculature are within normal limits. No pleural effusions are identified. There are no active appearing infiltrates. Prior median sternotomy.   Impression:     No active disease.      Electronically Signed: Garfield Wang MD   6/22/2025 11:12 AM EDT   Workstation ID: [id number]    CT Abdomen Pelvis Without Contrast [[id number]] Juventino as Reviewed   Order Status: Completed Collected: 06/18/25 2000    Updated: 06/18/25 2007   Narrative:     CT  ABDOMEN PELVIS WO CONTRAST    Date of Exam: 6/18/2025 6:55 PM EDT    Indication: Flank pain, kidney stone suspected  Abdominal pain  flank pain.    Comparison: CT abdomen pelvis 4/19/2025    Technique: Axial CT images were obtained of the abdomen and pelvis without the administration of contrast. Reconstructed coronal and sagittal images were also obtained. Automated exposure control and iterative construction methods were used.      Findings:  Included Chest: Bibasal atelectasis. Coronary artery calcifications. Status post CABG    Liver: No significant abnormality.     Gallbladder and biliary tree: No significant abnormality.     Spleen: No significant abnormality.     Pancreas: No significant abnormality.     Adrenal glands: No significant abnormality.     Kidneys and ureters: Bilateral renal cysts including hyperattenuating left renal cyst which could represent a hemorrhagic or proteinaceous cyst. No hydroureteronephrosis.     Stomach and duodenum:No significant abnormality.    Small and large bowel: Large volume stool in the rectum and sigmoid colon. No evidence of bowel obstruction. No significant pericolonic inflammatory changes seen. No findings to suggest acute appendicitis.    Peritoneal cavity: No free fluid or free air.    Bladder: Orozco in decompressed bladder.     Pelvic organs: No significant abnormality.     Vasculature: Atherosclerotic calcifications.     Lymph nodes: No pathologic appearing lymph nodes by imaging criteria.     Bones and soft tissues: Degenerative changes of the imaged spine. No acute osseous abnormality.   Impression:     Impression:  No acute findings in the abdomen/pelvis.      Electronically Signed: Castro Ponce MD   6/18/2025 8:05 PM EDT   Workstation ID: [id number]    XR Chest 1 View [[id number]] Juventino as Reviewed   Order Status: Completed Collected: 06/18/25 1631    Updated: 06/18/25 1634   Narrative:     XR CHEST 1 VW    Date of Exam: 6/18/2025 4:29 PM EDT    Indication:  Dysrhythmia Triage Protocol    Comparison: 4/23/2025    Findings:  Heart size is within normal limits. There are postoperative changes of prior sternotomy and presumably bypass. Pulmonary vascular markings in the chest are normal. The lungs are clear. No acute infiltrates or pleural fluid collections are identified.   Impression:     Impression:  1.Postoperative changes of prior sternotomy and presumably bypass.  2.No active disease.        Electronically Signed: Dino Dailey MD   6/18/2025 4:32 PM EDT   Workstation ID: [id number]       LAB RESULTS:      Lab 06/24/25  0435 06/23/25  0436 06/22/25  0430 06/21/25  0427 06/20/25  0516 06/19/25  0513 06/18/25  1928 06/18/25  1613   WBC 9.16 11.85* 16.37* 7.57 7.81   < >  --   --    HEMOGLOBIN 11.6* 11.7* 12.2* 11.6* 12.0*   < >  --   --    HEMATOCRIT 35.1* 36.1* 36.3* 33.6* 35.0*   < >  --   --    PLATELETS 195 190 209 190 180   < >  --   --    NEUTROS ABS 6.46 8.54* 13.79* 4.79 4.88   < >  --   --    IMMATURE GRANS (ABS) 0.08* 0.05 0.12* 0.06* 0.05   < >  --   --    LYMPHS ABS 1.68 2.02 1.45 1.85 2.02   < >  --   --    MONOS ABS 0.82 1.03* 0.96* 0.70 0.71   < >  --   --    EOS ABS 0.08 0.17 0.00 0.13 0.12   < >  --   --    MCV 88.6 88.9 85.4 84.6 85.0   < >  --   --    LACTATE  --   --   --   --   --   --  1.9 2.4*    < > = values in this interval not displayed.         Lab 06/24/25  0435 06/23/25  0436 06/22/25  0430 06/21/25  0427 06/20/25  0516 06/18/25  2340 06/18/25  1612   SODIUM 132* 135* 131* 132* 127*   < > 124*   POTASSIUM 3.7 4.2 3.5 3.4* 2.9*   < > 3.0*   CHLORIDE 101 102 96* 94* 90*   < > 79*   CO2 18.2* 21.2* 20.0* 23.1 27.0   < > 17.7*   ANION GAP 12.8 11.8 15.0 14.9 10.0   < > 27.3*   BUN 12.7 13.1 17.3 23.7* 40.5*   < > 83.8*   CREATININE 1.06 1.03 1.32* 1.33* 1.60*   < > 3.50*   EGFR 77.4 80.1 59.5* 59.0* 47.2*   < > 18.5*   GLUCOSE 88 60* 93 77 104*   < > 147*   CALCIUM 8.2* 8.9 8.9 8.7 8.2*   < > 9.6   MAGNESIUM 1.4* 2.0 1.4* 1.8 2.0   < > 2.1    PHOSPHORUS 1.4* 2.5 2.0* 2.5 1.7*   < >  --    HEMOGLOBIN A1C  --   --   --  6.20*  --   --   --    TSH  --   --   --   --   --   --  6.160*    < > = values in this interval not displayed.         Lab 06/19/25  0513 06/18/25  1612   TOTAL PROTEIN 7.2 8.2   ALBUMIN 3.6 4.1   GLOBULIN 3.6 4.1   ALT (SGPT) 15 20   AST (SGOT) 24 31   BILIRUBIN 1.2 1.3*   ALK PHOS 61 69         Lab 06/18/25  1817 06/18/25  1612   HSTROP T 57* 60*                 Brief Urine Lab Results  (Last result in the past 365 days)        Color   Clarity   Blood   Leuk Est   Nitrite   Protein   CREAT   Urine HCG        06/18/25 1818 Yellow   Cloudy   Moderate (2+)   Large (3+)   Negative   Trace                 Microbiology Results (last 10 days)       Procedure Component Value - Date/Time    Blood Culture - Blood, Arm, Right [[id number]]  (Normal) Collected: 06/22/25 1110    Lab Status: Preliminary result Specimen: Blood from Arm, Right Updated: 06/24/25 1115     Blood Culture No growth at 2 days    Blood Culture - Blood, Arm, Left [[id number]]  (Normal) Collected: 06/22/25 1109    Lab Status: Preliminary result Specimen: Blood from Arm, Left Updated: 06/24/25 1115     Blood Culture No growth at 2 days    Urine Culture - Urine, Indwelling Urethral Catheter [[id number]]  (Abnormal)  (Susceptibility) Collected: 06/18/25 1818    Lab Status: Final result Specimen: Urine from Indwelling Urethral Catheter Updated: 06/21/25 0434     Urine Culture 25,000 CFU/mL Enterococcus faecalis    Narrative:      Colonization of the urinary tract without infection is common. Treatment is discouraged unless the patient is symptomatic, pregnant, or undergoing an invasive urologic procedure.    Susceptibility        Enterococcus faecalis      ROSLYN      Ampicillin Susceptible      Levofloxacin Susceptible      Nitrofurantoin Susceptible      Vancomycin Susceptible                                   Results for orders placed during the hospital encounter of 04/15/25    Adult  Transthoracic Echo Complete W/ Cont if Necessary Per Protocol    Interpretation Summary    Left ventricular systolic function is normal. Calculated left ventricular EF = 57.9%    Left ventricular diastolic function is consistent with (grade I) impaired relaxation.    There is mild calcification of the aortic valve.      Labs Pending at Discharge:  Pending Labs       Order Current Status    Blood Culture - Blood, Arm, Left Preliminary result    Blood Culture - Blood, Arm, Right Preliminary result            Time spent on Discharge including face to face service:  31 minutes    Electronically signed by José Miguel Ortega MD, 06/24/25, 3:05 PM EDT.

## 2025-06-24 NOTE — THERAPY TREATMENT NOTE
Patient Name: Alberto Rachel  : 1960    MRN: [id number]                              Today's Date: 2025       Admit Date: 2025    Visit Dx:     ICD-10-CM ICD-9-CM   1. Acute renal failure, unspecified acute renal failure type  N17.9 584.9   2. Decreased activities of daily living (ADL)  Z78.9 V49.89   3. Difficulty walking  R26.2 719.7     Patient Active Problem List   Diagnosis    Anxiety    Atherosclerosis of coronary artery bypass graft of native heart without angina pectoris    Essential hypertension    Depression    Mixed hyperlipidemia    Type 2 diabetes mellitus with hyperglycemia, without long-term current use of insulin    Hypothyroidism    Arthritis of knee    Illiteracy    Cramps, extremity    Tachycardia    Obesity (BMI 30-39.9)    Degenerative disc disease, lumbar    Primary insomnia    Gastroesophageal reflux disease without esophagitis    Close exposure to COVID-19 virus    Mild intermittent asthma    Candidal dermatitis    Bronchitis    Bilateral shoulder pain    Hyperkalemia    Stage 3a chronic kidney disease (CKD)    Hyponatremia    Onychomycosis    Onychocryptosis    PVD (peripheral vascular disease)    Foot pain, bilateral    ESVIN (acute kidney injury)     Past Medical History:   Diagnosis Date    Anxiety     Arthritis     Atherosclerosis of coronary artery bypass graft of native heart without angina pectoris 10/23/2018    Coronary artery disease with previous bypass, LIMA to the LAD, SVG to ramus, and SVG to RCA in     CAD (coronary artery disease) 10/23/2018    Carotid artery stenosis with cerebral infarction 2015    Dr. St     Cramps, extremity 9/10/2021    Depression     DM2 (diabetes mellitus, type 2) 10/23/2018    Duodenal ulcer     Essential hypertension 10/23/2018    Headache     Heart attack 2015    Heartburn     HLD (hyperlipidemia)     HTN (hypertension)     Injury of right leg 2021    Irregular heart beat     Low back pain 2019    Mild episode  of recurrent depressive disorder 10/23/2018    Rash 03/18/2019    Shortness of breath     SOB (shortness of breath)     Type 2 diabetes mellitus with hyperglycemia, without long-term current use of insulin 06/11/2019    Uncontrolled diabetes mellitus 03/18/2019     Past Surgical History:   Procedure Laterality Date    CARDIAC CATHETERIZATION  06/2015    Dr. St     CIRCUMCISION      age 14 yrs old    CORONARY ARTERY BYPASS GRAFT  06/2015    5 bypasses      General Information       Row Name 06/24/25 1256          OT Time and Intention    Document Type therapy note (daily note)  -     Mode of Treatment individual therapy;occupational therapy  -AC     Comment patient took a drink with straw once seated in recliner and got choked.  Nursing notified.  -       Row Name 06/24/25 1256          General Information    Patient Profile Reviewed yes  -     Existing Precautions/Restrictions fall  -       Row Name 06/24/25 1256          Cognition    Orientation Status (Cognition) --  patient alert, able to follow directions with increased cues  -               User Key  (r) = Recorded By, (t) = Taken By, (c) = Cosigned By      Initials Name Provider Type    AC Betty Flores, MAL Occupational Therapist                     Mobility/ADL's       Row Name 06/24/25 1257          Bed Mobility    Bed Mobility bed mobility (all) activities  -     All Activities, [place] (Bed Mobility) contact guard  -     Assistive Device (Bed Mobility) head of bed elevated  -       Row Name 06/24/25 1257          Transfers    Transfers sit-stand transfer;toilet transfer  -       Row Name 06/24/25 1257          Sit-Stand Transfer    Sit-Stand Curlew (Transfers) contact guard;1 person assist;verbal cues  -     Assistive Device (Sit-Stand Transfers) walker, front-wheeled  -       Row Name 06/24/25 1257          Functional Mobility    Functional Mobility- Ind. Level verbal cues required;1 person  -     Functional Mobility-  Device walker, front-wheeled  -AC       Row Name 06/24/25 1257          Activities of Daily Living    BADL Assessment/Intervention toileting;grooming  -AC       Row Name 06/24/25 1257          Toileting Assessment/Training    [place]anson Level (Toileting) toileting skills;adjust/manage clothing;verbal cues;contact guard assist  -AC     Position (Toileting) supported standing  -AC       Row Name 06/24/25 1257          Grooming Assessment/Training    [place]anson Level (Grooming) grooming skills;wash face, hands;minimum assist (75% patient effort);contact guard assist;verbal cues  -AC     Position (Grooming) supported standing;sink side  -AC               User Key  (r) = Recorded By, (t) = Taken By, (c) = Cosigned By      Initials Name Provider Type    Betty Strickland OT Occupational Therapist                   Obj/Interventions       [facility] Name 06/24/25 1258          Sensory Assessment (Somatosensory)    Sensory Assessment (Somatosensory) UE sensation intact  -AC       Row Name 06/24/25 1258          Vision Assessment/Intervention    Visual Impairment/Limitations WFL  -[facility] Name 06/24/25 1258          Motor Skills    Functional Endurance fair minus/fair  -AC       Row Name 06/24/25 1258          Balance    Balance Assessment standing dynamic balance  -AC     Dynamic Standing Balance contact guard;verbal cues;1-person assist  -AC     Position/Device Used, Standing Balance supported;walker, front-wheeled  -AC     Balance Interventions standing;sit to stand;supported;dynamic;minimal challenge;occupation based/functional task  -AC               User Key  (r) = Recorded By, (t) = Taken By, (c) = Cosigned By      Initials Name Provider Type    Betty Strickland OT Occupational Therapist                   Goals/Plan    No documentation.                  Clinical Impression       [facility] Name 06/24/25 1300          Pain Assessment    Pretreatment Pain Rating 0/10 - no pain  -AC     Posttreatment Pain Rating 0/10 - no  pain  -AC       Row Name 06/24/25 1300          Plan of Care Review    Plan of Care Reviewed With patient  -AC     Progress no change  -     Outcome Evaluation Patient participated in adl and functional mobility task this date with cues for initiating tasks as well as safety with mobility. Patient to continue with therapy in order to maximize independence.  -AC       Row Name 06/24/25 1300          Therapy Plan Review/Discharge Plan (OT)    Anticipated Discharge Disposition (OT) sub acute care setting  -AC               User Key  (r) = Recorded By, (t) = Taken By, (c) = Cosigned By      Initials Name Provider Type    AC Betty Flores, OT Occupational Therapist                   Outcome Measures       Row Name 06/24/25 0828          How much help from another person do you currently need...    Turning from your back to your side while in flat bed without using bedrails? 3  -AG     Moving from lying on back to sitting on the side of a flat bed without bedrails? 3  -AG     Moving to and from a bed to a chair (including a wheelchair)? 3  -AG     Standing up from a chair using your arms (e.g., wheelchair, bedside chair)? 3  -AG     Climbing 3-5 steps with a railing? 2  -AG     To walk in hospital room? 3  -AG     AM-PAC 6 Clicks Score (PT) 17  -AG     Highest Level of Mobility Goal Stand (1 or More Minutes)-5  -AG               User Key  (r) = Recorded By, (t) = Taken By, (c) = Cosigned By      Initials Name Provider Type    Reji Lozada, RN Registered Nurse                      OT Recommendation and Plan     Plan of Care Review  Plan of Care Reviewed With: patient  Progress: no change  Outcome Evaluation: Patient participated in adl and functional mobility task this date with cues for initiating tasks as well as safety with mobility. Patient to continue with therapy in order to maximize independence.     Time Calculation:         Time Calculation- OT       Row Name 06/24/25 1302             Time Calculation-  OT    OT Received On 06/24/25  -AC         Timed Charges    34890 - OT Therapeutic Activity Minutes 10  -AC      08530 - OT Self Care/Mgmt Minutes 13  -AC         Total Minutes    Timed Charges Total Minutes 23  -AC       Total Minutes 23  -AC                User Key  (r) = Recorded By, (t) = Taken By, (c) = Cosigned By      Initials Name Provider Type     Betty Flores OT Occupational Therapist                  Therapy Charges for Today       Code Description Service Date Service Provider Modifiers Qty    [id number]  OT THERAPEUTIC ACT EA 15 MIN 6/24/2025 Betty Flores OT GO 1    [id number] HC OT SELF CARE/MGMT/TRAIN EA 15 MIN 6/24/2025 Betty Flores OT GO 1                 Betty Flores OT  6/24/2025

## 2025-06-24 NOTE — CONSULTS
Discharge Planning Assessment   Blank     Patient Name: Alberto Rachel  MRN: [id number]  Today's Date: 6/24/2025    Admit Date: 6/18/2025     Discharge Plan       Row Name 06/24/25 0953       Plan    Patient/Family in Agreement with Plan yes    Final Discharge Disposition Code 03 - skilled nursing facility (SNF)    Final Note Pre-cert has been approved for [facility]. MD notified.             Continued Care and Services - Admitted Since 6/18/2025       Destination       Service Provider Request Status Services Address Phone Fax Patient Preferred    [facility] Accepted -- [address] [facility] [place] [address] [phone] --           [facility]

## 2025-06-24 NOTE — PROGRESS NOTES
[facility]     Nephrology Progress Note      Patient Name: Alberto Rachel  : 1960  MRN: [id number]  Primary Care Physician:  Magnus Euceda DO  Date of admission: 2025    Subjective   Subjective     Interval History:  Patient Reports feeling okay.  Got mildly short of breath with ambulation from chair to bed.  No chest pain.  Voiding well.  No dysuria or fever.  Tolerating p.o. but has poor appetite.  Labs reviewed.    2600 cc urine output recorded last 24 hours.    Review of Systems   All systems were reviewed and negative except for: Was mentioned above.    Objective   Objective     Vitals:   Temp:  [98.2 °F (36.8 °C)-99.1 °F (37.3 °C)] 98.6 °F (37 °C)  Heart Rate:  [68-88] 68  Resp:  [16] 16  BP: ()/(46-70) 108/46  Physical Exam:   Constitutional: Awake, alert, no distress.  Sitting in bed.  Slightly slow to respond.   Eyes: sclerae anicteric, no conjunctival injection   HENT: mucous membranes moist   Neck: Supple, no thyromegaly, no lymphadenopathy, trachea midline, No JVD   Respiratory: Clear to auscultation bilaterally, nonlabored respirations    Cardiovascular: RRR, no murmurs, rubs, or gallops.   Gastrointestinal: Positive bowel sounds, soft, nontender, nondistended   Musculoskeletal: No edema, no clubbing or cyanosis   Psychiatric: Appropriate affect, cooperative   Neurologic: Oriented, moving all extremities, Cranial Nerves grossly intact, speech clear   Skin: warm and dry, no rashes     Result Review    Result Reviewed:  I have personally reviewed the results from the time of this admission to 2025 13:32 EDT and agree with these findings:  [x]  Laboratory  []  Microbiology  [x]  Radiology  []  EKG/Telemetry   []  Cardiology/Vascular   []  Pathology  []  Old records  []  Other:        Lab 25  0435 25  0436 25  0430 25  0427 25  0516 25  0513 25  2340 25  1612   SODIUM 132* 135* 131* 132* 127* 124* 126* 124*   POTASSIUM 3.7 4.2  3.5 3.4* 2.9* 2.9* 2.9* 3.0*   CHLORIDE 101 102 96* 94* 90* 85* 82* 79*   CO2 18.2* 21.2* 20.0* 23.1 27.0 14.5* 14.6* 17.7*   BUN 12.7 13.1 17.3 23.7* 40.5* 69.5* 77.5* 83.8*   CREATININE 1.06 1.03 1.32* 1.33* 1.60* 2.63* 3.01* 3.50*   GLUCOSE 88 60* 93 77 104* 83 90 147*   EGFR 77.4 80.1 59.5* 59.0* 47.2* 26.0* 22.1* 18.5*   ANION GAP 12.8 11.8 15.0 14.9 10.0 24.5* 29.4* 27.3*   MAGNESIUM 1.4* 2.0 1.4* 1.8 2.0 1.9  --  2.1   PHOSPHORUS 1.4* 2.5 2.0* 2.5 1.7* 2.2*  --   --            Most notable findings include: As above.    UA compatible with UTI.  Enterococcus faecalis.    Assessment & Plan   Assessment / Plan       Active Hospital Problems:  Active Hospital Problems    Diagnosis     **ESVIN (acute kidney injury)    Enterococcus faecalis UTI.  Anion gap acidosis.  Chronic kidney disease stage III    Assessment and Plan:    - Acute kidney injury secondary to intravascular volume depletion due to diuretics.  Peak creatinine 3.0, resolved.  Off IV fluid.  - CKD stage III, baseline creatinine 1.4-1.5 from previous records, thought to be secondary to hypertensive nephrosclerosis, previously bland urinary sediment and mild proteinuria.  CT abdomen pelvis without contrast showed no hydronephrosis.  - Mild anemia.  Monitor.  - Hypertension, blood pressure is controlled.  - Metabolic acidosis, multifactorial, improved.  - Coronary disease, status post CABG, medical management.  - Hyponatremia, hypovolemia, resolved.  - Type 2 diabetes with complications, per primary.  - Hypomagnesemia, being replaced IV.  - Hypophosphatemia, replaced per primary.  Improved nutrition should be helpful.  Discussed with nursing team.    From renal standpoint, patient can be discharged home.  I would be okay with resuming telmisartan 20 mg nightly.  Continue as needed Bumex for increased edema.  Avoid thiazides including chlorthalidone.  Okay to resume Jardiance 10 mg daily.  If discharged, would like to see him in the office for follow-up in  a week with repeat BMP.  Please call with any questions!    Electronically signed by Rajendra Lion MD, 6/24/2025, 13:32 EDT.    [phone]

## 2025-06-24 NOTE — PLAN OF CARE
Goal Outcome Evaluation:  Plan of Care Reviewed With: patient           Outcome Evaluation: Transfer from [facility].  Reports weakness. Incontinent of bladder.  Room air.

## 2025-06-27 LAB
BACTERIA SPEC AEROBE CULT: NORMAL
BACTERIA SPEC AEROBE CULT: NORMAL

## 2025-06-30 NOTE — PROGRESS NOTES
Chief Complaint  Diabetes (Pt seems confused. And short of breath recently. He is unsure if he is taking his medications. ), Shortness of Breath, and Fall (Recent fall at home. Difficulty bearing weight. )    Subjective          Alberto Rachel presents to [facility]  Diabetes  Shortness of Breath  Fall        History of Present Illness    Patient presents not feeling well confused looks terrible sent to the ED    Objective   Vital Signs:   BP 95/60 (BP Location: Right arm, Patient Position: Sitting, Cuff Size: Adult)   Pulse 110   Temp 97.4 °F (36.3 °C)   Resp 28   SpO2 100%       Physical Exam  Vitals reviewed.   Constitutional:       Appearance: He is well-developed. He is ill-appearing and toxic-appearing.   HENT:      Head: Normocephalic and atraumatic.      Right Ear: External ear normal.      Left Ear: External ear normal.      Nose: Nose normal.   Eyes:      Conjunctiva/sclera: Conjunctivae normal.      Pupils: Pupils are equal, round, and reactive to light.   Cardiovascular:      Rate and Rhythm: Tachycardia present.   Pulmonary:      Effort: Respiratory distress present.   Abdominal:      General: There is no distension.   Skin:     General: Skin is warm and dry.   Neurological:      Mental Status: He is disoriented.      Motor: Weakness present.   Psychiatric:         Mood and Affect: Mood and affect normal.         Behavior: Behavior normal.         Thought Content: Thought content normal.         Judgment: Judgment normal.          Result Review :   The following data was reviewed by: Magnus Euceda DO on 06/18/2025:  Common labs          6/22/2025    04:30 6/23/2025    04:36 6/24/2025    04:35   Common Labs   Glucose 93  60  88    BUN 17.3  13.1  12.7    Creatinine 1.32  1.03  1.06    Sodium 131  135  132    Potassium 3.5  4.2  3.7    Chloride 96  102  101    Calcium 8.9  8.9  8.2    WBC 16.37  11.85  9.16    Hemoglobin 12.2  11.7  11.6    Hematocrit 36.3  36.1   35.1    Platelets 209  190  195           Results                   Assessment and Plan    Diagnoses and all orders for this visit:    1. Tachypnea (Primary)    2. Type 2 diabetes mellitus with hyperglycemia, without long-term current use of insulin    3. Toxic confusional state        Assessment & Plan    Reviewed vitals patient's status called EMS transported to ED        Follow Up   No follow-ups on file.    Patient was given instructions and counseling regarding his condition or for health maintenance advice. Please see specific information pulled into the AVS if appropriate.       Transcribed from ambient dictation for Magnus Euceda DO by Magnus Euceda DO.  06/30/25   07:15 EDT    Patient or patient representative verbalized consent for the use of Ambient Listening during the visit with  Magnus Euceda DO for chart documentation. 6/30/2025  07:17 EDT

## 2025-07-11 ENCOUNTER — TELEPHONE (OUTPATIENT)
Dept: FAMILY MEDICINE CLINIC | Facility: CLINIC | Age: 65
End: 2025-07-11
Payer: MEDICARE

## 2025-07-11 NOTE — TELEPHONE ENCOUNTER
Caller: BENY WITH [facility]    Relationship to patient:     Best call back number: [phone]    Patient is needing: SANDRA WITH [facility] CALLED STATING PATIENT WAS RELEASED FROM [facility] YESTERDAY AND THEY ARE NEEDING A VERBAL ORDER TO RESUME CARE FOR THE PATIENT STARTING TOMORROW.

## 2025-07-14 DIAGNOSIS — K21.9 GASTROESOPHAGEAL REFLUX DISEASE WITHOUT ESOPHAGITIS: ICD-10-CM

## 2025-07-14 DIAGNOSIS — E78.2 MIXED HYPERLIPIDEMIA: ICD-10-CM

## 2025-07-14 DIAGNOSIS — F51.01 PRIMARY INSOMNIA: Chronic | ICD-10-CM

## 2025-07-14 RX ORDER — FAMOTIDINE 40 MG/1
TABLET, FILM COATED ORAL
Qty: 60 TABLET | Refills: 0 | Status: SHIPPED | OUTPATIENT
Start: 2025-07-14

## 2025-07-14 RX ORDER — TRAZODONE HYDROCHLORIDE 100 MG/1
100 TABLET ORAL NIGHTLY
Qty: 30 TABLET | Refills: 0 | Status: SHIPPED | OUTPATIENT
Start: 2025-07-14

## 2025-07-14 RX ORDER — ARIPIPRAZOLE 20 MG/1
20 TABLET ORAL DAILY
Qty: 30 TABLET | Refills: 0 | Status: SHIPPED | OUTPATIENT
Start: 2025-07-14

## 2025-07-14 RX ORDER — ASPIRIN 81 MG/1
81 TABLET, COATED ORAL DAILY
Qty: 30 TABLET | Refills: 0 | Status: SHIPPED | OUTPATIENT
Start: 2025-07-14

## 2025-07-14 NOTE — TELEPHONE ENCOUNTER
Caller: BENY WITH [facility]    Relationship to patient:     Best call back number: [phone]    Patient is needing: Beny called in and is requesting a call back regarding resumption of home health for patient and said she is needing verbal orders.

## 2025-07-17 RX ORDER — METOPROLOL SUCCINATE 25 MG/1
TABLET, EXTENDED RELEASE ORAL
Qty: 30 TABLET | Refills: 0 | Status: SHIPPED | OUTPATIENT
Start: 2025-07-17

## 2025-07-22 ENCOUNTER — OFFICE VISIT (OUTPATIENT)
Dept: FAMILY MEDICINE CLINIC | Facility: CLINIC | Age: 65
End: 2025-07-22
Payer: MEDICARE

## 2025-07-22 VITALS
OXYGEN SATURATION: 99 % | HEART RATE: 74 BPM | SYSTOLIC BLOOD PRESSURE: 111 MMHG | TEMPERATURE: 97.1 F | RESPIRATION RATE: 16 BRPM | BODY MASS INDEX: 27.28 KG/M2 | WEIGHT: 180 LBS | HEIGHT: 68 IN | DIASTOLIC BLOOD PRESSURE: 53 MMHG

## 2025-07-22 DIAGNOSIS — E78.2 MIXED HYPERLIPIDEMIA: Primary | ICD-10-CM

## 2025-07-22 DIAGNOSIS — E11.65 TYPE 2 DIABETES MELLITUS WITH HYPERGLYCEMIA, WITHOUT LONG-TERM CURRENT USE OF INSULIN: ICD-10-CM

## 2025-07-22 DIAGNOSIS — I10 ESSENTIAL HYPERTENSION: ICD-10-CM

## 2025-07-22 DIAGNOSIS — E03.9 HYPOTHYROIDISM, UNSPECIFIED TYPE: ICD-10-CM

## 2025-07-22 DIAGNOSIS — F51.01 PRIMARY INSOMNIA: ICD-10-CM

## 2025-07-22 DIAGNOSIS — J30.2 SEASONAL ALLERGIC RHINITIS, UNSPECIFIED TRIGGER: ICD-10-CM

## 2025-07-22 PROCEDURE — 3044F HG A1C LEVEL LT 7.0%: CPT | Performed by: FAMILY MEDICINE

## 2025-07-22 PROCEDURE — 1126F AMNT PAIN NOTED NONE PRSNT: CPT | Performed by: FAMILY MEDICINE

## 2025-07-22 PROCEDURE — 99214 OFFICE O/P EST MOD 30 MIN: CPT | Performed by: FAMILY MEDICINE

## 2025-07-22 PROCEDURE — 3074F SYST BP LT 130 MM HG: CPT | Performed by: FAMILY MEDICINE

## 2025-07-22 PROCEDURE — 3078F DIAST BP <80 MM HG: CPT | Performed by: FAMILY MEDICINE

## 2025-07-22 RX ORDER — CHLORTHALIDONE 25 MG/1
25 TABLET ORAL DAILY
COMMUNITY
Start: 2025-06-12

## 2025-07-23 ENCOUNTER — EXTERNAL PBMM DATA (OUTPATIENT)
Dept: PHARMACY | Facility: OTHER | Age: 65
End: 2025-07-23
Payer: MEDICARE

## 2025-07-25 DIAGNOSIS — E78.5 HYPERLIPIDEMIA, UNSPECIFIED HYPERLIPIDEMIA TYPE: ICD-10-CM

## 2025-07-25 RX ORDER — FENOFIBRATE 145 MG/1
145 TABLET, FILM COATED ORAL DAILY
Qty: 90 TABLET | Refills: 0 | Status: SHIPPED | OUTPATIENT
Start: 2025-07-25

## 2025-07-25 RX ORDER — ATORVASTATIN CALCIUM 80 MG/1
80 TABLET, FILM COATED ORAL DAILY
Qty: 30 TABLET | Refills: 0 | Status: SHIPPED | OUTPATIENT
Start: 2025-07-25

## 2025-08-05 DIAGNOSIS — F51.01 PRIMARY INSOMNIA: Chronic | ICD-10-CM

## 2025-08-05 DIAGNOSIS — K21.9 GASTROESOPHAGEAL REFLUX DISEASE WITHOUT ESOPHAGITIS: ICD-10-CM

## 2025-08-05 DIAGNOSIS — E78.2 MIXED HYPERLIPIDEMIA: ICD-10-CM

## 2025-08-06 RX ORDER — FAMOTIDINE 40 MG/1
TABLET, FILM COATED ORAL
Qty: 60 TABLET | Refills: 0 | Status: SHIPPED | OUTPATIENT
Start: 2025-08-06

## 2025-08-06 RX ORDER — TRAZODONE HYDROCHLORIDE 100 MG/1
100 TABLET ORAL NIGHTLY
Qty: 30 TABLET | Refills: 0 | Status: SHIPPED | OUTPATIENT
Start: 2025-08-06

## 2025-08-06 RX ORDER — LEVOCETIRIZINE DIHYDROCHLORIDE 5 MG/1
5 TABLET, FILM COATED ORAL EVERY EVENING
Qty: 30 TABLET | Refills: 1 | Status: SHIPPED | OUTPATIENT
Start: 2025-08-06

## 2025-08-06 RX ORDER — BUMETANIDE 1 MG/1
1 TABLET ORAL DAILY PRN
Qty: 90 TABLET | Refills: 0 | Status: SHIPPED | OUTPATIENT
Start: 2025-08-06

## 2025-08-06 RX ORDER — METOPROLOL SUCCINATE 25 MG/1
TABLET, EXTENDED RELEASE ORAL
Qty: 30 TABLET | Refills: 0 | Status: SHIPPED | OUTPATIENT
Start: 2025-08-06

## 2025-08-06 RX ORDER — ARIPIPRAZOLE 20 MG/1
20 TABLET ORAL DAILY
Qty: 30 TABLET | Refills: 0 | Status: SHIPPED | OUTPATIENT
Start: 2025-08-06

## 2025-08-06 RX ORDER — ASPIRIN 81 MG/1
81 TABLET, COATED ORAL DAILY
Qty: 30 TABLET | Refills: 0 | Status: SHIPPED | OUTPATIENT
Start: 2025-08-06

## 2025-08-06 RX ORDER — CITALOPRAM HYDROBROMIDE 20 MG/1
20 TABLET ORAL DAILY
Qty: 90 TABLET | Refills: 0 | Status: SHIPPED | OUTPATIENT
Start: 2025-08-06

## 2025-08-06 RX ORDER — EMPAGLIFLOZIN 25 MG/1
TABLET, FILM COATED ORAL
Qty: 30 TABLET | Refills: 0 | Status: SHIPPED | OUTPATIENT
Start: 2025-08-06

## 2025-08-07 DIAGNOSIS — E03.9 HYPOTHYROIDISM, UNSPECIFIED TYPE: Chronic | ICD-10-CM

## 2025-08-07 RX ORDER — LEVOTHYROXINE SODIUM 50 UG/1
50 TABLET ORAL
Qty: 90 TABLET | Refills: 0 | Status: SHIPPED | OUTPATIENT
Start: 2025-08-07

## 2025-08-11 RX ORDER — METFORMIN HYDROCHLORIDE 500 MG/1
1000 TABLET, EXTENDED RELEASE ORAL EVERY 12 HOURS SCHEDULED
Qty: 120 TABLET | Refills: 0 | Status: SHIPPED | OUTPATIENT
Start: 2025-08-11

## 2025-08-14 RX ORDER — TELMISARTAN 20 MG/1
20 TABLET ORAL DAILY
Qty: 30 TABLET | Refills: 1 | OUTPATIENT
Start: 2025-08-14

## 2025-08-14 RX ORDER — TELMISARTAN 20 MG/1
20 TABLET ORAL DAILY
Qty: 90 TABLET | Refills: 3 | Status: SHIPPED | OUTPATIENT
Start: 2025-08-14

## 2025-08-19 RX ORDER — ATORVASTATIN CALCIUM 80 MG/1
80 TABLET, FILM COATED ORAL DAILY
Qty: 30 TABLET | Refills: 0 | Status: SHIPPED | OUTPATIENT
Start: 2025-08-19